# Patient Record
Sex: FEMALE | Race: WHITE | NOT HISPANIC OR LATINO | Employment: OTHER | URBAN - METROPOLITAN AREA
[De-identification: names, ages, dates, MRNs, and addresses within clinical notes are randomized per-mention and may not be internally consistent; named-entity substitution may affect disease eponyms.]

---

## 2020-02-22 ENCOUNTER — HOSPITAL ENCOUNTER (INPATIENT)
Facility: HOSPITAL | Age: 85
LOS: 2 days | Discharge: ANOTHER HOSPITAL | DRG: 813 | End: 2020-02-24
Attending: HOSPITALIST | Admitting: HOSPITALIST
Payer: MEDICARE

## 2020-02-22 ENCOUNTER — HOSPITAL ENCOUNTER (EMERGENCY)
Facility: HOSPITAL | Age: 85
Discharge: ANOTHER HOSPITAL | End: 2020-02-22
Attending: INTERNAL MEDICINE
Payer: MEDICARE

## 2020-02-22 VITALS
SYSTOLIC BLOOD PRESSURE: 138 MMHG | BODY MASS INDEX: 19.99 KG/M2 | WEIGHT: 120 LBS | TEMPERATURE: 98 F | HEIGHT: 65 IN | OXYGEN SATURATION: 95 % | HEART RATE: 93 BPM | DIASTOLIC BLOOD PRESSURE: 53 MMHG | RESPIRATION RATE: 22 BRPM

## 2020-02-22 DIAGNOSIS — I48.91 ATRIAL FIBRILLATION: ICD-10-CM

## 2020-02-22 DIAGNOSIS — R06.02 SOB (SHORTNESS OF BREATH): ICD-10-CM

## 2020-02-22 DIAGNOSIS — I50.811 ACUTE RIGHT-SIDED CONGESTIVE HEART FAILURE: ICD-10-CM

## 2020-02-22 DIAGNOSIS — D69.6 THROMBOCYTOPENIA: ICD-10-CM

## 2020-02-22 DIAGNOSIS — I34.0 MITRAL INSUFFICIENCY, ACUTE: ICD-10-CM

## 2020-02-22 DIAGNOSIS — I34.0 NONRHEUMATIC MITRAL VALVE REGURGITATION: ICD-10-CM

## 2020-02-22 DIAGNOSIS — I50.31 ACUTE DIASTOLIC CONGESTIVE HEART FAILURE: ICD-10-CM

## 2020-02-22 DIAGNOSIS — I48.91 ATRIAL FIBRILLATION, NEW ONSET: Primary | ICD-10-CM

## 2020-02-22 DIAGNOSIS — I49.9 ARRHYTHMIA: ICD-10-CM

## 2020-02-22 DIAGNOSIS — R07.89 OTHER CHEST PAIN: ICD-10-CM

## 2020-02-22 DIAGNOSIS — R07.9 CHEST PAIN: ICD-10-CM

## 2020-02-22 DIAGNOSIS — I48.91 ATRIAL FIBRILLATION WITH RAPID VENTRICULAR RESPONSE: ICD-10-CM

## 2020-02-22 DIAGNOSIS — D52.0 ANEMIA, MACROCYTIC, NUTRITIONAL: Primary | ICD-10-CM

## 2020-02-22 PROBLEM — I50.9 ACUTE CONGESTIVE HEART FAILURE: Status: ACTIVE | Noted: 2020-02-22

## 2020-02-22 PROBLEM — J96.01 ACUTE RESPIRATORY FAILURE WITH HYPOXIA: Status: ACTIVE | Noted: 2020-02-22

## 2020-02-22 PROBLEM — I10 BENIGN ESSENTIAL HYPERTENSION: Status: ACTIVE | Noted: 2020-02-22

## 2020-02-22 LAB
ABO + RH BLD: NORMAL
ALBUMIN SERPL BCP-MCNC: 3.7 G/DL (ref 3.5–5.2)
ALBUMIN SERPL BCP-MCNC: 3.9 G/DL (ref 3.5–5.2)
ALP SERPL-CCNC: 47 U/L (ref 55–135)
ALP SERPL-CCNC: 50 U/L (ref 55–135)
ALT SERPL W/O P-5'-P-CCNC: 34 U/L (ref 10–44)
ALT SERPL W/O P-5'-P-CCNC: 38 U/L (ref 10–44)
ANION GAP SERPL CALC-SCNC: 10 MMOL/L (ref 8–16)
ANION GAP SERPL CALC-SCNC: 11 MMOL/L (ref 8–16)
APTT BLDCRRT: <21 SEC (ref 21–32)
AST SERPL-CCNC: 26 U/L (ref 10–40)
AST SERPL-CCNC: 33 U/L (ref 10–40)
BASOPHILS # BLD AUTO: 0.01 K/UL (ref 0–0.2)
BASOPHILS NFR BLD: 0 % (ref 0–1.9)
BASOPHILS NFR BLD: 0.1 % (ref 0–1.9)
BILIRUB SERPL-MCNC: 0.9 MG/DL (ref 0.1–1)
BILIRUB SERPL-MCNC: 1 MG/DL (ref 0.1–1)
BLD GP AB SCN CELLS X3 SERPL QL: NORMAL
BLD PROD TYP BPU: NORMAL
BLOOD UNIT EXPIRATION DATE: NORMAL
BLOOD UNIT TYPE CODE: 5100
BLOOD UNIT TYPE: NORMAL
BNP SERPL-MCNC: 736 PG/ML (ref 0–99)
BUN SERPL-MCNC: 14 MG/DL (ref 8–23)
BUN SERPL-MCNC: 16 MG/DL (ref 8–23)
CALCIUM SERPL-MCNC: 9.1 MG/DL (ref 8.7–10.5)
CALCIUM SERPL-MCNC: 9.1 MG/DL (ref 8.7–10.5)
CHLORIDE SERPL-SCNC: 94 MMOL/L (ref 95–110)
CHLORIDE SERPL-SCNC: 99 MMOL/L (ref 95–110)
CO2 SERPL-SCNC: 31 MMOL/L (ref 23–29)
CO2 SERPL-SCNC: 37 MMOL/L (ref 23–29)
CODING SYSTEM: NORMAL
CREAT SERPL-MCNC: 0.6 MG/DL (ref 0.5–1.4)
CREAT SERPL-MCNC: 0.8 MG/DL (ref 0.5–1.4)
DIFFERENTIAL METHOD: ABNORMAL
DIFFERENTIAL METHOD: ABNORMAL
DISPENSE STATUS: NORMAL
EOSINOPHIL # BLD AUTO: 0 K/UL (ref 0–0.5)
EOSINOPHIL NFR BLD: 0.2 % (ref 0–8)
EOSINOPHIL NFR BLD: 2 % (ref 0–8)
ERYTHROCYTE [DISTWIDTH] IN BLOOD BY AUTOMATED COUNT: 12.8 % (ref 11.5–14.5)
ERYTHROCYTE [DISTWIDTH] IN BLOOD BY AUTOMATED COUNT: 13 % (ref 11.5–14.5)
EST. GFR  (AFRICAN AMERICAN): >60 ML/MIN/1.73 M^2
EST. GFR  (AFRICAN AMERICAN): >60 ML/MIN/1.73 M^2
EST. GFR  (NON AFRICAN AMERICAN): >60 ML/MIN/1.73 M^2
EST. GFR  (NON AFRICAN AMERICAN): >60 ML/MIN/1.73 M^2
GLUCOSE SERPL-MCNC: 111 MG/DL (ref 70–110)
GLUCOSE SERPL-MCNC: 123 MG/DL (ref 70–110)
HCT VFR BLD AUTO: 42.7 % (ref 37–48.5)
HCT VFR BLD AUTO: 44.6 % (ref 37–48.5)
HGB BLD-MCNC: 13.6 G/DL (ref 12–16)
HGB BLD-MCNC: 14.3 G/DL (ref 12–16)
IMM GRANULOCYTES # BLD AUTO: 0.03 K/UL (ref 0–0.04)
IMM GRANULOCYTES # BLD AUTO: ABNORMAL K/UL (ref 0–0.04)
IMM GRANULOCYTES NFR BLD AUTO: 0.4 % (ref 0–0.5)
IMM GRANULOCYTES NFR BLD AUTO: ABNORMAL % (ref 0–0.5)
INFLUENZA A, MOLECULAR: NEGATIVE
INFLUENZA B, MOLECULAR: NEGATIVE
INR PPP: 1.1 (ref 0.8–1.2)
LYMPHOCYTES # BLD AUTO: 0.7 K/UL (ref 1–4.8)
LYMPHOCYTES NFR BLD: 12 % (ref 18–48)
LYMPHOCYTES NFR BLD: 9.2 % (ref 18–48)
MAGNESIUM SERPL-MCNC: 1.5 MG/DL (ref 1.6–2.6)
MCH RBC QN AUTO: 32.1 PG (ref 27–31)
MCH RBC QN AUTO: 32.1 PG (ref 27–31)
MCHC RBC AUTO-ENTMCNC: 31.9 G/DL (ref 32–36)
MCHC RBC AUTO-ENTMCNC: 32.1 G/DL (ref 32–36)
MCV RBC AUTO: 100 FL (ref 82–98)
MCV RBC AUTO: 101 FL (ref 82–98)
MONOCYTES # BLD AUTO: 0.7 K/UL (ref 0.3–1)
MONOCYTES NFR BLD: 8.8 % (ref 4–15)
MONOCYTES NFR BLD: 9 % (ref 4–15)
NEUTROPHILS # BLD AUTO: 6.5 K/UL (ref 1.8–7.7)
NEUTROPHILS NFR BLD: 77 % (ref 38–73)
NEUTROPHILS NFR BLD: 81.3 % (ref 38–73)
NRBC BLD-RTO: 0 /100 WBC
NRBC BLD-RTO: 0 /100 WBC
NUM UNITS TRANS WBC-POOR PLATPHERESIS: NORMAL
PLATELET # BLD AUTO: 16 K/UL (ref 150–350)
PLATELET # BLD AUTO: 9 K/UL (ref 150–350)
PMV BLD AUTO: 11.6 FL (ref 9.2–12.9)
PMV BLD AUTO: 13.7 FL (ref 9.2–12.9)
POTASSIUM SERPL-SCNC: 4 MMOL/L (ref 3.5–5.1)
POTASSIUM SERPL-SCNC: 4.1 MMOL/L (ref 3.5–5.1)
PROT SERPL-MCNC: 6.5 G/DL (ref 6–8.4)
PROT SERPL-MCNC: 7 G/DL (ref 6–8.4)
PROTHROMBIN TIME: 11.5 SEC (ref 9–12.5)
RBC # BLD AUTO: 4.24 M/UL (ref 4–5.4)
RBC # BLD AUTO: 4.46 M/UL (ref 4–5.4)
SODIUM SERPL-SCNC: 140 MMOL/L (ref 136–145)
SODIUM SERPL-SCNC: 142 MMOL/L (ref 136–145)
SPECIMEN SOURCE: NORMAL
TROPONIN I SERPL DL<=0.01 NG/ML-MCNC: 0.03 NG/ML (ref 0.02–0.5)
TROPONIN I SERPL DL<=0.01 NG/ML-MCNC: 0.05 NG/ML
WBC # BLD AUTO: 8.03 K/UL (ref 3.9–12.7)
WBC # BLD AUTO: 8.5 K/UL (ref 3.9–12.7)

## 2020-02-22 PROCEDURE — 25000003 PHARM REV CODE 250: Performed by: INTERNAL MEDICINE

## 2020-02-22 PROCEDURE — 85025 COMPLETE CBC W/AUTO DIFF WBC: CPT

## 2020-02-22 PROCEDURE — 36430 TRANSFUSION BLD/BLD COMPNT: CPT

## 2020-02-22 PROCEDURE — 85730 THROMBOPLASTIN TIME PARTIAL: CPT

## 2020-02-22 PROCEDURE — 83735 ASSAY OF MAGNESIUM: CPT

## 2020-02-22 PROCEDURE — 93005 ELECTROCARDIOGRAM TRACING: CPT

## 2020-02-22 PROCEDURE — 71045 X-RAY EXAM CHEST 1 VIEW: CPT | Mod: TC,FY

## 2020-02-22 PROCEDURE — 71045 XR CHEST AP PORTABLE: ICD-10-PCS | Mod: 26,,, | Performed by: RADIOLOGY

## 2020-02-22 PROCEDURE — 80053 COMPREHEN METABOLIC PANEL: CPT | Mod: 91

## 2020-02-22 PROCEDURE — 80053 COMPREHEN METABOLIC PANEL: CPT

## 2020-02-22 PROCEDURE — 36415 COLL VENOUS BLD VENIPUNCTURE: CPT

## 2020-02-22 PROCEDURE — 85610 PROTHROMBIN TIME: CPT

## 2020-02-22 PROCEDURE — 96374 THER/PROPH/DIAG INJ IV PUSH: CPT

## 2020-02-22 PROCEDURE — 84484 ASSAY OF TROPONIN QUANT: CPT | Mod: 91

## 2020-02-22 PROCEDURE — 96375 TX/PRO/DX INJ NEW DRUG ADDON: CPT

## 2020-02-22 PROCEDURE — 99285 EMERGENCY DEPT VISIT HI MDM: CPT | Mod: 25

## 2020-02-22 PROCEDURE — 85025 COMPLETE CBC W/AUTO DIFF WBC: CPT | Mod: 91

## 2020-02-22 PROCEDURE — 63600175 PHARM REV CODE 636 W HCPCS: Performed by: INTERNAL MEDICINE

## 2020-02-22 PROCEDURE — 96376 TX/PRO/DX INJ SAME DRUG ADON: CPT

## 2020-02-22 PROCEDURE — 86901 BLOOD TYPING SEROLOGIC RH(D): CPT

## 2020-02-22 PROCEDURE — 83880 ASSAY OF NATRIURETIC PEPTIDE: CPT

## 2020-02-22 PROCEDURE — P9037 PLATE PHERES LEUKOREDU IRRAD: HCPCS

## 2020-02-22 PROCEDURE — 71045 X-RAY EXAM CHEST 1 VIEW: CPT | Mod: 26,,, | Performed by: RADIOLOGY

## 2020-02-22 PROCEDURE — 27000221 HC OXYGEN, UP TO 24 HOURS

## 2020-02-22 PROCEDURE — 84484 ASSAY OF TROPONIN QUANT: CPT

## 2020-02-22 PROCEDURE — 20000000 HC ICU ROOM

## 2020-02-22 PROCEDURE — 87502 INFLUENZA DNA AMP PROBE: CPT

## 2020-02-22 RX ORDER — MAGNESIUM SULFATE HEPTAHYDRATE 40 MG/ML
4 INJECTION, SOLUTION INTRAVENOUS
Status: DISCONTINUED | OUTPATIENT
Start: 2020-02-22 | End: 2020-02-24 | Stop reason: HOSPADM

## 2020-02-22 RX ORDER — LABETALOL HYDROCHLORIDE 5 MG/ML
10 INJECTION, SOLUTION INTRAVENOUS
Status: COMPLETED | OUTPATIENT
Start: 2020-02-22 | End: 2020-02-22

## 2020-02-22 RX ORDER — ACETAMINOPHEN 325 MG/1
650 TABLET ORAL EVERY 4 HOURS PRN
Status: DISCONTINUED | OUTPATIENT
Start: 2020-02-22 | End: 2020-02-24 | Stop reason: HOSPADM

## 2020-02-22 RX ORDER — LISINOPRIL 40 MG/1
40 TABLET ORAL DAILY
COMMUNITY

## 2020-02-22 RX ORDER — POTASSIUM CHLORIDE 20 MEQ/1
40 TABLET, EXTENDED RELEASE ORAL
Status: DISCONTINUED | OUTPATIENT
Start: 2020-02-22 | End: 2020-02-24 | Stop reason: HOSPADM

## 2020-02-22 RX ORDER — POTASSIUM CHLORIDE 7.45 MG/ML
40 INJECTION INTRAVENOUS
Status: DISCONTINUED | OUTPATIENT
Start: 2020-02-22 | End: 2020-02-24 | Stop reason: HOSPADM

## 2020-02-22 RX ORDER — FUROSEMIDE 10 MG/ML
40 INJECTION INTRAMUSCULAR; INTRAVENOUS
Status: COMPLETED | OUTPATIENT
Start: 2020-02-22 | End: 2020-02-22

## 2020-02-22 RX ORDER — HYDRALAZINE HYDROCHLORIDE 25 MG/1
50 TABLET, FILM COATED ORAL
Status: COMPLETED | OUTPATIENT
Start: 2020-02-22 | End: 2020-02-22

## 2020-02-22 RX ORDER — CALCIUM CHLORIDE IN 0.9 % NACL 1 G/100 ML
1 INTRAVENOUS SOLUTION, PIGGYBACK (ML) INTRAVENOUS
Status: DISCONTINUED | OUTPATIENT
Start: 2020-02-22 | End: 2020-02-24 | Stop reason: HOSPADM

## 2020-02-22 RX ORDER — MAGNESIUM SULFATE HEPTAHYDRATE 40 MG/ML
2 INJECTION, SOLUTION INTRAVENOUS
Status: DISCONTINUED | OUTPATIENT
Start: 2020-02-22 | End: 2020-02-24 | Stop reason: HOSPADM

## 2020-02-22 RX ORDER — ONDANSETRON 2 MG/ML
4 INJECTION INTRAMUSCULAR; INTRAVENOUS EVERY 8 HOURS PRN
Status: DISCONTINUED | OUTPATIENT
Start: 2020-02-22 | End: 2020-02-24 | Stop reason: HOSPADM

## 2020-02-22 RX ORDER — POTASSIUM CHLORIDE 7.45 MG/ML
20 INJECTION INTRAVENOUS
Status: DISCONTINUED | OUTPATIENT
Start: 2020-02-22 | End: 2020-02-24 | Stop reason: HOSPADM

## 2020-02-22 RX ORDER — METOPROLOL TARTRATE 1 MG/ML
5 INJECTION, SOLUTION INTRAVENOUS ONCE
Status: COMPLETED | OUTPATIENT
Start: 2020-02-22 | End: 2020-02-22

## 2020-02-22 RX ORDER — POTASSIUM CHLORIDE 20 MEQ/1
20 TABLET, EXTENDED RELEASE ORAL
Status: DISCONTINUED | OUTPATIENT
Start: 2020-02-22 | End: 2020-02-24 | Stop reason: HOSPADM

## 2020-02-22 RX ORDER — DIGOXIN 0.25 MG/ML
500 INJECTION INTRAMUSCULAR; INTRAVENOUS ONCE
Status: COMPLETED | OUTPATIENT
Start: 2020-02-22 | End: 2020-02-22

## 2020-02-22 RX ORDER — LANOLIN ALCOHOL/MO/W.PET/CERES
800 CREAM (GRAM) TOPICAL
Status: DISCONTINUED | OUTPATIENT
Start: 2020-02-22 | End: 2020-02-24 | Stop reason: HOSPADM

## 2020-02-22 RX ORDER — ACETAMINOPHEN 325 MG/1
650 TABLET ORAL EVERY 8 HOURS PRN
Status: DISCONTINUED | OUTPATIENT
Start: 2020-02-22 | End: 2020-02-24 | Stop reason: HOSPADM

## 2020-02-22 RX ORDER — DIGOXIN 0.25 MG/ML
500 INJECTION INTRAMUSCULAR; INTRAVENOUS
Status: COMPLETED | OUTPATIENT
Start: 2020-02-22 | End: 2020-02-22

## 2020-02-22 RX ORDER — LABETALOL 100 MG/1
100 TABLET, FILM COATED ORAL
Status: COMPLETED | OUTPATIENT
Start: 2020-02-22 | End: 2020-02-22

## 2020-02-22 RX ORDER — MAGNESIUM SULFATE 1 G/100ML
1 INJECTION INTRAVENOUS
Status: DISCONTINUED | OUTPATIENT
Start: 2020-02-22 | End: 2020-02-24 | Stop reason: HOSPADM

## 2020-02-22 RX ORDER — METOPROLOL TARTRATE 25 MG/1
25 TABLET, FILM COATED ORAL 2 TIMES DAILY
Status: DISCONTINUED | OUTPATIENT
Start: 2020-02-22 | End: 2020-02-23

## 2020-02-22 RX ORDER — HYDROCODONE BITARTRATE AND ACETAMINOPHEN 500; 5 MG/1; MG/1
TABLET ORAL
Status: DISCONTINUED | OUTPATIENT
Start: 2020-02-22 | End: 2020-02-24 | Stop reason: HOSPADM

## 2020-02-22 RX ORDER — AMLODIPINE BESYLATE 2.5 MG/1
2.5 TABLET ORAL DAILY
Status: ON HOLD | COMMUNITY
End: 2020-09-23 | Stop reason: SDUPTHER

## 2020-02-22 RX ADMIN — MAGNESIUM SULFATE 2 G: 2 INJECTION INTRAVENOUS at 09:02

## 2020-02-22 RX ADMIN — LABETALOL HYDROCHLORIDE 10 MG: 5 INJECTION, SOLUTION INTRAVENOUS at 02:02

## 2020-02-22 RX ADMIN — DIGOXIN 500 MCG: 0.25 INJECTION INTRAMUSCULAR; INTRAVENOUS at 02:02

## 2020-02-22 RX ADMIN — NITROGLYCERIN 2 INCH: 20 OINTMENT TOPICAL at 01:02

## 2020-02-22 RX ADMIN — ACETAMINOPHEN 650 MG: 325 TABLET ORAL at 11:02

## 2020-02-22 RX ADMIN — METOPROLOL TARTRATE 25 MG: 25 TABLET ORAL at 09:02

## 2020-02-22 RX ADMIN — LABETALOL HYDROCHLORIDE 100 MG: 100 TABLET, FILM COATED ORAL at 03:02

## 2020-02-22 RX ADMIN — FUROSEMIDE 40 MG: 10 INJECTION, SOLUTION INTRAMUSCULAR; INTRAVENOUS at 01:02

## 2020-02-22 RX ADMIN — DIGOXIN 500 MCG: 0.25 INJECTION INTRAMUSCULAR; INTRAVENOUS at 01:02

## 2020-02-22 RX ADMIN — HYDRALAZINE HYDROCHLORIDE 50 MG: 25 TABLET, FILM COATED ORAL at 01:02

## 2020-02-22 RX ADMIN — METOPROLOL TARTRATE 5 MG: 1 INJECTION, SOLUTION INTRAVENOUS at 08:02

## 2020-02-22 NOTE — ED PROVIDER NOTES
Encounter Date: 2/22/2020       History     Chief Complaint   Patient presents with    Headache    Back Pain    Shortness of Breath    Weakness    Cough     Patient comes in with shortness of breath, weakness, inability to lay flat, and cough.  She also states that she has been having headache and back pain. She has never had shortness of breath weakness like this in the past.  She has no history of heart disease.  She has never been told that she had a murmur.  She has had swelling of her feet but this is not brand new.  She is not on any diuretics for her swelling. She is mildly tachypneic on examination        Review of patient's allergies indicates:  No Known Allergies  Past Medical History:   Diagnosis Date    Hypertension      History reviewed. No pertinent surgical history.  History reviewed. No pertinent family history.  Social History     Tobacco Use    Smoking status: Never Smoker   Substance Use Topics    Alcohol use: Never     Frequency: Never    Drug use: Never     Review of Systems   Constitutional: Positive for fatigue. Negative for fever.   HENT: Negative for sore throat.    Respiratory: Positive for cough, chest tightness and shortness of breath.    Cardiovascular: Positive for leg swelling. Negative for chest pain.   Gastrointestinal: Negative for nausea.   Genitourinary: Negative for dysuria.   Musculoskeletal: Negative for back pain.   Skin: Negative for rash.   Neurological: Negative for weakness.   Hematological: Does not bruise/bleed easily.   All other systems reviewed and are negative.      Physical Exam     Initial Vitals [02/22/20 1234]   BP Pulse Resp Temp SpO2   (!) 155/85 (!) 124 16 97.8 °F (36.6 °C) (!) 90 %      MAP       --         Physical Exam    Nursing note and vitals reviewed.  Constitutional: Vital signs are normal. She appears well-developed and well-nourished. She is active and cooperative.   HENT:   Head: Normocephalic and atraumatic.   Eyes: Conjunctivae and lids  are normal. Lids are everted and swept, no foreign bodies found.   Neck: Trachea normal, normal range of motion and full passive range of motion without pain. Neck supple.   Cardiovascular: S1 normal, S2 normal, intact distal pulses and normal pulses.  No extrasystoles are present.    Murmur heard.  Patient has a holosystolic murmur heard best in the left axillary line radiating to the back.  Is not heard anterior chest wall very much.  It starts at the S1 ins at the S2.      Patient is in atrial fibrillation with a rapid response.  She has PVCs   Pulmonary/Chest: She has rales.   Abdominal: Soft. Normal appearance and bowel sounds are normal.   Musculoskeletal: Normal range of motion.   Neurological: She is alert. She has normal reflexes. GCS eye subscore is 4. GCS verbal subscore is 5. GCS motor subscore is 6.   Skin: Skin is warm, dry and intact. Capillary refill takes less than 2 seconds.   Psychiatric: She has a normal mood and affect. Her speech is normal and behavior is normal. Cognition and memory are normal.         ED Course   Procedures  Labs Reviewed   CBC W/ AUTO DIFFERENTIAL - Abnormal; Notable for the following components:       Result Value    Mean Corpuscular Volume 100 (*)     Mean Corpuscular Hemoglobin 32.1 (*)     Platelets 16 (*)     Gran% 77.0 (*)     Lymph% 12.0 (*)     All other components within normal limits    Narrative:     plt of 16   critical result(s) called and verbal readback obtained   from   Dr. Cox@[id number] by guillaume on 02.22.20 by ALLISON 02/22/2020 14:42   COMPREHENSIVE METABOLIC PANEL - Abnormal; Notable for the following components:    CO2 31 (*)     Glucose 111 (*)     Alkaline Phosphatase 50 (*)     All other components within normal limits   B-TYPE NATRIURETIC PEPTIDE - Abnormal; Notable for the following components:     (*)     All other components within normal limits   INFLUENZA A & B BY MOLECULAR   PROTIME-INR   APTT     EKG Readings: (Independently Interpreted)    Initial Reading: No STEMI. Rhythm: Atrial Fibrillation. Ectopy: Greater than 6/min. ST Segments: Normal ST Segments. T Waves: Normal. Axis: Normal. Clinical Impression: Atrial Fibrillation with RVR with PVCs       Imaging Results          X-Ray Chest AP Portable (Final result)  Result time 02/22/20 13:55:05    Final result by Andrew Carter MD (02/22/20 13:55:05)                 Impression:      Cardiomediastinal silhouette enlargement with findings suggestive of underlying COPD.  Superimposed interstitial edema not excluded.      Electronically signed by: Andrew Carter  Date:    02/22/2020  Time:    13:55             Narrative:    EXAMINATION:  XR CHEST AP PORTABLE    CLINICAL HISTORY:  CHF;.    TECHNIQUE:  Single frontal portable view of the chest was performed.    COMPARISON:  None.    FINDINGS:  Cardiac monitor wires overlie the chest.  Cardiomediastinal silhouette is enlarged.  Calcific plaque projects over the aortic arch.Lungs are hyperexpanded with increased interstitial markings bilaterally and mild flattening of the hemidiaphragms suggestive of underlying COPD.  Superimposed interstitial edema not excluded.  No focal consolidation, pleural effusion, or pneumothorax.  Bones are intact.                              X-Rays:   Independently Interpreted Readings:   Chest X-Ray: Cardiomegaly present.  Increased vascular markings consistent with CHF are present.     Medical Decision Making:   Clinical Tests:   Lab Tests: Ordered and Reviewed  The following lab test(s) were unremarkable: CBC, CMP and BNP       <> Summary of Lab: Patient has elevated BNP.  She also has marked thrombocytopenia.  I exam of the slide myself and there or very few platelets noted. The test was run 3 times with ranging between 8016 1000 platelets.  Radiological Study: Ordered and Reviewed  Medical Tests: Ordered and Reviewed  ED Management:  Chest x-ray showed cardiomegaly with mild pulmonary edema.  EKG showed atrial fib with rapid  response and PVCs.  Patient was hypertensive in mildly tachypneic on admission.  She was found to have a very loud mitral insufficiency murmur.  This was confirmed with bedside echocardiogram.  She has normal left ventricular function. She has a wide open mitral insufficiency.  Rupture of the cardia E was not visualized but is possible.  She has some calcification on the mitral valve but vegetations are not seen.  Her left atrium is not tremendously dilated suggesting this is possibly fairly recent.  She has been seen by her doctor in the recent past and she has no one is made mention of any murmur.  Her murmur in her left axilla is very loud.    She was given nitroglycerin and hydralazine for her hypertension.  This was to vaso dilate due to the normal left ventricle and mitral insufficiency.  She was then given digoxin initially 500 mcg which she had minimal benefit then another 500 mcg which slowed her down slightly and converted her to sinus rhythm. Due to her rate still being significantly elevated she had a 5 mg dose of labetalol which was repeated and slowed her enough to evaluate effect on her ventricular function and tolerance tip.  She tolerated quite well. She was given 100 mg of labetalol p.o..  At the present time her rate is approximately 90 and her blood pressure is 110 over 60.  She is comfortable.  She is was also given Lasix 40 IV push.  She states she is breathing much better and feels much better.    Patient should be transferred for several reasons.  1.  Evaluation of the mitral insufficiency to of see if it is acute and if TAVR is a feasible treatment or if medical treatment alone can manage this problem.  2.  Severe thrombocytopenia unknown etiology.  The platelets are low enough level whether need hematologic evaluation.  3.  Atrial fibrillation which is now controlled.    Total time on this patient with critical care is 3 hr                                 Clinical Impression:        ICD-10-CM ICD-9-CM   1. Atrial fibrillation, new onset I48.91 427.31   2. Atrial fibrillation I48.91 427.31   3. Mitral insufficiency, acute I34.0 424.0   4. Thrombocytopenia D69.6 287.5   5. Acute diastolic congestive heart failure I50.31 428.31     428.0                             Jermain Brock MD  02/22/20 1622       Jermain Brock MD  04/01/20 [id number]

## 2020-02-23 ENCOUNTER — CLINICAL SUPPORT (OUTPATIENT)
Dept: CARDIOLOGY | Facility: HOSPITAL | Age: 85
DRG: 813 | End: 2020-02-23
Attending: INTERNAL MEDICINE
Payer: MEDICARE

## 2020-02-23 PROBLEM — I34.0 NONRHEUMATIC MITRAL VALVE REGURGITATION: Status: ACTIVE | Noted: 2020-02-23

## 2020-02-23 LAB
ANION GAP SERPL CALC-SCNC: 13 MMOL/L (ref 8–16)
ANION GAP SERPL CALC-SCNC: 7 MMOL/L (ref 8–16)
AORTIC ROOT ANNULUS: 2.83 CM
AORTIC VALVE CUSP SEPERATION: 1.67 CM
APTT PPP: 25.3 SEC (ref 23.6–33.3)
AV INDEX (PROSTH): 0.66
AV MEAN GRADIENT: 6 MMHG
AV PEAK GRADIENT: 11 MMHG
AV VALVE AREA: 2.05 CM2
AV VELOCITY RATIO: 53.75
BASOPHILS # BLD AUTO: 0 K/UL (ref 0–0.2)
BASOPHILS # BLD AUTO: 0.01 K/UL (ref 0–0.2)
BASOPHILS NFR BLD: 0 % (ref 0–1.9)
BASOPHILS NFR BLD: 0.2 % (ref 0–1.9)
BNP SERPL-MCNC: 891 PG/ML (ref 0–99)
BSA FOR ECHO PROCEDURE: 1.58 M2
BUN SERPL-MCNC: 20 MG/DL (ref 8–23)
BUN SERPL-MCNC: 21 MG/DL (ref 8–23)
CALCIUM SERPL-MCNC: 8.8 MG/DL (ref 8.7–10.5)
CALCIUM SERPL-MCNC: 8.9 MG/DL (ref 8.7–10.5)
CHLORIDE SERPL-SCNC: 96 MMOL/L (ref 95–110)
CHLORIDE SERPL-SCNC: 96 MMOL/L (ref 95–110)
CO2 SERPL-SCNC: 34 MMOL/L (ref 23–29)
CO2 SERPL-SCNC: 37 MMOL/L (ref 23–29)
CREAT SERPL-MCNC: 0.9 MG/DL (ref 0.5–1.4)
CREAT SERPL-MCNC: 0.9 MG/DL (ref 0.5–1.4)
CV ECHO LV RWT: 0.52 CM
DIFFERENTIAL METHOD: ABNORMAL
DIFFERENTIAL METHOD: ABNORMAL
DOP CALC AO PEAK VEL: 1.66 M/S
DOP CALC AO VTI: 28.61 CM
DOP CALC LVOT AREA: 3.1 CM2
DOP CALC LVOT DIAMETER: 1.99 CM
DOP CALC LVOT PEAK VEL: 89.22 M/S
DOP CALC LVOT STROKE VOLUME: 58.69 CM3
DOP CALCLVOT PEAK VEL VTI: 18.88 CM
E WAVE DECELERATION TIME: 166.07 MSEC
E/A RATIO: 1.25
E/E' RATIO: 18.63 M/S
ECHO LV POSTERIOR WALL: 0.93 CM (ref 0.6–1.1)
EOSINOPHIL # BLD AUTO: 0 K/UL (ref 0–0.5)
EOSINOPHIL # BLD AUTO: 0.1 K/UL (ref 0–0.5)
EOSINOPHIL NFR BLD: 0 % (ref 0–8)
EOSINOPHIL NFR BLD: 1.5 % (ref 0–8)
ERYTHROCYTE [DISTWIDTH] IN BLOOD BY AUTOMATED COUNT: 12.7 % (ref 11.5–14.5)
ERYTHROCYTE [DISTWIDTH] IN BLOOD BY AUTOMATED COUNT: 12.9 % (ref 11.5–14.5)
EST. GFR  (AFRICAN AMERICAN): >60 ML/MIN/1.73 M^2
EST. GFR  (AFRICAN AMERICAN): >60 ML/MIN/1.73 M^2
EST. GFR  (NON AFRICAN AMERICAN): 57.7 ML/MIN/1.73 M^2
EST. GFR  (NON AFRICAN AMERICAN): 57.7 ML/MIN/1.73 M^2
FRACTIONAL SHORTENING: 24 % (ref 28–44)
GLUCOSE SERPL-MCNC: 102 MG/DL (ref 70–110)
GLUCOSE SERPL-MCNC: 157 MG/DL (ref 70–110)
HCT VFR BLD AUTO: 38.4 % (ref 37–48.5)
HCT VFR BLD AUTO: 40.3 % (ref 37–48.5)
HGB BLD-MCNC: 11.8 G/DL (ref 12–16)
HGB BLD-MCNC: 12.3 G/DL (ref 12–16)
IMM GRANULOCYTES # BLD AUTO: 0.02 K/UL (ref 0–0.04)
IMM GRANULOCYTES # BLD AUTO: 0.04 K/UL (ref 0–0.04)
IMM GRANULOCYTES NFR BLD AUTO: 0.3 % (ref 0–0.5)
IMM GRANULOCYTES NFR BLD AUTO: 0.4 % (ref 0–0.5)
INR PPP: 1.1
INTERVENTRICULAR SEPTUM: 1.11 CM (ref 0.6–1.1)
LDH SERPL L TO P-CCNC: 162 U/L (ref 110–260)
LEFT ATRIUM SIZE: 3.83 CM
LEFT INTERNAL DIMENSION IN SYSTOLE: 2.7 CM (ref 2.1–4)
LEFT VENTRICLE MASS INDEX: 68 G/M2
LEFT VENTRICULAR INTERNAL DIMENSION IN DIASTOLE: 3.57 CM (ref 3.5–6)
LEFT VENTRICULAR MASS: 109.64 G
LV LATERAL E/E' RATIO: 14.9 M/S
LV SEPTAL E/E' RATIO: 24.83 M/S
LYMPHOCYTES # BLD AUTO: 0.3 K/UL (ref 1–4.8)
LYMPHOCYTES # BLD AUTO: 1 K/UL (ref 1–4.8)
LYMPHOCYTES NFR BLD: 14.9 % (ref 18–48)
LYMPHOCYTES NFR BLD: 3.4 % (ref 18–48)
MAGNESIUM SERPL-MCNC: 1.9 MG/DL (ref 1.6–2.6)
MAGNESIUM SERPL-MCNC: 2.1 MG/DL (ref 1.6–2.6)
MCH RBC QN AUTO: 32 PG (ref 27–31)
MCH RBC QN AUTO: 32 PG (ref 27–31)
MCHC RBC AUTO-ENTMCNC: 30.5 G/DL (ref 32–36)
MCHC RBC AUTO-ENTMCNC: 30.7 G/DL (ref 32–36)
MCV RBC AUTO: 104 FL (ref 82–98)
MCV RBC AUTO: 105 FL (ref 82–98)
MONOCYTES # BLD AUTO: 0.4 K/UL (ref 0.3–1)
MONOCYTES # BLD AUTO: 1 K/UL (ref 0.3–1)
MONOCYTES NFR BLD: 15 % (ref 4–15)
MONOCYTES NFR BLD: 4.6 % (ref 4–15)
MV PEAK A VEL: 1.19 M/S
MV PEAK E VEL: 1.49 M/S
NEUTROPHILS # BLD AUTO: 4.5 K/UL (ref 1.8–7.7)
NEUTROPHILS # BLD AUTO: 8.3 K/UL (ref 1.8–7.7)
NEUTROPHILS NFR BLD: 68.1 % (ref 38–73)
NEUTROPHILS NFR BLD: 91.6 % (ref 38–73)
NRBC BLD-RTO: 0 /100 WBC
NRBC BLD-RTO: 0 /100 WBC
PATH REV BLD -IMP: NORMAL
PISA TR MAX VEL: 2.3 M/S
PLATELET # BLD AUTO: 15 K/UL (ref 150–350)
PLATELET # BLD AUTO: 51 K/UL (ref 150–350)
PLATELET BLD QL SMEAR: ABNORMAL
PMV BLD AUTO: 10.5 FL (ref 9.2–12.9)
PMV BLD AUTO: 10.5 FL (ref 9.2–12.9)
POTASSIUM SERPL-SCNC: 4.3 MMOL/L (ref 3.5–5.1)
POTASSIUM SERPL-SCNC: 4.9 MMOL/L (ref 3.5–5.1)
PROTHROMBIN TIME: 13.4 SEC (ref 10.6–14.8)
PV PEAK VELOCITY: 125.89 CM/S
RA PRESSURE: 8 MMHG
RBC # BLD AUTO: 3.69 M/UL (ref 4–5.4)
RBC # BLD AUTO: 3.84 M/UL (ref 4–5.4)
RIGHT VENTRICULAR END-DIASTOLIC DIMENSION: 180 CM
SODIUM SERPL-SCNC: 140 MMOL/L (ref 136–145)
SODIUM SERPL-SCNC: 143 MMOL/L (ref 136–145)
TDI LATERAL: 0.1 M/S
TDI SEPTAL: 0.06 M/S
TDI: 0.08 M/S
TR MAX PG: 21 MMHG
TROPONIN I SERPL DL<=0.01 NG/ML-MCNC: 0.06 NG/ML
TROPONIN I SERPL DL<=0.01 NG/ML-MCNC: 0.06 NG/ML
TV REST PULMONARY ARTERY PRESSURE: 29 MMHG
WBC # BLD AUTO: 6.58 K/UL (ref 3.9–12.7)
WBC # BLD AUTO: 9.01 K/UL (ref 3.9–12.7)

## 2020-02-23 PROCEDURE — 25500020 PHARM REV CODE 255: Performed by: INTERNAL MEDICINE

## 2020-02-23 PROCEDURE — 94761 N-INVAS EAR/PLS OXIMETRY MLT: CPT

## 2020-02-23 PROCEDURE — 25000003 PHARM REV CODE 250: Performed by: INTERNAL MEDICINE

## 2020-02-23 PROCEDURE — 25000003 PHARM REV CODE 250

## 2020-02-23 PROCEDURE — 83615 LACTATE (LD) (LDH) ENZYME: CPT

## 2020-02-23 PROCEDURE — 80048 BASIC METABOLIC PNL TOTAL CA: CPT

## 2020-02-23 PROCEDURE — 93306 TTE W/DOPPLER COMPLETE: CPT

## 2020-02-23 PROCEDURE — 84484 ASSAY OF TROPONIN QUANT: CPT | Mod: 91

## 2020-02-23 PROCEDURE — 85730 THROMBOPLASTIN TIME PARTIAL: CPT

## 2020-02-23 PROCEDURE — 83735 ASSAY OF MAGNESIUM: CPT

## 2020-02-23 PROCEDURE — 99291 PR CRITICAL CARE, E/M 30-74 MINUTES: ICD-10-PCS | Mod: ,,, | Performed by: INTERNAL MEDICINE

## 2020-02-23 PROCEDURE — 20000000 HC ICU ROOM

## 2020-02-23 PROCEDURE — 63700000 PHARM REV CODE 250 ALT 637 W/O HCPCS

## 2020-02-23 PROCEDURE — 93005 ELECTROCARDIOGRAM TRACING: CPT

## 2020-02-23 PROCEDURE — 63600175 PHARM REV CODE 636 W HCPCS: Performed by: INTERNAL MEDICINE

## 2020-02-23 PROCEDURE — 36415 COLL VENOUS BLD VENIPUNCTURE: CPT

## 2020-02-23 PROCEDURE — 51702 INSERT TEMP BLADDER CATH: CPT

## 2020-02-23 PROCEDURE — 85025 COMPLETE CBC W/AUTO DIFF WBC: CPT | Mod: 91

## 2020-02-23 PROCEDURE — 83880 ASSAY OF NATRIURETIC PEPTIDE: CPT

## 2020-02-23 PROCEDURE — 63600175 PHARM REV CODE 636 W HCPCS

## 2020-02-23 PROCEDURE — 25000003 PHARM REV CODE 250: Performed by: NURSE PRACTITIONER

## 2020-02-23 PROCEDURE — 99900035 HC TECH TIME PER 15 MIN (STAT)

## 2020-02-23 PROCEDURE — 94660 CPAP INITIATION&MGMT: CPT

## 2020-02-23 PROCEDURE — 85610 PROTHROMBIN TIME: CPT

## 2020-02-23 PROCEDURE — 80048 BASIC METABOLIC PNL TOTAL CA: CPT | Mod: 91

## 2020-02-23 PROCEDURE — 27000221 HC OXYGEN, UP TO 24 HOURS

## 2020-02-23 PROCEDURE — 99291 CRITICAL CARE FIRST HOUR: CPT | Mod: ,,, | Performed by: INTERNAL MEDICINE

## 2020-02-23 PROCEDURE — 83735 ASSAY OF MAGNESIUM: CPT | Mod: 91

## 2020-02-23 PROCEDURE — 84484 ASSAY OF TROPONIN QUANT: CPT

## 2020-02-23 RX ORDER — SODIUM CHLORIDE 9 MG/ML
INJECTION, SOLUTION INTRAVENOUS ONCE
Status: DISCONTINUED | OUTPATIENT
Start: 2020-02-24 | End: 2020-02-24

## 2020-02-23 RX ORDER — MORPHINE SULFATE 2 MG/ML
INJECTION, SOLUTION INTRAMUSCULAR; INTRAVENOUS
Status: COMPLETED
Start: 2020-02-23 | End: 2020-02-23

## 2020-02-23 RX ORDER — ATROPINE SULFATE 0.1 MG/ML
INJECTION INTRAVENOUS
Status: DISPENSED
Start: 2020-02-23 | End: 2020-02-24

## 2020-02-23 RX ORDER — DILTIAZEM HCL/D5W 125 MG/125
5 PLASTIC BAG, INJECTION (ML) INTRAVENOUS ONCE
Status: DISCONTINUED | OUTPATIENT
Start: 2020-02-23 | End: 2020-02-24 | Stop reason: HOSPADM

## 2020-02-23 RX ORDER — NITROGLYCERIN 20 MG/100ML
5 INJECTION INTRAVENOUS CONTINUOUS
Status: DISCONTINUED | OUTPATIENT
Start: 2020-02-23 | End: 2020-02-24 | Stop reason: HOSPADM

## 2020-02-23 RX ORDER — DIPHENHYDRAMINE HCL 25 MG
50 CAPSULE ORAL
Status: DISCONTINUED | OUTPATIENT
Start: 2020-02-23 | End: 2020-02-24 | Stop reason: HOSPADM

## 2020-02-23 RX ORDER — MORPHINE SULFATE 2 MG/ML
2 INJECTION, SOLUTION INTRAMUSCULAR; INTRAVENOUS
Status: DISCONTINUED | OUTPATIENT
Start: 2020-02-23 | End: 2020-02-24 | Stop reason: HOSPADM

## 2020-02-23 RX ORDER — FAMOTIDINE 20 MG/1
20 TABLET, FILM COATED ORAL DAILY
Status: DISCONTINUED | OUTPATIENT
Start: 2020-02-23 | End: 2020-02-24

## 2020-02-23 RX ORDER — FUROSEMIDE 10 MG/ML
20 INJECTION INTRAMUSCULAR; INTRAVENOUS ONCE
Status: COMPLETED | OUTPATIENT
Start: 2020-02-23 | End: 2020-02-23

## 2020-02-23 RX ORDER — CHLORHEXIDINE GLUCONATE ORAL RINSE 1.2 MG/ML
15 SOLUTION DENTAL 2 TIMES DAILY
Status: DISCONTINUED | OUTPATIENT
Start: 2020-02-23 | End: 2020-02-24 | Stop reason: HOSPADM

## 2020-02-23 RX ORDER — DILTIAZEM HYDROCHLORIDE 5 MG/ML
INJECTION INTRAVENOUS
Status: COMPLETED
Start: 2020-02-23 | End: 2020-02-23

## 2020-02-23 RX ORDER — PREDNISONE 20 MG/1
20 TABLET ORAL
Status: DISCONTINUED | OUTPATIENT
Start: 2020-02-23 | End: 2020-02-23

## 2020-02-23 RX ORDER — LISINOPRIL 10 MG/1
10 TABLET ORAL DAILY
Status: DISCONTINUED | OUTPATIENT
Start: 2020-02-23 | End: 2020-02-24 | Stop reason: HOSPADM

## 2020-02-23 RX ORDER — MUPIROCIN 20 MG/G
OINTMENT TOPICAL 2 TIMES DAILY
Status: DISCONTINUED | OUTPATIENT
Start: 2020-02-23 | End: 2020-02-24 | Stop reason: HOSPADM

## 2020-02-23 RX ORDER — METHYLPREDNISOLONE SOD SUCC 125 MG
125 VIAL (EA) INJECTION EVERY 6 HOURS
Status: DISCONTINUED | OUTPATIENT
Start: 2020-02-23 | End: 2020-02-23

## 2020-02-23 RX ORDER — FUROSEMIDE 10 MG/ML
INJECTION INTRAMUSCULAR; INTRAVENOUS
Status: COMPLETED
Start: 2020-02-23 | End: 2020-02-23

## 2020-02-23 RX ORDER — NITROGLYCERIN 0.4 MG/1
TABLET SUBLINGUAL
Status: COMPLETED
Start: 2020-02-23 | End: 2020-02-23

## 2020-02-23 RX ORDER — PREDNISONE 20 MG/1
20 TABLET ORAL EVERY 6 HOURS
Status: DISCONTINUED | OUTPATIENT
Start: 2020-02-23 | End: 2020-02-24 | Stop reason: HOSPADM

## 2020-02-23 RX ADMIN — LISINOPRIL 10 MG: 10 TABLET ORAL at 12:02

## 2020-02-23 RX ADMIN — MORPHINE SULFATE 2 MG: 2 INJECTION, SOLUTION INTRAMUSCULAR; INTRAVENOUS at 03:02

## 2020-02-23 RX ADMIN — IOHEXOL 100 ML: 350 INJECTION, SOLUTION INTRAVENOUS at 08:02

## 2020-02-23 RX ADMIN — PREDNISONE 20 MG: 20 TABLET ORAL at 11:02

## 2020-02-23 RX ADMIN — METOPROLOL TARTRATE 25 MG: 25 TABLET ORAL at 08:02

## 2020-02-23 RX ADMIN — ACETAMINOPHEN 650 MG: 325 TABLET ORAL at 05:02

## 2020-02-23 RX ADMIN — FUROSEMIDE 20 MG: 10 INJECTION, SOLUTION INTRAMUSCULAR; INTRAVENOUS at 05:02

## 2020-02-23 RX ADMIN — MORPHINE SULFATE 2 MG: 2 INJECTION, SOLUTION INTRAMUSCULAR; INTRAVENOUS at 11:02

## 2020-02-23 RX ADMIN — ACETAMINOPHEN 650 MG: 325 TABLET ORAL at 08:02

## 2020-02-23 RX ADMIN — PREDNISONE 20 MG: 20 TABLET ORAL at 05:02

## 2020-02-23 RX ADMIN — NITROGLYCERIN 5 MCG/MIN: 20 INJECTION INTRAVENOUS at 05:02

## 2020-02-23 RX ADMIN — FAMOTIDINE 20 MG: 20 TABLET ORAL at 05:02

## 2020-02-23 RX ADMIN — ACETAMINOPHEN 650 MG: 325 TABLET ORAL at 09:02

## 2020-02-23 RX ADMIN — NITROGLYCERIN 0.4 MG: 0.4 TABLET, ORALLY DISINTEGRATING SUBLINGUAL at 03:02

## 2020-02-23 RX ADMIN — DILTIAZEM HYDROCHLORIDE 10 MG: 5 INJECTION INTRAVENOUS at 03:02

## 2020-02-23 RX ADMIN — MUPIROCIN: 20 OINTMENT TOPICAL at 09:02

## 2020-02-23 RX ADMIN — CHLORHEXIDINE GLUCONATE 15 ML: 1.2 RINSE ORAL at 09:02

## 2020-02-23 RX ADMIN — FUROSEMIDE 20 MG: 10 INJECTION, SOLUTION INTRAMUSCULAR; INTRAVENOUS at 03:02

## 2020-02-23 RX ADMIN — FUROSEMIDE 20 MG: 10 INJECTION INTRAMUSCULAR; INTRAVENOUS at 05:02

## 2020-02-23 NOTE — SUBJECTIVE & OBJECTIVE
Objective:     Vital Signs (Most Recent):  Temp: (P) 98 °F (36.7 °C) (02/22/20 1953)  Pulse: 90 (02/22/20 1953)  Resp: (!) 22 (02/22/20 1953)  BP: (!) 171/79 (02/22/20 1900)  SpO2: 95 % (02/22/20 1953) Vital Signs (24h Range):  Temp:  [97.8 °F (36.6 °C)-98 °F (36.7 °C)] (P) 98 °F (36.7 °C)  Pulse:  [] 90  Resp:  [14-33] 22  SpO2:  [84 %-97 %] 95 %  BP: ()/() 171/79        Significant Labs:   CBC:   Recent Labs   Lab 02/22/20  1345   WBC 8.50   HGB 14.3   HCT 44.6   PLT 16*     CMP:   Recent Labs   Lab 02/22/20  1345      K 4.1   CL 99   CO2 31*   *   BUN 14   CREATININE 0.6   CALCIUM 9.1   PROT 7.0   ALBUMIN 3.9   BILITOT 1.0   ALKPHOS 50*   AST 33   ALT 38   ANIONGAP 10   EGFRNONAA >60.0     Cardiac Markers:   Recent Labs   Lab 02/22/20  1345   *     Coagulation:   Recent Labs   Lab 02/22/20  1345   INR 1.1   APTT <21.0     Troponin:   Recent Labs   Lab 02/22/20  1345   TROPONINI 0.03       Significant Imaging: I have reviewed all pertinent imaging results/findings within the past 24 hours.     Reading Physician Reading Date Result Priority   Andrew Carter MD 2/22/2020 STAT      Narrative     EXAMINATION:  XR CHEST AP PORTABLE    CLINICAL HISTORY:  CHF;.    TECHNIQUE:  Single frontal portable view of the chest was performed.    COMPARISON:  None.    FINDINGS:  Cardiac monitor wires overlie the chest.  Cardiomediastinal silhouette is enlarged.  Calcific plaque projects over the aortic arch.Lungs are hyperexpanded with increased interstitial markings bilaterally and mild flattening of the hemidiaphragms suggestive of underlying COPD.  Superimposed interstitial edema not excluded.  No focal consolidation, pleural effusion, or pneumothorax.  Bones are intact.      Impression       Cardiomediastinal silhouette enlargement with findings suggestive of underlying COPD.  Superimposed interstitial edema not excluded.      Electronically signed by: Andrew Carter  Date: 02/22/2020  Time:  13:55

## 2020-02-23 NOTE — SUBJECTIVE & OBJECTIVE
Past Medical History:   Diagnosis Date    Cancer     skin cancer    Hypertension        History reviewed. No pertinent surgical history.    Review of patient's allergies indicates:  No Known Allergies    Family History     None        Tobacco Use    Smoking status: Never Smoker   Substance and Sexual Activity    Alcohol use: Never     Frequency: Never    Drug use: Never    Sexual activity: Not Currently         Review of Systems   Constitutional: Positive for activity change and fatigue. Negative for appetite change, chills, diaphoresis and fever.   HENT: Negative for congestion, hearing loss, nosebleeds, postnasal drip, sneezing and sore throat.    Respiratory: Positive for shortness of breath. Negative for apnea, cough, choking, chest tightness, wheezing and stridor.    Cardiovascular: Positive for leg swelling. Negative for chest pain and palpitations.        Atrial fibrillation   Gastrointestinal: Negative for abdominal distention, abdominal pain, blood in stool, constipation, diarrhea and nausea.   Genitourinary: Negative for dysuria, frequency, hematuria and urgency.   Musculoskeletal: Negative for arthralgias and myalgias.   Neurological: Positive for weakness. Negative for dizziness, tremors, seizures, syncope, facial asymmetry, speech difficulty, light-headedness, numbness and headaches.   Hematological: Negative for adenopathy.   Psychiatric/Behavioral: Negative for dysphoric mood and suicidal ideas. The patient is not nervous/anxious.      Objective:     Vital Signs (Most Recent):  Temp: 98.5 °F (36.9 °C) (02/23/20 1501)  Pulse: 88 (02/23/20 1501)  Resp: (!) 50 (02/23/20 1501)  BP: (!) 170/74 (02/23/20 1530)  SpO2: (!) 85 % (02/23/20 1501) Vital Signs (24h Range):  Temp:  [97.9 °F (36.6 °C)-98.6 °F (37 °C)] 98.5 °F (36.9 °C)  Pulse:  [] 88  Resp:  [17-56] 50  SpO2:  [82 %-100 %] 85 %  BP: (117-193)/() 170/74     Weight: 56.7 kg (125 lb)  Body mass index is 20.8 kg/m².      Intake/Output  Summary (Last 24 hours) at 2/23/2020 1615  Last data filed at 2/23/2020 0901  Gross per 24 hour   Intake 275 ml   Output 775 ml   Net -500 ml       Physical Exam   Constitutional: She is oriented to person, place, and time. She appears well-developed and well-nourished. No distress.   Thin elderly female, nad, wearing BiPAP  No distress   HENT:   Head: Normocephalic and atraumatic.   Right Ear: External ear normal.   Left Ear: External ear normal.   Nose: Nose normal.   Mouth/Throat: Oropharynx is clear and moist.   Eyes: Pupils are equal, round, and reactive to light. Conjunctivae and EOM are normal.   Neck: Normal range of motion. Neck supple. No JVD present. No tracheal deviation present. No thyromegaly present.   JVD up   Cardiovascular: Normal rate and intact distal pulses. Exam reveals no gallop and no friction rub.   Murmur heard.  Irregular, + PVC   Pulmonary/Chest: Effort normal. No stridor. No respiratory distress. She has no wheezes. She has rales (few posterior). She exhibits no tenderness.   Wearing BiPAP  No acc m use   Abdominal: Soft. Bowel sounds are normal. She exhibits no distension. There is no tenderness. There is no rebound and no guarding.   Musculoskeletal: Normal range of motion. She exhibits edema. She exhibits no tenderness.   Lymphadenopathy:     She has no cervical adenopathy.   Neurological: She is alert and oriented to person, place, and time. No cranial nerve deficit.   Skin: Skin is warm and dry. She is not diaphoretic.   Psychiatric: She has a normal mood and affect. Her behavior is normal.   Nursing note and vitals reviewed.      Vents:  Oxygen Concentration (%): 100 (02/23/20 1540)    Lines/Drains/Airways     Peripheral Intravenous Line                 Peripheral IV - Single Lumen 02/22/20 20 G Posterior;Right Hand 1 day         Peripheral IV - Single Lumen 02/22/20 1910 Left;Posterior Hand less than 1 day                Significant Labs:    CBC/Anemia Profile:  Recent Labs   Lab  02/22/20  1345 02/22/20 1949 02/23/20 0419   WBC 8.50 8.03 6.58   HGB 14.3 13.6 11.8*   HCT 44.6 42.7 38.4   PLT 16* 9* 51*   * 101* 104*   RDW 12.8 13.0 12.9        Chemistries:  Recent Labs   Lab 02/22/20  1345 02/22/20 1949 02/23/20 0419    142 143   K 4.1 4.0 4.3   CL 99 94* 96   CO2 31* 37* 34*   BUN 14 16 20   CREATININE 0.6 0.8 0.9   CALCIUM 9.1 9.1 8.9   ALBUMIN 3.9 3.7  --    PROT 7.0 6.5  --    BILITOT 1.0 0.9  --    ALKPHOS 50* 47*  --    ALT 38 34  --    AST 33 26  --    MG  --  1.5* 2.1       Coagulation:   Recent Labs   Lab 02/22/20  1345   INR 1.1   APTT <21.0     Troponin:   Recent Labs   Lab 02/22/20 1949 02/23/20 0419 02/23/20  0941   TROPONINI 0.053* 0.060* 0.058*     All pertinent labs within the past 24 hours have been reviewed.    Flu screen - negative    Significant Imaging:   I have reviewed and interpreted all pertinent imaging results/findings within the past 24 hours.     CXR (2/23) - some hyperinflation, edema, enlarged heart with straight left heart border  Dopplers - pending    EKG  Vent. Rate : 083 BPM     Atrial Rate : 083 BPM     P-R Int : 272 ms          QRS Dur : 090 ms      QT Int : 330 ms       P-R-T Axes : 070 003 086 degrees     QTc Int : 387 ms    Sinus rhythm with 1st degree A-V block with occasional Premature  ventricular complexes  Anterior infarct ,age undetermined  Abnormal ECG  When compared with ECG of 23-FEB-2020 02:01,  Nonspecific T wave abnormality, improved in Lateral leads  Confirmed by Ton Feliciano MD ([id number]) on 2/23/2020 3:55:29 PM  (no S1Q3T3)    ECHO  · Concentric left ventricular remodeling.  · Normal left ventricular systolic function. The estimated ejection fraction is 65%.  · Indeterminate left ventricular diastolic function.  · No wall motion abnormalities.  · Normal right ventricular systolic function.  · Mild left atrial enlargement.  · There is mild leaflet calcification of the Mitral Valve.  · Mild mitral  sclerosis.  · Moderate-to-severe mitral regurgitation.  · Mild tricuspid regurgitation.  · Intermediate central venous pressure (8 mmHg).  · The estimated PA systolic pressure is 29 mmHg.

## 2020-02-23 NOTE — PROGRESS NOTES
Apparently patient got out of bed became acutely short of breath and hypoxemic this is associated with significant anxiety..  Patient had developed atrial fibrillation with rapid ventricular rates she was placed back in the bed.  With increased oxygen she developed significant hypertension of 220.  She was promptly treated with sublingual nitro placed on BiPAP machine for respiratory support and gradual improvement.  She converted back to normal sinus rhythm.  Blood pressure is improved now to 157/74 mm of mercury heart rate is 76 beats per minute with frequent PVCs.  No acute ST changes noted on telemetry.  She was also given 2 mg of morphine to which she responded very well.  With BiPAP machine and about treatment a saturations are normalized completely.    She is resting comfortably at this time.  Lungs have coarse crackles at the bases.  Cardiac exam remains to have holosystolic murmur at the apex extremities have no significant tenderness.  Ultrasound the lower extremities have been requested per hospitalist  At this point patient may need angiographic assessment more definite diagnosis.  I will tentatively schedule her for tomorrow morning as she is resting comfortably I will discuss this more with her in the morning.  Will recheck her labs in the morning as well as chest x-ray  In the meanwhile initiate IV Tridil drip.

## 2020-02-23 NOTE — H&P
[facility] Medicine  History & Physical    Patient Name: Fiona Arteaga  MRN: [id number]  Admission Date: 2/22/2020  Attending Physician: Yoel Mcfadden MD  Primary Care Provider: Primary Doctor No         Patient information was obtained from patient, past medical records and ER records.     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    She was in her usual state of health until about 2 weeks ago when she noticed gradually worsening shortness of breath.  States that she has chronic mild shortness of breath but has been worse over the last 2 weeks and very severe over the last 2 days prompting her to go to the ED.  Shortness of breath is worse with exertion and better with rest.  She has associated nonpitting bilateral lower extremity edema and orthopnea but denies paroxysmal nocturnal dyspnea. States that she had sore throat and some congestion about 2 weeks ago after which all these symptoms started.  Admits to pleuritic chest pain over anterior chest wall which has since resolved.  Also has associated nausea without any vomiting.  No prior history of congestive heart failure or coronary artery disease. She denies recent fever, chills, nausea/vomiting, epistaxis, gingival bleeding, diarrhea/constipation, dysuria/hematuria, hematemesis, melena or hematochezia. She is on amlodipine and lisinopril at home which she is compliant with.     Workup in the outside ED revealed acute severe thrombocytopenia.  She was also found to be in atrial fibrillation with RVR and possible new onset congestive heart failure with a heart murmur.  She  received 500 mcg of digoxin x2, IV furosemide 40 mg, hydralazine 50 mg p.o., IV labetalol and nitroglycerin patch.  After initial stabilization she was transferred to our facility for higher level of care.    Patient appears to be hemodynamically stable during my encounter.  Telemetry revealing AFib with RVR with heart rates anywhere between  beats per minute with frequent PVCs.  Blood pressure is elevated at 170/80 mmHg.  She is tachypneic on my exam and has conversational dyspnea.  Denies any chest pain or pressure.  Shortness of breath has improved.      Rest of the 10 point review of systems is negative except as mentioned above.  Past medical history:  Benign essential hypertension  Past surgical history:  Reviewed, nonpertinent  Social history: Denies history of tobacco, alcohol or drug abuse  Family history: Reviewed and noncontributory    EXAM:  General: Patient resting comfortably in no acute distress.  Eyes: PERRLA. No conjunctivae pallor. No scleral icterus.  ENT: OMM. No pharyngeal erythema.  Neck: Supple. No adenopathy.  Lungs:  Tachypnea noted; no accessory muscle use.  Faint bibasilar crackles audible.  No wheezing  Cor:  Irregularly irregular rhythm with tachycardia.  Holosystolic murmur heard best at the left lower sternal border.  Bilateral nonpitting edema extending all the way to the knees  Abd: Soft. Nontender. No HSM. BS (+)  Musculoskeletal: No arthropathy, deformity.  Skin: No rashes, swelling, or erythema.  Neuro: A&O x3.  Speech is clear and coherent.  Motor strength is 5/5 in all extremities.  No sensory deficit.  Ext: No clubbing. No cyanosis. Peripheral pulses +      Objective:     Vital Signs (Most Recent):  Temp: (P) 98 °F (36.7 °C) (02/22/20 1953)  Pulse: 90 (02/22/20 1953)  Resp: (!) 22 (02/22/20 1953)  BP: (!) 171/79 (02/22/20 1900)  SpO2: 95 % (02/22/20 1953) Vital Signs (24h Range):  Temp:  [97.8 °F (36.6 °C)-98 °F (36.7 °C)] (P) 98 °F (36.7 °C)  Pulse:  [] 90  Resp:   [14-33] 22  SpO2:  [84 %-97 %] 95 %  BP: ()/() 171/79        Significant Labs:   CBC:   Recent Labs   Lab 02/22/20  1345   WBC 8.50   HGB 14.3   HCT 44.6   PLT 16*     CMP:   Recent Labs   Lab 02/22/20  1345      K 4.1   CL 99   CO2 31*   *   BUN 14   CREATININE 0.6   CALCIUM 9.1   PROT 7.0   ALBUMIN 3.9   BILITOT 1.0   ALKPHOS 50*   AST 33   ALT 38   ANIONGAP 10   EGFRNONAA >60.0     Cardiac Markers:   Recent Labs   Lab 02/22/20  1345   *     Coagulation:   Recent Labs   Lab 02/22/20  1345   INR 1.1   APTT <21.0     Troponin:   Recent Labs   Lab 02/22/20  1345   TROPONINI 0.03       Significant Imaging: I have reviewed all pertinent imaging results/findings within the past 24 hours.     Reading Physician Reading Date Result Priority   Andrew Carter MD 2/22/2020 STAT      Narrative     EXAMINATION:  XR CHEST AP PORTABLE    CLINICAL HISTORY:  CHF;.    TECHNIQUE:  Single frontal portable view of the chest was performed.    COMPARISON:  None.    FINDINGS:  Cardiac monitor wires overlie the chest.  Cardiomediastinal silhouette is enlarged.  Calcific plaque projects over the aortic arch.Lungs are hyperexpanded with increased interstitial markings bilaterally and mild flattening of the hemidiaphragms suggestive of underlying COPD.  Superimposed interstitial edema not excluded.  No focal consolidation, pleural effusion, or pneumothorax.  Bones are intact.      Impression       Cardiomediastinal silhouette enlargement with findings suggestive of underlying COPD.  Superimposed interstitial edema not excluded.      Electronically signed by: Andrew Carter  Date: 02/22/2020  Time: 13:55              Assessment/Plan:     * Atrial fibrillation with rapid ventricular response  New onset   S/p digoxin 500 mcg x 2 at outside facility   Also received IV and PO labetalol   Cardiac monitoring in the ICU   Converted to normal sinus rhythm after receiving IV metoprolol 5 mg push  Consult cardiology   Obtain  echocardiogram   No anticoagulation due to severe thrombocytopenia       Thrombocytopenia  Acute isolated thrombocytopenia  No evidence of active bleeding  Unlikely HIT, TTP or drug-induced etiology based on presentation   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)  Platelet count dropped to 9000 per microL; will order 1 unit of platelets  Consult Hematology further evaluation  Will obtain peripheral blood smear and LDH       Acute congestive heart failure  Possible related to valvular heart disease vs tachycardia induced cardiomyopathy due to AF with RVR   Treat AF as above   Supplemental oxygen   S/p IV furosemide 40 mg   Obtain echocardiogram   Repeat BNP next AM   Cardiology consult       Acute respiratory failure with hypoxia  Likely due to new onset CHF   Plan as above       Benign essential hypertension  Aware  Chronic medical issue   Monitor   Hold home amlodipine and lisinopril   Will start metoprolol PO to assist with rate control         Critical care time of 45 min spent    VTE Risk Mitigation (From admission, onward)         Ordered     IP VTE HIGH RISK PATIENT  Once      02/22/20 1914     Reason for No Pharmacological VTE Prophylaxis  Once     Question:  Reasons:  Answer:  Thrombocytopenia    02/22/20 1914                   Yoel Mcfadden MD  Department of Hospital Medicine   [facility]

## 2020-02-23 NOTE — ASSESSMENT & PLAN NOTE
· Due to acute pulmonary edema due to MR, Afib and accelerated HTN  · Better  · Wean O2 down and then wean off BiPAP  · She has NO h/o COPD or cigarette use

## 2020-02-23 NOTE — PROGRESS NOTES
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  She was in her usual state of health until about 2 weeks ago when she noticed gradually worsening shortness of breath.  States that she has chronic mild shortness of breath but has been worse over the last 2 weeks and very severe over the last 2 days prompting her to go to the ED.  Shortness of breath is worse with exertion and better with rest.  She has associated nonpitting bilateral lower extremity edema and orthopnea but denies paroxysmal nocturnal dyspnea. States that she had sore throat and some congestion about 2 weeks ago after which all these symptoms started.  Admits to pleuritic chest pain over anterior chest wall which has since resolved.  Also has associated nausea without any vomiting.  No prior history of congestive heart failure or coronary artery disease. She denies recent fever, chills, nausea/vomiting, epistaxis, gingival bleeding, diarrhea/constipation, dysuria/hematuria, hematemesis, melena or hematochezia. She is on amlodipine and lisinopril at home which she is compliant with.   Workup in the outside ED revealed acute severe thrombocytopenia.  She was also found to be in atrial fibrillation with RVR and possible new onset congestive heart failure with a heart murmur.  She received 500 mcg of digoxin x2, IV furosemide 40 mg, hydralazine 50 mg  p.o., IV labetalol and nitroglycerin patch.  After initial stabilization she was transferred to our facility for higher level of care.  Patient appears to be hemodynamically stable during my encounter.  Telemetry revealing AFib with RVR with heart rates anywhere between  beats per minute with frequent PVCs.  Blood pressure is elevated at 170/80 mmHg.  She is tachypneic on my exam and has conversational dyspnea.  Denies any chest pain or pressure.  Shortness of breath has improved.  Rest of the 10 point review of systems is negative except as mentioned above.  Past medical history:  Benign essential hypertension  Past surgical history:  Reviewed, nonpertinent  Social history: Denies history of tobacco, alcohol or drug abuse  Family history: Reviewed and noncontributory    Interval history:  Today the patient reports persistent shortness of breath.  She felt improved with medical treatment overnight, however this morning her shortness of breath worsened to severe intensity with constant timing.  At that time she was tachycardic.  She received treatment and subsequently her shortness of breath has improved some at this point.  She denies chest pain. No fever.  No headache or bleeding.    Physical exam:  Vital signs reviewed  General:  Nontoxic, frail, Venti mask in place  Eyes: PERRLA.  No conjunctival discharge. No scleral icterus.  ENT:  Moist mucous membranes  Lungs:  Increased work of breathing with some accessory muscle use.  No wheezing, diminished breath sounds.  Cor:  Irregularly irregular rhythm with tachycardia.  2+ radial pulses bilaterally. Holosystolic murmur.  1+ BLE edema  Abd: Soft. Nontender. Nondistended  Skin:  Dry and warm with no jaundice  Neuro:  Alert, follows commands  Psych:  Mood is calm, insight fair    Laboratory data:  Platelet 51  Hemoglobin 11  Hematocrit 38    Troponin 0.058    Chest x-ray personally reviewed:  Basilar atelectasis, minimal proximal vascular congestion  overall improved interstitial edema    Echocardiogram Impression:  · Concentric left ventricular remodeling.  · Normal left ventricular systolic function. The estimated ejection fraction is 65%.  · Indeterminate left ventricular diastolic function.  · No wall motion abnormalities.  · Normal right ventricular systolic function.  · Mild left atrial enlargement.  · There is mild leaflet calcification of the Mitral Valve.  · Mild mitral sclerosis.  · Moderate-to-severe mitral regurgitation.  · Mild tricuspid regurgitation.  · Intermediate central venous pressure (8 mmHg).  · The estimated PA systolic pressure is 29 mmHg.    Assessment/Plan:     Assessment:  Rapid atrial fibrillation  Acute hypoxemic respiratory failure secondary to pulmonary edema  Acute pulmonary edema due to acute diastolic CHF exacerbation, rapid atrial fibrillation, mitral regurgitation  Severe mitral regurgitation  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, improved  Hypertension  Anemia likely inflammatory    Plan:  Continue ICU care for close cardiopulmonary monitoring  Appreciate consultants  Additional diuresis IV Lasix 20 mg.  Monitor fluid status.  Monitor urine output.  Continue medical management including ACE-inhibitor and beta-blocker  Consider CTA chest rule out PE  Platelets stabilized.  No evidence of hemolysis.  Will stop start prednisone 50 mg p.o. daily and monitor platelets closely.    Hematology workup pending including platelet antibodies.  I discussed the case with Dr. Holland.  Mobilize as able  Mechanical VTE prophylaxis; cannot use heparin due to thrombocytopenia    * Atrial fibrillation with rapid ventricular response  New onset   S/p digoxin 500 mcg x 2 at outside facility   Also received IV and PO labetalol   Cardiac monitoring in the ICU   Converted to normal sinus rhythm after receiving IV metoprolol 5 mg push  Consult cardiology   Obtain echocardiogram   No anticoagulation due to severe thrombocytopenia       Acute respiratory  failure with hypoxia  Likely due to new onset CHF   Plan as above       Benign essential hypertension  Aware  Chronic medical issue   Monitor   Hold home amlodipine and lisinopril   Will start metoprolol PO to assist with rate control         Acute congestive heart failure  Possible related to valvular heart disease vs tachycardia induced cardiomyopathy due to AF with RVR   Treat AF as above   Supplemental oxygen   S/p IV furosemide 40 mg   Obtain echocardiogram   Repeat BNP next AM   Cardiology consult       Thrombocytopenia  Acute isolated thrombocytopenia  No evidence of active bleeding  Unlikely HIT, TTP or drug-induced etiology based on presentation   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

## 2020-02-23 NOTE — ASSESSMENT & PLAN NOTE
· Related to valvular heart disease  · Diurese, BP control, afterload reduction, heart rate control  · She did (and could again) decompensate very quickly

## 2020-02-23 NOTE — ASSESSMENT & PLAN NOTE
New onset   S/p digoxin 500 mcg x 2 at outside facility   Also received IV and PO labetalol   Cardiac monitoring in the ICU   Converted to normal sinus rhythm after receiving IV metoprolol 5 mg push  Consult cardiology   Obtain echocardiogram   No anticoagulation due to severe thrombocytopenia

## 2020-02-23 NOTE — ASSESSMENT & PLAN NOTE
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

## 2020-02-23 NOTE — HPI
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    She was in her usual state of health until about 2 weeks ago when she noticed gradually worsening shortness of breath.  States that she has chronic mild shortness of breath but has been worse over the last 2 weeks and very severe over the last 2 days prompting her to go to the ED.  Shortness of breath is worse with exertion and better with rest.  She has associated nonpitting bilateral lower extremity edema and orthopnea but denies paroxysmal nocturnal dyspnea. States that she had sore throat and some congestion about 2 weeks ago after which all these symptoms started.  Admits to pleuritic chest pain over anterior chest wall which has since resolved.  Also has associated nausea without any vomiting.  No prior history of congestive heart failure or coronary artery disease. She denies recent fever, chills, nausea/vomiting, epistaxis, gingival bleeding, diarrhea/constipation, dysuria/hematuria, hematemesis, melena or hematochezia. She is on amlodipine and lisinopril at home which she is compliant with.     Workup in the outside ED revealed acute severe thrombocytopenia.  She was also found to be in atrial fibrillation with RVR and possible new onset congestive heart failure with a heart murmur.  She received 500 mcg of digoxin x2, IV furosemide 40 mg, hydralazine 50 mg p.o., IV labetalol and nitroglycerin patch.  After initial stabilization she was transferred to our facility for higher level of care.    Patient appears to be hemodynamically stable during my encounter.  Telemetry revealing AFib with RVR with heart rates anywhere between  beats per minute with frequent PVCs.  Blood pressure is elevated at 170/80 mmHg.  She is tachypneic on my exam and has conversational dyspnea.  Denies any chest  pain or pressure.  Shortness of breath has improved.      Rest of the 10 point review of systems is negative except as mentioned above.  Past medical history:  Benign essential hypertension  Past surgical history:  Reviewed, nonpertinent  Social history: Denies history of tobacco, alcohol or drug abuse  Family history: Reviewed and noncontributory    EXAM:  General: Patient resting comfortably in no acute distress.  Eyes: PERRLA. No conjunctivae pallor. No scleral icterus.  ENT: OMM. No pharyngeal erythema.  Neck: Supple. No adenopathy.  Lungs:  Tachypnea noted; no accessory muscle use.  Faint bibasilar crackles audible.  No wheezing  Cor:  Irregularly irregular rhythm with tachycardia.  Holosystolic murmur heard best at the left lower sternal border.  Bilateral nonpitting edema extending all the way to the knees  Abd: Soft. Nontender. No HSM. BS (+)  Musculoskeletal: No arthropathy, deformity.  Skin: No rashes, swelling, or erythema.  Neuro: A&O x3.  Speech is clear and coherent.  Motor strength is 5/5 in all extremities.  No sensory deficit.  Ext: No clubbing. No cyanosis. Peripheral pulses +

## 2020-02-23 NOTE — HPI
86 yo female with little/no PMH except for HTN presented to [facility] and had afib with RVR, transferred here for further evaluation.  During the day she has been OK (but has needed 5 LPM).  This PM she had a rapid decompensation with afib/RVR, accelerated HTN, decreased sats - she was placed on BiPAp and given lasi, nitrates and has settled down.  I was called to see here acutely.  When I arrived she was wearing BiPAP and in no distress.  Still on 80% O2 but sats were 99%, tele shows NSR with PVC and BP was better controlled.  Getting history was a little hard because on BiPAP.  She denies any h/o lung disease, she has a minimal smoking history.  She admits that oer the last 3-6 months she has had decreased exertional ability, she denies any type of chest pain or palpitations.  She has had some increase in LE edema.  ROS as below.

## 2020-02-23 NOTE — CONSULTS
[facility]  Pulmonology  Consult Note    Patient Name: Fiona Arteaga  MRN: [id number]  Admission Date: 2/22/2020  Hospital Length of Stay: 1 days  Code Status: Full Code  Attending Physician: Alex Bonds MD  Primary Care Provider: Primary Doctor No   Principal Problem: Atrial fibrillation with rapid ventricular response    Inpatient consult to Pulmonology  Consult performed by: Jaun Thurston MD  Consult ordered by: Alex Bonds MD        Subjective:     HPI:  88 yo female with little/no PMH except for HTN presented to [facility] and had afib with RVR, transferred here for further evaluation.  During the day she has been OK (but has needed 5 LPM).  This PM she had a rapid decompensation with afib/RVR, accelerated HTN, decreased sats - she was placed on BiPAp and given lasi, nitrates and has settled down.  I was called to see here acutely.  When I arrived she was wearing BiPAP and in no distress.  Still on 80% O2 but sats were 99%, tele shows NSR with PVC and BP was better controlled.  Getting history was a little hard because on BiPAP.  She denies any h/o lung disease, she has a minimal smoking history.  She admits that oer the last 3-6 months she has had decreased exertional ability, she denies any type of chest pain or palpitations.  She has had some increase in LE edema.  ROS as below.    Past Medical History:   Diagnosis Date    Cancer     skin cancer    Hypertension        History reviewed. No pertinent surgical history.    Review of patient's allergies indicates:  No Known Allergies    Family History     None        Tobacco Use    Smoking status: Never Smoker   Substance and Sexual Activity    Alcohol use: Never     Frequency: Never    Drug use: Never    Sexual activity: Not Currently         Review of Systems   Constitutional: Positive for activity change and fatigue. Negative for appetite change, chills, diaphoresis and fever.   HENT: Negative for  congestion, hearing loss, nosebleeds, postnasal drip, sneezing and sore throat.    Respiratory: Positive for shortness of breath. Negative for apnea, cough, choking, chest tightness, wheezing and stridor.    Cardiovascular: Positive for leg swelling. Negative for chest pain and palpitations.        Atrial fibrillation   Gastrointestinal: Negative for abdominal distention, abdominal pain, blood in stool, constipation, diarrhea and nausea.   Genitourinary: Negative for dysuria, frequency, hematuria and urgency.   Musculoskeletal: Negative for arthralgias and myalgias.   Neurological: Positive for weakness. Negative for dizziness, tremors, seizures, syncope, facial asymmetry, speech difficulty, light-headedness, numbness and headaches.   Hematological: Negative for adenopathy.   Psychiatric/Behavioral: Negative for dysphoric mood and suicidal ideas. The patient is not nervous/anxious.      Objective:     Vital Signs (Most Recent):  Temp: 98.5 °F (36.9 °C) (02/23/20 1501)  Pulse: 88 (02/23/20 1501)  Resp: (!) 50 (02/23/20 1501)  BP: (!) 170/74 (02/23/20 1530)  SpO2: (!) 85 % (02/23/20 1501) Vital Signs (24h Range):  Temp:  [97.9 °F (36.6 °C)-98.6 °F (37 °C)] 98.5 °F (36.9 °C)  Pulse:  [] 88  Resp:  [17-56] 50  SpO2:  [82 %-100 %] 85 %  BP: (117-193)/() 170/74     Weight: 56.7 kg (125 lb)  Body mass index is 20.8 kg/m².      Intake/Output Summary (Last 24 hours) at 2/23/2020 1615  Last data filed at 2/23/2020 0901  Gross per 24 hour   Intake 275 ml   Output 775 ml   Net -500 ml       Physical Exam   Constitutional: She is oriented to person, place, and time. She appears well-developed and well-nourished. No distress.   Thin elderly female, nad, wearing BiPAP  No distress   HENT:   Head: Normocephalic and atraumatic.   Right Ear: External ear normal.   Left Ear: External ear normal.   Nose: Nose normal.   Mouth/Throat: Oropharynx is clear and moist.   Eyes: Pupils are equal, round, and reactive to light.  Conjunctivae and EOM are normal.   Neck: Normal range of motion. Neck supple. No JVD present. No tracheal deviation present. No thyromegaly present.   JVD up   Cardiovascular: Normal rate and intact distal pulses. Exam reveals no gallop and no friction rub.   Murmur heard.  Irregular, + PVC   Pulmonary/Chest: Effort normal. No stridor. No respiratory distress. She has no wheezes. She has rales (few posterior). She exhibits no tenderness.   Wearing BiPAP  No acc m use   Abdominal: Soft. Bowel sounds are normal. She exhibits no distension. There is no tenderness. There is no rebound and no guarding.   Musculoskeletal: Normal range of motion. She exhibits edema. She exhibits no tenderness.   Lymphadenopathy:     She has no cervical adenopathy.   Neurological: She is alert and oriented to person, place, and time. No cranial nerve deficit.   Skin: Skin is warm and dry. She is not diaphoretic.   Psychiatric: She has a normal mood and affect. Her behavior is normal.   Nursing note and vitals reviewed.      Vents:  Oxygen Concentration (%): 100 (02/23/20 1540)    Lines/Drains/Airways     Peripheral Intravenous Line                 Peripheral IV - Single Lumen 02/22/20 20 G Posterior;Right Hand 1 day         Peripheral IV - Single Lumen 02/22/20 1910 Left;Posterior Hand less than 1 day                Significant Labs:    CBC/Anemia Profile:  Recent Labs   Lab 02/22/20  1345 02/22/20 1949 02/23/20 0419   WBC 8.50 8.03 6.58   HGB 14.3 13.6 11.8*   HCT 44.6 42.7 38.4   PLT 16* 9* 51*   * 101* 104*   RDW 12.8 13.0 12.9        Chemistries:  Recent Labs   Lab 02/22/20  1345 02/22/20 1949 02/23/20  0419    142 143   K 4.1 4.0 4.3   CL 99 94* 96   CO2 31* 37* 34*   BUN 14 16 20   CREATININE 0.6 0.8 0.9   CALCIUM 9.1 9.1 8.9   ALBUMIN 3.9 3.7  --    PROT 7.0 6.5  --    BILITOT 1.0 0.9  --    ALKPHOS 50* 47*  --    ALT 38 34  --    AST 33 26  --    MG  --  1.5* 2.1       Coagulation:   Recent Labs   Lab 02/22/20  6746    INR 1.1   APTT <21.0     Troponin:   Recent Labs   Lab 02/22/20  1949 02/23/20  0419 02/23/20  0941   TROPONINI 0.053* 0.060* 0.058*     All pertinent labs within the past 24 hours have been reviewed.    Flu screen - negative    Significant Imaging:   I have reviewed and interpreted all pertinent imaging results/findings within the past 24 hours.     CXR (2/23) - some hyperinflation, edema, enlarged heart with straight left heart border  Dopplers - pending    EKG  Vent. Rate : 083 BPM     Atrial Rate : 083 BPM     P-R Int : 272 ms          QRS Dur : 090 ms      QT Int : 330 ms       P-R-T Axes : 070 003 086 degrees     QTc Int : 387 ms    Sinus rhythm with 1st degree A-V block with occasional Premature  ventricular complexes  Anterior infarct ,age undetermined  Abnormal ECG  When compared with ECG of 23-FEB-2020 02:01,  Nonspecific T wave abnormality, improved in Lateral leads  Confirmed by Ton Feliciano MD ([id number]) on 2/23/2020 3:55:29 PM  (no S1Q3T3)    ECHO  · Concentric left ventricular remodeling.  · Normal left ventricular systolic function. The estimated ejection fraction is 65%.  · Indeterminate left ventricular diastolic function.  · No wall motion abnormalities.  · Normal right ventricular systolic function.  · Mild left atrial enlargement.  · There is mild leaflet calcification of the Mitral Valve.  · Mild mitral sclerosis.  · Moderate-to-severe mitral regurgitation.  · Mild tricuspid regurgitation.  · Intermediate central venous pressure (8 mmHg).  · The estimated PA systolic pressure is 29 mmHg.    Assessment/Plan:     * Atrial fibrillation with rapid ventricular response  · Currently being addressed  · Better now but doesn't tolerate RVR well    Acute respiratory failure with hypoxia  · Due to acute pulmonary edema due to MR, Afib and accelerated HTN  · Better  · Wean O2 down and then wean off BiPAP  · She has NO h/o COPD or cigarette use    Acute congestive heart failure  · Related to valvular  "heart disease  · Diurese, BP control, afterload reduction, heart rate control  · She did (and could again) decompensate very quickly    Nonrheumatic mitral valve regurgitation  · Acute decompensation seems most c/w acute pulmonary edema related to this  · May need further evaluation  · May need transfer to [facility] if valve work needs to happen    Benign essential hypertension  · Not so "benign"  · Had accelerated HTN when she was in respiratory distress    Thrombocytopenia  · Seen by Dr Garces  · Steroids started    Critical Care Time    I have spent > 35 minutes providing critical care services for this pt for the above diagnoses.  These services have included pt evaluation, pt exam, BiPAP assessment, discussions with staff, chart review, data review, note preparation and .  The patient has life threatening illness with a high risk of decompensation and/or death.        Thank you for your consult. I will follow-up with patient. Please contact us if you have any additional questions.     Jaun Thurston MD  Pulmonology  [facility]    "

## 2020-02-23 NOTE — CONSULTS
[facility]  Cardiology  Consult    Patient Name: Fiona Artegaa  MRN: [id number]  Admission Date: 2/22/2020  Code Status: Full Code   Attending Provider: Yoel Mcfadden MD   Primary Care Physician: Primary Doctor No  Principal Problem:Atrial fibrillation with rapid ventricular response    Patient information was obtained from patient and ER records.     Subjective:     Chief Complaint:   AFIB     HPI:     Ms. Arteaga is an 87 year old female patient with no known medical history besides HTN. She started having edema to the lower extremities for about 3 weeks. She has had SOB every so often but it did not last. She says yesterday however when she woke up she could not breathe and asked her granddaughter to bring her to the hospital. She went to ER and She was transferred here from [facility]. She tells me she just moved here not to long ago and her PCP is Dr. Girard. She has never had anyone tell her she has had a murmur. She was AFIB with RVR and was given digoxin at the other facility x 2. Currently she is going in and out of Aflutter and SR with PACs and PVCs. She is breathing easier currently however has SOB with exertion. She denies chest pain or palpitations.    Past Medical History:   Diagnosis Date    Cancer     skin cancer    Hypertension        History reviewed. No pertinent surgical history.    Review of patient's allergies indicates:  No Known Allergies    Current Facility-Administered Medications on File Prior to Encounter   Medication    [COMPLETED] digoxin injection 500 mcg    [COMPLETED] digoxin injection 500 mcg    [COMPLETED] furosemide injection 40 mg    [COMPLETED] hydrALAZINE tablet 50 mg    [COMPLETED] labetalol injection 10 mg    [COMPLETED] labetalol tablet 100 mg    [COMPLETED] nitroGLYCERIN 2% TD oint ointment 2 inch    [DISCONTINUED] nitroGLYCERIN 2% TD oint ointment 2 inch     Current Outpatient Medications on File Prior to Encounter   Medication Sig     amLODIPine (NORVASC) 2.5 MG tablet Take 2.5 mg by mouth once daily.    lisinopril (PRINIVIL,ZESTRIL) 40 MG tablet Take 40 mg by mouth once daily.     Family History     None        Tobacco Use    Smoking status: Never Smoker   Substance and Sexual Activity    Alcohol use: Never     Frequency: Never    Drug use: Never    Sexual activity: Not Currently       REVIEW OF SYSTEMS:    Constitutional: Negative for chills, fatigue and fever.   Eyes: No double vision, No blurred vision  Neuro: No headaches, No dizziness  Respiratory: SOB with exertion.    Cardiovascular: Peripheral Edema and Palpitations  Gastrointestinal: Negative for abdominal pain, No melena, diarrhea, nausea and vomiting.   Genitourinary: Negative for dysuria and frequency, Negative for hematuria  Skin: Negative for bruising, Negative for edema or discoloration noted.   Endocrine: Negative for polyphagia, Negative for heat intolerance, Negative for cold intolerance  Psychiatric: Negative for depression, Negative for anxiety, Negative for memory loss  Musculoskeletal: +myalgias    Objective:     Vital Signs (Most Recent):  Temp: 98.3 °F (36.8 °C) (02/23/20 0700)  Pulse: 98 (02/23/20 0812)  Resp: (!) 34 (02/23/20 0800)  BP: (!) 178/77 (02/23/20 0812)  SpO2: (!) 90 % (02/23/20 0800) Vital Signs (24h Range):  Temp:  [97.8 °F (36.6 °C)-98.6 °F (37 °C)] 98.3 °F (36.8 °C)  Pulse:  [] 98  Resp:  [14-56] 34  SpO2:  [82 %-100 %] 90 %  BP: ()/() 178/77     Weight: 56.7 kg (125 lb)  Body mass index is 20.8 kg/m².    SpO2: (!) 90 %  O2 Device (Oxygen Therapy): nasal cannula      Intake/Output Summary (Last 24 hours) at 2/23/2020 0928  Last data filed at 2/23/2020 0502  Gross per 24 hour   Intake 275 ml   Output 575 ml   Net -300 ml       Lines/Drains/Airways     Peripheral Intravenous Line                 Peripheral IV - Single Lumen 02/22/20 20 G Posterior;Right Hand 1 day         Peripheral IV - Single Lumen 02/22/20 1910 Left;Posterior Hand  less than 1 day                PHYSICAL EXAM:    GENERAL: Thin female in no distress  HEENT: Normocephalic. Pupils normal and conjunctivae normal.  Mucous membranes normal, no cyanosis or icterus, trachea central,no pallor or icterus is noted..   NECK: No JVD. No bruit..   THYROID: Thyroid not enlarged. No nodules present..   CARDIAC: Harsh Holosystolic murmur noted at the LSB  CHEST ANATOMY: Mild Barrel Chest  LUNGS: Mild Crackles   ABDOMEN: Soft no masses or organomegaly.  No abdomen pulsations or bruits.  Normal bowel sounds. No pulsations and no masses felt, No guarding or rebound.   URINARY:  fletcher catheter   EXTREMITIES:Mild edema bilaterally  PERIPHERAL VASCULAR SYSTEM: Good palpable distal pulses.   CENTRAL NERVOUS SYSTEM: No focal motor or sensory deficits noted.   SKIN: Skin without lesions, moist, well perfused.   MUSCLE STRENGTH & TONE: No noteable weakness, atrophy or abnormal movement.       Significant Labs:     Results for SARATH COLON (MRN [id number]) as of 2/23/2020 09:29   Ref. Range 2/23/2020 04:19   WBC Latest Ref Range: 3.90 - 12.70 K/uL 6.58   RBC Latest Ref Range: 4.00 - 5.40 M/uL 3.69 (L)   Hemoglobin Latest Ref Range: 12.0 - 16.0 g/dL 11.8 (L)   Hematocrit Latest Ref Range: 37.0 - 48.5 % 38.4   MCV Latest Ref Range: 82 - 98 fL 104 (H)   MCH Latest Ref Range: 27.0 - 31.0 pg 32.0 (H)   MCHC Latest Ref Range: 32.0 - 36.0 g/dL 30.7 (L)   RDW Latest Ref Range: 11.5 - 14.5 % 12.9   Platelets Latest Ref Range: 150 - 350 K/uL 51 (L)   MPV Latest Ref Range: 9.2 - 12.9 fL 10.5   Gran% Latest Ref Range: 38.0 - 73.0 % 68.1   Gran # (ANC) Latest Ref Range: 1.8 - 7.7 K/uL 4.5   Lymph% Latest Ref Range: 18.0 - 48.0 % 14.9 (L)   Lymph # Latest Ref Range: 1.0 - 4.8 K/uL 1.0   Mono% Latest Ref Range: 4.0 - 15.0 % 15.0   Mono # Latest Ref Range: 0.3 - 1.0 K/uL 1.0   Eosinophil% Latest Ref Range: 0.0 - 8.0 % 1.5   Eos # Latest Ref Range: 0.0 - 0.5 K/uL 0.1   Basophil% Latest Ref Range: 0.0 - 1.9 % 0.2   Baso #  Latest Ref Range: 0.00 - 0.20 K/uL 0.01   nRBC Latest Ref Range: 0 /100 WBC 0   Differential Method Unknown Automated   Immature Grans (Abs) Latest Ref Range: 0.00 - 0.04 K/uL 0.02   Immature Granulocytes Latest Ref Range: 0.0 - 0.5 % 0.3   Sodium Latest Ref Range: 136 - 145 mmol/L 143   Potassium Latest Ref Range: 3.5 - 5.1 mmol/L 4.3   Chloride Latest Ref Range: 95 - 110 mmol/L 96   CO2 Latest Ref Range: 23 - 29 mmol/L 34 (H)   Anion Gap Latest Ref Range: 8 - 16 mmol/L 13   BUN, Bld Latest Ref Range: 8 - 23 mg/dL 20   Creatinine Latest Ref Range: 0.5 - 1.4 mg/dL 0.9   eGFR if non African American Latest Ref Range: >60 mL/min/1.73 m^2 57.7 (A)   eGFR if African American Latest Ref Range: >60 mL/min/1.73 m^2 >60.0   Glucose Latest Ref Range: 70 - 110 mg/dL 102   Calcium Latest Ref Range: 8.7 - 10.5 mg/dL 8.9   Magnesium Latest Ref Range: 1.6 - 2.6 mg/dL 2.1   BNP Latest Ref Range: 0 - 99 pg/mL 891 (H)   LD Latest Ref Range: 110 - 260 U/L 162   Troponin I Latest Ref Range: <=0.040 ng/mL 0.060 (HH)     Significant Imaging:   Narrative     EXAMINATION:  XR CHEST AP PORTABLE    CLINICAL HISTORY:  CHF;.    TECHNIQUE:  Single frontal portable view of the chest was performed.    COMPARISON:  None.    FINDINGS:  Cardiac monitor wires overlie the chest.  Cardiomediastinal silhouette is enlarged.  Calcific plaque projects over the aortic arch.Lungs are hyperexpanded with increased interstitial markings bilaterally and mild flattening of the hemidiaphragms suggestive of underlying COPD.  Superimposed interstitial edema not excluded.  No focal consolidation, pleural effusion, or pneumothorax.  Bones are intact.      Impression       Cardiomediastinal silhouette enlargement with findings suggestive of underlying COPD.  Superimposed interstitial edema not excluded.      Electronically signed by: Andrew Carter  Date: 02/22/2020  Time: 13:55            I HAVE REVIEWED :    The vital signs, nurses notes, and all the pertinent  radiology and labs.     Assessment and Plan:     1. AFIB with RVR- Currently SR with PACs and PVCs  2. Holosytolic Murmur  3. Acute CHF exacerbation-  improving  4. COPD  5. Thrombocytopenia  6. HTN    Plan:  Continue Metoprolol 25 mg PO BID, this can be increased if needed  Add Lisinopril 10 mg daily   Await Final ECHO report  No Anticoagulation currently secondary to thrombocytopenia  Further decisions based upon hospital course.  Thank you.            Active Diagnoses:    Diagnosis Date Noted POA    PRINCIPAL PROBLEM:  Atrial fibrillation with rapid ventricular response [I48.91] 02/22/2020 Yes    Thrombocytopenia [D69.6] 02/22/2020 Yes    Acute congestive heart failure [I50.9] 02/22/2020 Yes    Benign essential hypertension [I10] 02/22/2020 Yes    Acute respiratory failure with hypoxia [J96.01] 02/22/2020 Yes      Problems Resolved During this Admission:           VTE Risk Mitigation (From admission, onward)         Ordered     Place sequential compression device  Until discontinued      02/22/20 2117     IP VTE HIGH RISK PATIENT  Once      02/22/20 1914     Reason for No Pharmacological VTE Prophylaxis  Once     Question:  Reasons:  Answer:  Thrombocytopenia    02/22/20 1914                Tasha Antunez NP  Cardiology   [facility]      I have personally interviewed and examined the patient, I have reviewed the Nurse Practitioner's history and physical, assessment, and plan. I have personally evaluated the patient at bedside and agree with the findings.

## 2020-02-23 NOTE — ASSESSMENT & PLAN NOTE
Aware  Chronic medical issue   Monitor   Hold home amlodipine and lisinopril   Will start metoprolol PO to assist with rate control

## 2020-02-23 NOTE — ASSESSMENT & PLAN NOTE
Possible related to valvular heart disease vs tachycardia induced cardiomyopathy due to AF with RVR   Treat AF as above   Supplemental oxygen   S/p IV furosemide 40 mg   Obtain echocardiogram   Repeat BNP next AM   Cardiology consult

## 2020-02-23 NOTE — CONSULTS
Hematology Oncology consultation  Griffin Holland MD  February 23, 2020  Consultation was requested per     This 87-year-old female was seen at [facility] with complaints of gradually increasing shortness of breath and peripheral edema. She was found to have atrial flutter with rapid ventricular rate and assessed as having congestive heart failure.  She was transferred to [facility] for further management.    Also she was found to have a platelet count of 9000.  Platelet pheresis was given and her platelet count is now 51,000. She does admit to easy bruisability but denies history of bleeding tendencies.    She does not smoke, she does not drink alcohol with regularity.  She lives in an apartment in [facility].  She does not have history of drug allergies.  She does have history of hypertension.    She has a niece with a history of increased platelets who sees Dr. Martini of Hematology in [place].    She appears comfortable and does not appear acutely ill or chronically ill.  She does not have palpable lymphadenopathy.  Breath sounds are distant, lungs are clear  She she has irregular rhythm no murmur  Abdomen is nontender, liver spleen are not palpable  Left and right breast are nontender without palpable mass  Neurologically she is grossly intact  Her calves are nontender, she does have 1+ edema, she does not have petechiae or purpura for on exam  She does not have bleeding or oozing from IV sites    Hemoglobin is 11.8, hematocrit is 38.4, white blood cell count is 6580.  MCV is 104.  Initial platelet count was 9000 and after platelet pheresis repeat platelet count is 10243.  She has a normal differential.  Will review her peripheral smear at the lab.    BNP is 891, LDH is 162.  Pts  blood type is O-positive, antibody screen is negative.   Influenza A and B are negative    Chest x-ray shows left basilar atelectasis    Assessment:    This individual has marked thrombocytopenia,  borderline anemia with macrocytosis, atrial flutter with rapid ventricular rate, congestive heart failure, hypertension history.  Platelet count has improved to 51,000  after transfusion with 1 platelet pheresis.  CBC will be monitored.    The differential diagnosis of the thrombocytopenia includes decreased platelet production in the bone marrow secondary to vitamin deficiencies in the setting of her increased MCV of 104.  Will check her B12, folate, ferritin and B6.    Myelodysplastic syndrome is in the differential with the increased MCV and this degree of thrombocytopenia.   I would defer Bone marrow examination until we get some of this lab work returned.    Consumptive processes  that could give us this degree of thrombocytopenia would include ITP.  Will check her anti-platelet antibodies.    TTP is also in the differential, will go to the lab and review her peripheral smear to assess the platelet number, and to check for polychromasia or schistocytes as a manifestation of microangiopathic hemolytic anemia.    Hit syndrome with thrombocytopenia secondary to heparin or Lovenox administration needs to also be considered.  She may have gotten a heparin product in the ER at [facility] or here.  Will ask that heparin, heparin flushes, Lovenox be deferred until we know that her a hit antibody is negative.    She does not have petechiae or ecchymoses on exam.  Emir hose have been applied.  Will apply SCDs to reduce the risk of DVT event here.    Have discussed the case with Dr. Bonds.  We will start her on empirical prednisone 20 mg q.i.d. because of the degree of baseline thrombocytopenia while we are waiting on anti-platelet antibody serologies and vitamin levels to return.    Also IV Ig is being considered.    Hydralazine a can be associated with autoimmune hemolytic anemia.  Also H pylori infection can sometimes cause thrombocytopenia of this degree through immune mechanisms.  Will check her stools for H  pylori antigen.  I am off tomorrow and Dr. Gonzalez will see her and round for myself.  Thank you for the consultation.

## 2020-02-23 NOTE — ASSESSMENT & PLAN NOTE
· Acute decompensation seems most c/w acute pulmonary edema related to this  · May need further evaluation  · May need transfer to [facility] if valve work needs to happen

## 2020-02-23 NOTE — PLAN OF CARE
Pt. Admitted and oriented to the unit at 1900. Tele monitoring read as recorded. Dr. Mcfadden aware. Metoprolol given as ordered. Pt. Has been NSR with 1st degree AVB and frequent or bigeminy PVC's throughout remainder of shift. One unit of platelets transfused as ordered. No reaction suspected. Pt. Sat up in bed to eat dinner. No complaints.

## 2020-02-24 ENCOUNTER — HOSPITAL ENCOUNTER (INPATIENT)
Facility: HOSPITAL | Age: 85
LOS: 8 days | Discharge: HOME | DRG: 292 | End: 2020-03-03
Attending: INTERNAL MEDICINE | Admitting: INTERNAL MEDICINE
Payer: MEDICARE

## 2020-02-24 VITALS
RESPIRATION RATE: 28 BRPM | SYSTOLIC BLOOD PRESSURE: 133 MMHG | TEMPERATURE: 98 F | WEIGHT: 120.13 LBS | OXYGEN SATURATION: 97 % | HEIGHT: 65 IN | DIASTOLIC BLOOD PRESSURE: 59 MMHG | HEART RATE: 96 BPM | BODY MASS INDEX: 20.01 KG/M2

## 2020-02-24 DIAGNOSIS — D69.3 IDIOPATHIC THROMBOCYTOPENIC PURPURA (ITP): Primary | ICD-10-CM

## 2020-02-24 DIAGNOSIS — I48.91 ATRIAL FIBRILLATION WITH RAPID VENTRICULAR RESPONSE: ICD-10-CM

## 2020-02-24 DIAGNOSIS — I49.3 PVC (PREMATURE VENTRICULAR CONTRACTION): ICD-10-CM

## 2020-02-24 DIAGNOSIS — I34.0 NONRHEUMATIC MITRAL VALVE REGURGITATION: ICD-10-CM

## 2020-02-24 DIAGNOSIS — I50.9 HEART FAILURE, CHRONIC, WITH ACUTE DECOMPENSATION: ICD-10-CM

## 2020-02-24 DIAGNOSIS — R53.81 PHYSICAL DECONDITIONING: ICD-10-CM

## 2020-02-24 PROBLEM — I50.30 (HFPEF) HEART FAILURE WITH PRESERVED EJECTION FRACTION: Status: ACTIVE | Noted: 2020-02-24

## 2020-02-24 LAB
ANION GAP SERPL CALC-SCNC: 8 MMOL/L (ref 8–16)
BASOPHILS # BLD AUTO: 0.01 K/UL (ref 0–0.2)
BASOPHILS NFR BLD: 0.1 % (ref 0–1.9)
BNP SERPL-MCNC: 873 PG/ML (ref 0–99)
BUN SERPL-MCNC: 24 MG/DL (ref 8–23)
CALCIUM SERPL-MCNC: 8.4 MG/DL (ref 8.7–10.5)
CHLORIDE SERPL-SCNC: 93 MMOL/L (ref 95–110)
CO2 SERPL-SCNC: 36 MMOL/L (ref 23–29)
CREAT SERPL-MCNC: 0.7 MG/DL (ref 0.5–1.4)
DIFFERENTIAL METHOD: ABNORMAL
EOSINOPHIL # BLD AUTO: 0 K/UL (ref 0–0.5)
EOSINOPHIL NFR BLD: 0 % (ref 0–8)
ERYTHROCYTE [DISTWIDTH] IN BLOOD BY AUTOMATED COUNT: 12.6 % (ref 11.5–14.5)
EST. GFR  (AFRICAN AMERICAN): >60 ML/MIN/1.73 M^2
EST. GFR  (NON AFRICAN AMERICAN): >60 ML/MIN/1.73 M^2
FERRITIN SERPL-MCNC: 53 NG/ML (ref 20–300)
FOLATE SERPL-MCNC: >24.8 NG/ML (ref 4–24)
GLUCOSE SERPL-MCNC: 130 MG/DL (ref 70–110)
HCT VFR BLD AUTO: 38 % (ref 37–48.5)
HGB BLD-MCNC: 11.6 G/DL (ref 12–16)
IMM GRANULOCYTES # BLD AUTO: 0.02 K/UL (ref 0–0.04)
IMM GRANULOCYTES NFR BLD AUTO: 0.3 % (ref 0–0.5)
LYMPHOCYTES # BLD AUTO: 0.3 K/UL (ref 1–4.8)
LYMPHOCYTES NFR BLD: 4.3 % (ref 18–48)
MAGNESIUM SERPL-MCNC: 1.7 MG/DL (ref 1.6–2.6)
MAGNESIUM SERPL-MCNC: 1.8 MG/DL (ref 1.6–2.6)
MCH RBC QN AUTO: 32 PG (ref 27–31)
MCHC RBC AUTO-ENTMCNC: 30.5 G/DL (ref 32–36)
MCV RBC AUTO: 105 FL (ref 82–98)
MONOCYTES # BLD AUTO: 0.1 K/UL (ref 0.3–1)
MONOCYTES NFR BLD: 1.5 % (ref 4–15)
MPV, BLUE TOP: 13.6 FL (ref 9.2–12.9)
NEUTROPHILS # BLD AUTO: 6.3 K/UL (ref 1.8–7.7)
NEUTROPHILS NFR BLD: 93.8 % (ref 38–73)
NRBC BLD-RTO: 0 /100 WBC
PLATELET # BLD AUTO: 13 K/UL (ref 150–350)
PLATELET, BLUE TOP: 15 K/UL (ref 150–350)
PMV BLD AUTO: 12.2 FL (ref 9.2–12.9)
POTASSIUM SERPL-SCNC: 4.7 MMOL/L (ref 3.5–5.1)
POTASSIUM SERPL-SCNC: 4.8 MMOL/L (ref 3.5–5.1)
RBC # BLD AUTO: 3.63 M/UL (ref 4–5.4)
SODIUM SERPL-SCNC: 137 MMOL/L (ref 136–145)
TSH SERPL DL<=0.005 MIU/L-ACNC: 0.53 UIU/ML (ref 0.4–4)
VIT B12 SERPL-MCNC: 984 PG/ML (ref 210–950)
WBC # BLD AUTO: 6.7 K/UL (ref 3.9–12.7)

## 2020-02-24 PROCEDURE — 83735 ASSAY OF MAGNESIUM: CPT | Mod: 91

## 2020-02-24 PROCEDURE — 86022 PLATELET ANTIBODIES: CPT | Mod: 59

## 2020-02-24 PROCEDURE — 63600175 PHARM REV CODE 636 W HCPCS: Performed by: STUDENT IN AN ORGANIZED HEALTH CARE EDUCATION/TRAINING PROGRAM

## 2020-02-24 PROCEDURE — 25000003 PHARM REV CODE 250

## 2020-02-24 PROCEDURE — 84207 ASSAY OF VITAMIN B-6: CPT

## 2020-02-24 PROCEDURE — 84132 ASSAY OF SERUM POTASSIUM: CPT

## 2020-02-24 PROCEDURE — 99223 PR INITIAL HOSPITAL CARE,LEVL III: ICD-10-PCS | Mod: AI,GC,, | Performed by: INTERNAL MEDICINE

## 2020-02-24 PROCEDURE — 82746 ASSAY OF FOLIC ACID SERUM: CPT

## 2020-02-24 PROCEDURE — 99900035 HC TECH TIME PER 15 MIN (STAT)

## 2020-02-24 PROCEDURE — 94660 CPAP INITIATION&MGMT: CPT

## 2020-02-24 PROCEDURE — 85025 COMPLETE CBC W/AUTO DIFF WBC: CPT

## 2020-02-24 PROCEDURE — 99223 1ST HOSP IP/OBS HIGH 75: CPT | Mod: AI,GC,, | Performed by: INTERNAL MEDICINE

## 2020-02-24 PROCEDURE — 83735 ASSAY OF MAGNESIUM: CPT

## 2020-02-24 PROCEDURE — 25000003 PHARM REV CODE 250: Performed by: STUDENT IN AN ORGANIZED HEALTH CARE EDUCATION/TRAINING PROGRAM

## 2020-02-24 PROCEDURE — 25000003 PHARM REV CODE 250: Performed by: NURSE PRACTITIONER

## 2020-02-24 PROCEDURE — 82728 ASSAY OF FERRITIN: CPT

## 2020-02-24 PROCEDURE — 94761 N-INVAS EAR/PLS OXIMETRY MLT: CPT

## 2020-02-24 PROCEDURE — 20000000 HC ICU ROOM

## 2020-02-24 PROCEDURE — 27000221 HC OXYGEN, UP TO 24 HOURS

## 2020-02-24 PROCEDURE — 80048 BASIC METABOLIC PNL TOTAL CA: CPT

## 2020-02-24 PROCEDURE — 63600175 PHARM REV CODE 636 W HCPCS: Performed by: INTERNAL MEDICINE

## 2020-02-24 PROCEDURE — 85397 CLOTTING FUNCT ACTIVITY: CPT

## 2020-02-24 PROCEDURE — 85049 AUTOMATED PLATELET COUNT: CPT

## 2020-02-24 PROCEDURE — 25000003 PHARM REV CODE 250: Performed by: INTERNAL MEDICINE

## 2020-02-24 PROCEDURE — 82607 VITAMIN B-12: CPT

## 2020-02-24 PROCEDURE — 84443 ASSAY THYROID STIM HORMONE: CPT

## 2020-02-24 PROCEDURE — 86022 PLATELET ANTIBODIES: CPT

## 2020-02-24 PROCEDURE — 83880 ASSAY OF NATRIURETIC PEPTIDE: CPT

## 2020-02-24 PROCEDURE — 36415 COLL VENOUS BLD VENIPUNCTURE: CPT

## 2020-02-24 RX ORDER — FUROSEMIDE 10 MG/ML
40 INJECTION INTRAMUSCULAR; INTRAVENOUS 2 TIMES DAILY
Status: DISCONTINUED | OUTPATIENT
Start: 2020-02-24 | End: 2020-02-26

## 2020-02-24 RX ORDER — MORPHINE SULFATE 2 MG/ML
2 INJECTION, SOLUTION INTRAMUSCULAR; INTRAVENOUS
Status: CANCELLED | OUTPATIENT
Start: 2020-02-24

## 2020-02-24 RX ORDER — LANOLIN ALCOHOL/MO/W.PET/CERES
400 CREAM (GRAM) TOPICAL DAILY
Status: DISCONTINUED | OUTPATIENT
Start: 2020-02-24 | End: 2020-02-24 | Stop reason: HOSPADM

## 2020-02-24 RX ORDER — LISINOPRIL 10 MG/1
10 TABLET ORAL DAILY
Status: CANCELLED | OUTPATIENT
Start: 2020-02-25

## 2020-02-24 RX ORDER — ACETAMINOPHEN 325 MG/1
650 TABLET ORAL ONCE
Status: COMPLETED | OUTPATIENT
Start: 2020-02-24 | End: 2020-02-24

## 2020-02-24 RX ORDER — METOPROLOL TARTRATE 25 MG/1
12.5 TABLET ORAL 4 TIMES DAILY
Status: DISCONTINUED | OUTPATIENT
Start: 2020-02-24 | End: 2020-02-25

## 2020-02-24 RX ORDER — ACETAMINOPHEN 325 MG/1
650 TABLET ORAL EVERY 4 HOURS PRN
Status: CANCELLED | OUTPATIENT
Start: 2020-02-24

## 2020-02-24 RX ORDER — PANTOPRAZOLE SODIUM 40 MG/1
40 TABLET, DELAYED RELEASE ORAL DAILY
Status: DISCONTINUED | OUTPATIENT
Start: 2020-02-25 | End: 2020-03-03 | Stop reason: HOSPADM

## 2020-02-24 RX ORDER — ONDANSETRON 2 MG/ML
4 INJECTION INTRAMUSCULAR; INTRAVENOUS EVERY 8 HOURS PRN
Status: CANCELLED | OUTPATIENT
Start: 2020-02-24

## 2020-02-24 RX ORDER — NITROGLYCERIN 20 MG/100ML
5 INJECTION INTRAVENOUS CONTINUOUS
Status: CANCELLED | OUTPATIENT
Start: 2020-02-24

## 2020-02-24 RX ORDER — MUPIROCIN 20 MG/G
OINTMENT TOPICAL 2 TIMES DAILY
Status: CANCELLED | OUTPATIENT
Start: 2020-02-24 | End: 2020-02-28

## 2020-02-24 RX ORDER — CHLORHEXIDINE GLUCONATE ORAL RINSE 1.2 MG/ML
15 SOLUTION DENTAL 2 TIMES DAILY
Status: CANCELLED | OUTPATIENT
Start: 2020-02-24 | End: 2020-02-28

## 2020-02-24 RX ORDER — LANOLIN ALCOHOL/MO/W.PET/CERES
400 CREAM (GRAM) TOPICAL DAILY
Status: CANCELLED | OUTPATIENT
Start: 2020-02-25

## 2020-02-24 RX ORDER — PANTOPRAZOLE SODIUM 40 MG/1
40 TABLET, DELAYED RELEASE ORAL DAILY
Status: DISCONTINUED | OUTPATIENT
Start: 2020-02-24 | End: 2020-02-24 | Stop reason: HOSPADM

## 2020-02-24 RX ORDER — PREDNISONE 20 MG/1
20 TABLET ORAL EVERY 6 HOURS
Status: CANCELLED | OUTPATIENT
Start: 2020-02-24

## 2020-02-24 RX ORDER — HYDROCODONE BITARTRATE AND ACETAMINOPHEN 500; 5 MG/1; MG/1
TABLET ORAL
Status: CANCELLED | OUTPATIENT
Start: 2020-02-24

## 2020-02-24 RX ORDER — MAGNESIUM SULFATE HEPTAHYDRATE 40 MG/ML
2 INJECTION, SOLUTION INTRAVENOUS ONCE
Status: COMPLETED | OUTPATIENT
Start: 2020-02-24 | End: 2020-02-24

## 2020-02-24 RX ORDER — DIPHENHYDRAMINE HCL 25 MG
50 CAPSULE ORAL
Status: CANCELLED | OUTPATIENT
Start: 2020-02-24

## 2020-02-24 RX ORDER — AMLODIPINE BESYLATE 2.5 MG/1
2.5 TABLET ORAL DAILY
Status: DISCONTINUED | OUTPATIENT
Start: 2020-02-24 | End: 2020-03-02

## 2020-02-24 RX ORDER — ACETAMINOPHEN 325 MG/1
650 TABLET ORAL EVERY 8 HOURS PRN
Status: CANCELLED | OUTPATIENT
Start: 2020-02-24

## 2020-02-24 RX ORDER — PANTOPRAZOLE SODIUM 40 MG/1
40 TABLET, DELAYED RELEASE ORAL DAILY
Status: CANCELLED | OUTPATIENT
Start: 2020-02-25

## 2020-02-24 RX ORDER — LISINOPRIL 20 MG/1
40 TABLET ORAL DAILY
Status: DISCONTINUED | OUTPATIENT
Start: 2020-02-25 | End: 2020-02-24

## 2020-02-24 RX ORDER — PREDNISONE 10 MG/1
20 TABLET ORAL 4 TIMES DAILY
Status: DISCONTINUED | OUTPATIENT
Start: 2020-02-24 | End: 2020-02-25

## 2020-02-24 RX ADMIN — METOPROLOL TARTRATE 12.5 MG: 25 TABLET, FILM COATED ORAL at 09:02

## 2020-02-24 RX ADMIN — PANTOPRAZOLE SODIUM 40 MG: 40 TABLET, DELAYED RELEASE ORAL at 09:02

## 2020-02-24 RX ADMIN — FUROSEMIDE 40 MG: 10 INJECTION, SOLUTION INTRAMUSCULAR; INTRAVENOUS at 05:02

## 2020-02-24 RX ADMIN — LISINOPRIL 10 MG: 10 TABLET ORAL at 09:02

## 2020-02-24 RX ADMIN — PREDNISONE 20 MG: 20 TABLET ORAL at 09:02

## 2020-02-24 RX ADMIN — AMLODIPINE BESYLATE 2.5 MG: 2.5 TABLET ORAL at 05:02

## 2020-02-24 RX ADMIN — ACETAMINOPHEN 650 MG: 325 TABLET ORAL at 09:02

## 2020-02-24 RX ADMIN — MAGNESIUM SULFATE HEPTAHYDRATE 2 G: 40 INJECTION, SOLUTION INTRAVENOUS at 09:02

## 2020-02-24 RX ADMIN — PREDNISONE 20 MG: 20 TABLET ORAL at 05:02

## 2020-02-24 RX ADMIN — METOPROLOL TARTRATE 12.5 MG: 25 TABLET, FILM COATED ORAL at 05:02

## 2020-02-24 RX ADMIN — CHLORHEXIDINE GLUCONATE 15 ML: 1.2 RINSE ORAL at 09:02

## 2020-02-24 RX ADMIN — MAGNESIUM OXIDE 400 MG: 400 TABLET ORAL at 09:02

## 2020-02-24 RX ADMIN — MUPIROCIN: 20 OINTMENT TOPICAL at 09:02

## 2020-02-24 NOTE — PROGRESS NOTES
Interval History:     Patient had extended pauses overnight.  Denies having any chest discomfort some shortness of breath intermittently is noted.  No tendencies for any bleeding noted.      Review of Systems     Denies Chest pain, sob, or palpitations  No recent fever, cough chills or congestion  No blood in the urine or stool  No myalgias  No recent arm, neck, or jaw pain  No lightheadedness or dizziness  No Double vision, blurry, vision or headache     Objective:     Vital Signs (Most Recent):  Temp: 98.3 °F (36.8 °C) (02/24/20 1205)  Pulse: 96 (02/24/20 1230)  Resp: (!) 28 (02/24/20 1230)  BP: (!) 133/59 (02/24/20 1230)  SpO2: 97 % (02/24/20 1230) Vital Signs (24h Range):  Temp:  [97.5 °F (36.4 °C)-98.5 °F (36.9 °C)] 98.2 °F (36.8 °C)  Pulse:  [] 96  Resp:  [18-41] 24  SpO2:  [87 %-100 %] 97 %  BP: ()/(49-80) 151/67     Weight: 54.5 kg (120 lb 2.4 oz)  Body mass index is 19.99 kg/m².     SpO2: 97 %  O2 Device (Oxygen Therapy): High Flow nasal Cannula      Intake/Output Summary (Last 24 hours) at 2/24/2020 1508  Last data filed at 2/24/2020 0500  Gross per 24 hour   Intake 70.41 ml   Output 1026 ml   Net -955.59 ml       Lines/Drains/Airways     Drain                 Urethral Catheter 02/23/20 1744 Straight-tip 16 Fr. less than 1 day          Peripheral Intravenous Line                 Peripheral IV - Double Lumen 02/23/20 1600 20 G Left Antecubital less than 1 day         Peripheral IV - Single Lumen 02/24/20 0218 20 G Anterior;Right Forearm less than 1 day                   chlorhexidine  15 mL Mouth/Throat BID    dilTIAZem  5 mg/hr Intravenous Once    lisinopril  10 mg Oral Daily    magnesium oxide  400 mg Oral Daily    mupirocin   Nasal BID    pantoprazole  40 mg Oral Daily    predniSONE  20 mg Oral Q6H         PHYSICAL EXAM:  Constitutional: Well built, well nourished in no apparent distress  Neck:  Elevated JVD no thyromegaly noted  Lungs appear diminished breath sounds coarse crackles  bilaterally   Chest Wall: no tenderness  Heart: regular rate and rhythm, S1, S2 normal, holosystolic murmurs present over the apex.  Abdomen: soft, non-tender; bowel sounds normal; no masses,  no organomegaly  Extremities: Extremities normal, no edema  Neuro: AAO X 3    I HAVE REVIEWED :    The vital signs, nurses notes, and all the pertinent radiology and labs.       Significant Labs:   Severe thrombocytopenia is noted    Significant Imaging:       I HAVE REVIEWED :    The vital signs, nurses notes, and all the pertinent radiology and labs.       ASSESSMENT & PLAN:    Paroxysmal atrial fibrillation now in sinus rhythm  2.  Extended pauses  3.  Severe mitral insufficiency  4.  Severe thrombocytopenia  5.  Acute congestive heart failure combination systolic and diastolic dysfunction  6.  Valvular heart disease    RECOMMENDATIONS:  Discussed with the staff and patient.  Recommend to transfer the patient to tertiary care facility for appropriate intervention as necessary insert of stage procedure.  She can have platelet transfusion and appropriate coronary intervention can be performed at 1 stage.  Patient understands agrees for transfer.      As per hospital less assessment and plan      Atrial fibrillation with rapid ventricular response  New onset   S/p digoxin 500 mcg x 2 at outside facility   Also received IV and PO labetalol   Cardiac monitoring in the ICU   Converted to normal sinus rhythm after receiving IV metoprolol 5 mg push  Consult cardiology   Obtain echocardiogram   No anticoagulation due to severe thrombocytopenia       Benign essential hypertension  Aware  Chronic medical issue   Monitor   Hold home amlodipine and lisinopril   Will start metoprolol PO to assist with rate control         Thrombocytopenia  Acute isolated thrombocytopenia  No evidence of active bleeding  Unlikely HIT, TTP or drug-induced etiology based on presentation   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)  Platelet count  "dropped to 9000 per microL; will order 1 unit of platelets  Consult Hematology further evaluation  Will obtain peripheral blood smear and LDH       Acute congestive heart failure  Possible related to valvular heart disease vs tachycardia induced cardiomyopathy due to AF with RVR   Treat AF as above   Supplemental oxygen   S/p IV furosemide 40 mg   Obtain echocardiogram   Repeat BNP next AM   Cardiology consult       Acute respiratory failure with hypoxia  Likely due to new onset CHF   Plan as above       Atrial fibrillation with rapid ventricular response  · Currently being addressed  · Better now but doesn't tolerate RVR well    Acute respiratory failure with hypoxia  · Due to acute pulmonary edema due to MR, Afib and accelerated HTN  · Better  · Wean O2 down and then wean off BiPAP  · She has NO h/o COPD or cigarette use    Acute congestive heart failure  · Related to valvular heart disease  · Diurese, BP control, afterload reduction, heart rate control  · She did (and could again) decompensate very quickly    Thrombocytopenia  · Seen by Dr Garces  · Steroids started    Benign essential hypertension  · Not so "benign"  · Had accelerated HTN when she was in respiratory distress    Nonrheumatic mitral valve regurgitation  · Acute decompensation seems most c/w acute pulmonary edema related to this  · May need further evaluation  · May need transfer to [facility] if valve work needs to happen    Atrial fibrillation with rapid ventricular response  · Had pause as noted  · Current plan is to transfer to [facility] (arrangements being made)    Acute respiratory failure with hypoxia  · Better and on NC O2  · She has NO h/o COPD or cigarette use    Acute congestive heart failure  · Related to valvular heart disease  · Diurese, BP control, afterload reduction, heart rate control  · She did (and could again) decompensate very quickly  · Better this AM    Thrombocytopenia  · Seen by Dr Garces  · Steroids " started  · ? ITP    Benign essential hypertension  · Better control    Nonrheumatic mitral valve regurgitation  · Acute decompensation seems most c/w acute pulmonary edema related to this  · More stable with regards to this this AM         I have personally interviewed and examined the patient, I have reviewed the Nurse Practitioner's history and physical, assessment, and plan. I have personally evaluated the patient at bedside and agree with the findings.

## 2020-02-24 NOTE — HPI
Ms. Arteaga is an 87yF who was sent from [facility] for evaluation of mitral valve regurgitation. She has a medical history of HTN. She is independent with ADLs at baseline. Yesterday she states she was drinking coffee in the AM and developed acute SOB. Also reports increased LE swelling and mild exertional dyspnea past 1 week. Denies chest pain. Went to ED and found to be in Afib with RVR. Also noted to have thrombocytopenia (was being worked up by Hematology there, s/p platelet transfusion and daily prednisone). She received 500 mcg of digoxin x2, IV furosemide 40 mg, hydralazine 50 mg p.o., IV labetalol and nitroglycerin patch. Sent to the ICU there with Bipap for intermittent respiratory failure. Bilateral lower extremity ultrasound was negative for DVT.  CTA chest was negative for pulmonary embolus but was consistent with pulmonary edema and pleural effusion.  Echocardiogram revealed severe mitral regurgitation with normal LVEF.Transferred for higher level of care.

## 2020-02-24 NOTE — PROGRESS NOTES
[facility]  Pulmonology  Progress Note    Patient Name: Fiona Arteaga  MRN: [id number]  Admission Date: 2/22/2020  Hospital Length of Stay: 2 days  Code Status: Full Code  Attending Provider: Alex Bonds MD  Primary Care Provider: Primary Doctor No   Principal Problem: Atrial fibrillation with rapid ventricular response    Subjective:     Interval History:     2/24/2020 - Respiratory status stable overnight, had about 18 second pause on tele (spontaneusly resolved).  No new complaints this AM.    Review of Systems   Constitutional: Positive for malaise/fatigue. Negative for chills, diaphoresis, fever and weight loss.   HENT: Negative for congestion.    Eyes: Negative for pain.   Respiratory: Positive for shortness of breath (better this AM). Negative for cough, hemoptysis, sputum production, wheezing and stridor.    Cardiovascular: Positive for leg swelling. Negative for chest pain, palpitations, orthopnea, claudication and PND.        + pause   Gastrointestinal: Negative for abdominal pain, blood in stool, constipation, diarrhea, heartburn, nausea and vomiting.   Genitourinary: Negative for dysuria, frequency, hematuria and urgency.   Musculoskeletal: Negative for falls and myalgias.   Neurological: Positive for weakness. Negative for sensory change and focal weakness.   Psychiatric/Behavioral: Negative for depression and suicidal ideas. The patient is not nervous/anxious.          Objective:     Vital Signs (Most Recent):  Temp: 98.3 °F (36.8 °C) (02/24/20 1205)  Pulse: 96 (02/24/20 1230)  Resp: (!) 28 (02/24/20 1230)  BP: (!) 133/59 (02/24/20 1230)  SpO2: 97 % (02/24/20 1230) Vital Signs (24h Range):  Temp:  [97.5 °F (36.4 °C)-98.5 °F (36.9 °C)] 98.2 °F (36.8 °C)  Pulse:  [] 96  Resp:  [18-50] 24  SpO2:  [85 %-100 %] 97 %  BP: ()/(49-84) 151/67     Weight: 54.5 kg (120 lb 2.4 oz)  Body mass index is 19.99 kg/m².      Intake/Output Summary (Last 24 hours) at 2/24/2020  1321  Last data filed at 2/24/2020 0500  Gross per 24 hour   Intake 70.41 ml   Output 1026 ml   Net -955.59 ml       Physical Exam   Constitutional: She appears well-developed and well-nourished. No distress.   Thin female, nad, aao x 2   HENT:   Head: Normocephalic and atraumatic.   Right Ear: External ear normal.   Left Ear: External ear normal.   Nose: Nose normal.   Mouth/Throat: Oropharynx is clear and moist.   Eyes: Pupils are equal, round, and reactive to light. Conjunctivae and EOM are normal.   Neck: Normal range of motion. Neck supple. No JVD present. No tracheal deviation present. No thyromegaly present.   Cardiovascular: Normal rate, regular rhythm and intact distal pulses. Exam reveals no gallop and no friction rub.   Murmur heard.  Pulmonary/Chest: Effort normal and breath sounds normal. No stridor. No respiratory distress. She has no wheezes. She has no rales. She exhibits no tenderness.   Decreased at bases, o/w CTA   Abdominal: Soft. Bowel sounds are normal. She exhibits no distension. There is no tenderness. There is no rebound and no guarding.   Musculoskeletal: Normal range of motion. She exhibits edema. She exhibits no tenderness.   Lymphadenopathy:     She has no cervical adenopathy.   Neurological: She is alert. No cranial nerve deficit.   Skin: Skin is warm and dry. She is not diaphoretic.   Psychiatric: She has a normal mood and affect. Her behavior is normal.   Nursing note and vitals reviewed.      Vents:  Oxygen Concentration (%): 70 (02/23/20 1926)    Lines/Drains/Airways     Drain                 Urethral Catheter 02/23/20 1744 Straight-tip 16 Fr. less than 1 day          Peripheral Intravenous Line                 Peripheral IV - Double Lumen 02/23/20 1600 20 G Left Antecubital less than 1 day         Peripheral IV - Single Lumen 02/24/20 0218 20 G Anterior;Right Forearm less than 1 day                Significant Labs:    CBC/Anemia Profile:  Recent Labs   Lab 02/23/20  0493  02/23/20 2129 02/24/20 0437   WBC 6.58 9.01 6.70   HGB 11.8* 12.3 11.6*   HCT 38.4 40.3 38.0   PLT 51* 15* 13*   * 105* 105*   RDW 12.9 12.7 12.6   FERRITIN  --   --  53   FOLATE  --   --  >24.8*   [id number]  --   --  984*        Chemistries:  Recent Labs   Lab 02/22/20  1345  02/22/20  1949 02/23/20 0419 02/23/20 1704 02/23/20 1706 02/24/20 0437     --  142 143 140  --  137   K 4.1  --  4.0 4.3 4.9  --  4.8   CL 99  --  94* 96 96  --  93*   CO2 31*  --  37* 34* 37*  --  36*   BUN 14  --  16 20 21  --  24*   CREATININE 0.6  --  0.8 0.9 0.9  --  0.7   CALCIUM 9.1  --  9.1 8.9 8.8  --  8.4*   ALBUMIN 3.9  --  3.7  --   --   --   --    PROT 7.0  --  6.5  --   --   --   --    BILITOT 1.0  --  0.9  --   --   --   --    ALKPHOS 50*  --  47*  --   --   --   --    ALT 38  --  34  --   --   --   --    AST 33  --  26  --   --   --   --    MG  --    < > 1.5* 2.1  --  1.9 1.8    < > = values in this interval not displayed.       All pertinent labs within the past 24 hours have been reviewed.    Significant Imaging:  I have reviewed and interpreted all pertinent imaging results/findings within the past 24 hours.     CXR (2/24) - NAD    Assessment/Plan:     * Atrial fibrillation with rapid ventricular response  · Had pause as noted  · Current plan is to transfer to [facility] (arrangements being made)    Acute respiratory failure with hypoxia  · Better and on NC O2  · She has NO h/o COPD or cigarette use    Acute congestive heart failure  · Related to valvular heart disease  · Diurese, BP control, afterload reduction, heart rate control  · She did (and could again) decompensate very quickly  · Better this AM    Nonrheumatic mitral valve regurgitation  · Acute decompensation seems most c/w acute pulmonary edema related to this  · More stable with regards to this this AM    Benign essential hypertension  · Better control    Thrombocytopenia  · Seen by Dr Garces  · Steroids started  · ? ITP            Jaun Thurston MD  Pulmonology  [facility]

## 2020-02-24 NOTE — ASSESSMENT & PLAN NOTE
· Related to valvular heart disease  · Diurese, BP control, afterload reduction, heart rate control  · She did (and could again) decompensate very quickly  · Better this AM

## 2020-02-24 NOTE — SUBJECTIVE & OBJECTIVE
Past Medical History:   Diagnosis Date    Cancer     skin cancer    Hypertension        No past surgical history on file.    Review of patient's allergies indicates:  No Known Allergies    Current Facility-Administered Medications on File Prior to Encounter   Medication    [] atropine 0.1 mg/mL injection    [COMPLETED] furosemide injection 20 mg    [COMPLETED] iohexol (OMNIPAQUE 350) injection 100 mL    [DISCONTINUED] 0.9%  NaCl infusion (for blood administration)    [DISCONTINUED] 0.9%  NaCl infusion    [DISCONTINUED] acetaminophen tablet 650 mg    [DISCONTINUED] acetaminophen tablet 650 mg    [DISCONTINUED] calcium chloride 1g in sodium chloride 0.9% 100mL (ready to mix system)    [DISCONTINUED] calcium chloride 1g in sodium chloride 0.9% 100mL (ready to mix system)    [DISCONTINUED] calcium chloride 1g in sodium chloride 0.9% 100mL (ready to mix system)    [DISCONTINUED] chlorhexidine 0.12 % solution 15 mL    [DISCONTINUED] diltiaZEM 125 mg in dextrose 5% 125 mL infusion (non-titrating)    [DISCONTINUED] diphenhydrAMINE capsule 50 mg    [DISCONTINUED] famotidine tablet 20 mg    [DISCONTINUED] lisinopril tablet 10 mg    [DISCONTINUED] magnesium oxide tablet 400 mg    [DISCONTINUED] magnesium oxide tablet 800 mg    [DISCONTINUED] magnesium sulfate 2g in water 50mL IVPB (premix)    [DISCONTINUED] magnesium sulfate 2g in water 50mL IVPB (premix)    [DISCONTINUED] magnesium sulfate 2g in water 50mL IVPB (premix)    [DISCONTINUED] magnesium sulfate in dextrose IVPB (premix) 1 g    [DISCONTINUED] metoprolol tartrate (LOPRESSOR) tablet 25 mg    [DISCONTINUED] morphine injection 2 mg    [DISCONTINUED] mupirocin 2 % ointment    [DISCONTINUED] nitroGLYCERIN 50 mg in dextrose 5 % 250 mL infusion (TITRATING)    [DISCONTINUED] ondansetron injection 4 mg    [DISCONTINUED] pantoprazole EC tablet 40 mg    [DISCONTINUED] potassium chloride 10 mEq in 100 mL IVPB    [DISCONTINUED] potassium  chloride 10 mEq in 100 mL IVPB    [DISCONTINUED] potassium chloride 10 mEq in 100 mL IVPB    [DISCONTINUED] potassium chloride 10 mEq in 100 mL IVPB    [DISCONTINUED] potassium chloride SA CR tablet 20 mEq    [DISCONTINUED] potassium chloride SA CR tablet 20 mEq    [DISCONTINUED] potassium chloride SA CR tablet 40 mEq    [DISCONTINUED] potassium chloride SA CR tablet 40 mEq    [DISCONTINUED] predniSONE tablet 20 mg    [DISCONTINUED] sodium phosphate 15 mmol in dextrose 5 % 250 mL IVPB    [DISCONTINUED] sodium phosphate 15 mmol in dextrose 5 % 250 mL IVPB    [DISCONTINUED] sodium phosphate 20.01 mmol in dextrose 5 % 250 mL IVPB    [DISCONTINUED] sodium phosphate 20.01 mmol in dextrose 5 % 250 mL IVPB    [DISCONTINUED] sodium phosphate 30 mmol in dextrose 5 % 250 mL IVPB     Current Outpatient Medications on File Prior to Encounter   Medication Sig    amLODIPine (NORVASC) 2.5 MG tablet Take 2.5 mg by mouth once daily.    lisinopril (PRINIVIL,ZESTRIL) 40 MG tablet Take 40 mg by mouth once daily.     Family History     None        Tobacco Use    Smoking status: Never Smoker   Substance and Sexual Activity    Alcohol use: Never     Frequency: Never    Drug use: Never    Sexual activity: Not Currently     Review of Systems   Constitution: Positive for weight gain. Negative for chills, decreased appetite, diaphoresis, fever and malaise/fatigue.   HENT: Negative for congestion.    Eyes: Negative for blurred vision.   Cardiovascular: Positive for dyspnea on exertion and leg swelling. Negative for chest pain, orthopnea, palpitations, paroxysmal nocturnal dyspnea and syncope.   Hematologic/Lymphatic: Bruises/bleeds easily.   Skin: Positive for color change.   Musculoskeletal: Negative for falls and myalgias.   Gastrointestinal: Negative for abdominal pain, nausea and vomiting.   Genitourinary: Negative for dysuria.   Neurological: Positive for tremors. Negative for dizziness and weakness.    Psychiatric/Behavioral: Negative for altered mental status.     Objective:     Vital Signs (Most Recent):  Temp: 98.2 °F (36.8 °C) (02/24/20 1500)  Pulse: (!) 111 (02/24/20 1700)  Resp: (!) 26 (02/24/20 1700)  BP: (!) 157/74 (02/24/20 1700)  SpO2: (!) 93 % (02/24/20 1700) Vital Signs (24h Range):  Temp:  [97.5 °F (36.4 °C)-98.5 °F (36.9 °C)] 98.2 °F (36.8 °C)  Pulse:  [] 111  Resp:  [18-41] 26  SpO2:  [87 %-100 %] 93 %  BP: ()/(49-80) 157/74     Weight: 54.4 kg (119 lb 14.9 oz)  Body mass index is 19.96 kg/m².    SpO2: (!) 93 %  O2 Device (Oxygen Therapy): nasal cannula      Intake/Output Summary (Last 24 hours) at 2/24/2020 1716  Last data filed at 2/24/2020 1600  Gross per 24 hour   Intake --   Output 95 ml   Net -95 ml       Lines/Drains/Airways     Drain                 Urethral Catheter 02/23/20 1744 Straight-tip 16 Fr. less than 1 day          Peripheral Intravenous Line                 Peripheral IV - Double Lumen 02/23/20 1600 20 G Left Antecubital 1 day         Peripheral IV - Single Lumen 02/24/20 0218 20 G Anterior;Right Forearm less than 1 day                Physical Exam   Constitutional: She is oriented to person, place, and time. She appears well-developed and well-nourished. No distress.   HENT:   Head: Normocephalic and atraumatic.   On 2L NC   Eyes: Conjunctivae and EOM are normal. No scleral icterus.   Neck: Normal range of motion. Neck supple. No JVD present. No thyromegaly present.   Cardiovascular: Normal rate and regular rhythm.   Murmur heard.   Holosystolic murmur is present at the apex.  Pulmonary/Chest: Effort normal. No respiratory distress. She has wheezes (Mild).   Abdominal: Soft. She exhibits no distension. There is no tenderness.   Musculoskeletal: Normal range of motion. She exhibits edema (Trace LE edema ).   Lymphadenopathy:     She has no cervical adenopathy.   Neurological: She is alert and oriented to person, place, and time. No cranial nerve deficit. Coordination  normal.   Skin: Skin is warm and dry. Bruising and petechiae noted. No rash noted. She is not diaphoretic.   Psychiatric: She has a normal mood and affect. Her behavior is normal.   Nursing note and vitals reviewed.      Significant Labs:   ABG: No results for input(s): PH, PCO2, HCO3, POCSATURATED, BE in the last 48 hours., CMP   Recent Labs   Lab 02/22/20 1949 02/23/20 0419 02/23/20 1704 02/24/20  0437    143 140 137   K 4.0 4.3 4.9 4.8   CL 94* 96 96 93*   CO2 37* 34* 37* 36*   * 102 157* 130*   BUN 16 20 21 24*   CREATININE 0.8 0.9 0.9 0.7   CALCIUM 9.1 8.9 8.8 8.4*   PROT 6.5  --   --   --    ALBUMIN 3.7  --   --   --    BILITOT 0.9  --   --   --    ALKPHOS 47*  --   --   --    AST 26  --   --   --    ALT 34  --   --   --    ANIONGAP 11 13 7* 8   ESTGFRAFRICA >60.0 >60.0 >60.0 >60.0   EGFRNONAA >60.0 57.7* 57.7* >60.0   , CBC   Recent Labs   Lab 02/23/20 0419 02/23/20 2129 02/24/20  0437   WBC 6.58 9.01 6.70   HGB 11.8* 12.3 11.6*   HCT 38.4 40.3 38.0   PLT 51* 15* 13*   , INR   Recent Labs   Lab 02/23/20 1706   INR 1.1    and Troponin   Recent Labs   Lab 02/22/20 1949 02/23/20 0419 02/23/20  0941   TROPONINI 0.053* 0.060* 0.058*       Significant Imaging: Echocardiogram:   Transthoracic echo (TTE) complete (Cupid Only):   Results for orders placed or performed during the hospital encounter of 02/22/20   Echo Color Flow Doppler? Yes   Result Value Ref Range    BSA 1.58 m2    TDI SEPTAL 0.06 m/s    LV LATERAL E/E' RATIO 14.90 m/s    LV SEPTAL E/E' RATIO 24.83 m/s    AORTIC VALVE CUSP SEPERATION 1.67 cm    TDI LATERAL 0.10 m/s    PV PEAK VELOCITY 125.89 cm/s    LVIDD 3.57 3.5 - 6.0 cm    IVS 1.11 (A) 0.6 - 1.1 cm    PW 0.93 0.6 - 1.1 cm    Ao root annulus 2.83 cm    LVIDS 2.70 2.1 - 4.0 cm    FS 24 28 - 44 %    LV mass 109.64 g    LA size 3.83 cm    RVDD 180.00 cm    Left Ventricle Relative Wall Thickness 0.52 cm    AV mean gradient 6 mmHg    AV valve area 2.05 cm2    AV Velocity Ratio 53.75      AV index (prosthetic) 0.66     E/A ratio 1.25     Mean e' 0.08 m/s    E wave decelartion time 166.07 msec    LVOT diameter 1.99 cm    LVOT area 3.1 cm2    LVOT peak artem 89.22 m/s    LVOT peak VTI 18.88 cm    Ao peak artem 1.66 m/s    Ao VTI 28.61 cm    LVOT stroke volume 58.69 cm3    AV peak gradient 11 mmHg    E/E' ratio 18.63 m/s    MV Peak E Artem 1.49 m/s    TR Max Artem 2.30 m/s    MV Peak A Artem 1.19 m/s    LV Mass Index 68 g/m2    Triscuspid Valve Regurgitation Peak Gradient 21 mmHg    Right Atrial Pressure (from IVC) 8 mmHg    TV rest pulmonary artery pressure 29 mmHg    Narrative    · Concentric left ventricular remodeling.  · Normal left ventricular systolic function. The estimated ejection   fraction is 65%.  · Indeterminate left ventricular diastolic function.  · No wall motion abnormalities.  · Normal right ventricular systolic function.  · Mild left atrial enlargement.  · There is mild leaflet calcification of the Mitral Valve.  · Mild mitral sclerosis.  · Moderate-to-severe mitral regurgitation.  · Mild tricuspid regurgitation.  · Intermediate central venous pressure (8 mmHg).  · The estimated PA systolic pressure is 29 mmHg.

## 2020-02-24 NOTE — PLAN OF CARE
Arrhythmias as charted. Pt. Asymptomatic. VSS at this time. Pt. Tolerating HFNC well. Nitro gtt paused at this time. Code cart in room with external pacer pads on pt and connected to the [facility]. Pt. Bathed and clipped for cath lab. Pt. Has been NPO since midnight except po prednisone this morning.

## 2020-02-24 NOTE — ASSESSMENT & PLAN NOTE
Pt with moderate-severe MR. Likely functional MR. Also has HFpEF and Afib with RVR.     -Will review echo   - According to Dr. Fernandez, pt has no indications for any valvular intervention   - Will continue diuresis, BP control, Afib control

## 2020-02-24 NOTE — PLAN OF CARE
02/24/20 1511   Discharge Reassessment   Assessment Type Discharge Planning Reassessment   Anticipated Discharge Disposition Short Term   Pt to discharged to [facility].

## 2020-02-24 NOTE — ASSESSMENT & PLAN NOTE
[facility] and found to have thrombocytopenia. Per pt, states she was never told she has this before.   Hematology followed at outside hospital, work-up pending. Started on prednisone 20 mg QID and s/p platelet transfusion prior to transfer. Per their notes, consider IVIG if needed. B12 elevated on outside labs. Denies alcohol use. LFTs wnl.     - Hematology consulted here  - Bruising noted on arms, no signs active bleeding currently   - Will monitor with daily CBCs  - Platelets at 13 now  - Re-started prednisone

## 2020-02-24 NOTE — DISCHARGE SUMMARY
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  She was in her usual state of health until about 2 weeks ago when she noticed gradually worsening shortness of breath.  States that she has chronic mild shortness of breath but has been worse over the last 2 weeks and very severe over the last 2 days prompting her to go to the ED.  Shortness of breath is worse with exertion and better with rest.  She has associated nonpitting bilateral lower extremity edema and orthopnea but denies paroxysmal nocturnal dyspnea. States that she had sore throat and some congestion about 2 weeks ago after which all these symptoms started.  Admits to pleuritic chest pain over anterior chest wall which has since resolved.  Also has associated nausea without any vomiting.  No prior history of congestive heart failure or coronary artery disease. She denies recent fever, chills, nausea/vomiting, epistaxis, gingival bleeding, diarrhea/constipation, dysuria/hematuria, hematemesis, melena or hematochezia. She is on amlodipine and lisinopril at home which she is compliant with.   Workup in the outside ED revealed acute severe thrombocytopenia.  She was also found to be in atrial fibrillation with RVR and possible new onset congestive heart failure with a heart murmur.  She received 500 mcg of digoxin x2, IV furosemide 40 mg, hydralazine 50 mg p.o., IV labetalol and nitroglycerin patch.  After initial stabilization she was transferred to our facility for higher level  of care.  Patient appears to be hemodynamically stable during my encounter.  Telemetry revealing AFib with RVR with heart rates anywhere between  beats per minute with frequent PVCs.  Blood pressure is elevated at 170/80 mmHg.  She is tachypneic on my exam and has conversational dyspnea.  Denies any chest pain or pressure.  Shortness of breath has improved.  Rest of the 10 point review of systems is negative except as mentioned above.  Past medical history:  Benign essential hypertension  Past surgical history:  Reviewed, nonpertinent  Social history: Denies history of tobacco, alcohol or drug abuse  Family history: Reviewed and noncontributory     Hospital course:  The patient was admitted to the ICU for workup and treatment.  Cardiology, Pulmonary, and Hematology consultations were obtained.  On admission the patient had rapid atrial fibrillation as well as pulmonary edema. She was started on IV Lasix in addition to IV Lopressor.  She converted to sinus rhythm. Her symptoms fluctuated with episodes of rapid atrial fibrillation causing flash pulmonary edema and worsening respiratory distress. She was started on IV Tridil infusion.  Bilateral lower extremity ultrasound was negative for DVT.  CTA chest was negative for pulmonary embolus but was consistent with pulmonary edema and pleural effusion.  Echocardiogram revealed severe mitral regurgitation with normal LVEF, as below.  At this time cardiology is recommending transfer for evaluation of severe mitral regurgitation with atrial fibrillation which is driving her symptoms.  On admission the patient was found to have severe thrombocytopenia requiring platelet transfusion.  Subsequently platelets again decreased.  Hematology consultation was obtained and the patient was started on oral prednisone for possible ITP.  Workup including serologies initiated.  Platelet count will need to be monitored.     Consults:   Consults (From admission, onward)        Status  Ordering Provider     Inpatient consult to Cardiology  Once     Provider:  Ton Feliciano MD    Completed JUVENCIO SIMON     Inpatient consult to Hematology Oncology  Once     Provider:  EDUARD Garces MD    Completed JUVENCIO SIMON     Inpatient consult to Pulmonology  Once     Provider:  Jaun Thurston MD    Completed MARITZA ALAS        Discharge diagnoses:  Rapid atrial fibrillation  Acute hypoxemic respiratory failure secondary to pulmonary edema  Acute pulmonary edema due to acute diastolic CHF exacerbation, rapid atrial fibrillation, mitral regurgitation  Severe mitral regurgitation  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  Hypertension  Anemia likely inflammatory  Hypomagnesemia, improved  Additional discharge Diagnoses:    Diagnosis Date Noted POA    PRINCIPAL PROBLEM:  Atrial fibrillation with rapid ventricular response [I48.91] 02/22/2020 Yes    Nonrheumatic mitral valve regurgitation [I34.0] 02/23/2020 Yes    Thrombocytopenia [D69.6] 02/22/2020 Yes    Acute congestive heart failure [I50.9] 02/22/2020 Yes    Benign essential hypertension [I10] 02/22/2020 Yes    Acute respiratory failure with hypoxia [J96.01] 02/22/2020 Yes      Problems Resolved During this Admission:       Disposition: Another [facility] Care [facility]* TRANSFER TO [facility]    Significant Diagnostic Studies:   CTA CHEST IMPRESSION:  Mild bilateral pleural effusions and lower lobe/basal subsegmental atelectasis.  Diffuse patchy ground glass haziness in all lobes of bilateral lungs - suspect  congestive changes.  Indeterminate 5.5 mm nodule in middle lobe of right lung. LUNG RADS:4A  Suspicious (see below).  Mild cardiomegaly.  No filling defect within the pulmonary arteries to the segmental branch level.    BLE DOPPLER US IMPRESSION: No ultrasound evidence of right or left lower extremity DVT.    Echocardiogram Impression:  · Concentric left ventricular remodeling.  · Normal left ventricular systolic function. The  estimated ejection fraction is 65%.  · Indeterminate left ventricular diastolic function.  · No wall motion abnormalities.  · Normal right ventricular systolic function.  · Mild left atrial enlargement.  · There is mild leaflet calcification of the Mitral Valve.  · Mild mitral sclerosis.  · Moderate-to-severe mitral regurgitation.  · Mild tricuspid regurgitation.  · Intermediate central venous pressure (8 mmHg).  · The estimated PA systolic pressure is 29 mmHg.      Pending Diagnostic Studies:     Procedure Component Value Units Date/Time    [id number] Evaluation [[id number]] Collected:  02/24/20 0437    Order Status:  Sent Lab Status:  In process Updated:  02/24/20 0450    Specimen:  Blood     JUNIOR Screen w/Reflex [[id number]]     Order Status:  Sent Lab Status:  No result     Specimen:  Blood     EKG 12-lead [[id number]]     Order Status:  Sent Lab Status:  No result     Dacia-Barr Virus (EBV) antibody panel [[id number]]     Order Status:  Sent Lab Status:  No result     Specimen:  Blood     HIV 1/2 Ag/Ab (4th Gen) [[id number]]     Order Status:  Sent Lab Status:  No result     Specimen:  Blood     Heparin-induced platelet antibody [[id number]] Collected:  02/24/20 0437    Order Status:  Sent Lab Status:  In process Updated:  02/24/20 0450    Specimen:  Blood     Hepatitis Panel, Acute [[id number]]     Order Status:  Sent Lab Status:  No result     Specimen:  Blood     Platelet AutoAntibody Panel [[id number]] Collected:  02/24/20 0437    Order Status:  Sent Lab Status:  In process Updated:  02/24/20 0450    Specimen:  Blood     Platelet antibodies, indirect [[id number]] Collected:  02/24/20 0437    Order Status:  Sent Lab Status:  In process Updated:  02/24/20 0450    Specimen:  Blood     Vitamin B6 [[id number]] Collected:  02/24/20 0437    Order Status:  Sent Lab Status:  In process Updated:  02/24/20 0450    Specimen:  Blood          Medications:  Transfer Medications (for Discharge Readmit only):   Current  Facility-Administered Medications   Medication Dose Route Frequency Provider Last Rate Last Dose    0.9%  NaCl infusion (for blood administration)   Intravenous Q24H PRN Yoel Mcfadden MD        acetaminophen tablet 650 mg  650 mg Oral Q4H PRN Yoel Mcfadden MD   650 mg at 02/24/20 0936    acetaminophen tablet 650 mg  650 mg Oral Q8H PRN Yoel Mcfadden MD        calcium chloride 1g in sodium chloride 0.9% 100mL (ready to mix system)  1 g Intravenous PRN Yoel Mcfadden MD        calcium chloride 1g in sodium chloride 0.9% 100mL (ready to mix system)  1 g Intravenous PRN Yoel Mcfadden MD        calcium chloride 1g in sodium chloride 0.9% 100mL (ready to mix system)  1 g Intravenous PRN Yoel Mcfadden MD        chlorhexidine 0.12 % solution 15 mL  15 mL Mouth/Throat BID Marnie Cullen PharmD   15 mL at 02/24/20 0937    diltiaZEM 125 mg in dextrose 5% 125 mL infusion (non-titrating)  5 mg/hr Intravenous Once Alex Bonds MD        diphenhydrAMINE capsule 50 mg  50 mg Oral On Call Procedure Ton Feliciano MD        lisinopril tablet 10 mg  10 mg Oral Daily Tasha Antunez, NP   10 mg at 02/24/20 0937    magnesium oxide tablet 400 mg  400 mg Oral Daily Tasha Antunez NP   400 mg at 02/24/20 0937    magnesium oxide tablet 800 mg  800 mg Oral PRN Yoel Mcfadden MD        magnesium sulfate 2g in water 50mL IVPB (premix)  2 g Intravenous PRN Yoel Mcfadden MD   Stopped at 02/22/20 2226    magnesium sulfate 2g in water 50mL IVPB (premix)  4 g Intravenous PRN Yoel Mcfadden MD        magnesium sulfate 2g in water 50mL IVPB (premix)  2 g Intravenous PRN Yoel Mcfadden MD        magnesium sulfate in dextrose IVPB (premix) 1 g  1 g Intravenous PRN Yoel Mcfadden MD        morphine injection 2 mg  2 mg Intravenous Q3H PRN Ton Feliciano MD   2 mg at 02/23/20 2309    mupirocin 2 % ointment   Nasal BID Marnie Cullen PharmD        nitroGLYCERIN 50 mg in dextrose 5 % 250  mL infusion (TITRATING)  5 mcg/min Intravenous Continuous Ton Feliciano MD   Stopped at 02/24/20 0249    ondansetron injection 4 mg  4 mg Intravenous Q8H PRN Yoel Mcfadden MD        pantoprazole EC tablet 40 mg  40 mg Oral Daily Tashaarminda Antunez, DARRICK   40 mg at 02/24/20 0937    potassium chloride 10 mEq in 100 mL IVPB  20 mEq Intravenous PRN Yoel Mcfadden MD        potassium chloride 10 mEq in 100 mL IVPB  40 mEq Intravenous PRN Yoel Mcfadden MD        potassium chloride 10 mEq in 100 mL IVPB  20 mEq Intravenous PRN Yoel Mcfadden MD        potassium chloride 10 mEq in 100 mL IVPB  40 mEq Intravenous PRN Yoel Mcfadden MD        potassium chloride SA CR tablet 20 mEq  20 mEq Oral PRDEEPIKA Mcfadden MD        potassium chloride SA CR tablet 20 mEq  20 mEq Oral PRN Yoel Mcfadden MD        potassium chloride SA CR tablet 40 mEq  40 mEq Oral PRN Yoel Mcfadden MD        potassium chloride SA CR tablet 40 mEq  40 mEq Oral PRN Yoel Mcfadden MD        predniSONE tablet 20 mg  20 mg Oral Q6H Ton Feliciano MD   20 mg at 02/24/20 0522    sodium phosphate 15 mmol in dextrose 5 % 250 mL IVPB  15 mmol Intravenous PRN Yoel Mcfadden MD        sodium phosphate 15 mmol in dextrose 5 % 250 mL IVPB  15 mmol Intravenous PRN Yoel Mcfadden MD        sodium phosphate 20.01 mmol in dextrose 5 % 250 mL IVPB  20.01 mmol Intravenous PRN Yoel Mcfadden MD        sodium phosphate 20.01 mmol in dextrose 5 % 250 mL IVPB  20.01 mmol Intravenous PRN Yoel Mcfadden MD        sodium phosphate 30 mmol in dextrose 5 % 250 mL IVPB  30 mmol Intravenous PRN Yoel Mcfadden MD           Indwelling Lines/Drains at time of discharge:   Lines/Drains/Airways     Drain                 Urethral Catheter 02/23/20 9424 Straight-tip 16 Fr. less than 1 day                Time spent on the discharge of patient: 35 minutes  Patient was seen and examined on the date of discharge and determined to be suitable for  discharge.         Alex Bonds MD  Department of Hospital Medicine  [facility]

## 2020-02-24 NOTE — SUBJECTIVE & OBJECTIVE
Interval History:     2/24/2020 - Respiratory status stable overnight, had about 18 second pause on tele (spontaneusly resolved).  No new complaints this AM.    Review of Systems   Constitutional: Positive for malaise/fatigue. Negative for chills, diaphoresis, fever and weight loss.   HENT: Negative for congestion.    Eyes: Negative for pain.   Respiratory: Positive for shortness of breath (better this AM). Negative for cough, hemoptysis, sputum production, wheezing and stridor.    Cardiovascular: Positive for leg swelling. Negative for chest pain, palpitations, orthopnea, claudication and PND.        + pause   Gastrointestinal: Negative for abdominal pain, blood in stool, constipation, diarrhea, heartburn, nausea and vomiting.   Genitourinary: Negative for dysuria, frequency, hematuria and urgency.   Musculoskeletal: Negative for falls and myalgias.   Neurological: Positive for weakness. Negative for sensory change and focal weakness.   Psychiatric/Behavioral: Negative for depression and suicidal ideas. The patient is not nervous/anxious.          Objective:     Vital Signs (Most Recent):  Temp: 98.3 °F (36.8 °C) (02/24/20 1205)  Pulse: 96 (02/24/20 1230)  Resp: (!) 28 (02/24/20 1230)  BP: (!) 133/59 (02/24/20 1230)  SpO2: 97 % (02/24/20 1230) Vital Signs (24h Range):  Temp:  [97.5 °F (36.4 °C)-98.5 °F (36.9 °C)] 98.2 °F (36.8 °C)  Pulse:  [] 96  Resp:  [18-50] 24  SpO2:  [85 %-100 %] 97 %  BP: ()/(49-84) 151/67     Weight: 54.5 kg (120 lb 2.4 oz)  Body mass index is 19.99 kg/m².      Intake/Output Summary (Last 24 hours) at 2/24/2020 1321  Last data filed at 2/24/2020 0500  Gross per 24 hour   Intake 70.41 ml   Output 1026 ml   Net -955.59 ml       Physical Exam   Constitutional: She appears well-developed and well-nourished. No distress.   Thin female, nad, aao x 2   HENT:   Head: Normocephalic and atraumatic.   Right Ear: External ear normal.   Left Ear: External ear normal.   Nose: Nose normal.    Mouth/Throat: Oropharynx is clear and moist.   Eyes: Pupils are equal, round, and reactive to light. Conjunctivae and EOM are normal.   Neck: Normal range of motion. Neck supple. No JVD present. No tracheal deviation present. No thyromegaly present.   Cardiovascular: Normal rate, regular rhythm and intact distal pulses. Exam reveals no gallop and no friction rub.   Murmur heard.  Pulmonary/Chest: Effort normal and breath sounds normal. No stridor. No respiratory distress. She has no wheezes. She has no rales. She exhibits no tenderness.   Decreased at bases, o/w CTA   Abdominal: Soft. Bowel sounds are normal. She exhibits no distension. There is no tenderness. There is no rebound and no guarding.   Musculoskeletal: Normal range of motion. She exhibits edema. She exhibits no tenderness.   Lymphadenopathy:     She has no cervical adenopathy.   Neurological: She is alert. No cranial nerve deficit.   Skin: Skin is warm and dry. She is not diaphoretic.   Psychiatric: She has a normal mood and affect. Her behavior is normal.   Nursing note and vitals reviewed.      Vents:  Oxygen Concentration (%): 70 (02/23/20 1926)    Lines/Drains/Airways     Drain                 Urethral Catheter 02/23/20 1744 Straight-tip 16 Fr. less than 1 day          Peripheral Intravenous Line                 Peripheral IV - Double Lumen 02/23/20 1600 20 G Left Antecubital less than 1 day         Peripheral IV - Single Lumen 02/24/20 0218 20 G Anterior;Right Forearm less than 1 day                Significant Labs:    CBC/Anemia Profile:  Recent Labs   Lab 02/23/20  0419 02/23/20  2129 02/24/20  0437   WBC 6.58 9.01 6.70   HGB 11.8* 12.3 11.6*   HCT 38.4 40.3 38.0   PLT 51* 15* 13*   * 105* 105*   RDW 12.9 12.7 12.6   FERRITIN  --   --  53   FOLATE  --   --  >24.8*   [id number]  --   --  984*        Chemistries:  Recent Labs   Lab 02/22/20  1345  02/22/20  1949 02/23/20  0419 02/23/20  1704 02/23/20  1706 02/24/20  0437     --  142  143 140  --  137   K 4.1  --  4.0 4.3 4.9  --  4.8   CL 99  --  94* 96 96  --  93*   CO2 31*  --  37* 34* 37*  --  36*   BUN 14  --  16 20 21  --  24*   CREATININE 0.6  --  0.8 0.9 0.9  --  0.7   CALCIUM 9.1  --  9.1 8.9 8.8  --  8.4*   ALBUMIN 3.9  --  3.7  --   --   --   --    PROT 7.0  --  6.5  --   --   --   --    BILITOT 1.0  --  0.9  --   --   --   --    ALKPHOS 50*  --  47*  --   --   --   --    ALT 38  --  34  --   --   --   --    AST 33  --  26  --   --   --   --    MG  --    < > 1.5* 2.1  --  1.9 1.8    < > = values in this interval not displayed.       All pertinent labs within the past 24 hours have been reviewed.    Significant Imaging:  I have reviewed and interpreted all pertinent imaging results/findings within the past 24 hours.     CXR (2/24) - NAD

## 2020-02-24 NOTE — ASSESSMENT & PLAN NOTE
Ms. Arteaga is an 87yF who was sent from [facility] for further level of care. Yesterday AM developed acute SOB. Also reports increased LE swelling and mild exertional dyspnea past 1 week. Denies chest pain. Went to ED and found to be in Afib with RVR. She received 500 mcg of digoxin x2, IV furosemide 40 mg, hydralazine 50 mg, IV labetalol and nitroglycerin patch. Sent to the ICU there with Bipap for intermittent respiratory failure. Bilateral lower extremity ultrasound was negative for DVT. CTA chest was negative for pulmonary embolus but was consistent with pulmonary edema and pleural effusion. BNP elevated. Echocardiogram revealed severe mitral regurgitation with normal LVEF.    · Concentric left ventricular remodeling.  · Normal left ventricular systolic function. The estimated ejection fraction is 65%.  · Indeterminate left ventricular diastolic function.  · No wall motion abnormalities.  · Normal right ventricular systolic function.  · Mild left atrial enlargement.  · There is mild leaflet calcification of the Mitral Valve.  · Mild mitral sclerosis.  · Moderate-to-severe mitral regurgitation.  · Mild tricuspid regurgitation.  · Intermediate central venous pressure (8 mmHg).  · The estimated PA systolic pressure is 29 mmHg.    -Will diurese with Lasix 40 mg IV BID, fletcher in place  -Metoprolol 12.5 QID, rate control for Afib  -BP control with Amlodipine 2.5 mg daily  -Ins/Outs, daily weights

## 2020-02-24 NOTE — H&P
[facility]  Cardiology  History and Physical     Patient Name: Fiona Arteaga  MRN: [id number]  Admission Date: 2020  Code Status: Prior   Attending Provider: Esther Fernandez MD   Primary Care Physician: Primary Doctor No  Principal Problem:(HFpEF) heart failure with preserved ejection fraction    Patient information was obtained from patient and ER records.     Subjective:     Chief Complaint:  HFpEF     HPI:  Ms. Arteaga is an 87yF who was sent from [facility] for evaluation of mitral valve regurgitation. She has a medical history of HTN. She is independent with ADLs at baseline. Yesterday she states she was drinking coffee in the AM and developed acute SOB. Also reports increased LE swelling and mild exertional dyspnea past 1 week. Denies chest pain. Went to ED and found to be in Afib with RVR. Also noted to have thrombocytopenia (was being worked up by Hematology there, s/p platelet transfusion and daily prednisone). She received 500 mcg of digoxin x2, IV furosemide 40 mg, hydralazine 50 mg p.o., IV labetalol and nitroglycerin patch. Sent to the ICU there with Bipap for intermittent respiratory failure. Bilateral lower extremity ultrasound was negative for DVT.  CTA chest was negative for pulmonary embolus but was consistent with pulmonary edema and pleural effusion.  Echocardiogram revealed severe mitral regurgitation with normal LVEF.Transferred for higher level of care.     Past Medical History:   Diagnosis Date    Cancer     skin cancer    Hypertension        No past surgical history on file.    Review of patient's allergies indicates:  No Known Allergies    Current Facility-Administered Medications on File Prior to Encounter   Medication    [] atropine 0.1 mg/mL injection    [COMPLETED] furosemide injection 20 mg    [COMPLETED] iohexol (OMNIPAQUE 350) injection 100 mL    [DISCONTINUED] 0.9%  NaCl infusion (for blood administration)     [DISCONTINUED] 0.9%  NaCl infusion    [DISCONTINUED] acetaminophen tablet 650 mg    [DISCONTINUED] acetaminophen tablet 650 mg    [DISCONTINUED] calcium chloride 1g in sodium chloride 0.9% 100mL (ready to mix system)    [DISCONTINUED] calcium chloride 1g in sodium chloride 0.9% 100mL (ready to mix system)    [DISCONTINUED] calcium chloride 1g in sodium chloride 0.9% 100mL (ready to mix system)    [DISCONTINUED] chlorhexidine 0.12 % solution 15 mL    [DISCONTINUED] diltiaZEM 125 mg in dextrose 5% 125 mL infusion (non-titrating)    [DISCONTINUED] diphenhydrAMINE capsule 50 mg    [DISCONTINUED] famotidine tablet 20 mg    [DISCONTINUED] lisinopril tablet 10 mg    [DISCONTINUED] magnesium oxide tablet 400 mg    [DISCONTINUED] magnesium oxide tablet 800 mg    [DISCONTINUED] magnesium sulfate 2g in water 50mL IVPB (premix)    [DISCONTINUED] magnesium sulfate 2g in water 50mL IVPB (premix)    [DISCONTINUED] magnesium sulfate 2g in water 50mL IVPB (premix)    [DISCONTINUED] magnesium sulfate in dextrose IVPB (premix) 1 g    [DISCONTINUED] metoprolol tartrate (LOPRESSOR) tablet 25 mg    [DISCONTINUED] morphine injection 2 mg    [DISCONTINUED] mupirocin 2 % ointment    [DISCONTINUED] nitroGLYCERIN 50 mg in dextrose 5 % 250 mL infusion (TITRATING)    [DISCONTINUED] ondansetron injection 4 mg    [DISCONTINUED] pantoprazole EC tablet 40 mg    [DISCONTINUED] potassium chloride 10 mEq in 100 mL IVPB    [DISCONTINUED] potassium chloride 10 mEq in 100 mL IVPB    [DISCONTINUED] potassium chloride 10 mEq in 100 mL IVPB    [DISCONTINUED] potassium chloride 10 mEq in 100 mL IVPB    [DISCONTINUED] potassium chloride SA CR tablet 20 mEq    [DISCONTINUED] potassium chloride SA CR tablet 20 mEq    [DISCONTINUED] potassium chloride SA CR tablet 40 mEq    [DISCONTINUED] potassium chloride SA CR tablet 40 mEq    [DISCONTINUED] predniSONE tablet 20 mg    [DISCONTINUED] sodium phosphate 15 mmol in dextrose 5 % 250  mL IVPB    [DISCONTINUED] sodium phosphate 15 mmol in dextrose 5 % 250 mL IVPB    [DISCONTINUED] sodium phosphate 20.01 mmol in dextrose 5 % 250 mL IVPB    [DISCONTINUED] sodium phosphate 20.01 mmol in dextrose 5 % 250 mL IVPB    [DISCONTINUED] sodium phosphate 30 mmol in dextrose 5 % 250 mL IVPB     Current Outpatient Medications on File Prior to Encounter   Medication Sig    amLODIPine (NORVASC) 2.5 MG tablet Take 2.5 mg by mouth once daily.    lisinopril (PRINIVIL,ZESTRIL) 40 MG tablet Take 40 mg by mouth once daily.     Family History     None        Tobacco Use    Smoking status: Never Smoker   Substance and Sexual Activity    Alcohol use: Never     Frequency: Never    Drug use: Never    Sexual activity: Not Currently     Review of Systems   Constitution: Positive for weight gain. Negative for chills, decreased appetite, diaphoresis, fever and malaise/fatigue.   HENT: Negative for congestion.    Eyes: Negative for blurred vision.   Cardiovascular: Positive for dyspnea on exertion and leg swelling. Negative for chest pain, orthopnea, palpitations, paroxysmal nocturnal dyspnea and syncope.   Hematologic/Lymphatic: Bruises/bleeds easily.   Skin: Positive for color change.   Musculoskeletal: Negative for falls and myalgias.   Gastrointestinal: Negative for abdominal pain, nausea and vomiting.   Genitourinary: Negative for dysuria.   Neurological: Positive for tremors. Negative for dizziness and weakness.   Psychiatric/Behavioral: Negative for altered mental status.     Objective:     Vital Signs (Most Recent):  Temp: 98.2 °F (36.8 °C) (02/24/20 1500)  Pulse: (!) 111 (02/24/20 1700)  Resp: (!) 26 (02/24/20 1700)  BP: (!) 157/74 (02/24/20 1700)  SpO2: (!) 93 % (02/24/20 1700) Vital Signs (24h Range):  Temp:  [97.5 °F (36.4 °C)-98.5 °F (36.9 °C)] 98.2 °F (36.8 °C)  Pulse:  [] 111  Resp:  [18-41] 26  SpO2:  [87 %-100 %] 93 %  BP: ()/(49-80) 157/74     Weight: 54.4 kg (119 lb 14.9 oz)  Body mass  index is 19.96 kg/m².    SpO2: (!) 93 %  O2 Device (Oxygen Therapy): nasal cannula      Intake/Output Summary (Last 24 hours) at 2/24/2020 1716  Last data filed at 2/24/2020 1600  Gross per 24 hour   Intake --   Output 95 ml   Net -95 ml       Lines/Drains/Airways     Drain                 Urethral Catheter 02/23/20 1744 Straight-tip 16 Fr. less than 1 day          Peripheral Intravenous Line                 Peripheral IV - Double Lumen 02/23/20 1600 20 G Left Antecubital 1 day         Peripheral IV - Single Lumen 02/24/20 0218 20 G Anterior;Right Forearm less than 1 day                Physical Exam   Constitutional: She is oriented to person, place, and time. She appears well-developed and well-nourished. No distress.   HENT:   Head: Normocephalic and atraumatic.   On 2L NC   Eyes: Conjunctivae and EOM are normal. No scleral icterus.   Neck: Normal range of motion. Neck supple. No JVD present. No thyromegaly present.   Cardiovascular: Normal rate and regular rhythm.   Murmur heard.   Holosystolic murmur is present at the apex.  Pulmonary/Chest: Effort normal. No respiratory distress. She has wheezes (Mild).   Abdominal: Soft. She exhibits no distension. There is no tenderness.   Musculoskeletal: Normal range of motion. She exhibits edema (Trace LE edema ).   Lymphadenopathy:     She has no cervical adenopathy.   Neurological: She is alert and oriented to person, place, and time. No cranial nerve deficit. Coordination normal.   Skin: Skin is warm and dry. Bruising and petechiae noted. No rash noted. She is not diaphoretic.   Psychiatric: She has a normal mood and affect. Her behavior is normal.   Nursing note and vitals reviewed.      Significant Labs:   ABG: No results for input(s): PH, PCO2, HCO3, POCSATURATED, BE in the last 48 hours., CMP   Recent Labs   Lab 02/22/20  1949 02/23/20  0419 02/23/20  1704 02/24/20  0437    143 140 137   K 4.0 4.3 4.9 4.8   CL 94* 96 96 93*   CO2 37* 34* 37* 36*   * 102  157* 130*   BUN 16 20 21 24*   CREATININE 0.8 0.9 0.9 0.7   CALCIUM 9.1 8.9 8.8 8.4*   PROT 6.5  --   --   --    ALBUMIN 3.7  --   --   --    BILITOT 0.9  --   --   --    ALKPHOS 47*  --   --   --    AST 26  --   --   --    ALT 34  --   --   --    ANIONGAP 11 13 7* 8   ESTGFRAFRICA >60.0 >60.0 >60.0 >60.0   EGFRNONAA >60.0 57.7* 57.7* >60.0   , CBC   Recent Labs   Lab 02/23/20  0419 02/23/20 2129 02/24/20  0437   WBC 6.58 9.01 6.70   HGB 11.8* 12.3 11.6*   HCT 38.4 40.3 38.0   PLT 51* 15* 13*   , INR   Recent Labs   Lab 02/23/20  1706   INR 1.1    and Troponin   Recent Labs   Lab 02/22/20  1949 02/23/20  0419 02/23/20  0941   TROPONINI 0.053* 0.060* 0.058*       Significant Imaging: Echocardiogram:   Transthoracic echo (TTE) complete (Cupid Only):   Results for orders placed or performed during the hospital encounter of 02/22/20   Echo Color Flow Doppler? Yes   Result Value Ref Range    BSA 1.58 m2    TDI SEPTAL 0.06 m/s    LV LATERAL E/E' RATIO 14.90 m/s    LV SEPTAL E/E' RATIO 24.83 m/s    AORTIC VALVE CUSP SEPERATION 1.67 cm    TDI LATERAL 0.10 m/s    PV PEAK VELOCITY 125.89 cm/s    LVIDD 3.57 3.5 - 6.0 cm    IVS 1.11 (A) 0.6 - 1.1 cm    PW 0.93 0.6 - 1.1 cm    Ao root annulus 2.83 cm    LVIDS 2.70 2.1 - 4.0 cm    FS 24 28 - 44 %    LV mass 109.64 g    LA size 3.83 cm    RVDD 180.00 cm    Left Ventricle Relative Wall Thickness 0.52 cm    AV mean gradient 6 mmHg    AV valve area 2.05 cm2    AV Velocity Ratio 53.75     AV index (prosthetic) 0.66     E/A ratio 1.25     Mean e' 0.08 m/s    E wave decelartion time 166.07 msec    LVOT diameter 1.99 cm    LVOT area 3.1 cm2    LVOT peak artem 89.22 m/s    LVOT peak VTI 18.88 cm    Ao peak artem 1.66 m/s    Ao VTI 28.61 cm    LVOT stroke volume 58.69 cm3    AV peak gradient 11 mmHg    E/E' ratio 18.63 m/s    MV Peak E Artem 1.49 m/s    TR Max Artem 2.30 m/s    MV Peak A Artem 1.19 m/s    LV Mass Index 68 g/m2    Triscuspid Valve Regurgitation Peak Gradient 21 mmHg    Right Atrial  Pressure (from IVC) 8 mmHg    TV rest pulmonary artery pressure 29 mmHg    Narrative    · Concentric left ventricular remodeling.  · Normal left ventricular systolic function. The estimated ejection   fraction is 65%.  · Indeterminate left ventricular diastolic function.  · No wall motion abnormalities.  · Normal right ventricular systolic function.  · Mild left atrial enlargement.  · There is mild leaflet calcification of the Mitral Valve.  · Mild mitral sclerosis.  · Moderate-to-severe mitral regurgitation.  · Mild tricuspid regurgitation.  · Intermediate central venous pressure (8 mmHg).  · The estimated PA systolic pressure is 29 mmHg.        Assessment and Plan:     * (HFpEF) heart failure with preserved ejection fraction  Ms. Arteaga is an 87yF who was sent from [facility] for further level of care. Yesterday AM developed acute SOB. Also reports increased LE swelling and mild exertional dyspnea past 1 week. Denies chest pain. Went to ED and found to be in Afib with RVR. She received 500 mcg of digoxin x2, IV furosemide 40 mg, hydralazine 50 mg, IV labetalol and nitroglycerin patch. Sent to the ICU there with Bipap for intermittent respiratory failure. Bilateral lower extremity ultrasound was negative for DVT. CTA chest was negative for pulmonary embolus but was consistent with pulmonary edema and pleural effusion. BNP elevated. Echocardiogram revealed severe mitral regurgitation with normal LVEF.    · Concentric left ventricular remodeling.  · Normal left ventricular systolic function. The estimated ejection fraction is 65%.  · Indeterminate left ventricular diastolic function.  · No wall motion abnormalities.  · Normal right ventricular systolic function.  · Mild left atrial enlargement.  · There is mild leaflet calcification of the Mitral Valve.  · Mild mitral sclerosis.  · Moderate-to-severe mitral regurgitation.  · Mild tricuspid regurgitation.  · Intermediate central venous pressure (8  mmHg).  · The estimated PA systolic pressure is 29 mmHg.    -Will diurese with Lasix 40 mg IV BID, fletcher in place  -Metoprolol 12.5 QID, rate control for Afib  -BP control with Amlodipine 2.5 mg daily  -Ins/Outs, daily weights      MR (mitral regurgitation)  Pt with moderate-severe MR. Likely functional MR. Also has HFpEF and Afib with RVR.     -Will review echo   - According to Dr. Fernandez, pt has no indications for any valvular intervention   - Will continue diuresis, BP control, Afib control       Benign essential hypertension  Will re-start home Amlodipine 2.5 mg daily, can up titrate as needed for BP control        Atrial fibrillation with rapid ventricular response  Found to be in paroxysmal Afib with RVR at outside Hospital.   CHADS2-VASC of 5 (7.2% risk CVA). HAS-BLED 3 (high risk of bleeding).     - Will hold on AG currently in setting of thrombocytopenia with platelets at 13   - Rate control with Metoprolol 12.5 QID  - Now in sinus rhythm  - Can consider sotolol if needed for further control   - TSH ordered     Thrombocytopenia  Pt presented to [facility] and found to have thrombocytopenia. Per pt, states she was never told she has this before.   Hematology followed at outside hospital, work-up pending. Started on prednisone 20 mg QID and s/p platelet transfusion prior to transfer. Per their notes, consider IVIG if needed. B12 elevated on outside labs. Denies alcohol use. LFTs wnl.     - Hematology consulted here  - Bruising noted on arms, no signs active bleeding currently   - Will monitor with daily CBCs  - Platelets at 13 now  - Re-started prednisone          VTE Risk Mitigation (From admission, onward)         Ordered     Place sequential compression device  Until discontinued      02/24/20 0386                Penny Pike MD  Cardiology   [facility]

## 2020-02-24 NOTE — ASSESSMENT & PLAN NOTE
· Had pause as noted  · Current plan is to transfer to [facility] (arrangements being made)

## 2020-02-24 NOTE — PROGRESS NOTES
"[facility]   Hematology/Oncology  Inpatient Progress Note          Patient Name: Fiona Arteaga  MRN: [id number]  Admission Date: 2/22/2020  Hospital Length of Stay: 2 days  Code Status: Full Code   Attending Provider: Alex Bonds MD  Consulting Provider: Benjie Orellana MD  Primary Care Physician: Primary Doctor No  Principal Problem:Atrial fibrillation with rapid ventricular response    Coverage for Dr EDUARD Garces      Subjective:       Patient ID: Fiona Arteaga is a 87 y.o. female.    Chief Complaint: Shortness of Breath        History Present Illness:    Patient was transferred to [facility] today prior to being seen by myself      Review of Systems:      deferred    Objective:     Vitals:  Blood pressure (!) 133/59, pulse 96, temperature 98.3 °F (36.8 °C), temperature source Oral, resp. rate (!) 28, height 5' 5" (1.651 m), weight 54.5 kg (120 lb 2.4 oz), SpO2 97 %, not currently breastfeeding.    Physical Exam:      deferred          Lab Review:        Lab Results   Component Value Date    WBC 6.70 02/24/2020    HGB 11.6 (L) 02/24/2020    HCT 38.0 02/24/2020     (H) 02/24/2020    PLT 13 (LL) 02/24/2020     CMP  Sodium   Date Value Ref Range Status   02/24/2020 137 136 - 145 mmol/L Final     Potassium   Date Value Ref Range Status   02/24/2020 4.8 3.5 - 5.1 mmol/L Final     Chloride   Date Value Ref Range Status   02/24/2020 93 (L) 95 - 110 mmol/L Final     CO2   Date Value Ref Range Status   02/24/2020 36 (H) 23 - 29 mmol/L Final     Glucose   Date Value Ref Range Status   02/24/2020 130 (H) 70 - 110 mg/dL Final     BUN, Bld   Date Value Ref Range Status   02/24/2020 24 (H) 8 - 23 mg/dL Final     Creatinine   Date Value Ref Range Status   02/24/2020 0.7 0.5 - 1.4 mg/dL Final     Calcium   Date Value Ref Range Status   02/24/2020 8.4 (L) 8.7 - 10.5 mg/dL Final     Total Protein   Date Value Ref Range Status   02/22/2020 6.5 6.0 - 8.4 g/dL Final     Albumin   Date " Value Ref Range Status   02/22/2020 3.7 3.5 - 5.2 g/dL Final     Total Bilirubin   Date Value Ref Range Status   02/22/2020 0.9 0.1 - 1.0 mg/dL Final     Comment:     For infants and newborns, interpretation of results should be based  on gestational age, weight and in agreement with clinical  observations.  Premature Infant recommended reference ranges:  Up to 24 hours.............<8.0 mg/dL  Up to 48 hours............<12.0 mg/dL  3-5 days..................<15.0 mg/dL  6-29 days.................<15.0 mg/dL       Alkaline Phosphatase   Date Value Ref Range Status   02/22/2020 47 (L) 55 - 135 U/L Final     AST   Date Value Ref Range Status   02/22/2020 26 10 - 40 U/L Final     ALT   Date Value Ref Range Status   02/22/2020 34 10 - 44 U/L Final     Anion Gap   Date Value Ref Range Status   02/24/2020 8 8 - 16 mmol/L Final     eGFR if    Date Value Ref Range Status   02/24/2020 >60.0 >60 mL/min/1.73 m^2 Final     eGFR if non    Date Value Ref Range Status   02/24/2020 >60.0 >60 mL/min/1.73 m^2 Final     Comment:     Calculation used to obtain the estimated glomerular filtration  rate (eGFR) is the CKD-EPI equation.                Radiology Diagnostic Studies:       X-Ray Chest AP Portable [[id number]] Collected: 02/24/20 0508   Order Status: Completed Updated: 02/24/20 0606   Narrative:         Exam: X-RAY OF THE CHEST    Clinical data: Respiratory failure.    Technique: Single view of the chest.    Prior studies: CTA of the chest dated 02/23/20.    Findings: Increased lung markings are seen bilaterally with no evidence for  geographic infiltrates, effusions or pneumothoraces.  The heart is somewhat  enlarged.  No acute osseous abnormality is detected.    IMPRESSION: No acute cardiopulmonary process    Recommendation:  Follow up as clinically indicated.         Assessment:     IMPRESSION:    (1) 87 y.o. female known to the hematology service of my associate Dr EDUARD Garces with  diagnosis of thrombocytopenia  - latest platelet count is 13,000 and down from 15,000 yesterday but up from the 9,000 count it was before  - he is currently on steroids  - will monitor for now, if the counts continued to decline and/or drops below 10,000 then will initiate IV IgG    (2) Atrial flutter/fibrillation with RVR and CHF; MVR - seen by Dr Feliciano with cardiology this admit    (3) Mild borderline anemia with hgb at 11.6 with normal total bilirubin and LDH; macrocytic parameters; I do not suspect any hemolysis at this time and as such I do not suspect TTP or HUS either  -  ferritin and B12 were WNL  - antiplat ab panels pending, HIT ab pending, [id number] pending    (4) HTN    (5) Respiratory insufficiency due to acute pulmonary edema - seen by Dr Thurston with pulmonary this admit          1. Anemia, macrocytic, nutritional    2. Thrombocytopenia    3. Chest pain    4. Acute diastolic congestive heart failure    5. Atrial fibrillation    6. Arrhythmia    7. SOB (shortness of breath)    8. Atrial fibrillation with rapid ventricular response    9. Acute right-sided congestive heart failure    10. Nonrheumatic mitral valve regurgitation    11. Other chest pain           Plan:     PLAN:          1. patient was transferred to [facility] today prior to being seen; I notified Dr Garces's nurse Kathy Orellana MD  Hematology/Oncology  [facility]

## 2020-02-24 NOTE — ASSESSMENT & PLAN NOTE
Found to be in paroxysmal Afib with RVR at outside Hospital.   CHADS2-VASC of 5 (7.2% risk CVA). HAS-BLED 3 (high risk of bleeding).     - Will hold on AG currently in setting of thrombocytopenia with platelets at 13   - Rate control with Metoprolol 12.5 QID  - Now in sinus rhythm  - Can consider sotolol if needed for further control   - TSH ordered

## 2020-02-25 LAB
ALBUMIN SERPL BCP-MCNC: 2.7 G/DL (ref 3.5–5.2)
ALP SERPL-CCNC: 49 U/L (ref 55–135)
ALT SERPL W/O P-5'-P-CCNC: 33 U/L (ref 10–44)
ANION GAP SERPL CALC-SCNC: 5 MMOL/L (ref 8–16)
ANISOCYTOSIS BLD QL SMEAR: SLIGHT
AST SERPL-CCNC: 17 U/L (ref 10–40)
BASO STIPL BLD QL SMEAR: ABNORMAL
BASOPHILS # BLD AUTO: 0 K/UL (ref 0–0.2)
BASOPHILS NFR BLD: 0 % (ref 0–1.9)
BILIRUB SERPL-MCNC: 0.5 MG/DL (ref 0.1–1)
BUN SERPL-MCNC: 23 MG/DL (ref 8–23)
CALCIUM SERPL-MCNC: 8.4 MG/DL (ref 8.7–10.5)
CHLORIDE SERPL-SCNC: 95 MMOL/L (ref 95–110)
CO2 SERPL-SCNC: 36 MMOL/L (ref 23–29)
CREAT SERPL-MCNC: 0.8 MG/DL (ref 0.5–1.4)
DIFFERENTIAL METHOD: ABNORMAL
EOSINOPHIL # BLD AUTO: 0 K/UL (ref 0–0.5)
EOSINOPHIL NFR BLD: 0.4 % (ref 0–8)
ERYTHROCYTE [DISTWIDTH] IN BLOOD BY AUTOMATED COUNT: 12.6 % (ref 11.5–14.5)
EST. GFR  (AFRICAN AMERICAN): >60 ML/MIN/1.73 M^2
EST. GFR  (NON AFRICAN AMERICAN): >60 ML/MIN/1.73 M^2
GLUCOSE SERPL-MCNC: 121 MG/DL (ref 70–110)
HCT VFR BLD AUTO: 36.4 % (ref 37–48.5)
HGB BLD-MCNC: 11.4 G/DL (ref 12–16)
HYPOCHROMIA BLD QL SMEAR: ABNORMAL
IMM GRANULOCYTES # BLD AUTO: 0.03 K/UL (ref 0–0.04)
IMM GRANULOCYTES NFR BLD AUTO: 0.3 % (ref 0–0.5)
LYMPHOCYTES # BLD AUTO: 0.4 K/UL (ref 1–4.8)
LYMPHOCYTES NFR BLD: 3.6 % (ref 18–48)
MAGNESIUM SERPL-MCNC: 2.5 MG/DL (ref 1.6–2.6)
MCH RBC QN AUTO: 32.6 PG (ref 27–31)
MCHC RBC AUTO-ENTMCNC: 31.3 G/DL (ref 32–36)
MCV RBC AUTO: 104 FL (ref 82–98)
MONOCYTES # BLD AUTO: 0.5 K/UL (ref 0.3–1)
MONOCYTES NFR BLD: 4.2 % (ref 4–15)
NEUTROPHILS # BLD AUTO: 10.2 K/UL (ref 1.8–7.7)
NEUTROPHILS NFR BLD: 91.5 % (ref 38–73)
NRBC BLD-RTO: 0 /100 WBC
PF4 HEPARIN CMPLX IGG SERPL IA: 0.15 OD (ref 0–0.4)
PHOSPHATE SERPL-MCNC: 2.5 MG/DL (ref 2.7–4.5)
PLATELET # BLD AUTO: 17 K/UL (ref 150–350)
PLATELET BLD QL SMEAR: ABNORMAL
PMV BLD AUTO: 11.8 FL (ref 9.2–12.9)
POTASSIUM SERPL-SCNC: 5.1 MMOL/L (ref 3.5–5.1)
PROT SERPL-MCNC: 5.4 G/DL (ref 6–8.4)
RBC # BLD AUTO: 3.5 M/UL (ref 4–5.4)
SODIUM SERPL-SCNC: 136 MMOL/L (ref 136–145)
WBC # BLD AUTO: 11.17 K/UL (ref 3.9–12.7)

## 2020-02-25 PROCEDURE — 85025 COMPLETE CBC W/AUTO DIFF WBC: CPT

## 2020-02-25 PROCEDURE — 63600175 PHARM REV CODE 636 W HCPCS: Performed by: STUDENT IN AN ORGANIZED HEALTH CARE EDUCATION/TRAINING PROGRAM

## 2020-02-25 PROCEDURE — 20600001 HC STEP DOWN PRIVATE ROOM

## 2020-02-25 PROCEDURE — 94761 N-INVAS EAR/PLS OXIMETRY MLT: CPT

## 2020-02-25 PROCEDURE — 99223 1ST HOSP IP/OBS HIGH 75: CPT | Mod: GC,,, | Performed by: INTERNAL MEDICINE

## 2020-02-25 PROCEDURE — 25000003 PHARM REV CODE 250: Performed by: STUDENT IN AN ORGANIZED HEALTH CARE EDUCATION/TRAINING PROGRAM

## 2020-02-25 PROCEDURE — 83735 ASSAY OF MAGNESIUM: CPT

## 2020-02-25 PROCEDURE — 27000221 HC OXYGEN, UP TO 24 HOURS

## 2020-02-25 PROCEDURE — 80053 COMPREHEN METABOLIC PANEL: CPT

## 2020-02-25 PROCEDURE — 99231 SBSQ HOSP IP/OBS SF/LOW 25: CPT | Mod: GC,,, | Performed by: INTERNAL MEDICINE

## 2020-02-25 PROCEDURE — 84100 ASSAY OF PHOSPHORUS: CPT

## 2020-02-25 PROCEDURE — 99231 PR SUBSEQUENT HOSPITAL CARE,LEVL I: ICD-10-PCS | Mod: GC,,, | Performed by: INTERNAL MEDICINE

## 2020-02-25 PROCEDURE — 99223 PR INITIAL HOSPITAL CARE,LEVL III: ICD-10-PCS | Mod: GC,,, | Performed by: INTERNAL MEDICINE

## 2020-02-25 RX ORDER — PREDNISONE 10 MG/1
60 TABLET ORAL DAILY
Status: DISCONTINUED | OUTPATIENT
Start: 2020-02-26 | End: 2020-03-02

## 2020-02-25 RX ORDER — METOPROLOL SUCCINATE 25 MG/1
50 TABLET, EXTENDED RELEASE ORAL 2 TIMES DAILY
Status: DISCONTINUED | OUTPATIENT
Start: 2020-02-25 | End: 2020-03-03 | Stop reason: HOSPADM

## 2020-02-25 RX ADMIN — PANTOPRAZOLE SODIUM 40 MG: 40 TABLET, DELAYED RELEASE ORAL at 08:02

## 2020-02-25 RX ADMIN — METOPROLOL SUCCINATE 50 MG: 25 TABLET, EXTENDED RELEASE ORAL at 08:02

## 2020-02-25 RX ADMIN — PREDNISONE 20 MG: 20 TABLET ORAL at 01:02

## 2020-02-25 RX ADMIN — METOPROLOL TARTRATE 12.5 MG: 25 TABLET, FILM COATED ORAL at 08:02

## 2020-02-25 RX ADMIN — FUROSEMIDE 40 MG: 10 INJECTION, SOLUTION INTRAMUSCULAR; INTRAVENOUS at 08:02

## 2020-02-25 RX ADMIN — PREDNISONE 20 MG: 20 TABLET ORAL at 08:02

## 2020-02-25 RX ADMIN — AMLODIPINE BESYLATE 2.5 MG: 2.5 TABLET ORAL at 08:02

## 2020-02-25 RX ADMIN — PREDNISONE 20 MG: 20 TABLET ORAL at 06:02

## 2020-02-25 NOTE — PLAN OF CARE
CMICU DAILY GOALS       A: Awake    RASS: Goal - RASS Goal: 0-->alert and calm  Actual - RASS (Pascal Agitation-Sedation Scale): 0-->alert and calm   Restraint necessity:  N/A  B: Breath   SBT: Not intubated   C: Coordinate A & B, analgesics/sedatives   Pain: managed    SAT: NA  D: Delirium   CAM-ICU:    E: Early Mobility   MOVE Screen: Pass   Activity: Activity Management: activity adjusted per tolerance  FAS: Feeding/Nutrition   Diet order: Diet/Nutrition Received: NPO,   Fluid restriction:    T: Thrombus   DVT prophylaxis: VTE Required Core Measure: (TEDs) Compression stocking therapy initiated/maintained  H: HOB Elevation   Head of Bed (HOB): HOB at 30 degrees  U: Ulcer Prophylaxis   GI: yes  G: Glucose control   managed    S: Skin   Bundle compliance: yes     Date: PM or AM shift (new admit after 1500)  B: Bowel Function   no issues   I: Indwelling Catheters   Schaeffer necessity: [REMOVED]      Urethral Catheter 02/23/20 1744 Straight-tip 16 Fr.-Reason for Continuing Urinary Catheterization: Critically ill in ICU requiring intensive monitoring   CVC necessity: No   IPAD offered: No  D: De-escalation Antibx   No  Plan for the day   Manage blood pressure. Diurese per ordered meds.     Family/Goals of care/Code Status   Code Status: Full     No acute events throughout day, VS and assessment per flow sheet, patient progressing towards goals as tolerated, plan of care reviewed with Fiona Arteaga and family, all concerns addressed, will continue to monitor.

## 2020-02-25 NOTE — HOSPITAL COURSE
Overnight 2/24 with PVCs, electrolytes wnl. BP stable in the 150s/80s. Good urine output with lasix. Will step down to floor today.

## 2020-02-25 NOTE — ASSESSMENT & PLAN NOTE
Ms. Arteaga is an 87yF who was sent from [facility] for further level of care. Yesterday AM developed acute SOB. Also reports increased LE swelling and mild exertional dyspnea past 1 week. Denies chest pain. Went to ED and found to be in Afib with RVR. She received 500 mcg of digoxin x2, IV furosemide 40 mg, hydralazine 50 mg, IV labetalol and nitroglycerin patch. Sent to the ICU there with Bipap for intermittent respiratory failure. Bilateral lower extremity ultrasound was negative for DVT. CTA chest was negative for pulmonary embolus but was consistent with pulmonary edema and pleural effusion. BNP elevated. Echocardiogram revealed severe mitral regurgitation with normal LVEF.    · Concentric left ventricular remodeling.  · Normal left ventricular systolic function. The estimated ejection fraction is 65%.  · Indeterminate left ventricular diastolic function.  · No wall motion abnormalities.  · Normal right ventricular systolic function.  · Mild left atrial enlargement.  · There is mild leaflet calcification of the Mitral Valve.  · Mild mitral sclerosis.  · Moderate-to-severe mitral regurgitation.  · Mild tricuspid regurgitation.  · Intermediate central venous pressure (8 mmHg).  · The estimated PA systolic pressure is 29 mmHg.    -Diurese with Lasix 40 mg IV BID, so far negative 1.3L  -Metoprolol 12.5 QID changed to Toprol XL 50 mg BID for rate control for Afib  -BP control with Amlodipine 2.5 mg daily  -Ins/Outs, daily weights  -Will step down to hospital medicine today  -If Heme needs to give IVIG, can give extra dose of lasix for volume control

## 2020-02-25 NOTE — PROGRESS NOTES
[facility]  Cardiology  Progress Note    Patient Name: Fiona Arteaga  MRN: [id number]  Admission Date: 2/24/2020  Hospital Length of Stay: 1 days  Code Status: Prior   Attending Physician: Esther Fernandez MD   Primary Care Physician: Primary Doctor No  Expected Discharge Date:   Principal Problem:(HFpEF) heart failure with preserved ejection fraction    Subjective:     Hospital Course:   Overnight 2/24 with PVCs, electrolytes wnl. BP stable in the 150s/80s. Good urine output with lasix. Will step down to floor today.    Interval History: Did well overnight, no complaints. PVCs noted on telemetry. K and Mg wnl. Urine output 1.3L with lasix 40 mg IV BID. Platelets up to 17 today. Heme/onc consulted. Will step down to floor today.    Review of Systems   Constitution: Negative for chills, decreased appetite, diaphoresis, fever and malaise/fatigue.   HENT: Negative for congestion.    Eyes: Negative for blurred vision.   Cardiovascular: Negative for chest pain, dyspnea on exertion, leg swelling, orthopnea, palpitations, paroxysmal nocturnal dyspnea and syncope.   Hematologic/Lymphatic: Bruises/bleeds easily.   Skin: Positive for color change.   Musculoskeletal: Negative for falls and myalgias.   Gastrointestinal: Negative for abdominal pain, nausea and vomiting.   Genitourinary: Negative for dysuria.   Neurological: Positive for tremors. Negative for dizziness and weakness.   Psychiatric/Behavioral: Negative for altered mental status.     Objective:     Vital Signs (Most Recent):  Temp: 98.1 °F (36.7 °C) (02/24/20 2300)  Pulse: 95 (02/25/20 0839)  Resp: (!) 21 (02/25/20 0600)  BP: (!) 155/68 (02/25/20 0839)  SpO2: 96 % (02/25/20 0600) Vital Signs (24h Range):  Temp:  [98 °F (36.7 °C)-98.5 °F (36.9 °C)] 98.1 °F (36.7 °C)  Pulse:  [] 95  Resp:  [19-33] 21  SpO2:  [93 %-99 %] 96 %  BP: (105-160)/(46-82) 155/68     Weight: 54.5 kg (120 lb 2.4 oz)  Body mass index is 19.99 kg/m².     SpO2: 96  %  O2 Device (Oxygen Therapy): nasal cannula      Intake/Output Summary (Last 24 hours) at 2/25/2020 0907  Last data filed at 2/25/2020 0600  Gross per 24 hour   Intake 50 ml   Output 1310 ml   Net -1260 ml       Lines/Drains/Airways     Drain                 Urethral Catheter 02/24/20 1749 less than 1 day          Peripheral Intravenous Line                 Peripheral IV - Double Lumen 02/23/20 1600 20 G Left Antecubital 1 day         Peripheral IV - Single Lumen 02/24/20 0218 20 G Anterior;Right Forearm 1 day                Physical Exam   Constitutional: She is oriented to person, place, and time. She appears well-developed and well-nourished. No distress.   HENT:   Head: Normocephalic and atraumatic.   On 3L NC   Eyes: Conjunctivae and EOM are normal. No scleral icterus.   Neck: Normal range of motion. Neck supple. No JVD present. No thyromegaly present.   Cardiovascular: Normal rate. An irregular rhythm present. Frequent extrasystoles are present.   No murmur heard.  Pulmonary/Chest: Effort normal. No respiratory distress. She has no wheezes.   Abdominal: Soft. She exhibits no distension. There is no tenderness.   Musculoskeletal: Normal range of motion. She exhibits edema (Trace LE edema ).   Compression stockings in place   Lymphadenopathy:     She has no cervical adenopathy.   Neurological: She is alert and oriented to person, place, and time. No cranial nerve deficit. Coordination normal.   Skin: Skin is warm and dry. Bruising and petechiae noted. No rash noted. She is not diaphoretic.   Psychiatric: She has a normal mood and affect. Her behavior is normal.   Nursing note and vitals reviewed.      Significant Labs:   CMP   Recent Labs   Lab 02/23/20  1704 02/24/20  0437 02/24/20  1714 02/25/20  0210    137  --  136   K 4.9 4.8 4.7 5.1   CL 96 93*  --  95   CO2 37* 36*  --  36*   * 130*  --  121*   BUN 21 24*  --  23   CREATININE 0.9 0.7  --  0.8   CALCIUM 8.8 8.4*  --  8.4*   PROT  --   --   --   5.4*   ALBUMIN  --   --   --  2.7*   BILITOT  --   --   --  0.5   ALKPHOS  --   --   --  49*   AST  --   --   --  17   ALT  --   --   --  33   ANIONGAP 7* 8  --  5*   ESTGFRAFRICA >60.0 >60.0  --  >60.0   EGFRNONAA 57.7* >60.0  --  >60.0   , CBC   Recent Labs   Lab 02/23/20  2129 02/24/20  0437 02/25/20  0210   WBC 9.01 6.70 11.17   HGB 12.3 11.6* 11.4*   HCT 40.3 38.0 36.4*   PLT 15* 13* 17*    and INR   Recent Labs   Lab 02/23/20  1706   INR 1.1       Significant Imaging: X-Ray: CXR: X-Ray Chest 1 View (CXR): Small bilateral pleural effusions left greater than right, as well as atelectatic change at the lung bases, stable radiographic appearance.    Assessment and Plan:       * (HFpEF) heart failure with preserved ejection fraction  Ms. Arteaga is an 87yF who was sent from [facility] for further level of care. Yesterday AM developed acute SOB. Also reports increased LE swelling and mild exertional dyspnea past 1 week. Denies chest pain. Went to ED and found to be in Afib with RVR. She received 500 mcg of digoxin x2, IV furosemide 40 mg, hydralazine 50 mg, IV labetalol and nitroglycerin patch. Sent to the ICU there with Bipap for intermittent respiratory failure. Bilateral lower extremity ultrasound was negative for DVT. CTA chest was negative for pulmonary embolus but was consistent with pulmonary edema and pleural effusion. BNP elevated. Echocardiogram revealed severe mitral regurgitation with normal LVEF.    · Concentric left ventricular remodeling.  · Normal left ventricular systolic function. The estimated ejection fraction is 65%.  · Indeterminate left ventricular diastolic function.  · No wall motion abnormalities.  · Normal right ventricular systolic function.  · Mild left atrial enlargement.  · There is mild leaflet calcification of the Mitral Valve.  · Mild mitral sclerosis.  · Moderate-to-severe mitral regurgitation.  · Mild tricuspid regurgitation.  · Intermediate central venous pressure  (8 mmHg).  · The estimated PA systolic pressure is 29 mmHg.    -Diurese with Lasix 40 mg IV BID, so far negative 1.3L  -Metoprolol 12.5 QID changed to Toprol XL 50 mg BID for rate control for Afib  -BP control with Amlodipine 2.5 mg daily  -Ins/Outs, daily weights  -Will step down to hospital medicine today  -If Heme needs to give IVIG, can give extra dose of lasix for volume control     MR (mitral regurgitation)  Pt with moderate-severe MR. Likely functional MR. Also has HFpEF and Afib with RVR.     - According to Dr. Fernandez, pt has no indications for any valvular intervention (Papa lr)  - Continue diuresis, BP control, Afib control   - Will step down to Medicine today for further work-up of thrombocytopenia       Benign essential hypertension  Started on home Amlodipine 2.5 mg daily, can up titrate as needed for BP control        Atrial fibrillation with rapid ventricular response  Found to be in paroxysmal Afib with RVR at outside Hospital.   CHADS2-VASC of 5 (7.2% risk CVA). HAS-BLED 3 (high risk of bleeding).     - Will hold on AG currently in setting of thrombocytopenia with platelets at 13-->17 on admission  - Rate control with Metoprolol 12.5 QID-->Metoprolol XL 50 mg BID  - Now in sinus rhythm with PVCs  - TSH wnl     Thrombocytopenia  Pt presented to [facility] and found to have thrombocytopenia. Per pt, states she was never told she has this before.   Hematology followed at outside hospital, work-up pending. Started on prednisone 20 mg QID and s/p platelet transfusion prior to transfer. Per their notes, consider IVIG if needed. B12 elevated on outside labs. Denies alcohol use. LFTs wnl.     - Hematology consulted here on admit, considering IVIG, can give extra dose lasix if large volume needed to be given with IVIG  - Bruising noted on arms, no signs active bleeding currently   - Will monitor with daily CBCs  - Platelets at 13-->17 now  - Re-started prednisone 20 mg QID per  Hematology at [facility]          VTE Risk Mitigation (From admission, onward)         Ordered     Place sequential compression device  Until discontinued      02/24/20 6078                Penny Pike MD  Cardiology  [facility]

## 2020-02-25 NOTE — NURSING
Contacted Cards., notified Dr. Gallo of labs-K 5.1, Mg 2.5. Pt still experiencing PVCs. Per Dr. Gallo to go up on pt O2 from 2L to 3L. Will continue to monitor.

## 2020-02-25 NOTE — SUBJECTIVE & OBJECTIVE
Interval History: Did well overnight, no complaints. PVCs noted on telemetry. K and Mg wnl. Urine output 1.3L with lasix 40 mg IV BID. Platelets up to 17 today. Heme/onc consulted. Will step down to floor today.    Review of Systems   Constitution: Negative for chills, decreased appetite, diaphoresis, fever and malaise/fatigue.   HENT: Negative for congestion.    Eyes: Negative for blurred vision.   Cardiovascular: Negative for chest pain, dyspnea on exertion, leg swelling, orthopnea, palpitations, paroxysmal nocturnal dyspnea and syncope.   Hematologic/Lymphatic: Bruises/bleeds easily.   Skin: Positive for color change.   Musculoskeletal: Negative for falls and myalgias.   Gastrointestinal: Negative for abdominal pain, nausea and vomiting.   Genitourinary: Negative for dysuria.   Neurological: Positive for tremors. Negative for dizziness and weakness.   Psychiatric/Behavioral: Negative for altered mental status.     Objective:     Vital Signs (Most Recent):  Temp: 98.1 °F (36.7 °C) (02/24/20 2300)  Pulse: 95 (02/25/20 0839)  Resp: (!) 21 (02/25/20 0600)  BP: (!) 155/68 (02/25/20 0839)  SpO2: 96 % (02/25/20 0600) Vital Signs (24h Range):  Temp:  [98 °F (36.7 °C)-98.5 °F (36.9 °C)] 98.1 °F (36.7 °C)  Pulse:  [] 95  Resp:  [19-33] 21  SpO2:  [93 %-99 %] 96 %  BP: (105-160)/(46-82) 155/68     Weight: 54.5 kg (120 lb 2.4 oz)  Body mass index is 19.99 kg/m².     SpO2: 96 %  O2 Device (Oxygen Therapy): nasal cannula      Intake/Output Summary (Last 24 hours) at 2/25/2020 0907  Last data filed at 2/25/2020 0600  Gross per 24 hour   Intake 50 ml   Output 1310 ml   Net -1260 ml       Lines/Drains/Airways     Drain                 Urethral Catheter 02/24/20 1749 less than 1 day          Peripheral Intravenous Line                 Peripheral IV - Double Lumen 02/23/20 1600 20 G Left Antecubital 1 day         Peripheral IV - Single Lumen 02/24/20 0218 20 G Anterior;Right Forearm 1 day                Physical Exam    Constitutional: She is oriented to person, place, and time. She appears well-developed and well-nourished. No distress.   HENT:   Head: Normocephalic and atraumatic.   On 3L NC   Eyes: Conjunctivae and EOM are normal. No scleral icterus.   Neck: Normal range of motion. Neck supple. No JVD present. No thyromegaly present.   Cardiovascular: Normal rate. An irregular rhythm present. Frequent extrasystoles are present.   No murmur heard.  Pulmonary/Chest: Effort normal. No respiratory distress. She has no wheezes.   Abdominal: Soft. She exhibits no distension. There is no tenderness.   Musculoskeletal: Normal range of motion. She exhibits edema (Trace LE edema ).   Compression stockings in place   Lymphadenopathy:     She has no cervical adenopathy.   Neurological: She is alert and oriented to person, place, and time. No cranial nerve deficit. Coordination normal.   Skin: Skin is warm and dry. Bruising and petechiae noted. No rash noted. She is not diaphoretic.   Psychiatric: She has a normal mood and affect. Her behavior is normal.   Nursing note and vitals reviewed.      Significant Labs:   CMP   Recent Labs   Lab 02/23/20  1704 02/24/20 0437 02/24/20  1714 02/25/20  0210    137  --  136   K 4.9 4.8 4.7 5.1   CL 96 93*  --  95   CO2 37* 36*  --  36*   * 130*  --  121*   BUN 21 24*  --  23   CREATININE 0.9 0.7  --  0.8   CALCIUM 8.8 8.4*  --  8.4*   PROT  --   --   --  5.4*   ALBUMIN  --   --   --  2.7*   BILITOT  --   --   --  0.5   ALKPHOS  --   --   --  49*   AST  --   --   --  17   ALT  --   --   --  33   ANIONGAP 7* 8  --  5*   ESTGFRAFRICA >60.0 >60.0  --  >60.0   EGFRNONAA 57.7* >60.0  --  >60.0   , CBC   Recent Labs   Lab 02/23/20  2129 02/24/20  0437 02/25/20  0210   WBC 9.01 6.70 11.17   HGB 12.3 11.6* 11.4*   HCT 40.3 38.0 36.4*   PLT 15* 13* 17*    and INR   Recent Labs   Lab 02/23/20  1706   INR 1.1       Significant Imaging: X-Ray: CXR: X-Ray Chest 1 View (CXR): Small bilateral pleural  effusions left greater than right, as well as atelectatic change at the lung bases, stable radiographic appearance.

## 2020-02-25 NOTE — NURSING
Contacted Cards., notified Dr. Gallo of pt having frequent PVCs. Ordered to draw AM labs early at 0200 and contact her with the results. Will contine to monitor.

## 2020-02-25 NOTE — PLAN OF CARE
CMICU DAILY GOALS       A: Awake    RASS: Goal - RASS Goal: 0-->alert and calm  Actual - RASS (Pascal Agitation-Sedation Scale): 0-->alert and calm   Restraint necessity:    B: Breath   SBT: Not intubated   C: Coordinate A & B, analgesics/sedatives   Pain: managed    SAT: Not intubated  D: Delirium   CAM-ICU: Overall CAM-ICU: Negative  E: Early Mobility   MOVE Screen: Pass   Activity: Activity Management: activity clustered for rest period  FAS: Feeding/Nutrition   Diet order: Diet/Nutrition Received: low saturated fat/low cholesterol,   Fluid restriction:    T: Thrombus   DVT prophylaxis: VTE Required Core Measure: (TEDs) Compression stocking therapy initiated/maintained  H: HOB Elevation   Head of Bed (HOB): HOB at 45 degrees  U: Ulcer Prophylaxis   GI: yes  G: Glucose control   managed    S: Skin   Bundle compliance: yes   Bathing/Skin Care: bath, chlorhexidine, bath, complete, linen changed Date: [unfilled]  B: Bowel Function   no issues   I: Indwelling Catheters   Schaeffer necessity: Yes. Requiring close in/out monitoring at this time   CVC necessity: No   IPAD offered: No  D: De-escalation Antibx   Yes  Plan for the day   Continue to diurese. Monitor platelets. Step down out of ICU this evening.   Family/Goals of care/Code Status   Code Status: Prior     No acute events throughout day, VS and assessment per flow sheet, patient progressing towards goals as tolerated, plan of care reviewed with Fiona Arteaga and family, all concerns addressed, will continue to monitor.

## 2020-02-25 NOTE — CONSULTS
Consult Note    Inpatient consult to Hematology  Consult performed by: Cate Powell MD  Consult ordered by: Sarai Arreola MD        SUBJECTIVE:     History of Present Illness:  Patient is a 87 y.o. female with history of mitral regurgitation currently admitted under cardiology service with volume overload. She was transferred from [facility] where she was found to have profound thrombocytopenia with platelet count of 5k. She did not report any bleading outside of brusing associated with blood draws. Interestingly she received platelet transfusion at [facility] with appropriate response. She tells me her niece has a CLAUDINE-2 mutation associated MPN.         Review of patient's allergies indicates:  No Known Allergies  Past Medical History:   Diagnosis Date    Cancer     skin cancer    Hypertension      History reviewed. No pertinent surgical history.  History reviewed. No pertinent family history.  Social History     Tobacco Use    Smoking status: Never Smoker   Substance Use Topics    Alcohol use: Never     Frequency: Never    Drug use: Never     Review of Systems   Constitutional: Negative for chills, fever and weight loss.   HENT: Negative for congestion and nosebleeds.    Eyes: Negative for blurred vision.   Respiratory: Positive for shortness of breath. Negative for hemoptysis and sputum production.    Cardiovascular: Positive for leg swelling. Negative for chest pain.   Gastrointestinal: Negative for abdominal pain, nausea and vomiting.   Genitourinary: Negative for dysuria and hematuria.   Skin: Negative for rash.   Neurological: Negative for dizziness and headaches.   Endo/Heme/Allergies: Bruises/bleeds easily.     OBJECTIVE:     Vital Signs:  Temp:  [98 °F (36.7 °C)-98.1 °F (36.7 °C)]   Pulse:  []   Resp:  [14-35]   BP: (108-155)/(54-92)   SpO2:  [95 %-99 %]     Physical Exam   Constitutional: No distress.   Eyes: Conjunctivae are normal. Right eye exhibits no discharge. Left eye  exhibits no discharge. No scleral icterus.   Cardiovascular: Normal rate, regular rhythm and intact distal pulses.   Murmur heard.  Pulmonary/Chest: Effort normal and breath sounds normal. No respiratory distress.   Abdominal: Soft. She exhibits no distension. There is no tenderness.   Musculoskeletal: She exhibits edema.   Skin: She is not diaphoretic.   Nursing note and vitals reviewed.    Laboratory:  CBC:   Recent Labs   Lab 02/25/20 0210   WBC 11.17   RBC 3.50*   HGB 11.4*   HCT 36.4*   PLT 17*   *   MCH 32.6*   MCHC 31.3*     CMP:   Recent Labs   Lab 02/25/20 0210   *   CALCIUM 8.4*   ALBUMIN 2.7*   PROT 5.4*      K 5.1   CO2 36*   CL 95   BUN 23   CREATININE 0.8   ALKPHOS 49*   ALT 33   AST 17   BILITOT 0.5         ASSESSMENT/PLAN:     # Thrombocytopenia   # Macrocytic anemia     DDx includes ITP vs primary bone marrow process like MDS. Please check HIV Ag/Ab, HCV Ab with reflex to HCV RNA, H.pylori IgG and stool Ag, peripheral smear. Please order an abdominal US to screen for splenomegaly.     It is reasonable to treat for ITP for now, please change Prednisone dosing to 60 mg once daily (1 mg/kg). We would recommend IVIG x2 doses, we would like cardiology to weigh in on whether this would feasible since it might provide a considerable amount of volume to the patient.     We will follow her labs through the week and will consider performing a bone marrow biopsy later this week if counts fail to recover. Concern for MDS is more prominent given macrocytosis and normal vitamin B12 and Folate levels. Please check Copper level.     Patient was seen and discussed with attending Dr. Powell.

## 2020-02-25 NOTE — ASSESSMENT & PLAN NOTE
Pt with moderate-severe MR. Likely functional MR. Also has HFpEF and Afib with RVR.     - According to Dr. Fernandez, pt has no indications for any valvular intervention (Papa lr)  - Continue diuresis, BP control, Afib control   - Will step down to Medicine today for further work-up of thrombocytopenia

## 2020-02-25 NOTE — RESIDENT HANDOFF
Handoff     Primary Team: Networked reference to record PCT  Room Number: 6095/6095 A     Patient Name: Fiona Arteaga MRN: [id number]     Date of Birth: [id number] Allergies: Patient has no known allergies.     Age: 87 y.o. Admit Date: 2/24/2020     Sex: female  BMI: Body mass index is 19.99 kg/m².     Code Status: Prior        Illness Level (current clinical status): Watcher - Yes - Afib with RVR, Functional MS, HFpEF    Reason for Admission: (HFpEF) heart failure with preserved ejection fraction    Brief HPI:  Ms. Arteaga is an 87yF who was sent from [facility] for evaluation of mitral valve regurgitation. She has a medical history of HTN. She is independent with ADLs at baseline. Yesterday she states she was drinking coffee in the AM and developed acute SOB. Also reports increased LE swelling and mild exertional dyspnea past 1 week. Denies chest pain. Went to ED and found to be in Afib with RVR. Also noted to have thrombocytopenia (was being worked up by Hematology there, s/p platelet transfusion and daily prednisone). She received 500 mcg of digoxin x2, IV furosemide 40 mg, hydralazine 50 mg p.o., IV labetalol and nitroglycerin patch. Sent to the ICU there with Bipap for intermittent respiratory failure. Bilateral lower extremity ultrasound was negative for DVT.  CTA chest was negative for pulmonary embolus but was consistent with pulmonary edema and pleural effusion.  Echocardiogram revealed severe mitral regurgitation with normal LVEF.Transferred for higher level of care.     Procedure Date: NA    Hospital Course: Did well overnight, no complaints. PVCs noted on telemetry. K and Mg wnl. Urine output 1.3L with lasix 40 mg IV BID. Platelets up to 17 today. Toprol XL 50 mg BID started for rate control. Heme/onc consulted. Will step down to floor today.    Tasks:   -Continue with diuresis and BP control for HFpEF  -Not a candidate for any valvular procedure  -Extra dose of lasix if needed for IVIG    -PT/OT  -F/u Heme recommendations for thrombocytopenia     Estimated Discharge Date: TBD    Discharge Disposition: Home or Self Care

## 2020-02-25 NOTE — ASSESSMENT & PLAN NOTE
Pt presented to [facility] and found to have thrombocytopenia. Per pt, states she was never told she has this before.   Hematology followed at outside hospital, work-up pending. Started on prednisone 20 mg QID and s/p platelet transfusion prior to transfer. Per their notes, consider IVIG if needed. B12 elevated on outside labs. Denies alcohol use. LFTs wnl.     - Hematology consulted here on admit, considering IVIG, can give extra dose lasix if large volume needed to be given with IVIG  - Bruising noted on arms, no signs active bleeding currently   - Will monitor with daily CBCs  - Platelets at 13-->17 now  - Re-started prednisone 20 mg QID per Hematology at [facility]

## 2020-02-25 NOTE — CARE UPDATE
Notified cards about patient's mag being 1.7. MD Gallo to look at patient's chart. No new orders received.

## 2020-02-25 NOTE — ASSESSMENT & PLAN NOTE
Found to be in paroxysmal Afib with RVR at outside Hospital.   CHADS2-VASC of 5 (7.2% risk CVA). HAS-BLED 3 (high risk of bleeding).     - Will hold on AG currently in setting of thrombocytopenia with platelets at 13-->17 on admission  - Rate control with Metoprolol 12.5 QID-->Metoprolol XL 50 mg BID  - Now in sinus rhythm with PVCs  - TSH wnl

## 2020-02-25 NOTE — PLAN OF CARE
CMICU DAILY GOALS       A: Awake    RASS: Goal - RASS Goal: 0-->alert and calm  Actual - RASS (Pascal Agitation-Sedation Scale): 0-->alert and calm   Restraint necessity:    B: Breath   SBT: NA   C: Coordinate A & B, analgesics/sedatives   Pain: managed    SAT: NA  D: Delirium   CAM-ICU: Overall CAM-ICU: Negative  E: Early Mobility   MOVE Screen: Pass   Activity: Activity Management: activity adjusted per tolerance  FAS: Feeding/Nutrition   Diet order: Diet/Nutrition Received: low saturated fat/low cholesterol, 2 gram sodium,   Fluid restriction:    T: Thrombus   DVT prophylaxis: VTE Required Core Measure: (TEDs) Compression stocking therapy initiated/maintained  H: HOB Elevation   Head of Bed (HOB): HOB at 60 degrees  U: Ulcer Prophylaxis   GI: yes  G: Glucose control   managed    S: Skin   Bundle compliance: yes     Date: 2/24/20 AM shift    B: Bowel Function   LBM 2/22/20    I: Indwelling Catheters   Schaeffer necessity: [REMOVED]      Urethral Catheter 02/23/20 1744 Straight-tip 16 Fr.-Reason for Continuing Urinary Catheterization: Critically ill in ICU requiring intensive monitoring       Urethral Catheter 02/24/20 1749-Reason for Continuing Urinary Catheterization: Critically ill in ICU requiring intensive monitoring   CVC necessity: No   IPAD offered: No  D: De-escalation Antibx   Meds per MAR.    Plan for the day          Monitor heart rhythm.   Family/Goals of care/Code Status   Code Status: Prior/Full Code      No acute events throughout day, VS and assessment per flow sheet, patient progressing towards goals as tolerated, plan of care reviewed with Fiona Arteaga, all concerns addressed, will continue to monitor.

## 2020-02-26 PROBLEM — D69.3 IDIOPATHIC THROMBOCYTOPENIC PURPURA (ITP): Status: ACTIVE | Noted: 2020-02-26

## 2020-02-26 LAB
ALBUMIN SERPL BCP-MCNC: 3 G/DL (ref 3.5–5.2)
ALP SERPL-CCNC: 48 U/L (ref 55–135)
ALT SERPL W/O P-5'-P-CCNC: 26 U/L (ref 10–44)
ANION GAP SERPL CALC-SCNC: 8 MMOL/L (ref 8–16)
ANISOCYTOSIS BLD QL SMEAR: SLIGHT
AST SERPL-CCNC: 13 U/L (ref 10–40)
BASOPHILS # BLD AUTO: 0 K/UL (ref 0–0.2)
BASOPHILS # BLD AUTO: 0 K/UL (ref 0–0.2)
BASOPHILS NFR BLD: 0 % (ref 0–1.9)
BASOPHILS NFR BLD: 0 % (ref 0–1.9)
BILIRUB SERPL-MCNC: 0.8 MG/DL (ref 0.1–1)
BUN SERPL-MCNC: 25 MG/DL (ref 8–23)
CALCIUM SERPL-MCNC: 9 MG/DL (ref 8.7–10.5)
CHLORIDE SERPL-SCNC: 92 MMOL/L (ref 95–110)
CO2 SERPL-SCNC: 40 MMOL/L (ref 23–29)
CREAT SERPL-MCNC: 0.8 MG/DL (ref 0.5–1.4)
DIFFERENTIAL METHOD: ABNORMAL
DIFFERENTIAL METHOD: ABNORMAL
EOSINOPHIL # BLD AUTO: 0 K/UL (ref 0–0.5)
EOSINOPHIL # BLD AUTO: 0 K/UL (ref 0–0.5)
EOSINOPHIL NFR BLD: 0 % (ref 0–8)
EOSINOPHIL NFR BLD: 0.1 % (ref 0–8)
ERYTHROCYTE [DISTWIDTH] IN BLOOD BY AUTOMATED COUNT: 12.2 % (ref 11.5–14.5)
ERYTHROCYTE [DISTWIDTH] IN BLOOD BY AUTOMATED COUNT: 12.3 % (ref 11.5–14.5)
EST. GFR  (AFRICAN AMERICAN): >60 ML/MIN/1.73 M^2
EST. GFR  (NON AFRICAN AMERICAN): >60 ML/MIN/1.73 M^2
GLUCOSE SERPL-MCNC: 113 MG/DL (ref 70–110)
HCT VFR BLD AUTO: 40.2 % (ref 37–48.5)
HCT VFR BLD AUTO: 41.1 % (ref 37–48.5)
HCV AB SERPL QL IA: NEGATIVE
HGB BLD-MCNC: 12.7 G/DL (ref 12–16)
HGB BLD-MCNC: 12.8 G/DL (ref 12–16)
HIV 1+2 AB+HIV1 P24 AG SERPL QL IA: NEGATIVE
HYPOCHROMIA BLD QL SMEAR: ABNORMAL
IMM GRANULOCYTES # BLD AUTO: 0.04 K/UL (ref 0–0.04)
IMM GRANULOCYTES # BLD AUTO: 0.04 K/UL (ref 0–0.04)
IMM GRANULOCYTES NFR BLD AUTO: 0.4 % (ref 0–0.5)
IMM GRANULOCYTES NFR BLD AUTO: 0.4 % (ref 0–0.5)
LYMPHOCYTES # BLD AUTO: 0.7 K/UL (ref 1–4.8)
LYMPHOCYTES # BLD AUTO: 0.8 K/UL (ref 1–4.8)
LYMPHOCYTES NFR BLD: 8 % (ref 18–48)
LYMPHOCYTES NFR BLD: 8.5 % (ref 18–48)
MAGNESIUM SERPL-MCNC: 1.8 MG/DL (ref 1.6–2.6)
MCH RBC QN AUTO: 31.8 PG (ref 27–31)
MCH RBC QN AUTO: 32.2 PG (ref 27–31)
MCHC RBC AUTO-ENTMCNC: 31.1 G/DL (ref 32–36)
MCHC RBC AUTO-ENTMCNC: 31.6 G/DL (ref 32–36)
MCV RBC AUTO: 102 FL (ref 82–98)
MCV RBC AUTO: 102 FL (ref 82–98)
MONOCYTES # BLD AUTO: 0.7 K/UL (ref 0.3–1)
MONOCYTES # BLD AUTO: 0.8 K/UL (ref 0.3–1)
MONOCYTES NFR BLD: 8.3 % (ref 4–15)
MONOCYTES NFR BLD: 9 % (ref 4–15)
NEUTROPHILS # BLD AUTO: 7.4 K/UL (ref 1.8–7.7)
NEUTROPHILS # BLD AUTO: 7.4 K/UL (ref 1.8–7.7)
NEUTROPHILS NFR BLD: 82.6 % (ref 38–73)
NEUTROPHILS NFR BLD: 82.7 % (ref 38–73)
NRBC BLD-RTO: 0 /100 WBC
NRBC BLD-RTO: 0 /100 WBC
OVALOCYTES BLD QL SMEAR: ABNORMAL
PATH REV BLD -IMP: NORMAL
PATH REV BLD -IMP: NORMAL
PHOSPHATE SERPL-MCNC: 3.1 MG/DL (ref 2.7–4.5)
PLATELET # BLD AUTO: 20 K/UL (ref 150–350)
PLATELET # BLD AUTO: 20 K/UL (ref 150–350)
PLATELET BLD QL SMEAR: ABNORMAL
PLT AB: GP IA/IIA: NEGATIVE
PLT AB: GP IB/IX: NEGATIVE
PLT AB: GP IIB/IIIA: NEGATIVE
PLT AB: GP IV: NEGATIVE
PLT AB: HLA CLASS 1: NEGATIVE
PMV BLD AUTO: 11.6 FL (ref 9.2–12.9)
PMV BLD AUTO: 13.4 FL (ref 9.2–12.9)
POIKILOCYTOSIS BLD QL SMEAR: SLIGHT
POLYCHROMASIA BLD QL SMEAR: ABNORMAL
POTASSIUM SERPL-SCNC: 4.3 MMOL/L (ref 3.5–5.1)
PROT SERPL-MCNC: 6 G/DL (ref 6–8.4)
RBC # BLD AUTO: 3.94 M/UL (ref 4–5.4)
RBC # BLD AUTO: 4.03 M/UL (ref 4–5.4)
SODIUM SERPL-SCNC: 140 MMOL/L (ref 136–145)
VWF CP ACT/NOR PPP CHRO: 57.4 %
WBC # BLD AUTO: 8.92 K/UL (ref 3.9–12.7)
WBC # BLD AUTO: 8.94 K/UL (ref 3.9–12.7)

## 2020-02-26 PROCEDURE — 85025 COMPLETE CBC W/AUTO DIFF WBC: CPT | Mod: 91

## 2020-02-26 PROCEDURE — 99231 SBSQ HOSP IP/OBS SF/LOW 25: CPT | Mod: GC,,, | Performed by: INTERNAL MEDICINE

## 2020-02-26 PROCEDURE — 99231 PR SUBSEQUENT HOSPITAL CARE,LEVL I: ICD-10-PCS | Mod: GC,,, | Performed by: INTERNAL MEDICINE

## 2020-02-26 PROCEDURE — 83735 ASSAY OF MAGNESIUM: CPT

## 2020-02-26 PROCEDURE — 20600001 HC STEP DOWN PRIVATE ROOM

## 2020-02-26 PROCEDURE — 99232 PR SUBSEQUENT HOSPITAL CARE,LEVL II: ICD-10-PCS | Mod: ,,, | Performed by: STUDENT IN AN ORGANIZED HEALTH CARE EDUCATION/TRAINING PROGRAM

## 2020-02-26 PROCEDURE — 25000003 PHARM REV CODE 250: Performed by: STUDENT IN AN ORGANIZED HEALTH CARE EDUCATION/TRAINING PROGRAM

## 2020-02-26 PROCEDURE — 86677 HELICOBACTER PYLORI ANTIBODY: CPT

## 2020-02-26 PROCEDURE — 97166 OT EVAL MOD COMPLEX 45 MIN: CPT

## 2020-02-26 PROCEDURE — 86803 HEPATITIS C AB TEST: CPT

## 2020-02-26 PROCEDURE — 63600175 PHARM REV CODE 636 W HCPCS: Performed by: STUDENT IN AN ORGANIZED HEALTH CARE EDUCATION/TRAINING PROGRAM

## 2020-02-26 PROCEDURE — 97535 SELF CARE MNGMENT TRAINING: CPT

## 2020-02-26 PROCEDURE — 97161 PT EVAL LOW COMPLEX 20 MIN: CPT

## 2020-02-26 PROCEDURE — 97530 THERAPEUTIC ACTIVITIES: CPT

## 2020-02-26 PROCEDURE — 82525 ASSAY OF COPPER: CPT

## 2020-02-26 PROCEDURE — 84100 ASSAY OF PHOSPHORUS: CPT

## 2020-02-26 PROCEDURE — 80053 COMPREHEN METABOLIC PANEL: CPT

## 2020-02-26 PROCEDURE — 85060 BLOOD SMEAR INTERPRETATION: CPT | Mod: ,,, | Performed by: PATHOLOGY

## 2020-02-26 PROCEDURE — 99232 SBSQ HOSP IP/OBS MODERATE 35: CPT | Mod: ,,, | Performed by: STUDENT IN AN ORGANIZED HEALTH CARE EDUCATION/TRAINING PROGRAM

## 2020-02-26 PROCEDURE — 85060 PATHOLOGIST REVIEW: ICD-10-PCS | Mod: ,,, | Performed by: PATHOLOGY

## 2020-02-26 PROCEDURE — 36415 COLL VENOUS BLD VENIPUNCTURE: CPT

## 2020-02-26 PROCEDURE — 86703 HIV-1/HIV-2 1 RESULT ANTBDY: CPT

## 2020-02-26 RX ORDER — ONDANSETRON 4 MG/1
4 TABLET, ORALLY DISINTEGRATING ORAL EVERY 6 HOURS PRN
Status: DISCONTINUED | OUTPATIENT
Start: 2020-02-26 | End: 2020-03-03 | Stop reason: HOSPADM

## 2020-02-26 RX ORDER — SIMETHICONE 80 MG
1 TABLET,CHEWABLE ORAL 3 TIMES DAILY PRN
Status: DISCONTINUED | OUTPATIENT
Start: 2020-02-26 | End: 2020-03-03 | Stop reason: HOSPADM

## 2020-02-26 RX ORDER — FUROSEMIDE 10 MG/ML
40 INJECTION INTRAMUSCULAR; INTRAVENOUS DAILY
Status: DISCONTINUED | OUTPATIENT
Start: 2020-02-26 | End: 2020-02-27

## 2020-02-26 RX ORDER — ACETAMINOPHEN 325 MG/1
650 TABLET ORAL EVERY 6 HOURS PRN
Status: DISCONTINUED | OUTPATIENT
Start: 2020-02-26 | End: 2020-02-27

## 2020-02-26 RX ORDER — POLYETHYLENE GLYCOL 3350 17 G/17G
17 POWDER, FOR SOLUTION ORAL 2 TIMES DAILY PRN
Status: DISCONTINUED | OUTPATIENT
Start: 2020-02-26 | End: 2020-02-28

## 2020-02-26 RX ADMIN — METOPROLOL SUCCINATE 50 MG: 25 TABLET, EXTENDED RELEASE ORAL at 08:02

## 2020-02-26 RX ADMIN — PANTOPRAZOLE SODIUM 40 MG: 40 TABLET, DELAYED RELEASE ORAL at 09:02

## 2020-02-26 RX ADMIN — FUROSEMIDE 40 MG: 10 INJECTION, SOLUTION INTRAMUSCULAR; INTRAVENOUS at 09:02

## 2020-02-26 RX ADMIN — METOPROLOL SUCCINATE 50 MG: 25 TABLET, EXTENDED RELEASE ORAL at 09:02

## 2020-02-26 RX ADMIN — AMLODIPINE BESYLATE 2.5 MG: 2.5 TABLET ORAL at 09:02

## 2020-02-26 RX ADMIN — PREDNISONE 60 MG: 10 TABLET ORAL at 09:02

## 2020-02-26 RX ADMIN — SIMETHICONE CHEW TAB 80 MG 80 MG: 80 TABLET ORAL at 10:02

## 2020-02-26 NOTE — PT/OT/SLP EVAL
"Occupational Therapy   Evaluation and Discharge Note    Name: Fiona Arteaga  MRN: [id number]  Admitting Diagnosis:  (HFpEF) heart failure with preserved ejection fraction      Recommendations:     Discharge Recommendations: home with home health  Discharge Equipment Recommendations:  none  Barriers to discharge:  None    Assessment:     Fiona Arteaga is a 87 y.o. female with a medical diagnosis of (HFpEF) heart failure with preserved ejection fraction. At this time, patient is functioning at their prior level of function and does not require further acute OT services. Patient completed bed mobility and functional transfers with supervision to stand by assist, demonstrated good use of rolling walker, initiated self-pacing with standing rest break, and good insight into capabilities with endurance. Patient would benefit from HH when deemed medically appropriate for discharge for safety evaluation with rolling walker in her home environment and energy conservation.    Plan:     During this hospitalization, patient does not require further acute OT services.  Please re-consult if situation changes.    · Plan of Care Reviewed with: patient    Subjective     Chief Complaint: Ready to go home  Patient/Family Comments/goals: Return home and to OF    Occupational Profile:  Living Environment: Pt lives alone, her niece lives next door and "is constantly checking on her and telling her what not to do," in SSH, 0STE, t/s comb.  Previous level of function: Independent  Equipment Used at home:  none  Assistance upon Discharge: Patient's family lives next door and is available to provide assistance    Pain/Comfort:  · Pain Rating 1: 0/10  · Pain Rating Post-Intervention 1: 0/10    Patients cultural, spiritual, Restoration conflicts given the current situation: no    Objective:     Communicated with: Nskendrick prior to session.  Patient found supine with telemetry, pulse ox (continuous), oxygen upon OT entry to " room.    General Precautions: Standard, fall   Orthopedic Precautions:N/A   Braces: N/A     Occupational Performance:    Bed Mobility:    · Patient completed Rolling/Turning to Left with  supervision  · Patient completed Scooting/Bridging with stand by assistance  · Patient completed Supine to Sit with stand by assistance    Functional Mobility/Transfers:  · Patient completed Sit <> Stand Transfer with stand by assistance  with  rolling walker   · Functional Mobility: Stand by assistance with rolling walker for >HH distance x 2, patient's O2 sat to 87% during completion of functional mobility, x1 standing rest break    Activities of Daily Living:  · Grooming: supervision seated edge of bed  · Upper Body Dressing: supervision seated edge of bed  · Lower Body Dressing: supervision seated edge of bed  · Toileting: patient with Schaeffer in place    Cognitive/Visual Perceptual:  Cognitive/Psychosocial Skills:     -       Oriented to: Person, Place, Time and Situation   -       Follows Commands/attention:Follows multistep  commands  -       Safety awareness/insight to disability: intact   -       Mood/Affect/Coping skills/emotional control: Appropriate to situation and Cooperative    Physical Exam:  Postural examination/scapula alignment:    -       No postural abnormalities identified  Upper Extremity Range of Motion:     -       Right Upper Extremity: WFL  -       Left Upper Extremity: WFL  Upper Extremity Strength:    -       Right Upper Extremity: WFL  -       Left Upper Extremity: WFL   Strength:    -       Right Upper Extremity: WFL  -       Left Upper Extremity: WFL    AMPAC 6 Click ADL:  AMPAC Total Score: 23    Treatment & Education:  -Evaluation completed, plan of care established.  -Patient and family educated on roles/goals of OT and POC.  -White board updated.  -Therapist provided time for questions and answered within scope of practice.  -Patient educated on importance of EOB/OOB activity to maximize  recovery.  Education:    Patient left supine with all lines intact and call button in reach    GOALS:   Multidisciplinary Problems     Occupational Therapy Goals     Not on file          Multidisciplinary Problems (Resolved)        Problem: Occupational Therapy Goal    Goal Priority Disciplines Outcome Interventions   Occupational Therapy Goal   (Resolved)     OT, PT/OT Met    Description:  Evaluation completed, patient determined to be at baseline for completion of ADL's and functional transfers. Patient D/C acute OT. Patient could benefit from HH for use of DME in home environment as well as safety evaluation and education on most efficient set-up for energy conservation.     Patient does not require continued skilled OT services at this time.  Patient educated on importance of continued edge of bed and out of bed activity and safety.   Patient educated to request re-ordering of therapy from MD if functional status/mobility should change during this admission.                    History:     Past Medical History:   Diagnosis Date    Cancer     skin cancer    Hypertension        History reviewed. No pertinent surgical history.    Time Tracking:     OT Date of Treatment: 02/26/20  OT Start Time: 1312  OT Stop Time: 1330  OT Total Time (min): 18 min    Billable Minutes:Evaluation 8  Therapeutic Activity 10    Lisbeth Pennington OT  2/26/2020

## 2020-02-26 NOTE — PROGRESS NOTES
Hospital Medicine  Progress Note      Patient Name: Fiona Arteaga  MRN:  [id number]  Hospital Medicine Team: [facility] [place] Jeremias Kenny MD  Date of Admission:  2/24/2020     Length of Stay:  LOS: 2 days   Expected Discharge Date: 2/28/2020  Principal Problem:  (HFpEF) heart failure with preserved ejection fraction      Subjective:    Interval History/Overnight Events:  Stepped down from MICU, diuresing well, oxygen requirements down to 1L NC, improvement in leg swelling. Heme-Onc following for thrombocytopenia, recommend continuing prednisone for now and holding off on IVIG. Platelet counts improving. Patient without any complaints as per ROS below this AM.       Review of Systems:  Constitutional: Negative for chills, fatigue, fever.   Respiratory: Negative for cough, shortness of breath.    Cardiovascular: Negative for chest pain, palpitations, leg swelling.   Gastrointestinal: Negative for abdominal pain, nausea, vomiting, diarrhea, constipation  Genitourinary: Negative for dysuria, frequency.   All other systems reviewed and are negative.      Objective:    Physical Exam:  Temp:  [97.8 °F (36.6 °C)-98.3 °F (36.8 °C)]   Pulse:  []   Resp:  [11-26]   BP: (120-172)/(59-88)   SpO2:  [78 %-97 %]     Constitutional: Appears comfortable, in no acute distress   Eyes: No scleral icterus or eye discharge  Cardiovascular: Normal heart rate.  Regular heart rhythm.  No murmur heard.  Pulmonary/Chest: Effort normal and breath sounds normal. No respiratory distress. No wheezes, rales, or rhonchi  Abdominal: Soft. Bowel sounds are normal.  No distension.  No tenderness  Musculoskeletal: No deformities.  No edema.   Neurological: Alert.  Oriented to person, place, and time.   Skin: Skin is warm and dry.       Assessment and Plan:    * (HFpEF) heart failure with preserved ejection fraction  Ms. Arteaga is an 87yF who was sent from [facility] for further level of care. Yesterday AM developed acute  SOB. Also reports increased LE swelling and mild exertional dyspnea past 1 week. Denies chest pain. Went to ED and found to be in Afib with RVR. She received 500 mcg of digoxin x2, IV furosemide 40 mg, hydralazine 50 mg, IV labetalol and nitroglycerin patch. Sent to the ICU there with Bipap for intermittent respiratory failure. Bilateral lower extremity ultrasound was negative for DVT. CTA chest was negative for pulmonary embolus but was consistent with pulmonary edema and pleural effusion. BNP elevated. Echocardiogram revealed severe mitral regurgitation with normal LVEF.     · Concentric left ventricular remodeling.  · Normal left ventricular systolic function. The estimated ejection fraction is 65%.  · Indeterminate left ventricular diastolic function.  · No wall motion abnormalities.  · Normal right ventricular systolic function.  · Mild left atrial enlargement.  · There is mild leaflet calcification of the Mitral Valve.  · Mild mitral sclerosis.  · Moderate-to-severe mitral regurgitation.  · Mild tricuspid regurgitation.  · Intermediate central venous pressure (8 mmHg).  · The estimated PA systolic pressure is 29 mmHg.     -Diurese with Lasix 40 mg IV BID, so far negative 1.3L  -Metoprolol 12.5 QID changed to Toprol XL 50 mg BID for rate control for Afib  -BP control with Amlodipine 2.5 mg daily  -Ins/Outs, daily weights  -Will step down to hospital medicine today  -If Heme needs to give IVIG, can give extra dose of lasix for volume control      MR (mitral regurgitation)  Pt with moderate-severe MR. Likely functional MR. Also has HFpEF and Afib with RVR.      - According to Dr. Fernandez, pt has no indications for any valvular intervention (Papa lr)  - Continue diuresis, BP control, Afib control         Benign essential hypertension  Started on home Amlodipine 2.5 mg daily, can up titrate as needed for BP control           Atrial fibrillation with rapid ventricular response  Found to be in paroxysmal Afib  with RVR at outside Hospital.   CHADS2-VASC of 5 (7.2% risk CVA). HAS-BLED 3 (high risk of bleeding).      - Will hold on AG currently in setting of thrombocytopenia with platelets at 13-->17 on admission  - Rate control with Metoprolol 12.5 QID-->Metoprolol XL 50 mg BID  - Now in sinus rhythm with PVCs  - TSH wnl      Thrombocytopenia  Pt presented to [facility] and found to have thrombocytopenia. Per pt, states she was never told she has this before.   Hematology followed at outside hospital, work-up pending. Started on prednisone 20 mg QID and s/p platelet transfusion prior to transfer. Per their notes, consider IVIG if needed. B12 elevated on outside labs. Denies alcohol use. LFTs wnl.      - Hematology consulted here on admit, considering IVIG, can give extra dose lasix if large volume needed to be given with IVIG  - Bruising noted on arms, no signs active bleeding currently   - Will monitor with daily CBCs  - Platelets improving  - continue prednisone 1 mg/kg  - workup in process    Jeremias Kenny MD  Hospital Medicine  Spectra:  [phone]  Pager:  [phone]

## 2020-02-26 NOTE — ASSESSMENT & PLAN NOTE
88yo woman with PMH of HTN, HFpEF presents with SOB at [facility] found to have Afib with RVR & severe MR and transferred for Cardiology services. Initial platelets of 16 and given PLT transfusion.   Prednisone 60mg a day for about 2 days. Hand bruising improving and patient is without signs of overt mucosal bleeding. PLT  13>17> 20     Assessment: Acute thrombocytopenia due to ITP in setting of this 86 yo patient with no prior thrombocytopenia and RBC & WBC WNL.     Plan:   -Continue prednisone at 60mg QD (1mg/kg)   -PLT count improving, would monitor another 24hr   -Monitor for any signs of mucosal bleeding   -No IVIG at this time   -Hold off bone marrow biopsy at this time  -Disposition pending platelet improvement to an acceptable level such as 30K without any signs of bleeding

## 2020-02-26 NOTE — PLAN OF CARE
Evaluation completed, patient determined to be at baseline for completion of ADL's and functional transfers. Patient D/C acute OT. Patient could benefit from HH for use of DME in home environment as well as safety evaluation and education on most efficient set-up for energy conservation.     Patient does not require continued skilled OT services at this time.  Patient educated on importance of continued edge of bed and out of bed activity and safety.   Patient educated to request re-ordering of therapy from MD if functional status/mobility should change during this admission.     Discharge Recommendations: Home with HH for Safety Evalution; DME: Rolling walker  Outcome: Met

## 2020-02-26 NOTE — PT/OT/SLP EVAL
Physical Therapy Evaluation    Patient Name:  Fiona Arteaga   MRN:  [id number]    Recommendations:     Discharge Recommendations:  home with home health   Discharge Equipment Recommendations: walker, rolling(possibly oxygen)   Barriers to discharge: None    Assessment:     Fiona Arteaga is a 87 y.o. female admitted with a medical diagnosis of (HFpEF) heart failure with preserved ejection fraction.  She presents with the following impairments/functional limitations:  weakness, impaired endurance, impaired functional mobilty, gait instability, impaired balance, impaired cardiopulmonary response to activity Pt. cooperative and tolerated treatment fairly well except for desatting into mid-80s on room air.    Rehab Prognosis: Good; patient would benefit from acute skilled PT services to address these deficits and reach maximum level of function.    Recent Surgery: * No surgery found *      Plan:     During this hospitalization, patient to be seen 3 x/week to address the identified rehab impairments via gait training, therapeutic activities, therapeutic exercises and progress toward the following goals:    · Plan of Care Expires:  03/27/20    Subjective     Chief Complaint: weakness  Patient/Family Comments/goals: to get stronger  Pain/Comfort:  · Pain Rating 1: 0/10  · Pain Rating Post-Intervention 1: 0/10    Patients cultural, spiritual, Mandaeism conflicts given the current situation: no    Living Environment:  Pt. Lives alone in [facility] with no SONAL  Prior to admission, patients level of function was indep.  Equipment used at home: none.  Upon discharge, patient will have assistance from niece.    Objective:     Communicated with nursing prior to session.  Patient found supine with peripheral IV, pulse ox (continuous), telemetry, oxygen  upon PT entry to room.    General Precautions: Standard, fall   Orthopedic Precautions:N/A   Braces:       Exams:  · RLE ROM: WFL  · RLE Strength: WFL  · LLE ROM: WFL  · LLE  Strength: WFL    Functional Mobility:  · Bed Mobility:     · Rolling Left:  stand by assistance  · Scooting: stand by assistance  · Supine to Sit: stand by assistance  · Sit to Supine: stand by assistance  · Transfers:     · Sit to Stand:  stand by assistance with rolling walker  · Gait: 80' with RW and portable oxygen @ 2L. Pt. amb. with decreased step length/frankie.  · Balance: fair+      Therapeutic Activities and Exercises:   Discussed breathing technique, therapy/DME needs, goals, and POC.    AM-PAC 6 CLICK MOBILITY  Total Score:18     Patient left supine with all lines intact and call button in reach.    GOALS:   Multidisciplinary Problems     Physical Therapy Goals        Problem: Physical Therapy Goal    Goal Priority Disciplines Outcome Goal Variances Interventions   Physical Therapy Goal     PT, PT/OT Ongoing, Progressing     Description:  Goals to be met by: 3/11/2020     Patient will increase functional independence with mobility by performin. Supine to sit with Modified [place]  2. Sit to supine with Modified [place]  3. Sit to stand transfer with Supervision  4. Bed to chair transfer with Supervision using LRAD  5. Gait  x 150 feet with Supervision using LRAD.   6. Lower extremity exercise program x15 reps per handout, with supervision                      History:     Past Medical History:   Diagnosis Date    Cancer     skin cancer    Hypertension        History reviewed. No pertinent surgical history.    Time Tracking:     PT Received On: 20  PT Start Time: 1314     PT Stop Time: 1330  PT Total Time (min): 16 min     Billable Minutes: Evaluation 8 and Therapeutic Activity 8      Yaya May, PT  2020

## 2020-02-26 NOTE — PLAN OF CARE
Problem: Physical Therapy Goal  Goal: Physical Therapy Goal  Description  Goals to be met by: 3/11/2020     Patient will increase functional independence with mobility by performin. Supine to sit with Modified [place]  2. Sit to supine with Modified [place]  3. Sit to stand transfer with Supervision  4. Bed to chair transfer with Supervision using LRAD  5. Gait  x 150 feet with Supervision using LRAD.   6. Lower extremity exercise program x15 reps per handout, with supervision     Outcome: Ongoing, Progressing     Goals set

## 2020-02-26 NOTE — PROGRESS NOTES
Pt is AAOx4; afebrile; vital signs stable. She is on 1L O2 per nasal cannula. It was attempted to wean her off O2 twice, but sats dropped into the 70s'-80s. Plt count 20 this morning. She is up with one assist. Schaeffer pulled this afternoon. 40mg lasix given this morning; she is urinating clear yellow. Abdominal u/s done this morning.

## 2020-02-26 NOTE — PLAN OF CARE
02/26/20 1021   Post-Acute Status   Post-Acute Authorization Placement     SW is following this Pt for DC planning needs. There are no identified needs at this time.    Leti De Anda LMSW  Case Management   [facility]

## 2020-02-26 NOTE — PROGRESS NOTES
[facility]  Hematology/Oncology  Progress Note    Patient Name: Fiona Arteaga  Admission Date: 2/24/2020  Hospital Length of Stay: 2 days  Code Status: Prior     Subjective:     HPI:  Patient is a 87 y.o. female with history of mitral regurgitation currently admitted under cardiology service with volume overload. She was transferred from [facility] where she was found to have profound thrombocytopenia with platelet count of 5k. She did not report any bleeding outside of brusing associated with blood draws. Interestingly she received platelet transfusion at [facility] with appropriate response. She tells me her niece has a CLAUDINE-2 mutation associated MPN.      Oncology Treatment Plan:   [No treatment plan]    Medications:  Continuous Infusions:  Scheduled Meds:   amLODIPine  2.5 mg Oral Daily    furosemide (LASIX) IV  40 mg Intravenous Daily    metoprolol succinate  50 mg Oral BID    pantoprazole  40 mg Oral Daily    predniSONE  60 mg Oral Daily     PRN Meds:simethicone     Review of patient's allergies indicates:  No Known Allergies     Past Medical History:   Diagnosis Date    Cancer     skin cancer    Hypertension      History reviewed. No pertinent surgical history.  Family History     None        Tobacco Use    Smoking status: Never Smoker   Substance and Sexual Activity    Alcohol use: Never     Frequency: Never    Drug use: Never    Sexual activity: Not Currently       Review of Systems   Constitutional: Negative for activity change, chills, fatigue, fever and unexpected weight change.   HENT: Negative for congestion.    Eyes: Negative for visual disturbance.   Respiratory: Negative for cough, choking, chest tightness, shortness of breath, wheezing and stridor.    Cardiovascular: Negative for chest pain, palpitations and leg swelling.   Gastrointestinal: Negative for abdominal distention, abdominal pain, blood in stool, constipation, diarrhea, nausea and vomiting.    Endocrine: Negative for polyuria.   Genitourinary: Negative for difficulty urinating, dysuria, flank pain, frequency, urgency and vaginal discharge.   Musculoskeletal: Negative for arthralgias and gait problem.   Neurological: Negative for dizziness, tremors, seizures, facial asymmetry, weakness, numbness and headaches.   Hematological: Does not bruise/bleed easily.   Psychiatric/Behavioral: Negative for agitation, behavioral problems, confusion and decreased concentration.     Objective:     Vital Signs (Most Recent):  Temp: 97.8 °F (36.6 °C) (02/26/20 1126)  Pulse: 87 (02/26/20 1355)  Resp: (!) 22 (02/26/20 1355)  BP: (!) 120/59 (02/26/20 1355)  SpO2: (!) 93 % (02/26/20 1355) Vital Signs (24h Range):  Temp:  [97.8 °F (36.6 °C)-98.3 °F (36.8 °C)] 97.8 °F (36.6 °C)  Pulse:  [73-93] 87  Resp:  [11-26] 22  SpO2:  [78 %-97 %] 93 %  BP: (120-172)/(59-88) 120/59     Weight: 54.5 kg (120 lb 2.4 oz)  Body mass index is 19.99 kg/m².  Body surface area is 1.58 meters squared.      Intake/Output Summary (Last 24 hours) at 2/26/2020 1610  Last data filed at 2/26/2020 1600  Gross per 24 hour   Intake 1020 ml   Output 2150 ml   Net -1130 ml       Physical Exam   Constitutional: She is oriented to person, place, and time. She appears well-developed and well-nourished. No distress.   HENT:   Head: Normocephalic and atraumatic.   Eyes: Pupils are equal, round, and reactive to light. EOM are normal.   Neck: Normal range of motion. Neck supple. No JVD present. No thyromegaly present.   Cardiovascular: Normal rate, regular rhythm and intact distal pulses. Exam reveals no gallop and no friction rub.   Murmur heard.  Pulmonary/Chest: Effort normal and breath sounds normal. No stridor. No respiratory distress. She has no wheezes. She has no rales. She exhibits no tenderness.   Abdominal: Soft. Bowel sounds are normal. She exhibits no distension. There is no tenderness. There is no guarding.   Musculoskeletal: Normal range of motion.    Neurological: She is alert and oriented to person, place, and time. She displays normal reflexes. No cranial nerve deficit or sensory deficit. She exhibits normal muscle tone. Coordination normal.   Skin: Skin is warm and dry. Capillary refill takes less than 2 seconds. She is not diaphoretic.   Bruising on dorsal side of right hand, patient noted improvement   Psychiatric: She has a normal mood and affect. Her behavior is normal. Judgment and thought content normal.       Significant Labs:   CBC:   Recent Labs   Lab 02/25/20  0210 02/26/20  0651   WBC 11.17 8.94  8.92   HGB 11.4* 12.7  12.8   HCT 36.4* 40.2  41.1   PLT 17* 20*  20*   , CMP:   Recent Labs   Lab 02/24/20  1714 02/25/20 0210 02/26/20  0651   NA  --  136 140   K 4.7 5.1 4.3   CL  --  95 92*   CO2  --  36* 40*   GLU  --  121* 113*   BUN  --  23 25*   CREATININE  --  0.8 0.8   CALCIUM  --  8.4* 9.0   PROT  --  5.4* 6.0   ALBUMIN  --  2.7* 3.0*   BILITOT  --  0.5 0.8   ALKPHOS  --  49* 48*   AST  --  17 13   ALT  --  33 26   ANIONGAP  --  5* 8   EGFRNONAA  --  >60.0 >60.0   , Coagulation: No results for input(s): PT, INR, APTT in the last 48 hours. and All pertinent labs from the last 24 hours have been reviewed.    Diagnostic Results:  I have reviewed and interpreted all pertinent imaging results/findings within the past 24 hours.    Assessment/Plan:     * (HFpEF) heart failure with preserved ejection fraction  With severe MR, on IV diuresis     Management per primary team    Idiopathic thrombocytopenic purpura (ITP)  86yo woman with PMH of HTN, HFpEF presents with SOB at [facility] found to have Afib with RVR & severe MR and transferred for Cardiology services. Initial platelets of 16 and given PLT transfusion.   Prednisone 60mg a day for about 2 days. Hand bruising improving and patient is without signs of overt mucosal bleeding. PLT  13>17> 20     Assessment: Acute thrombocytopenia due to ITP in setting of this 86 yo patient with no prior  thrombocytopenia and RBC & WBC WNL.     Plan:   -Continue prednisone at 60mg QD (1mg/kg)   -PLT count improving, would monitor another 24hr   -Monitor for any signs of mucosal bleeding   -No IVIG at this time   -Hold off bone marrow biopsy at this time  -Disposition pending platelet improvement to an acceptable level such as 30K without any signs of bleeding  -She states her last CBC would have been 1 year ago. Does not think it was abnormal. Would obtain the last CBC records.     Atrial fibrillation with rapid ventricular response  Found to be in paroxysmal Afib with RVR at outside Hospital.   CHADS2-VASC of 5 (7.2% risk CVA). HAS-BLED 3 (high risk of bleeding).      AC held in setting of acute thrombocytopenia.    Management per primary team      Discussed with Dr. Roberts.      Justina Kumar MD  Hematology/Oncology Consults  [facility]

## 2020-02-26 NOTE — NURSING
[facility] transferred [facility] from [facility] via wheelchair. Pt AAO x 4; VSS. Pt on 3 L high flow NC; sats >94%. Schaeffer remains secure and intact. X 2 PIV secure and intact. Pt ambulating with stand by assist. Upon initial assessment, no evidence of skin breakdown. Old skin cancer site covered with bandage to left cheek. Fall precautions maintained. Call bell within reach; notified to call staff for mobility. WCTM.

## 2020-02-26 NOTE — ASSESSMENT & PLAN NOTE
# Thrombocytopenia   # Macrocytic anemia      DDx includes ITP vs primary bone marrow process like MDS. Please check HIV Ag/Ab, HCV Ab with reflex to HCV RNA, H.pylori IgG and stool Ag, peripheral smear. Please order an abdominal US to screen for splenomegaly.      It is reasonable to treat for ITP for now, please change Prednisone dosing to 60 mg once daily (1 mg/kg). We would recommend IVIG x2 doses, we would like cardiology to weigh in on whether this would feasible since it might provide a considerable amount of volume to the patient.      We will follow her labs through the week and will consider performing a bone marrow biopsy later this week if counts fail to recover. Concern for MDS is more prominent given macrocytosis and normal vitamin B12 and Folate levels. Please check Copper level.      Patient was seen and discussed with attending Dr. Powell.

## 2020-02-26 NOTE — ASSESSMENT & PLAN NOTE
Found to be in paroxysmal Afib with RVR at outside Hospital.   CHADS2-VASC of 5 (7.2% risk CVA). HAS-BLED 3 (high risk of bleeding).      AC held in setting of acute thrombocytopenia.    Management per primary team

## 2020-02-26 NOTE — PLAN OF CARE
Pt remains AAO x 4 with VSS throughout shift  No chief complaint or acute changes at this time  Lasix 40 mg continued for diuresis  Pt reports improved breathing; currently on 1 L high flow NC; sats > 94%  Schaeffer remains secure and intact; I/O documented in flow sheets  NPO @ midnight for abdominal US today 2/26  Pt x 1 assist; up to toilet and ambulating within room  Activity encouraged as tolerable  Tele and visi monitor remain in place; NSR on tele  Fall precautions maintained; call be within reach  Pt remains free from falls and injuries  Will continue to monitor

## 2020-02-26 NOTE — HPI
Patient is a 87 y.o. female with history of mitral regurgitation currently admitted under cardiology service with volume overload. She was transferred from [facility] where she was found to have profound thrombocytopenia with platelet count of 5k. She did not report any bleeding outside of brusing associated with blood draws. Interestingly she received platelet transfusion at [facility] with appropriate response. She tells me her niece has a CLAUDINE-2 mutation associated MPN.

## 2020-02-26 NOTE — SUBJECTIVE & OBJECTIVE
Oncology Treatment Plan:   [No treatment plan]    Medications:  Continuous Infusions:  Scheduled Meds:   amLODIPine  2.5 mg Oral Daily    furosemide (LASIX) IV  40 mg Intravenous Daily    metoprolol succinate  50 mg Oral BID    pantoprazole  40 mg Oral Daily    predniSONE  60 mg Oral Daily     PRN Meds:simethicone     Review of patient's allergies indicates:  No Known Allergies     Past Medical History:   Diagnosis Date    Cancer     skin cancer    Hypertension      History reviewed. No pertinent surgical history.  Family History     None        Tobacco Use    Smoking status: Never Smoker   Substance and Sexual Activity    Alcohol use: Never     Frequency: Never    Drug use: Never    Sexual activity: Not Currently       Review of Systems   Constitutional: Negative for activity change, chills, fatigue, fever and unexpected weight change.   HENT: Negative for congestion.    Eyes: Negative for visual disturbance.   Respiratory: Negative for cough, choking, chest tightness, shortness of breath, wheezing and stridor.    Cardiovascular: Negative for chest pain, palpitations and leg swelling.   Gastrointestinal: Negative for abdominal distention, abdominal pain, blood in stool, constipation, diarrhea, nausea and vomiting.   Endocrine: Negative for polyuria.   Genitourinary: Negative for difficulty urinating, dysuria, flank pain, frequency, urgency and vaginal discharge.   Musculoskeletal: Negative for arthralgias and gait problem.   Neurological: Negative for dizziness, tremors, seizures, facial asymmetry, weakness, numbness and headaches.   Hematological: Does not bruise/bleed easily.   Psychiatric/Behavioral: Negative for agitation, behavioral problems, confusion and decreased concentration.     Objective:     Vital Signs (Most Recent):  Temp: 97.8 °F (36.6 °C) (02/26/20 1126)  Pulse: 87 (02/26/20 1355)  Resp: (!) 22 (02/26/20 1355)  BP: (!) 120/59 (02/26/20 1355)  SpO2: (!) 93 % (02/26/20 1355) Vital Signs  (24h Range):  Temp:  [97.8 °F (36.6 °C)-98.3 °F (36.8 °C)] 97.8 °F (36.6 °C)  Pulse:  [73-93] 87  Resp:  [11-26] 22  SpO2:  [78 %-97 %] 93 %  BP: (120-172)/(59-88) 120/59     Weight: 54.5 kg (120 lb 2.4 oz)  Body mass index is 19.99 kg/m².  Body surface area is 1.58 meters squared.      Intake/Output Summary (Last 24 hours) at 2/26/2020 1610  Last data filed at 2/26/2020 1600  Gross per 24 hour   Intake 1020 ml   Output 2150 ml   Net -1130 ml       Physical Exam   Constitutional: She is oriented to person, place, and time. She appears well-developed and well-nourished. No distress.   HENT:   Head: Normocephalic and atraumatic.   Eyes: Pupils are equal, round, and reactive to light. EOM are normal.   Neck: Normal range of motion. Neck supple. No JVD present. No thyromegaly present.   Cardiovascular: Normal rate, regular rhythm and intact distal pulses. Exam reveals no gallop and no friction rub.   Murmur heard.  Pulmonary/Chest: Effort normal and breath sounds normal. No stridor. No respiratory distress. She has no wheezes. She has no rales. She exhibits no tenderness.   Abdominal: Soft. Bowel sounds are normal. She exhibits no distension. There is no tenderness. There is no guarding.   Musculoskeletal: Normal range of motion.   Neurological: She is alert and oriented to person, place, and time. She displays normal reflexes. No cranial nerve deficit or sensory deficit. She exhibits normal muscle tone. Coordination normal.   Skin: Skin is warm and dry. Capillary refill takes less than 2 seconds. She is not diaphoretic.   Bruising on dorsal side of right hand, patient noted improvement   Psychiatric: She has a normal mood and affect. Her behavior is normal. Judgment and thought content normal.       Significant Labs:   CBC:   Recent Labs   Lab 02/25/20  0210 02/26/20  0651   WBC 11.17 8.94  8.92   HGB 11.4* 12.7  12.8   HCT 36.4* 40.2  41.1   PLT 17* 20*  20*   , CMP:   Recent Labs   Lab 02/24/20  3770  02/25/20  0210 02/26/20  0651   NA  --  136 140   K 4.7 5.1 4.3   CL  --  95 92*   CO2  --  36* 40*   GLU  --  121* 113*   BUN  --  23 25*   CREATININE  --  0.8 0.8   CALCIUM  --  8.4* 9.0   PROT  --  5.4* 6.0   ALBUMIN  --  2.7* 3.0*   BILITOT  --  0.5 0.8   ALKPHOS  --  49* 48*   AST  --  17 13   ALT  --  33 26   ANIONGAP  --  5* 8   EGFRNONAA  --  >60.0 >60.0   , Coagulation: No results for input(s): PT, INR, APTT in the last 48 hours. and All pertinent labs from the last 24 hours have been reviewed.    Diagnostic Results:  I have reviewed and interpreted all pertinent imaging results/findings within the past 24 hours.

## 2020-02-27 PROBLEM — N30.00 ACUTE CYSTITIS: Status: ACTIVE | Noted: 2020-02-27

## 2020-02-27 PROBLEM — E87.3 METABOLIC ALKALOSIS: Status: ACTIVE | Noted: 2020-02-27

## 2020-02-27 LAB
ALBUMIN SERPL BCP-MCNC: 2.8 G/DL (ref 3.5–5.2)
ALLENS TEST: ABNORMAL
ALP SERPL-CCNC: 47 U/L (ref 55–135)
ALT SERPL W/O P-5'-P-CCNC: 24 U/L (ref 10–44)
ANION GAP SERPL CALC-SCNC: 6 MMOL/L (ref 8–16)
ANISOCYTOSIS BLD QL SMEAR: SLIGHT
AST SERPL-CCNC: 13 U/L (ref 10–40)
BACTERIA #/AREA URNS AUTO: ABNORMAL /HPF
BASOPHILS # BLD AUTO: 0.01 K/UL (ref 0–0.2)
BASOPHILS NFR BLD: 0.1 % (ref 0–1.9)
BILIRUB SERPL-MCNC: 0.8 MG/DL (ref 0.1–1)
BILIRUB UR QL STRIP: NEGATIVE
BUN SERPL-MCNC: 33 MG/DL (ref 8–23)
CALCIUM SERPL-MCNC: 8.9 MG/DL (ref 8.7–10.5)
CHLORIDE SERPL-SCNC: 91 MMOL/L (ref 95–110)
CLARITY UR REFRACT.AUTO: ABNORMAL
CO2 SERPL-SCNC: 44 MMOL/L (ref 23–29)
COLOR UR AUTO: YELLOW
COPPER SERPL-MCNC: 1231 UG/L (ref 810–1990)
CREAT SERPL-MCNC: 0.9 MG/DL (ref 0.5–1.4)
DELSYS: ABNORMAL
DIFFERENTIAL METHOD: ABNORMAL
EOSINOPHIL # BLD AUTO: 0 K/UL (ref 0–0.5)
EOSINOPHIL NFR BLD: 0.5 % (ref 0–8)
ERYTHROCYTE [DISTWIDTH] IN BLOOD BY AUTOMATED COUNT: 12.2 % (ref 11.5–14.5)
ERYTHROCYTE [SEDIMENTATION RATE] IN BLOOD BY WESTERGREN METHOD: 16 MM/H
EST. GFR  (AFRICAN AMERICAN): >60 ML/MIN/1.73 M^2
EST. GFR  (NON AFRICAN AMERICAN): 57.7 ML/MIN/1.73 M^2
FIO2: 24
FLOW: 1
GLUCOSE SERPL-MCNC: 93 MG/DL (ref 70–110)
GLUCOSE UR QL STRIP: NEGATIVE
HCO3 UR-SCNC: 47.3 MMOL/L (ref 24–28)
HCT VFR BLD AUTO: 40 % (ref 37–48.5)
HGB BLD-MCNC: 12.4 G/DL (ref 12–16)
HGB UR QL STRIP: ABNORMAL
HYALINE CASTS UR QL AUTO: 2 /LPF
HYPOCHROMIA BLD QL SMEAR: ABNORMAL
IMM GRANULOCYTES # BLD AUTO: 0.04 K/UL (ref 0–0.04)
IMM GRANULOCYTES NFR BLD AUTO: 0.5 % (ref 0–0.5)
KETONES UR QL STRIP: NEGATIVE
LEUKOCYTE ESTERASE UR QL STRIP: ABNORMAL
LYMPHOCYTES # BLD AUTO: 1.3 K/UL (ref 1–4.8)
LYMPHOCYTES NFR BLD: 14.9 % (ref 18–48)
MAGNESIUM SERPL-MCNC: 1.8 MG/DL (ref 1.6–2.6)
MCH RBC QN AUTO: 32.2 PG (ref 27–31)
MCHC RBC AUTO-ENTMCNC: 31 G/DL (ref 32–36)
MCV RBC AUTO: 104 FL (ref 82–98)
MICROSCOPIC COMMENT: ABNORMAL
MODE: ABNORMAL
MONOCYTES # BLD AUTO: 1 K/UL (ref 0.3–1)
MONOCYTES NFR BLD: 12.1 % (ref 4–15)
NEUTROPHILS # BLD AUTO: 6.1 K/UL (ref 1.8–7.7)
NEUTROPHILS NFR BLD: 71.9 % (ref 38–73)
NITRITE UR QL STRIP: POSITIVE
NRBC BLD-RTO: 0 /100 WBC
OVALOCYTES BLD QL SMEAR: ABNORMAL
PCO2 BLDA: 60.1 MMHG (ref 35–45)
PH SMN: 7.5 [PH] (ref 7.35–7.45)
PH UR STRIP: >8 [PH] (ref 5–8)
PHOSPHATE SERPL-MCNC: 3.3 MG/DL (ref 2.7–4.5)
PLATELET # BLD AUTO: 19 K/UL (ref 150–350)
PMV BLD AUTO: 11.9 FL (ref 9.2–12.9)
PO2 BLDA: 72 MMHG (ref 80–100)
POC BE: 24 MMOL/L
POC SATURATED O2: 95 % (ref 95–100)
POC TCO2: 49 MMOL/L (ref 23–27)
POIKILOCYTOSIS BLD QL SMEAR: SLIGHT
POLYCHROMASIA BLD QL SMEAR: ABNORMAL
POTASSIUM SERPL-SCNC: 3.9 MMOL/L (ref 3.5–5.1)
PROT SERPL-MCNC: 5.8 G/DL (ref 6–8.4)
PROT UR QL STRIP: ABNORMAL
RBC # BLD AUTO: 3.85 M/UL (ref 4–5.4)
RBC #/AREA URNS AUTO: 1 /HPF (ref 0–4)
SAMPLE: ABNORMAL
SITE: ABNORMAL
SODIUM SERPL-SCNC: 141 MMOL/L (ref 136–145)
SP GR UR STRIP: 1.02 (ref 1–1.03)
SP02: 98
SQUAMOUS #/AREA URNS AUTO: 1 /HPF
URN SPEC COLLECT METH UR: ABNORMAL
WBC # BLD AUTO: 8.48 K/UL (ref 3.9–12.7)
WBC #/AREA URNS AUTO: 15 /HPF (ref 0–5)

## 2020-02-27 PROCEDURE — 87186 SC STD MICRODIL/AGAR DIL: CPT

## 2020-02-27 PROCEDURE — 99233 PR SUBSEQUENT HOSPITAL CARE,LEVL III: ICD-10-PCS | Mod: ,,, | Performed by: STUDENT IN AN ORGANIZED HEALTH CARE EDUCATION/TRAINING PROGRAM

## 2020-02-27 PROCEDURE — 20600001 HC STEP DOWN PRIVATE ROOM

## 2020-02-27 PROCEDURE — 63600175 PHARM REV CODE 636 W HCPCS: Performed by: STUDENT IN AN ORGANIZED HEALTH CARE EDUCATION/TRAINING PROGRAM

## 2020-02-27 PROCEDURE — 87086 URINE CULTURE/COLONY COUNT: CPT

## 2020-02-27 PROCEDURE — 81001 URINALYSIS AUTO W/SCOPE: CPT

## 2020-02-27 PROCEDURE — 99231 SBSQ HOSP IP/OBS SF/LOW 25: CPT | Mod: GC,,, | Performed by: INTERNAL MEDICINE

## 2020-02-27 PROCEDURE — 82803 BLOOD GASES ANY COMBINATION: CPT

## 2020-02-27 PROCEDURE — 36415 COLL VENOUS BLD VENIPUNCTURE: CPT

## 2020-02-27 PROCEDURE — 94761 N-INVAS EAR/PLS OXIMETRY MLT: CPT

## 2020-02-27 PROCEDURE — 87077 CULTURE AEROBIC IDENTIFY: CPT

## 2020-02-27 PROCEDURE — 87088 URINE BACTERIA CULTURE: CPT

## 2020-02-27 PROCEDURE — 84100 ASSAY OF PHOSPHORUS: CPT

## 2020-02-27 PROCEDURE — 80053 COMPREHEN METABOLIC PANEL: CPT

## 2020-02-27 PROCEDURE — 85025 COMPLETE CBC W/AUTO DIFF WBC: CPT

## 2020-02-27 PROCEDURE — 99900035 HC TECH TIME PER 15 MIN (STAT)

## 2020-02-27 PROCEDURE — 63600175 PHARM REV CODE 636 W HCPCS

## 2020-02-27 PROCEDURE — 36600 WITHDRAWAL OF ARTERIAL BLOOD: CPT

## 2020-02-27 PROCEDURE — 83735 ASSAY OF MAGNESIUM: CPT

## 2020-02-27 PROCEDURE — 25000003 PHARM REV CODE 250: Performed by: STUDENT IN AN ORGANIZED HEALTH CARE EDUCATION/TRAINING PROGRAM

## 2020-02-27 PROCEDURE — 99233 SBSQ HOSP IP/OBS HIGH 50: CPT | Mod: ,,, | Performed by: STUDENT IN AN ORGANIZED HEALTH CARE EDUCATION/TRAINING PROGRAM

## 2020-02-27 PROCEDURE — 99231 PR SUBSEQUENT HOSPITAL CARE,LEVL I: ICD-10-PCS | Mod: GC,,, | Performed by: INTERNAL MEDICINE

## 2020-02-27 RX ORDER — ACETAZOLAMIDE 500 MG/5ML
500 INJECTION, POWDER, LYOPHILIZED, FOR SOLUTION INTRAVENOUS ONCE
Status: COMPLETED | OUTPATIENT
Start: 2020-02-27 | End: 2020-02-27

## 2020-02-27 RX ORDER — DIPHENHYDRAMINE HYDROCHLORIDE 50 MG/ML
25 INJECTION INTRAMUSCULAR; INTRAVENOUS
Status: DISPENSED | OUTPATIENT
Start: 2020-02-27 | End: 2020-03-03

## 2020-02-27 RX ORDER — CEFTRIAXONE 1 G/1
1 INJECTION, POWDER, FOR SOLUTION INTRAMUSCULAR; INTRAVENOUS
Status: DISCONTINUED | OUTPATIENT
Start: 2020-02-27 | End: 2020-03-02

## 2020-02-27 RX ADMIN — ACETAMINOPHEN 650 MG: 325 TABLET ORAL at 08:02

## 2020-02-27 RX ADMIN — PREDNISONE 60 MG: 10 TABLET ORAL at 08:02

## 2020-02-27 RX ADMIN — ACETAZOLAMIDE 500 MG: 500 INJECTION, POWDER, LYOPHILIZED, FOR SOLUTION INTRAVENOUS at 03:02

## 2020-02-27 RX ADMIN — METOPROLOL SUCCINATE 50 MG: 25 TABLET, EXTENDED RELEASE ORAL at 08:02

## 2020-02-27 RX ADMIN — ACETAMINOPHEN 650 MG: 325 TABLET ORAL at 03:02

## 2020-02-27 RX ADMIN — HUMAN IMMUNOGLOBULIN G 20 G: 20 LIQUID INTRAVENOUS at 07:02

## 2020-02-27 RX ADMIN — DIPHENHYDRAMINE HYDROCHLORIDE 12.5 MG: 50 INJECTION, SOLUTION INTRAMUSCULAR; INTRAVENOUS at 07:02

## 2020-02-27 RX ADMIN — PANTOPRAZOLE SODIUM 40 MG: 40 TABLET, DELAYED RELEASE ORAL at 08:02

## 2020-02-27 RX ADMIN — CEFTRIAXONE SODIUM 1 G: 1 INJECTION, POWDER, FOR SOLUTION INTRAMUSCULAR; INTRAVENOUS at 02:02

## 2020-02-27 RX ADMIN — AMLODIPINE BESYLATE 2.5 MG: 2.5 TABLET ORAL at 08:02

## 2020-02-27 NOTE — PLAN OF CARE
Pt. AAO x 4, WBC 8.48 afebrile, Tele NSR Hr 70's-80's, family at the bedside for part of the day- interacting with the patient and participating in care.  Critical Plt 19K - known abnormal-- started IGG x 5 today and plans for possible Bone biopsy on Monday  Critical CO2 44-- Lasix dc'd, ABG collected, and Diamox given  Pain when urinating, foul odor to urine, and bilateral flank pain-- urinalysis and urine culture sent-- started Rocephin Daily  Patient ambulated to [facility] with assistance  Encouraged patient to get up out of bed and in the chair and she stated she would later  Safety precautions maintained throughout the shift, call light within reach, and nonslip socks when out of bed.

## 2020-02-27 NOTE — ASSESSMENT & PLAN NOTE
With severe MR, on IV diuresis   Will need to keep in mind with fluid while administering IVIG over the next couple of days    Management per primary team

## 2020-02-27 NOTE — PROGRESS NOTES
[facility]  Hematology/Oncology  Progress Note    Patient Name: Fiona Arteaga  Admission Date: 2/24/2020  Hospital Length of Stay: 3 days  Code Status: Prior     Subjective:     HPI:  Patient is a 87 y.o. female with history of mitral regurgitation currently admitted under cardiology service with volume overload. She was transferred from [facility] where she was found to have profound thrombocytopenia with platelet count of 5k. She did not report any bleeding outside of brusing associated with blood draws. Interestingly she received platelet transfusion at [facility] with appropriate response. She tells me her niece has a CLAUDINE-2 mutation associated MPN.      Interval History: No improvement noted in platelets overnight, now on day 3. CBC otherwise stable as well as clinical course.     Oncology Treatment Plan:   [No treatment plan]    Medications:  Continuous Infusions:  Scheduled Meds:   amLODIPine  2.5 mg Oral Daily    cefTRIAXone (ROCEPHIN) IVPB  1 g Intravenous Q24H    diphenhydrAMINE  25 mg Intravenous Q24H    Immune Globulin G (IGG)-PRO-IGA 10 % injection (Privigen)  400 mg/kg/day (Dosing Weight) Intravenous Q24H    metoprolol succinate  50 mg Oral BID    pantoprazole  40 mg Oral Daily    predniSONE  60 mg Oral Daily     PRN Meds:ondansetron, polyethylene glycol, simethicone     Review of Systems   Constitutional: Negative for activity change and unexpected weight change.   Eyes: Negative for photophobia and redness.   Respiratory: Positive for shortness of breath. Negative for cough and wheezing.    Gastrointestinal: Negative for abdominal distention and abdominal pain.   Musculoskeletal: Positive for arthralgias and back pain.   Allergic/Immunologic: Negative for immunocompromised state.   Neurological: Negative for dizziness, weakness and light-headedness.   Hematological: Negative for adenopathy. Bruises/bleeds easily.   Psychiatric/Behavioral: Negative for  agitation, behavioral problems and confusion.     Objective:     Vital Signs (Most Recent):  Temp: 98.5 °F (36.9 °C) (02/27/20 1118)  Pulse: 78 (02/27/20 1416)  Resp: (!) 21 (02/27/20 1416)  BP: (!) 146/76 (02/27/20 1416)  SpO2: (!) 89 % (02/27/20 1416) Vital Signs (24h Range):  Temp:  [97.5 °F (36.4 °C)-98.9 °F (37.2 °C)] 98.5 °F (36.9 °C)  Pulse:  [67-88] 78  Resp:  [16-23] 21  SpO2:  [89 %-98 %] 89 %  BP: (106-154)/(59-80) 146/76     Weight: 57.1 kg (125 lb 14.1 oz)  Body mass index is 20.95 kg/m².  Body surface area is 1.62 meters squared.      Intake/Output Summary (Last 24 hours) at 2/27/2020 1528  Last data filed at 2/27/2020 1400  Gross per 24 hour   Intake 2040 ml   Output 1450 ml   Net 590 ml       Physical Exam   Constitutional: She is oriented to person, place, and time.   Elderly female, laying in bed, NAD   HENT:   Head: Normocephalic and atraumatic.   Eyes: Right eye exhibits no discharge. Left eye exhibits no discharge. No scleral icterus.   Neck:   Thin anatomy   Cardiovascular: Normal rate.   Murmur heard.  Pulmonary/Chest: Effort normal and breath sounds normal.   Abdominal: Soft. Bowel sounds are normal.   Musculoskeletal: Normal range of motion. She exhibits no edema.   Lymphadenopathy:     She has no cervical adenopathy.   Neurological: She is oriented to person, place, and time.   Skin: No rash noted. No pallor.   Psychiatric: She has a normal mood and affect. Her behavior is normal.   Vitals reviewed.      Significant Labs:   CBC:   Recent Labs   Lab 02/26/20  0651 02/27/20  0701   WBC 8.94  8.92 8.48   HGB 12.7  12.8 12.4   HCT 40.2  41.1 40.0   PLT 20*  20* 19*       Diagnostic Results:  I have reviewed all pertinent imaging results/findings within the past 24 hours.    Assessment/Plan:     * Idiopathic thrombocytopenic purpura (ITP)  New onset ITP in 87 female that appears to be pure thrombocytopenia and moderate. Plt stable today, on day 3 of full therapy. Discussion with patient at  bedside today regarding need for increase in therapy and prolongation of her hospital stay. She understands.     Plan  -continue with prednisone 1mg/kg  -will add IVIG 400 mg daily x5 days   -will need to be mindful of tenuous cardiac standpoint and may require further diuresis  -daily CBC   -if thrombocytopenia does not improve over weekend, will recommend bone marrow biopsy Monday  -will continue to follow    (HFpEF) heart failure with preserved ejection fraction  With severe MR, on IV diuresis   Will need to keep in mind with fluid while administering IVIG over the next couple of days    Management per primary team    Atrial fibrillation with rapid ventricular response  Found to be in paroxysmal Afib with RVR at outside Hospital.   CHADS2-VASC of 5 (7.2% risk CVA). HAS-BLED 3 (high risk of bleeding).      AC held in setting of acute thrombocytopenia.    Management per primary team    Discussed with Dr. Roberts.     Chuck Carroll MD  Hematology/Oncology  [facility]

## 2020-02-27 NOTE — SUBJECTIVE & OBJECTIVE
Interval History: No improvement noted in platelets overnight, now on day 3. CBC otherwise stable as well as clinical course.     Oncology Treatment Plan:   [No treatment plan]    Medications:  Continuous Infusions:  Scheduled Meds:   amLODIPine  2.5 mg Oral Daily    cefTRIAXone (ROCEPHIN) IVPB  1 g Intravenous Q24H    diphenhydrAMINE  25 mg Intravenous Q24H    Immune Globulin G (IGG)-PRO-IGA 10 % injection (Privigen)  400 mg/kg/day (Dosing Weight) Intravenous Q24H    metoprolol succinate  50 mg Oral BID    pantoprazole  40 mg Oral Daily    predniSONE  60 mg Oral Daily     PRN Meds:ondansetron, polyethylene glycol, simethicone     Review of Systems   Constitutional: Negative for activity change and unexpected weight change.   Eyes: Negative for photophobia and redness.   Respiratory: Positive for shortness of breath. Negative for cough and wheezing.    Gastrointestinal: Negative for abdominal distention and abdominal pain.   Musculoskeletal: Positive for arthralgias and back pain.   Allergic/Immunologic: Negative for immunocompromised state.   Neurological: Negative for dizziness, weakness and light-headedness.   Hematological: Negative for adenopathy. Bruises/bleeds easily.   Psychiatric/Behavioral: Negative for agitation, behavioral problems and confusion.     Objective:     Vital Signs (Most Recent):  Temp: 98.5 °F (36.9 °C) (02/27/20 1118)  Pulse: 78 (02/27/20 1416)  Resp: (!) 21 (02/27/20 1416)  BP: (!) 146/76 (02/27/20 1416)  SpO2: (!) 89 % (02/27/20 1416) Vital Signs (24h Range):  Temp:  [97.5 °F (36.4 °C)-98.9 °F (37.2 °C)] 98.5 °F (36.9 °C)  Pulse:  [67-88] 78  Resp:  [16-23] 21  SpO2:  [89 %-98 %] 89 %  BP: (106-154)/(59-80) 146/76     Weight: 57.1 kg (125 lb 14.1 oz)  Body mass index is 20.95 kg/m².  Body surface area is 1.62 meters squared.      Intake/Output Summary (Last 24 hours) at 2/27/2020 1528  Last data filed at 2/27/2020 1400  Gross per 24 hour   Intake 2040 ml   Output 1450 ml   Net 590  ml       Physical Exam   Constitutional: She is oriented to person, place, and time.   Elderly female, laying in bed, NAD   HENT:   Head: Normocephalic and atraumatic.   Eyes: Right eye exhibits no discharge. Left eye exhibits no discharge. No scleral icterus.   Neck:   Thin anatomy   Cardiovascular: Normal rate.   Murmur heard.  Pulmonary/Chest: Effort normal and breath sounds normal.   Abdominal: Soft. Bowel sounds are normal.   Musculoskeletal: Normal range of motion. She exhibits no edema.   Lymphadenopathy:     She has no cervical adenopathy.   Neurological: She is oriented to person, place, and time.   Skin: No rash noted. No pallor.   Psychiatric: She has a normal mood and affect. Her behavior is normal.   Vitals reviewed.      Significant Labs:   CBC:   Recent Labs   Lab 02/26/20  0651 02/27/20  0701   WBC 8.94  8.92 8.48   HGB 12.7  12.8 12.4   HCT 40.2  41.1 40.0   PLT 20*  20* 19*       Diagnostic Results:  I have reviewed all pertinent imaging results/findings within the past 24 hours.

## 2020-02-27 NOTE — ASSESSMENT & PLAN NOTE
New onset ITP in 87 female that appears to be pure thrombocytopenia and moderate. Plt stable today, on day 3 of full therapy. Discussion with patient at bedside today regarding need for increase in therapy and prolongation of her hospital stay. She understands.     Plan  -continue with prednisone 1mg/kg  -will add IVIG 400 mg daily x5 days   -will need to be mindful of tenuous cardiac standpoint and may require further diuresis  -daily CBC   -if thrombocytopenia does not improve over weekend, will recommend bone marrow biopsy Monday  -will continue to follow

## 2020-02-27 NOTE — PROGRESS NOTES
Hospital Medicine  Progress Note      Patient Name: Fiona Arteaga  MRN:  [id number]  Hospital Medicine Team: [facility] HOSP MED R Jeremias Kenny MD  Date of Admission:  2/24/2020     Length of Stay:  LOS: 3 days   Expected Discharge Date: 2/28/2020  Principal Problem:  (HFpEF) heart failure with preserved ejection fraction      Subjective:    Interval History/Overnight Events:  Patient with worsening metabolic alkalosis 2/2 diuresis today, lasix held and acetazolamide given. Patient also complaint of flank pain and odor to urine, CT renal stone study with mild hydronephrosis, U/A consistent with acute cystitis and ceftriaxone started.       Review of Systems:  Constitutional: Negative for chills, fatigue, fever.   Respiratory: Negative for cough, shortness of breath.    Cardiovascular: Negative for chest pain, palpitations, leg swelling.   Gastrointestinal: Negative for abdominal pain, nausea, vomiting, diarrhea, constipation.   Genitourinary: +dysuria, +flank pain  All other systems reviewed and are negative.      Objective:    Physical Exam:  Temp:  [97.5 °F (36.4 °C)-98.9 °F (37.2 °C)]   Pulse:  [67-88]   Resp:  [16-23]   BP: (106-154)/(59-80)   SpO2:  [91 %-98 %]     Constitutional: Appears comfortable, in no acute distress   Eyes: No scleral icterus or eye discharge  Cardiovascular: Normal heart rate.  Regular heart rhythm.  +murmur.  Pulmonary/Chest: Effort normal and breath sounds normal. No respiratory distress. No wheezes, rales, or rhonchi  Abdominal: Soft. Bowel sounds are normal.  No distension.  No tenderness  Musculoskeletal: No deformities.  No edema.   Neurological: Alert.  Oriented to person, place, and time.   Skin: Skin is warm and dry.       Assessment and Plan:  Patient transferred for cardiology evaluation for surgical intervention given severe mitral regurgitation, diuresed well and deemed stable for stepdown to hospital medicine. On 2/27/20 patient with worsening metabolic alkalosis 2/2  diuresis, lasix held and acetazolamide given. Patient also complaint of flank pain and odor to urine, CT renal stone study with mild hydronephrosis, U/A consistent with acute cystitis and ceftriaxone started. Hematology Oncology following for thrombocytopenia, counts slowly improving with prednisone.     * (HFpEF) heart failure with preserved ejection fraction  Ms. Arteaga is an 87yF who was sent from [facility] for further level of care. Yesterday AM developed acute SOB. Also reports increased LE swelling and mild exertional dyspnea past 1 week. Denies chest pain. Went to ED and found to be in Afib with RVR. She received 500 mcg of digoxin x2, IV furosemide 40 mg, hydralazine 50 mg, IV labetalol and nitroglycerin patch. Sent to the ICU there with Bipap for intermittent respiratory failure. Bilateral lower extremity ultrasound was negative for DVT. CTA chest was negative for pulmonary embolus but was consistent with pulmonary edema and pleural effusion. BNP elevated. Echocardiogram revealed severe mitral regurgitation with normal LVEF.     · Concentric left ventricular remodeling.  · Normal left ventricular systolic function. The estimated ejection fraction is 65%.  · Indeterminate left ventricular diastolic function.  · No wall motion abnormalities.  · Normal right ventricular systolic function.  · Mild left atrial enlargement.  · There is mild leaflet calcification of the Mitral Valve.  · Mild mitral sclerosis.  · Moderate-to-severe mitral regurgitation.  · Mild tricuspid regurgitation.  · Intermediate central venous pressure (8 mmHg).  · The estimated PA systolic pressure is 29 mmHg.     -Metoprolol 12.5 QID changed to Toprol XL 50 mg BID for rate control for Afib  -BP control with Amlodipine 2.5 mg daily  -Ins/Outs, daily weights  -Will hold lasix 2/27/20 as patient with worsening metabolic contraction alkalosis     Metabolic Alkalosis  - Patient with worsening metabolic alkalosis 2/2 diuresis  today, lasix held and acetazolamide given.   - will monitor on daily labs and resume lasix as appropriate    Acute cystitis  - complaint of flank pain and odor to urine, CT renal stone study with mild hydronephrosis  - U/A consistent with acute cystitis and ceftriaxone started.  - will follow urine culture     MR (mitral regurgitation)  Pt with moderate-severe MR. Likely functional MR. Also has HFpEF and Afib with RVR.      - According to Dr. Fernandez, pt has no indications for any valvular intervention (Papa lr)  - Continue diuresis, BP control, Afib control         Benign essential hypertension  Started on home Amlodipine 2.5 mg daily, can up titrate as needed for BP control           Atrial fibrillation with rapid ventricular response  Found to be in paroxysmal Afib with RVR at outside Hospital.   CHADS2-VASC of 5 (7.2% risk CVA). HAS-BLED 3 (high risk of bleeding).      - Will hold on AG currently in setting of thrombocytopenia with platelets at 13-->17 on admission  - Rate control with Metoprolol 12.5 QID-->Metoprolol XL 50 mg BID  - Now in sinus rhythm with PVCs  - TSH wnl      Thrombocytopenia  [facility] and found to have thrombocytopenia. Per pt, states she was never told she has this before.   Hematology followed at outside hospital, work-up pending. Started on prednisone 20 mg QID and s/p platelet transfusion prior to transfer. Per their notes, consider IVIG if needed. B12 elevated on outside labs. Denies alcohol use. LFTs wnl.      - Hematology consulted here on admit, considering IVIG, can give extra dose lasix if large volume needed to be given with IVIG  - Bruising noted on arms, no signs active bleeding currently   - Will monitor with daily CBCs  - Platelets improving  - continue prednisone 1 mg/kg  - workup in process      Jeremias Kenny MD  [facility] Medicine  Spectra:  [id number]  Pager:  [phone]

## 2020-02-28 PROBLEM — K59.00 CONSTIPATION: Status: ACTIVE | Noted: 2020-02-28

## 2020-02-28 LAB
ALBUMIN SERPL BCP-MCNC: 2.8 G/DL (ref 3.5–5.2)
ALP SERPL-CCNC: 47 U/L (ref 55–135)
ALT SERPL W/O P-5'-P-CCNC: 27 U/L (ref 10–44)
ANION GAP SERPL CALC-SCNC: 6 MMOL/L (ref 8–16)
ANISOCYTOSIS BLD QL SMEAR: SLIGHT
AST SERPL-CCNC: 12 U/L (ref 10–40)
BASOPHILS # BLD AUTO: 0.01 K/UL (ref 0–0.2)
BASOPHILS NFR BLD: 0.1 % (ref 0–1.9)
BILIRUB SERPL-MCNC: 0.7 MG/DL (ref 0.1–1)
BUN SERPL-MCNC: 31 MG/DL (ref 8–23)
CALCIUM SERPL-MCNC: 8.9 MG/DL (ref 8.7–10.5)
CHLORIDE SERPL-SCNC: 97 MMOL/L (ref 95–110)
CO2 SERPL-SCNC: 36 MMOL/L (ref 23–29)
CREAT SERPL-MCNC: 1 MG/DL (ref 0.5–1.4)
DIFFERENTIAL METHOD: ABNORMAL
EOSINOPHIL # BLD AUTO: 0.1 K/UL (ref 0–0.5)
EOSINOPHIL NFR BLD: 1.1 % (ref 0–8)
ERYTHROCYTE [DISTWIDTH] IN BLOOD BY AUTOMATED COUNT: 12.3 % (ref 11.5–14.5)
EST. GFR  (AFRICAN AMERICAN): 58.5 ML/MIN/1.73 M^2
EST. GFR  (NON AFRICAN AMERICAN): 50.8 ML/MIN/1.73 M^2
GLUCOSE SERPL-MCNC: 88 MG/DL (ref 70–110)
HCT VFR BLD AUTO: 41.2 % (ref 37–48.5)
HGB BLD-MCNC: 12.7 G/DL (ref 12–16)
HYPOCHROMIA BLD QL SMEAR: ABNORMAL
IMM GRANULOCYTES # BLD AUTO: 0.05 K/UL (ref 0–0.04)
IMM GRANULOCYTES NFR BLD AUTO: 0.6 % (ref 0–0.5)
LYMPHOCYTES # BLD AUTO: 1.3 K/UL (ref 1–4.8)
LYMPHOCYTES NFR BLD: 15.8 % (ref 18–48)
MAGNESIUM SERPL-MCNC: 1.9 MG/DL (ref 1.6–2.6)
MCH RBC QN AUTO: 31.8 PG (ref 27–31)
MCHC RBC AUTO-ENTMCNC: 30.8 G/DL (ref 32–36)
MCV RBC AUTO: 103 FL (ref 82–98)
MONOCYTES # BLD AUTO: 1 K/UL (ref 0.3–1)
MONOCYTES NFR BLD: 12.1 % (ref 4–15)
NEUTROPHILS # BLD AUTO: 5.9 K/UL (ref 1.8–7.7)
NEUTROPHILS NFR BLD: 70.3 % (ref 38–73)
NRBC BLD-RTO: 0 /100 WBC
OVALOCYTES BLD QL SMEAR: ABNORMAL
PHOSPHATE SERPL-MCNC: 4 MG/DL (ref 2.7–4.5)
PLATELET # BLD AUTO: 25 K/UL (ref 150–350)
PMV BLD AUTO: ABNORMAL FL (ref 9.2–12.9)
POIKILOCYTOSIS BLD QL SMEAR: SLIGHT
POLYCHROMASIA BLD QL SMEAR: ABNORMAL
POTASSIUM SERPL-SCNC: 3.9 MMOL/L (ref 3.5–5.1)
PROT SERPL-MCNC: 6.6 G/DL (ref 6–8.4)
RBC # BLD AUTO: 4 M/UL (ref 4–5.4)
SODIUM SERPL-SCNC: 139 MMOL/L (ref 136–145)
WBC # BLD AUTO: 8.44 K/UL (ref 3.9–12.7)

## 2020-02-28 PROCEDURE — 83735 ASSAY OF MAGNESIUM: CPT

## 2020-02-28 PROCEDURE — 99232 SBSQ HOSP IP/OBS MODERATE 35: CPT | Mod: ,,, | Performed by: STUDENT IN AN ORGANIZED HEALTH CARE EDUCATION/TRAINING PROGRAM

## 2020-02-28 PROCEDURE — 20600001 HC STEP DOWN PRIVATE ROOM

## 2020-02-28 PROCEDURE — 99232 PR SUBSEQUENT HOSPITAL CARE,LEVL II: ICD-10-PCS | Mod: ,,, | Performed by: STUDENT IN AN ORGANIZED HEALTH CARE EDUCATION/TRAINING PROGRAM

## 2020-02-28 PROCEDURE — 85025 COMPLETE CBC W/AUTO DIFF WBC: CPT

## 2020-02-28 PROCEDURE — 93010 EKG 12-LEAD: ICD-10-PCS | Mod: ,,, | Performed by: INTERNAL MEDICINE

## 2020-02-28 PROCEDURE — 80053 COMPREHEN METABOLIC PANEL: CPT

## 2020-02-28 PROCEDURE — 84100 ASSAY OF PHOSPHORUS: CPT

## 2020-02-28 PROCEDURE — 25000003 PHARM REV CODE 250: Performed by: STUDENT IN AN ORGANIZED HEALTH CARE EDUCATION/TRAINING PROGRAM

## 2020-02-28 PROCEDURE — 36415 COLL VENOUS BLD VENIPUNCTURE: CPT

## 2020-02-28 PROCEDURE — 87338 HPYLORI STOOL AG IA: CPT

## 2020-02-28 PROCEDURE — 87340 HEPATITIS B SURFACE AG IA: CPT

## 2020-02-28 PROCEDURE — 86706 HEP B SURFACE ANTIBODY: CPT

## 2020-02-28 PROCEDURE — 99233 SBSQ HOSP IP/OBS HIGH 50: CPT | Mod: GC,,, | Performed by: INTERNAL MEDICINE

## 2020-02-28 PROCEDURE — 63600175 PHARM REV CODE 636 W HCPCS: Performed by: STUDENT IN AN ORGANIZED HEALTH CARE EDUCATION/TRAINING PROGRAM

## 2020-02-28 PROCEDURE — 93005 ELECTROCARDIOGRAM TRACING: CPT

## 2020-02-28 PROCEDURE — 86704 HEP B CORE ANTIBODY TOTAL: CPT

## 2020-02-28 PROCEDURE — 25000003 PHARM REV CODE 250: Performed by: PHYSICIAN ASSISTANT

## 2020-02-28 PROCEDURE — 93010 ELECTROCARDIOGRAM REPORT: CPT | Mod: ,,, | Performed by: INTERNAL MEDICINE

## 2020-02-28 PROCEDURE — 99233 PR SUBSEQUENT HOSPITAL CARE,LEVL III: ICD-10-PCS | Mod: GC,,, | Performed by: INTERNAL MEDICINE

## 2020-02-28 PROCEDURE — 63600175 PHARM REV CODE 636 W HCPCS: Mod: JG

## 2020-02-28 RX ORDER — ACETAMINOPHEN 325 MG/1
650 TABLET ORAL EVERY 6 HOURS PRN
Status: DISCONTINUED | OUTPATIENT
Start: 2020-02-28 | End: 2020-03-03 | Stop reason: HOSPADM

## 2020-02-28 RX ORDER — FUROSEMIDE 40 MG/1
40 TABLET ORAL DAILY
Status: DISCONTINUED | OUTPATIENT
Start: 2020-02-28 | End: 2020-02-28

## 2020-02-28 RX ORDER — POLYETHYLENE GLYCOL 3350 17 G/17G
17 POWDER, FOR SOLUTION ORAL DAILY
Status: DISCONTINUED | OUTPATIENT
Start: 2020-02-28 | End: 2020-03-03 | Stop reason: HOSPADM

## 2020-02-28 RX ORDER — AMOXICILLIN 250 MG
1 CAPSULE ORAL DAILY PRN
Status: DISCONTINUED | OUTPATIENT
Start: 2020-02-28 | End: 2020-03-03 | Stop reason: HOSPADM

## 2020-02-28 RX ORDER — FUROSEMIDE 40 MG/1
80 TABLET ORAL DAILY
Status: DISCONTINUED | OUTPATIENT
Start: 2020-02-29 | End: 2020-03-03 | Stop reason: HOSPADM

## 2020-02-28 RX ADMIN — FUROSEMIDE 40 MG: 40 TABLET ORAL at 08:02

## 2020-02-28 RX ADMIN — ACETAMINOPHEN 650 MG: 325 TABLET ORAL at 10:02

## 2020-02-28 RX ADMIN — METOPROLOL SUCCINATE 50 MG: 25 TABLET, EXTENDED RELEASE ORAL at 09:02

## 2020-02-28 RX ADMIN — METOPROLOL SUCCINATE 50 MG: 25 TABLET, EXTENDED RELEASE ORAL at 08:02

## 2020-02-28 RX ADMIN — PREDNISONE 60 MG: 10 TABLET ORAL at 08:02

## 2020-02-28 RX ADMIN — POLYETHYLENE GLYCOL 3350 17 G: 17 POWDER, FOR SOLUTION ORAL at 10:02

## 2020-02-28 RX ADMIN — AMLODIPINE BESYLATE 2.5 MG: 2.5 TABLET ORAL at 08:02

## 2020-02-28 RX ADMIN — CEFTRIAXONE SODIUM 1 G: 1 INJECTION, POWDER, FOR SOLUTION INTRAMUSCULAR; INTRAVENOUS at 02:02

## 2020-02-28 RX ADMIN — HUMAN IMMUNOGLOBULIN G 20 G: 20 LIQUID INTRAVENOUS at 06:02

## 2020-02-28 RX ADMIN — PANTOPRAZOLE SODIUM 40 MG: 40 TABLET, DELAYED RELEASE ORAL at 08:02

## 2020-02-28 RX ADMIN — SIMETHICONE CHEW TAB 80 MG 80 MG: 80 TABLET ORAL at 08:02

## 2020-02-28 NOTE — SUBJECTIVE & OBJECTIVE
Interval History: No complaints, doing well. Tolerated day 1 of IVIG 400mg/kg well. Platelet with mild improvement this AM. Will plan to complete 5 day course.    Oncology Treatment Plan:   [No treatment plan]    Medications:  Continuous Infusions:  Scheduled Meds:   amLODIPine  2.5 mg Oral Daily    cefTRIAXone (ROCEPHIN) IVPB  1 g Intravenous Q24H    diphenhydrAMINE  25 mg Intravenous Q24H    furosemide  40 mg Oral Daily    Immune Globulin G (IGG)-PRO-IGA 10 % injection (Privigen)  400 mg/kg/day (Dosing Weight) Intravenous Q24H    metoprolol succinate  50 mg Oral BID    pantoprazole  40 mg Oral Daily    polyethylene glycol  17 g Oral Daily    predniSONE  60 mg Oral Daily     PRN Meds:ondansetron, senna-docusate 8.6-50 mg, simethicone     Review of Systems   Constitutional: Negative for activity change and unexpected weight change.   Eyes: Negative for photophobia and redness.   Respiratory: Positive for shortness of breath. Negative for cough and wheezing.    Gastrointestinal: Negative for abdominal distention and abdominal pain.   Musculoskeletal: Positive for arthralgias and back pain.   Allergic/Immunologic: Negative for immunocompromised state.   Neurological: Negative for dizziness, weakness and light-headedness.   Hematological: Negative for adenopathy. Bruises/bleeds easily.   Psychiatric/Behavioral: Negative for agitation, behavioral problems and confusion.     Objective:     Vital Signs (Most Recent):  Temp: 97.7 °F (36.5 °C) (02/28/20 0818)  Pulse: 77 (02/28/20 0839)  Resp: 14 (02/28/20 0818)  BP: (!) 149/87 (02/28/20 0839)  SpO2: 96 % (02/28/20 0818) Vital Signs (24h Range):  Temp:  [97.7 °F (36.5 °C)-98.5 °F (36.9 °C)] 97.7 °F (36.5 °C)  Pulse:  [57-88] 77  Resp:  [14-21] 14  SpO2:  [89 %-98 %] 96 %  BP: (107-154)/(59-87) 149/87     Weight: 59 kg (130 lb 1.1 oz)  Body mass index is 21.64 kg/m².  Body surface area is 1.64 meters squared.      Intake/Output Summary (Last 24 hours) at 2/28/2020  1022  Last data filed at 2/28/2020 1000  Gross per 24 hour   Intake 2720 ml   Output 1450 ml   Net 1270 ml       Physical Exam  Constitutional: She is oriented to person, place, and time.   Elderly female, laying in bed, NAD   HENT:   Head: Normocephalic and atraumatic.   Eyes: Right eye exhibits no discharge. Left eye exhibits no discharge. No scleral icterus.   Neck:   Thin anatomy   Cardiovascular: Normal rate.   Murmur heard.  Pulmonary/Chest: Effort normal and breath sounds normal.   Abdominal: Soft. Bowel sounds are normal.   Musculoskeletal: Normal range of motion. She exhibits no edema.   Lymphadenopathy:     She has no cervical adenopathy.   Neurological: She is oriented to person, place, and time.   Skin: No rash noted. No pallor. Senescent changes and right hand ecchymosis.   Psychiatric: She has a normal mood and affect. Her behavior is normal.   Vitals reviewed.     Significant Labs:   CBC:   Recent Labs   Lab 02/27/20  0701 02/28/20  0642   WBC 8.48 8.44   HGB 12.4 12.7   HCT 40.0 41.2   PLT 19* 25*       Diagnostic Results:  None

## 2020-02-28 NOTE — PROGRESS NOTES
[facility]  Hematology/Oncology  Progress Note    Patient Name: Fiona Artaega  Admission Date: 2/24/2020  Hospital Length of Stay: 4 days  Code Status: Prior     Subjective:     HPI:  Patient is a 87 y.o. female with history of mitral regurgitation currently admitted under cardiology service with volume overload. She was transferred from [facility] where she was found to have profound thrombocytopenia with platelet count of 5k. She did not report any bleeding outside of brusing associated with blood draws. Interestingly she received platelet transfusion at [facility] with appropriate response. She tells me her niece has a CLAUDINE-2 mutation associated MPN.      Interval History: No complaints, doing well. Tolerated day 1 of IVIG 400mg/kg well. Platelet with mild improvement this AM. Will plan to complete 5 day course.    Oncology Treatment Plan:   [No treatment plan]    Medications:  Continuous Infusions:  Scheduled Meds:   amLODIPine  2.5 mg Oral Daily    cefTRIAXone (ROCEPHIN) IVPB  1 g Intravenous Q24H    diphenhydrAMINE  25 mg Intravenous Q24H    furosemide  40 mg Oral Daily    Immune Globulin G (IGG)-PRO-IGA 10 % injection (Privigen)  400 mg/kg/day (Dosing Weight) Intravenous Q24H    metoprolol succinate  50 mg Oral BID    pantoprazole  40 mg Oral Daily    polyethylene glycol  17 g Oral Daily    predniSONE  60 mg Oral Daily     PRN Meds:ondansetron, senna-docusate 8.6-50 mg, simethicone     Review of Systems   Constitutional: Negative for activity change and unexpected weight change.   Eyes: Negative for photophobia and redness.   Respiratory: Positive for shortness of breath. Negative for cough and wheezing.    Gastrointestinal: Negative for abdominal distention and abdominal pain.   Musculoskeletal: Positive for arthralgias and back pain.   Allergic/Immunologic: Negative for immunocompromised state.   Neurological: Negative for dizziness, weakness and  light-headedness.   Hematological: Negative for adenopathy. Bruises/bleeds easily.   Psychiatric/Behavioral: Negative for agitation, behavioral problems and confusion.     Objective:     Vital Signs (Most Recent):  Temp: 97.7 °F (36.5 °C) (02/28/20 0818)  Pulse: 77 (02/28/20 0839)  Resp: 14 (02/28/20 0818)  BP: (!) 149/87 (02/28/20 0839)  SpO2: 96 % (02/28/20 0818) Vital Signs (24h Range):  Temp:  [97.7 °F (36.5 °C)-98.5 °F (36.9 °C)] 97.7 °F (36.5 °C)  Pulse:  [57-88] 77  Resp:  [14-21] 14  SpO2:  [89 %-98 %] 96 %  BP: (107-154)/(59-87) 149/87     Weight: 59 kg (130 lb 1.1 oz)  Body mass index is 21.64 kg/m².  Body surface area is 1.64 meters squared.      Intake/Output Summary (Last 24 hours) at 2/28/2020 1022  Last data filed at 2/28/2020 1000  Gross per 24 hour   Intake 2720 ml   Output 1450 ml   Net 1270 ml       Physical Exam  Constitutional: She is oriented to person, place, and time.   Elderly female, laying in bed, NAD   HENT:   Head: Normocephalic and atraumatic.   Eyes: Right eye exhibits no discharge. Left eye exhibits no discharge. No scleral icterus.   Neck:   Thin anatomy   Cardiovascular: Normal rate.   Murmur heard.  Pulmonary/Chest: Effort normal and breath sounds normal.   Abdominal: Soft. Bowel sounds are normal.   Musculoskeletal: Normal range of motion. She exhibits no edema.   Lymphadenopathy:     She has no cervical adenopathy.   Neurological: She is oriented to person, place, and time.   Skin: No rash noted. No pallor. Senescent changes and right hand ecchymosis.   Psychiatric: She has a normal mood and affect. Her behavior is normal.   Vitals reviewed.     Significant Labs:   CBC:   Recent Labs   Lab 02/27/20  0701 02/28/20  0642   WBC 8.48 8.44   HGB 12.4 12.7   HCT 40.0 41.2   PLT 19* 25*       Diagnostic Results:  None    Assessment/Plan:     * Idiopathic thrombocytopenic purpura (ITP)  New onset ITP in 87 female that appears to be pure thrombocytopenia and moderate. Patient failed 3  days of 1 mg/kg prednisone and decision was made to add IVIG 400 mg/kg daily for 5 days. She received day 1 yesterday without incident. Will continue to plan to complete a 5 day course.     Plan  -continue with prednisone 1mg/kg - 60 mg daily  -IVIG 400 mg/kg (day 2)  -daily CBC   -if thrombocytopenia does not improve over weekend, will recommend bone marrow biopsy Monday  -hep b panel drawn today for possible use of rituxan at some point  -will continue to follow, please contact with questions or concerns    To be discussed with Dr. Lundy. Plan pending his attestation.     Chuck Carroll MD  Hematology/Oncology  [facility]

## 2020-02-28 NOTE — PLAN OF CARE
Patient AAOx4, Afebrile, VSS on Visi monitor. Telemetry in use- NSR. IGG orders initiated- 2mg dose tolerated well, infused without issues. Patient ambulatory to BSC. Patient c/o ongoing burning and pain with urination- urine clear, yellow, odorous- see flowsheet for output details. No BM noted through shift. Stool specimen still to be obtained- reiterated to patient, patient verbalized understanding. Patient remains on 1L NC, O2 sats. Stable. Bed locked and lowered, call bell/personal items within reach, non-skid socks in use. Patient is aware to call for assistance. Hand hygiene performed before and after patient care. NAD noted, WCTM.

## 2020-02-28 NOTE — ASSESSMENT & PLAN NOTE
New onset ITP in 87 female that appears to be pure thrombocytopenia and moderate. Patient failed 3 days of 1 mg/kg prednisone and decision was made to add IVIG 400 mg/kg daily for 5 days. She received day 1 yesterday without incident. Will continue to plan to complete a 5 day course.     Plan  -continue with prednisone 1mg/kg - 60 mg daily  -IVIG 400 mg/kg (day 2)  -daily CBC   -if thrombocytopenia does not improve over weekend, will recommend bone marrow biopsy Monday  -hep b panel drawn today for possible use of rituxan at some point  -will continue to follow, please contact with questions or concerns

## 2020-02-28 NOTE — PLAN OF CARE
02/28/20 1608   Post-Acute Status   Post-Acute Authorization Other     This SW is in communication with  and medical team. SW will continue to follow for discharge needs and offer support as needed.     Leti De Anda LMSW  Case Management   [facility]

## 2020-02-29 LAB
ALBUMIN SERPL BCP-MCNC: 2.6 G/DL (ref 3.5–5.2)
ALP SERPL-CCNC: 42 U/L (ref 55–135)
ALT SERPL W/O P-5'-P-CCNC: 21 U/L (ref 10–44)
ANION GAP SERPL CALC-SCNC: 5 MMOL/L (ref 8–16)
AST SERPL-CCNC: 10 U/L (ref 10–40)
BACTERIA UR CULT: ABNORMAL
BASOPHILS # BLD AUTO: 0.01 K/UL (ref 0–0.2)
BASOPHILS NFR BLD: 0.1 % (ref 0–1.9)
BILIRUB SERPL-MCNC: 0.5 MG/DL (ref 0.1–1)
BUN SERPL-MCNC: 36 MG/DL (ref 8–23)
CALCIUM SERPL-MCNC: 8.6 MG/DL (ref 8.7–10.5)
CHLORIDE SERPL-SCNC: 99 MMOL/L (ref 95–110)
CO2 SERPL-SCNC: 35 MMOL/L (ref 23–29)
CREAT SERPL-MCNC: 0.8 MG/DL (ref 0.5–1.4)
DIFFERENTIAL METHOD: ABNORMAL
EOSINOPHIL # BLD AUTO: 0.1 K/UL (ref 0–0.5)
EOSINOPHIL NFR BLD: 0.8 % (ref 0–8)
ERYTHROCYTE [DISTWIDTH] IN BLOOD BY AUTOMATED COUNT: 12.3 % (ref 11.5–14.5)
EST. GFR  (AFRICAN AMERICAN): >60 ML/MIN/1.73 M^2
EST. GFR  (NON AFRICAN AMERICAN): >60 ML/MIN/1.73 M^2
GLUCOSE SERPL-MCNC: 88 MG/DL (ref 70–110)
H PYLORI IGG SERPL QL IA: ABNORMAL
HCT VFR BLD AUTO: 38.5 % (ref 37–48.5)
HGB BLD-MCNC: 12 G/DL (ref 12–16)
IMM GRANULOCYTES # BLD AUTO: 0.06 K/UL (ref 0–0.04)
IMM GRANULOCYTES NFR BLD AUTO: 0.8 % (ref 0–0.5)
LYMPHOCYTES # BLD AUTO: 1.2 K/UL (ref 1–4.8)
LYMPHOCYTES NFR BLD: 16.6 % (ref 18–48)
MAGNESIUM SERPL-MCNC: 1.9 MG/DL (ref 1.6–2.6)
MCH RBC QN AUTO: 32.1 PG (ref 27–31)
MCHC RBC AUTO-ENTMCNC: 31.2 G/DL (ref 32–36)
MCV RBC AUTO: 103 FL (ref 82–98)
MONOCYTES # BLD AUTO: 0.9 K/UL (ref 0.3–1)
MONOCYTES NFR BLD: 12.7 % (ref 4–15)
NEUTROPHILS # BLD AUTO: 5 K/UL (ref 1.8–7.7)
NEUTROPHILS NFR BLD: 69 % (ref 38–73)
NRBC BLD-RTO: 0 /100 WBC
PHOSPHATE SERPL-MCNC: 3.9 MG/DL (ref 2.7–4.5)
PLATELET # BLD AUTO: 48 K/UL (ref 150–350)
PMV BLD AUTO: 12.1 FL (ref 9.2–12.9)
POTASSIUM SERPL-SCNC: 3.9 MMOL/L (ref 3.5–5.1)
PROT SERPL-MCNC: 6.6 G/DL (ref 6–8.4)
RBC # BLD AUTO: 3.74 M/UL (ref 4–5.4)
SODIUM SERPL-SCNC: 139 MMOL/L (ref 136–145)
WBC # BLD AUTO: 7.3 K/UL (ref 3.9–12.7)

## 2020-02-29 PROCEDURE — 84100 ASSAY OF PHOSPHORUS: CPT

## 2020-02-29 PROCEDURE — 99232 PR SUBSEQUENT HOSPITAL CARE,LEVL II: ICD-10-PCS | Mod: ,,, | Performed by: INTERNAL MEDICINE

## 2020-02-29 PROCEDURE — 85025 COMPLETE CBC W/AUTO DIFF WBC: CPT

## 2020-02-29 PROCEDURE — 83735 ASSAY OF MAGNESIUM: CPT

## 2020-02-29 PROCEDURE — 25000003 PHARM REV CODE 250: Performed by: STUDENT IN AN ORGANIZED HEALTH CARE EDUCATION/TRAINING PROGRAM

## 2020-02-29 PROCEDURE — 36415 COLL VENOUS BLD VENIPUNCTURE: CPT

## 2020-02-29 PROCEDURE — 63600175 PHARM REV CODE 636 W HCPCS: Performed by: STUDENT IN AN ORGANIZED HEALTH CARE EDUCATION/TRAINING PROGRAM

## 2020-02-29 PROCEDURE — 80053 COMPREHEN METABOLIC PANEL: CPT

## 2020-02-29 PROCEDURE — 20600001 HC STEP DOWN PRIVATE ROOM

## 2020-02-29 PROCEDURE — 25000003 PHARM REV CODE 250: Performed by: PHYSICIAN ASSISTANT

## 2020-02-29 PROCEDURE — 99232 SBSQ HOSP IP/OBS MODERATE 35: CPT | Mod: ,,, | Performed by: INTERNAL MEDICINE

## 2020-02-29 PROCEDURE — 63600175 PHARM REV CODE 636 W HCPCS: Mod: JG

## 2020-02-29 RX ADMIN — PREDNISONE 60 MG: 10 TABLET ORAL at 08:02

## 2020-02-29 RX ADMIN — ACETAMINOPHEN 650 MG: 325 TABLET ORAL at 10:02

## 2020-02-29 RX ADMIN — PANTOPRAZOLE SODIUM 40 MG: 40 TABLET, DELAYED RELEASE ORAL at 08:02

## 2020-02-29 RX ADMIN — ACETAMINOPHEN 650 MG: 325 TABLET ORAL at 11:02

## 2020-02-29 RX ADMIN — CEFTRIAXONE SODIUM 1 G: 1 INJECTION, POWDER, FOR SOLUTION INTRAMUSCULAR; INTRAVENOUS at 01:02

## 2020-02-29 RX ADMIN — METOPROLOL SUCCINATE 50 MG: 25 TABLET, EXTENDED RELEASE ORAL at 08:02

## 2020-02-29 RX ADMIN — HUMAN IMMUNOGLOBULIN G 20 G: 20 LIQUID INTRAVENOUS at 05:02

## 2020-02-29 RX ADMIN — FUROSEMIDE 80 MG: 40 TABLET ORAL at 08:02

## 2020-02-29 RX ADMIN — ACETAMINOPHEN 650 MG: 325 TABLET ORAL at 05:02

## 2020-02-29 RX ADMIN — AMLODIPINE BESYLATE 2.5 MG: 2.5 TABLET ORAL at 08:02

## 2020-02-29 RX ADMIN — POLYETHYLENE GLYCOL 3350 17 G: 17 POWDER, FOR SOLUTION ORAL at 08:02

## 2020-02-29 NOTE — PROGRESS NOTES
Hospital Medicine  Progress Note      Patient Name: Fiona Arteaga  MRN:  [id number]  Hospital Medicine Team: [facility] R Ely Teran MD  Date of Admission:  2/24/2020     Length of Stay:  LOS: 5 days   Expected Discharge Date: 3/2/2020  Principal Problem:  Idiopathic thrombocytopenic purpura (ITP)      Subjective:    Interval History/Overnight Events:  Tolerated day 2 IVIG yesterday. Labs remarkable for Plts 25-->48k. Complaining of dysuria, although improving. UCx growing proteus.      Review of Systems:  Constitutional: Negative for chills, fatigue, fever.   Respiratory: Negative for cough, shortness of breath.    Cardiovascular: Negative for chest pain, palpitations, leg swelling.   Gastrointestinal: Negative for abdominal pain, nausea, vomiting, diarrhea, constipation.   Genitourinary: +dysuria (improving)  All other systems reviewed and are negative.      Objective:    Physical Exam:  Temp:  [97.8 °F (36.6 °C)-98.5 °F (36.9 °C)]   Pulse:  [59-84]   Resp:  [15-23]   BP: (110-155)/(61-91)   SpO2:  [93 %-98 %]     Constitutional: Appears comfortable, in no acute distress   Eyes: No scleral icterus or eye discharge  Cardiovascular: Normal heart rate.  Regular heart rhythm.  +murmur.  Pulmonary/Chest: Effort normal and breath sounds normal. No respiratory distress. No wheezes, rales, or rhonchi  Abdominal: Soft. Bowel sounds are normal.  No distension.  No tenderness  Musculoskeletal: No deformities.  No edema.   Neurological: Alert.  Oriented to person, place, and time.   Skin: Skin is warm and dry.       Assessment and Plan:  Patient transferred for cardiology evaluation for surgical intervention given severe mitral regurgitation, diuresed well and deemed stable for stepdown to hospital medicine. On 2/27/20 patient with worsening metabolic alkalosis 2/2 diuresis, lasix held and acetazolamide given. Patient also complaint of flank pain and odor to urine, CT renal stone study with mild  hydronephrosis, U/A consistent with acute cystitis and ceftriaxone started. UCx - pan-sensitive proteus mirabilis. Hematology Oncology following for thrombocytopenia, counts slowly improving with prednisone. Patient started on IVIG on 2/27/20 due to platelets not steadily improving with plan for 5 day course and then possible bone marrow biopsy.     * (HFpEF) heart failure with preserved ejection fraction  Ms. Arteaga is an 87yF who was sent from [facility] for further level of care. Yesterday AM developed acute SOB. Also reports increased LE swelling and mild exertional dyspnea past 1 week. Denies chest pain. Went to ED and found to be in Afib with RVR. She received 500 mcg of digoxin x2, IV furosemide 40 mg, hydralazine 50 mg, IV labetalol and nitroglycerin patch. Sent to the ICU there with Bipap for intermittent respiratory failure. Bilateral lower extremity ultrasound was negative for DVT. CTA chest was negative for pulmonary embolus but was consistent with pulmonary edema and pleural effusion. BNP elevated. Echocardiogram revealed severe mitral regurgitation with normal LVEF.     · Concentric left ventricular remodeling.  · Normal left ventricular systolic function. The estimated ejection fraction is 65%.  · Indeterminate left ventricular diastolic function.  · No wall motion abnormalities.  · Normal right ventricular systolic function.  · Mild left atrial enlargement.  · There is mild leaflet calcification of the Mitral Valve.  · Mild mitral sclerosis.  · Moderate-to-severe mitral regurgitation.  · Mild tricuspid regurgitation.  · Intermediate central venous pressure (8 mmHg).  · The estimated PA systolic pressure is 29 mmHg.     -Metoprolol 12.5 QID changed to Toprol XL 50 mg BID for rate control for Afib  -BP control with Amlodipine 2.5 mg daily  -Ins/Outs, daily weights  -Will hold lasix 2/27/20 as patient with worsening metabolic contraction alkalosis  -started lasix 40 PO daily on 2/28/20  however not much urine output, increased to lasix 80 PO daily for 2/29/20     Metabolic Alkalosis  - Patient with worsening metabolic alkalosis 2/2 diuresis 2/27/20, lasix held and acetazolamide given.   - improved with acetazolamide, resumed lasix on 2/28/20    Acute cystitis  - complaint of flank pain and odor to urine, CT renal stone study with mild hydronephrosis  - U/A consistent with acute cystitis and ceftriaxone started.  -UCX- pan-sensitive proteus mirabilis  - day 3/ 5 ceftriaxone    MR (mitral regurgitation)  Pt with moderate-severe MR. Likely functional MR. Also has HFpEF and Afib with RVR.      - According to Dr. Fernandez, pt has no indications for any valvular intervention (Papa lr)  - Continue diuresis, BP control, Afib control         Benign essential hypertension  Started on home Amlodipine 2.5 mg daily, can up titrate as needed for BP control           Atrial fibrillation with rapid ventricular response  Found to be in paroxysmal Afib with RVR at outside Hospital.   CHADS2-VASC of 5 (7.2% risk CVA). HAS-BLED 3 (high risk of bleeding).      - Will hold on AG currently in setting of thrombocytopenia with platelets at 13-->17 on admission  - Rate control with Metoprolol 12.5 QID-->Metoprolol XL 50 mg BID  - Now in sinus rhythm with PVCs  - TSH wnl      Thrombocytopenia  Pt presented to [facility] and found to have thrombocytopenia. Per pt, states she was never told she has this before.   Hematology followed at outside hospital, work-up pending. Started on prednisone 20 mg QID and s/p platelet transfusion prior to transfer. Per their notes, consider IVIG if needed. B12 elevated on outside labs. Denies alcohol use. LFTs wnl.      - Hematology consulted here on admit, considering IVIG, can give extra dose lasix if large volume needed to be given with IVIG  - Bruising noted on arms, no signs active bleeding currently   - Will monitor with daily CBCs  - Platelets improving  - continue  prednisone 1 mg/kg  - IVIG x2 - 2/27/20, 2/28/20 with plan for 5 day course    Constipation  - KUB 2/28/20- no obstruction  - miralax daily with PRN senna-doc  - resolved      Ely Teran MD  [facility]  Spectra:  [phone]

## 2020-02-29 NOTE — PLAN OF CARE
Pt aao x3. Vss. No acute distress. Tele reveals NSR. Pt to have her 3rd of 5 doses of IGG tonight. Pt to possibly have a bone marrow biopsy Monday. Pt independent and steady on her feet. Pt given tylenol for a headache. Will continue to monitor, assess and adjust care as needed. See assessment for full chart details.

## 2020-02-29 NOTE — PROGRESS NOTES
Hospital Medicine  Progress Note      Patient Name: Fiona Arteaga  MRN:  [id number]  Hospital Medicine Team: [facility] MED R Jeremias Kenny MD  Date of Admission:  2/24/2020     Length of Stay:  LOS: 4 days   Expected Discharge Date: 3/2/2020  Principal Problem:  Idiopathic thrombocytopenic purpura (ITP)      Subjective:    Interval History/Overnight Events:  Day 2 of IVIG. Metabolic alkalosis improving, lasix 40 PO daily started today and fluid restriction on diet however not much urine output. Patient states dysuria has improved significantly. Still without a bowel movement, KUB done and miralax scheduled with PRN senna-doc.      Review of Systems:  Constitutional: Negative for chills, fatigue, fever.   Respiratory: Negative for cough, shortness of breath.    Cardiovascular: Negative for chest pain, palpitations, leg swelling.   Gastrointestinal: Negative for abdominal pain, nausea, vomiting, diarrhea, constipation.   Genitourinary: +dysuria (improving)  All other systems reviewed and are negative.      Objective:    Physical Exam:  Temp:  [97.7 °F (36.5 °C)-98.5 °F (36.9 °C)]   Pulse:  [57-84]   Resp:  [14-23]   BP: (107-155)/(59-91)   SpO2:  [93 %-98 %]     Constitutional: Appears comfortable, in no acute distress   Eyes: No scleral icterus or eye discharge  Cardiovascular: Normal heart rate.  Regular heart rhythm.  +murmur.  Pulmonary/Chest: Effort normal and breath sounds normal. No respiratory distress. No wheezes, rales, or rhonchi  Abdominal: Soft. Bowel sounds are normal.  No distension.  No tenderness  Musculoskeletal: No deformities.  No edema.   Neurological: Alert.  Oriented to person, place, and time.   Skin: Skin is warm and dry.       Assessment and Plan:  Patient transferred for cardiology evaluation for surgical intervention given severe mitral regurgitation, diuresed well and deemed stable for stepdown to hospital medicine. On 2/27/20 patient with worsening metabolic alkalosis 2/2  diuresis, lasix held and acetazolamide given. Patient also complaint of flank pain and odor to urine, CT renal stone study with mild hydronephrosis, U/A consistent with acute cystitis and ceftriaxone started. Hematology Oncology following for thrombocytopenia, counts slowly improving with prednisone. Patient started on IVIG on 2/27/20 due to platelets not steadily improving with plan for 5 day course and then possible bone marrow biopsy.     * (HFpEF) heart failure with preserved ejection fraction  Ms. Arteaga is an 87yF who was sent from [facility] for further level of care. Yesterday AM developed acute SOB. Also reports increased LE swelling and mild exertional dyspnea past 1 week. Denies chest pain. Went to ED and found to be in Afib with RVR. She received 500 mcg of digoxin x2, IV furosemide 40 mg, hydralazine 50 mg, IV labetalol and nitroglycerin patch. Sent to the ICU there with Bipap for intermittent respiratory failure. Bilateral lower extremity ultrasound was negative for DVT. CTA chest was negative for pulmonary embolus but was consistent with pulmonary edema and pleural effusion. BNP elevated. Echocardiogram revealed severe mitral regurgitation with normal LVEF.     · Concentric left ventricular remodeling.  · Normal left ventricular systolic function. The estimated ejection fraction is 65%.  · Indeterminate left ventricular diastolic function.  · No wall motion abnormalities.  · Normal right ventricular systolic function.  · Mild left atrial enlargement.  · There is mild leaflet calcification of the Mitral Valve.  · Mild mitral sclerosis.  · Moderate-to-severe mitral regurgitation.  · Mild tricuspid regurgitation.  · Intermediate central venous pressure (8 mmHg).  · The estimated PA systolic pressure is 29 mmHg.     -Metoprolol 12.5 QID changed to Toprol XL 50 mg BID for rate control for Afib  -BP control with Amlodipine 2.5 mg daily  -Ins/Outs, daily weights  -Will hold lasix 2/27/20 as  patient with worsening metabolic contraction alkalosis  -started lasix 40 PO daily on 2/28/20 however not much urine output, increased to lasix 80 PO daily for 2/29/20     Metabolic Alkalosis  - Patient with worsening metabolic alkalosis 2/2 diuresis 2/27/20, lasix held and acetazolamide given.   - improved with acetazolamide, resumed lasix on 2/28/20    Acute cystitis  - complaint of flank pain and odor to urine, CT renal stone study with mild hydronephrosis  - U/A consistent with acute cystitis and ceftriaxone started.  - will follow urine culture     MR (mitral regurgitation)  Pt with moderate-severe MR. Likely functional MR. Also has HFpEF and Afib with RVR.      - According to Dr. Fernandez, pt has no indications for any valvular intervention (Papa lr)  - Continue diuresis, BP control, Afib control         Benign essential hypertension  Started on home Amlodipine 2.5 mg daily, can up titrate as needed for BP control           Atrial fibrillation with rapid ventricular response  Found to be in paroxysmal Afib with RVR at outside Hospital.   CHADS2-VASC of 5 (7.2% risk CVA). HAS-BLED 3 (high risk of bleeding).      - Will hold on AG currently in setting of thrombocytopenia with platelets at 13-->17 on admission  - Rate control with Metoprolol 12.5 QID-->Metoprolol XL 50 mg BID  - Now in sinus rhythm with PVCs  - TSH wnl      Thrombocytopenia  Pt presented to [facility] and found to have thrombocytopenia. Per pt, states she was never told she has this before.   Hematology followed at outside hospital, work-up pending. Started on prednisone 20 mg QID and s/p platelet transfusion prior to transfer. Per their notes, consider IVIG if needed. B12 elevated on outside labs. Denies alcohol use. LFTs wnl.      - Hematology consulted here on admit, considering IVIG, can give extra dose lasix if large volume needed to be given with IVIG  - Bruising noted on arms, no signs active bleeding currently   -  Will monitor with daily CBCs  - Platelets improving  - continue prednisone 1 mg/kg  - IVIG started 2/27/20 with plan for 5 day course    Constipation  - KUB 2/28/20  - miralax daily with PRN senna-doc      Jeremias Kenny MD  [facility]  Spectra:  [phone]  Pager:  [phone]

## 2020-02-29 NOTE — PLAN OF CARE
Pt. AAO x 4, WBC 8.44 afebrile  Tele NSR Hr 70's-80's with frequent PVC's-- Team aware and EKG performed  Critical Plt 25K - known abnormal-- started IGG dose 2 of 5 and plans for possible Bone biopsy on Monday  Pain when urinating, foul odor to urine- urine culture pending-- Rocephin continued-- symptoms have improved since yesterday  1.5 L FR-- 1.2 L consumed and patient aware 300 cc is available for the remainder of the day  Patient ambulated to [facility] with assistance  Encouraged patient to get up out of bed and in the chair and she stated she would later  Safety precautions maintained throughout the shift, call light within reach, and nonslip socks when out of bed.

## 2020-03-01 LAB
ALBUMIN SERPL BCP-MCNC: 2.6 G/DL (ref 3.5–5.2)
ALP SERPL-CCNC: 41 U/L (ref 55–135)
ALT SERPL W/O P-5'-P-CCNC: 17 U/L (ref 10–44)
ANION GAP SERPL CALC-SCNC: 5 MMOL/L (ref 8–16)
AST SERPL-CCNC: 10 U/L (ref 10–40)
BASOPHILS # BLD AUTO: 0.01 K/UL (ref 0–0.2)
BASOPHILS NFR BLD: 0.1 % (ref 0–1.9)
BILIRUB SERPL-MCNC: 0.6 MG/DL (ref 0.1–1)
BUN SERPL-MCNC: 33 MG/DL (ref 8–23)
CALCIUM SERPL-MCNC: 8.7 MG/DL (ref 8.7–10.5)
CHLORIDE SERPL-SCNC: 95 MMOL/L (ref 95–110)
CO2 SERPL-SCNC: 36 MMOL/L (ref 23–29)
CREAT SERPL-MCNC: 0.8 MG/DL (ref 0.5–1.4)
DIFFERENTIAL METHOD: ABNORMAL
EOSINOPHIL # BLD AUTO: 0.1 K/UL (ref 0–0.5)
EOSINOPHIL NFR BLD: 0.8 % (ref 0–8)
ERYTHROCYTE [DISTWIDTH] IN BLOOD BY AUTOMATED COUNT: 12.2 % (ref 11.5–14.5)
EST. GFR  (AFRICAN AMERICAN): >60 ML/MIN/1.73 M^2
EST. GFR  (NON AFRICAN AMERICAN): >60 ML/MIN/1.73 M^2
GLUCOSE SERPL-MCNC: 83 MG/DL (ref 70–110)
HCT VFR BLD AUTO: 37 % (ref 37–48.5)
HGB BLD-MCNC: 12.3 G/DL (ref 12–16)
IMM GRANULOCYTES # BLD AUTO: 0.04 K/UL (ref 0–0.04)
IMM GRANULOCYTES NFR BLD AUTO: 0.5 % (ref 0–0.5)
LYMPHOCYTES # BLD AUTO: 1.4 K/UL (ref 1–4.8)
LYMPHOCYTES NFR BLD: 18.9 % (ref 18–48)
MAGNESIUM SERPL-MCNC: 1.8 MG/DL (ref 1.6–2.6)
MCH RBC QN AUTO: 32.8 PG (ref 27–31)
MCHC RBC AUTO-ENTMCNC: 33.2 G/DL (ref 32–36)
MCV RBC AUTO: 99 FL (ref 82–98)
MONOCYTES # BLD AUTO: 0.9 K/UL (ref 0.3–1)
MONOCYTES NFR BLD: 11.9 % (ref 4–15)
NEUTROPHILS # BLD AUTO: 5.1 K/UL (ref 1.8–7.7)
NEUTROPHILS NFR BLD: 67.8 % (ref 38–73)
NRBC BLD-RTO: 0 /100 WBC
PHOSPHATE SERPL-MCNC: 3.2 MG/DL (ref 2.7–4.5)
PLATELET # BLD AUTO: 77 K/UL (ref 150–350)
PMV BLD AUTO: 11 FL (ref 9.2–12.9)
POTASSIUM SERPL-SCNC: 3.7 MMOL/L (ref 3.5–5.1)
PROT SERPL-MCNC: 7.1 G/DL (ref 6–8.4)
RBC # BLD AUTO: 3.75 M/UL (ref 4–5.4)
SODIUM SERPL-SCNC: 136 MMOL/L (ref 136–145)
WBC # BLD AUTO: 7.55 K/UL (ref 3.9–12.7)

## 2020-03-01 PROCEDURE — 63600175 PHARM REV CODE 636 W HCPCS: Performed by: STUDENT IN AN ORGANIZED HEALTH CARE EDUCATION/TRAINING PROGRAM

## 2020-03-01 PROCEDURE — 20600001 HC STEP DOWN PRIVATE ROOM

## 2020-03-01 PROCEDURE — 85025 COMPLETE CBC W/AUTO DIFF WBC: CPT

## 2020-03-01 PROCEDURE — 99232 PR SUBSEQUENT HOSPITAL CARE,LEVL II: ICD-10-PCS | Mod: ,,, | Performed by: INTERNAL MEDICINE

## 2020-03-01 PROCEDURE — 84100 ASSAY OF PHOSPHORUS: CPT

## 2020-03-01 PROCEDURE — 25000003 PHARM REV CODE 250: Performed by: PHYSICIAN ASSISTANT

## 2020-03-01 PROCEDURE — 36415 COLL VENOUS BLD VENIPUNCTURE: CPT

## 2020-03-01 PROCEDURE — 63600175 PHARM REV CODE 636 W HCPCS: Mod: JG

## 2020-03-01 PROCEDURE — 83735 ASSAY OF MAGNESIUM: CPT

## 2020-03-01 PROCEDURE — 25000003 PHARM REV CODE 250: Performed by: STUDENT IN AN ORGANIZED HEALTH CARE EDUCATION/TRAINING PROGRAM

## 2020-03-01 PROCEDURE — 80053 COMPREHEN METABOLIC PANEL: CPT

## 2020-03-01 PROCEDURE — 99232 SBSQ HOSP IP/OBS MODERATE 35: CPT | Mod: ,,, | Performed by: INTERNAL MEDICINE

## 2020-03-01 RX ADMIN — ACETAMINOPHEN 650 MG: 325 TABLET ORAL at 02:03

## 2020-03-01 RX ADMIN — METOPROLOL SUCCINATE 50 MG: 25 TABLET, EXTENDED RELEASE ORAL at 08:03

## 2020-03-01 RX ADMIN — PREDNISONE 60 MG: 10 TABLET ORAL at 08:03

## 2020-03-01 RX ADMIN — ACETAMINOPHEN 650 MG: 325 TABLET ORAL at 06:03

## 2020-03-01 RX ADMIN — CEFTRIAXONE SODIUM 1 G: 1 INJECTION, POWDER, FOR SOLUTION INTRAMUSCULAR; INTRAVENOUS at 02:03

## 2020-03-01 RX ADMIN — AMLODIPINE BESYLATE 2.5 MG: 2.5 TABLET ORAL at 08:03

## 2020-03-01 RX ADMIN — METOPROLOL SUCCINATE 50 MG: 25 TABLET, EXTENDED RELEASE ORAL at 07:03

## 2020-03-01 RX ADMIN — ACETAMINOPHEN 650 MG: 325 TABLET ORAL at 11:03

## 2020-03-01 RX ADMIN — FUROSEMIDE 80 MG: 40 TABLET ORAL at 08:03

## 2020-03-01 RX ADMIN — PANTOPRAZOLE SODIUM 40 MG: 40 TABLET, DELAYED RELEASE ORAL at 08:03

## 2020-03-01 RX ADMIN — HUMAN IMMUNOGLOBULIN G 20 G: 20 LIQUID INTRAVENOUS at 04:03

## 2020-03-01 NOTE — PROGRESS NOTES
Hospital Medicine  Progress Note      Patient Name: Fiona Arteaga  MRN:  [id number]  Hospital Medicine Team: [facility] HOSP MED R Ely Teran MD  Date of Admission:  2/24/2020     Length of Stay:  LOS: 6 days   Expected Discharge Date: 3/2/2020  Principal Problem:  Idiopathic thrombocytopenic purpura (ITP)      Subjective:    Interval History/Overnight Events:  Tolerated day 3 IVIG yesterday and scheduled for day 4 of 5 today. Thrombocytopenia continues to improve. On day 4 of 5 abx for proteus UTI, but still with mild dysuria.     Review of Systems:  Constitutional: Negative for chills, fatigue, fever.   Respiratory: Negative for cough, shortness of breath.    Cardiovascular: Negative for chest pain, palpitations, leg swelling.   Gastrointestinal: Negative for abdominal pain, nausea, vomiting, diarrhea, constipation.   Genitourinary: +dysuria (improving)  All other systems reviewed and are negative.      Objective:    Physical Exam:  Temp:  [97.3 °F (36.3 °C)-98.4 °F (36.9 °C)]   Pulse:  [62-80]   Resp:  [17-30]   BP: (101-147)/(54-71)   SpO2:  [95 %-99 %]     Constitutional: Appears comfortable, in no acute distress   Eyes: No scleral icterus or eye discharge  Cardiovascular: Normal heart rate.  Regular heart rhythm.  +murmur.  Pulmonary/Chest: Effort normal and breath sounds normal. No respiratory distress. No wheezes, rales, or rhonchi  Abdominal: Soft. Bowel sounds are normal.  No distension.  No tenderness  Musculoskeletal: No deformities.  No edema.   Neurological: Alert.  Oriented to person, place, and time.   Skin: Skin is warm and dry.       Assessment and Plan:  Patient transferred for cardiology evaluation for surgical intervention given severe mitral regurgitation, diuresed well and deemed stable for stepdown to hospital medicine. On 2/27/20 patient with worsening metabolic alkalosis 2/2 diuresis, lasix held and acetazolamide given. Patient also complaint of flank pain and odor to urine, CT  renal stone study with mild hydronephrosis, U/A consistent with acute cystitis and ceftriaxone started. UCx - pan-sensitive proteus mirabilis. Hematology Oncology following for thrombocytopenia, counts slowly improving with prednisone. Patient started on IVIG on 2/27/20 due to platelets not steadily improving with plan for 5 day course and then possible bone marrow biopsy.     * (HFpEF) heart failure with preserved ejection fraction  Ms. Arteaga is an 87yF who was [facility] for further level of care. Yesterday AM developed acute SOB. Also reports increased LE swelling and mild exertional dyspnea past 1 week. Denies chest pain. Went to ED and found to be in Afib with RVR. She received 500 mcg of digoxin x2, IV furosemide 40 mg, hydralazine 50 mg, IV labetalol and nitroglycerin patch. Sent to the ICU there with Bipap for intermittent respiratory failure. Bilateral lower extremity ultrasound was negative for DVT. CTA chest was negative for pulmonary embolus but was consistent with pulmonary edema and pleural effusion. BNP elevated. Echocardiogram revealed severe mitral regurgitation with normal LVEF.     · Concentric left ventricular remodeling.  · Normal left ventricular systolic function. The estimated ejection fraction is 65%.  · Indeterminate left ventricular diastolic function.  · No wall motion abnormalities.  · Normal right ventricular systolic function.  · Mild left atrial enlargement.  · There is mild leaflet calcification of the Mitral Valve.  · Mild mitral sclerosis.  · Moderate-to-severe mitral regurgitation.  · Mild tricuspid regurgitation.  · Intermediate central venous pressure (8 mmHg).  · The estimated PA systolic pressure is 29 mmHg.     -Metoprolol 12.5 QID changed to Toprol XL 50 mg BID for rate control for Afib  -BP control with Amlodipine 2.5 mg daily  -Ins/Outs, daily weights  -Will hold lasix 2/27/20 as patient with worsening metabolic contraction alkalosis  -started  lasix 40 PO daily on 2/28/20 however not much urine output, increased to lasix 80 PO daily for 2/29/20     Metabolic Alkalosis  - Patient with worsening metabolic alkalosis 2/2 diuresis 2/27/20, lasix held and acetazolamide given.   - improved with acetazolamide, resumed lasix on 2/28/20    Acute cystitis  - complaint of flank pain and odor to urine, CT renal stone study with mild hydronephrosis  - U/A consistent with acute cystitis and ceftriaxone started.  -UCX- pan-sensitive proteus mirabilis  - day 4/ 5 ceftriaxone    MR (mitral regurgitation)  Pt with moderate-severe MR. Likely functional MR. Also has HFpEF and Afib with RVR.      - According to Dr. Fernandez, pt has no indications for any valvular intervention (Papa lr)  - Continue diuresis, BP control, Afib control         Benign essential hypertension  Started on home Amlodipine 2.5 mg daily, can up titrate as needed for BP control           Atrial fibrillation with rapid ventricular response  Found to be in paroxysmal Afib with RVR at outside Hospital.   CHADS2-VASC of 5 (7.2% risk CVA). HAS-BLED 3 (high risk of bleeding).      - Will hold on AG currently in setting of thrombocytopenia with platelets at 13-->17 on admission  - Rate control with Metoprolol 12.5 QID-->Metoprolol XL 50 mg BID  - Now in sinus rhythm with PVCs  - TSH wnl   - will consider resuming AC tomorrow If platelets remain >50k     Thrombocytopenia  Pt presented to [facility] and found to have thrombocytopenia. Per pt, states she was never told she has this before.   Hematology followed at outside hospital, work-up pending. Started on prednisone 20 mg QID and s/p platelet transfusion prior to transfer. Per their notes, consider IVIG if needed. B12 elevated on outside labs. Denies alcohol use. LFTs wnl.      - Hematology consulted here on admit, considering IVIG, can give extra dose lasix if large volume needed to be given with IVIG  - Bruising noted on arms, no signs  active bleeding currently   - Will monitor with daily CBCs  - Platelets improving  - continue prednisone 1 mg/kg  - IVIG x3 - 2/27/20, 2/28/20, 2/29 with plan for 5 day course    Constipation  - KUB 2/28/20- no obstruction  - miralax daily with PRN senna-doc  - resolved      Ely Teran MD  [facility] Medicine  Spectra:  [phone]

## 2020-03-02 LAB
ALBUMIN SERPL BCP-MCNC: 2.5 G/DL (ref 3.5–5.2)
ALP SERPL-CCNC: 40 U/L (ref 55–135)
ALT SERPL W/O P-5'-P-CCNC: 14 U/L (ref 10–44)
ANION GAP SERPL CALC-SCNC: 6 MMOL/L (ref 8–16)
AST SERPL-CCNC: 10 U/L (ref 10–40)
BASOPHILS # BLD AUTO: 0.01 K/UL (ref 0–0.2)
BASOPHILS NFR BLD: 0.1 % (ref 0–1.9)
BILIRUB SERPL-MCNC: 0.5 MG/DL (ref 0.1–1)
BUN SERPL-MCNC: 32 MG/DL (ref 8–23)
CALCIUM SERPL-MCNC: 8.4 MG/DL (ref 8.7–10.5)
CHLORIDE SERPL-SCNC: 94 MMOL/L (ref 95–110)
CO2 SERPL-SCNC: 35 MMOL/L (ref 23–29)
CREAT SERPL-MCNC: 0.8 MG/DL (ref 0.5–1.4)
DIFFERENTIAL METHOD: ABNORMAL
EOSINOPHIL # BLD AUTO: 0.1 K/UL (ref 0–0.5)
EOSINOPHIL NFR BLD: 0.7 % (ref 0–8)
ERYTHROCYTE [DISTWIDTH] IN BLOOD BY AUTOMATED COUNT: 12.2 % (ref 11.5–14.5)
EST. GFR  (AFRICAN AMERICAN): >60 ML/MIN/1.73 M^2
EST. GFR  (NON AFRICAN AMERICAN): >60 ML/MIN/1.73 M^2
GLUCOSE SERPL-MCNC: 81 MG/DL (ref 70–110)
HBV CORE AB SERPL QL IA: POSITIVE
HBV SURFACE AB SER-ACNC: POSITIVE M[IU]/ML
HBV SURFACE AG SERPL QL IA: NEGATIVE
HCT VFR BLD AUTO: 38.7 % (ref 37–48.5)
HGB BLD-MCNC: 12.1 G/DL (ref 12–16)
IMM GRANULOCYTES # BLD AUTO: 0.07 K/UL (ref 0–0.04)
IMM GRANULOCYTES NFR BLD AUTO: 0.8 % (ref 0–0.5)
LYMPHOCYTES # BLD AUTO: 1.3 K/UL (ref 1–4.8)
LYMPHOCYTES NFR BLD: 15.7 % (ref 18–48)
MAGNESIUM SERPL-MCNC: 1.8 MG/DL (ref 1.6–2.6)
MCH RBC QN AUTO: 31.5 PG (ref 27–31)
MCHC RBC AUTO-ENTMCNC: 31.3 G/DL (ref 32–36)
MCV RBC AUTO: 101 FL (ref 82–98)
MONOCYTES # BLD AUTO: 1.1 K/UL (ref 0.3–1)
MONOCYTES NFR BLD: 12.4 % (ref 4–15)
NEUTROPHILS # BLD AUTO: 6 K/UL (ref 1.8–7.7)
NEUTROPHILS NFR BLD: 70.3 % (ref 38–73)
NRBC BLD-RTO: 0 /100 WBC
PHOSPHATE SERPL-MCNC: 3 MG/DL (ref 2.7–4.5)
PLATELET # BLD AUTO: 120 K/UL (ref 150–350)
PMV BLD AUTO: 10.5 FL (ref 9.2–12.9)
POTASSIUM SERPL-SCNC: 3.6 MMOL/L (ref 3.5–5.1)
PROT SERPL-MCNC: 7.4 G/DL (ref 6–8.4)
RBC # BLD AUTO: 3.84 M/UL (ref 4–5.4)
SODIUM SERPL-SCNC: 135 MMOL/L (ref 136–145)
WBC # BLD AUTO: 8.54 K/UL (ref 3.9–12.7)

## 2020-03-02 PROCEDURE — 63600175 PHARM REV CODE 636 W HCPCS: Mod: JG

## 2020-03-02 PROCEDURE — 94761 N-INVAS EAR/PLS OXIMETRY MLT: CPT

## 2020-03-02 PROCEDURE — 25000003 PHARM REV CODE 250: Performed by: STUDENT IN AN ORGANIZED HEALTH CARE EDUCATION/TRAINING PROGRAM

## 2020-03-02 PROCEDURE — 99232 PR SUBSEQUENT HOSPITAL CARE,LEVL II: ICD-10-PCS | Mod: ,,, | Performed by: INTERNAL MEDICINE

## 2020-03-02 PROCEDURE — 63600175 PHARM REV CODE 636 W HCPCS: Performed by: INTERNAL MEDICINE

## 2020-03-02 PROCEDURE — 85025 COMPLETE CBC W/AUTO DIFF WBC: CPT

## 2020-03-02 PROCEDURE — 84100 ASSAY OF PHOSPHORUS: CPT

## 2020-03-02 PROCEDURE — 80053 COMPREHEN METABOLIC PANEL: CPT

## 2020-03-02 PROCEDURE — 36415 COLL VENOUS BLD VENIPUNCTURE: CPT

## 2020-03-02 PROCEDURE — 25000003 PHARM REV CODE 250: Performed by: INTERNAL MEDICINE

## 2020-03-02 PROCEDURE — 99232 SBSQ HOSP IP/OBS MODERATE 35: CPT | Mod: ,,, | Performed by: INTERNAL MEDICINE

## 2020-03-02 PROCEDURE — 83735 ASSAY OF MAGNESIUM: CPT

## 2020-03-02 PROCEDURE — 63600175 PHARM REV CODE 636 W HCPCS: Performed by: STUDENT IN AN ORGANIZED HEALTH CARE EDUCATION/TRAINING PROGRAM

## 2020-03-02 PROCEDURE — 20600001 HC STEP DOWN PRIVATE ROOM

## 2020-03-02 PROCEDURE — 25000003 PHARM REV CODE 250: Performed by: PHYSICIAN ASSISTANT

## 2020-03-02 RX ORDER — PREDNISONE 10 MG/1
40 TABLET ORAL DAILY
Status: DISCONTINUED | OUTPATIENT
Start: 2020-03-03 | End: 2020-03-03 | Stop reason: HOSPADM

## 2020-03-02 RX ORDER — ENOXAPARIN SODIUM 100 MG/ML
40 INJECTION SUBCUTANEOUS EVERY 24 HOURS
Status: DISCONTINUED | OUTPATIENT
Start: 2020-03-02 | End: 2020-03-03

## 2020-03-02 RX ORDER — AMLODIPINE BESYLATE 5 MG/1
5 TABLET ORAL DAILY
Status: DISCONTINUED | OUTPATIENT
Start: 2020-03-03 | End: 2020-03-03 | Stop reason: HOSPADM

## 2020-03-02 RX ORDER — SULFAMETHOXAZOLE AND TRIMETHOPRIM 800; 160 MG/1; MG/1
1 TABLET ORAL DAILY
Status: DISCONTINUED | OUTPATIENT
Start: 2020-03-03 | End: 2020-03-03 | Stop reason: HOSPADM

## 2020-03-02 RX ORDER — AMLODIPINE BESYLATE 2.5 MG/1
2.5 TABLET ORAL ONCE
Status: COMPLETED | OUTPATIENT
Start: 2020-03-02 | End: 2020-03-02

## 2020-03-02 RX ORDER — CEFTRIAXONE 1 G/1
1 INJECTION, POWDER, FOR SOLUTION INTRAMUSCULAR; INTRAVENOUS
Status: COMPLETED | OUTPATIENT
Start: 2020-03-02 | End: 2020-03-02

## 2020-03-02 RX ADMIN — ACETAMINOPHEN 650 MG: 325 TABLET ORAL at 09:03

## 2020-03-02 RX ADMIN — ENOXAPARIN SODIUM 40 MG: 100 INJECTION SUBCUTANEOUS at 06:03

## 2020-03-02 RX ADMIN — AMLODIPINE BESYLATE 2.5 MG: 2.5 TABLET ORAL at 06:03

## 2020-03-02 RX ADMIN — AMLODIPINE BESYLATE 2.5 MG: 2.5 TABLET ORAL at 09:03

## 2020-03-02 RX ADMIN — PREDNISONE 60 MG: 10 TABLET ORAL at 09:03

## 2020-03-02 RX ADMIN — CEFTRIAXONE SODIUM 1 G: 1 INJECTION, POWDER, FOR SOLUTION INTRAMUSCULAR; INTRAVENOUS at 01:03

## 2020-03-02 RX ADMIN — PANTOPRAZOLE SODIUM 40 MG: 40 TABLET, DELAYED RELEASE ORAL at 09:03

## 2020-03-02 RX ADMIN — METOPROLOL SUCCINATE 50 MG: 25 TABLET, EXTENDED RELEASE ORAL at 09:03

## 2020-03-02 RX ADMIN — HUMAN IMMUNOGLOBULIN G 20 G: 20 LIQUID INTRAVENOUS at 05:03

## 2020-03-02 RX ADMIN — FUROSEMIDE 80 MG: 40 TABLET ORAL at 09:03

## 2020-03-02 NOTE — PLAN OF CARE
SW met with patient at bedside who informed SW that she has decided not to use HH at this time. Patient expressed that when she gets home with niece, if they determine she needs help, they will make contact with HH agencies provided and coordinate care on their own.     SW will continue to coordinate with patient, family, team and insurance to complete patient's discharge plan.       03/02/20 1432   Post-Acute Status   Post-Acute Authorization Home Health/Hospice   Home Health/Hospice Status Patient/Family Changed Mind     Jolie Swenson LMSW   - Case Management

## 2020-03-02 NOTE — PLAN OF CARE
Pt aaox4. Bed in low and locked position.  Nonskid socks in use.  Call bell, phone, food, drink within reach in bed or on bedside table.  Bedside commode in use.  Able to move to commode by self.  Aware to call if needing assistance.  Denies pain.  Tele in use.  IGG #4 of 5 to be given this afternoon. IVP rocephin last dose this afternoon.  Plan for dc tues

## 2020-03-02 NOTE — PLAN OF CARE
-Pt AAOx4  -Vital signs stable  -Telemetry monitoring NSR   -Received dose 4 of 5 if IVIG 3/1  -Rocephin continued for UTI  -Fall precautions maintained, non skid socks worn  -No acute events/falls/injuries this shift  -Pt aware to call for help with ambulating.    -Bed lowered, locked, siderails up x2, and call bell in reach.    See flowsheet for assessment findings.  Will continue to monitor pt.

## 2020-03-02 NOTE — PLAN OF CARE
Pt aaox4. Bed in low and locked position.  Nonskid socks in use.  Call bell, phone, food, drink within reach in bed or on bedside table.  Bedside commode in use.  Able to move to commode by self.  Aware to call if needing assistance.  Denies pain.  Tele in use.  IGG #5 of 5 to be given this afternoon. IVP rocephin last dose this afternoon.  Plan for dc lizeth

## 2020-03-02 NOTE — PLAN OF CARE
Patient seen and examined at bed side  She feels much better today,  and denied of any chest pain, shortness of breath fever or chills.    New onset ITP : Her platelet count markedly improved to 120 K today.  Status post IVIG day 5/5 and 60 mg of prednisone daily.     Plan  1.  Start prednisone taper to 40 mg daily for the next 2 weeks, 20 mg daily for 2 weeks, 10 mg for 2 weeks and monitoring platelet count.   2.  Start her on PJP prophylaxis as she is on prednisone for more than 4 weeks  3.  She will need outpatient hematology follow-up.  Spoke to Angelic, patient's niece who is going to arrange a follow-up visit with Dr. Martini at [facility] after discharge.     Plan of care discussed with Dr. Kamron Ruff MD   Hematology/Oncology

## 2020-03-02 NOTE — PLAN OF CARE
SW met with patient at bedside and reviewed recommendation for HH upon d/c. Patient stated that she lives with her niece and would like to review the list of in-network providers with her niece prior to picking one. Patient requested SW meet with her after niece comes at noon today, 3/2/20.    SW reviewed recommendation for rolling walker with Naya TUBBS. Naya entered the order of the DME needed.     SW reviewed d/c plan with patient's assigned nurse. Patient will be ready for discharge at the earliest on 3/3/20.     IAN will continue to coordinate with patient, family, team and insurance to complete patient's discharge plan.       03/02/20 1058   Post-Acute Status   Post-Acute Authorization Home Health/Hospice   Home Health/Hospice Status   (Patient List Provided)   Other Status   (DME (RW) Ordered)     Jolie Swenson LMSW   - Case Management

## 2020-03-03 VITALS
HEART RATE: 70 BPM | SYSTOLIC BLOOD PRESSURE: 133 MMHG | DIASTOLIC BLOOD PRESSURE: 63 MMHG | WEIGHT: 133.38 LBS | OXYGEN SATURATION: 95 % | RESPIRATION RATE: 19 BRPM | TEMPERATURE: 98 F | BODY MASS INDEX: 22.22 KG/M2 | HEIGHT: 65 IN

## 2020-03-03 LAB
ALBUMIN SERPL BCP-MCNC: 2.5 G/DL (ref 3.5–5.2)
ALP SERPL-CCNC: 41 U/L (ref 55–135)
ALT SERPL W/O P-5'-P-CCNC: 12 U/L (ref 10–44)
ANION GAP SERPL CALC-SCNC: 8 MMOL/L (ref 8–16)
AST SERPL-CCNC: 12 U/L (ref 10–40)
BASOPHILS # BLD AUTO: 0.01 K/UL (ref 0–0.2)
BASOPHILS NFR BLD: 0.1 % (ref 0–1.9)
BILIRUB SERPL-MCNC: 0.5 MG/DL (ref 0.1–1)
BUN SERPL-MCNC: 36 MG/DL (ref 8–23)
CALCIUM SERPL-MCNC: 8.6 MG/DL (ref 8.7–10.5)
CHLORIDE SERPL-SCNC: 93 MMOL/L (ref 95–110)
CO2 SERPL-SCNC: 35 MMOL/L (ref 23–29)
CREAT SERPL-MCNC: 0.8 MG/DL (ref 0.5–1.4)
DIFFERENTIAL METHOD: ABNORMAL
EOSINOPHIL # BLD AUTO: 0 K/UL (ref 0–0.5)
EOSINOPHIL NFR BLD: 0.5 % (ref 0–8)
ERYTHROCYTE [DISTWIDTH] IN BLOOD BY AUTOMATED COUNT: 12.1 % (ref 11.5–14.5)
EST. GFR  (AFRICAN AMERICAN): >60 ML/MIN/1.73 M^2
EST. GFR  (NON AFRICAN AMERICAN): >60 ML/MIN/1.73 M^2
GLUCOSE SERPL-MCNC: 86 MG/DL (ref 70–110)
H PYLORI AG STL QL IA: NOT DETECTED
HCT VFR BLD AUTO: 39 % (ref 37–48.5)
HGB BLD-MCNC: 12.3 G/DL (ref 12–16)
IMM GRANULOCYTES # BLD AUTO: 0.08 K/UL (ref 0–0.04)
IMM GRANULOCYTES NFR BLD AUTO: 1 % (ref 0–0.5)
LYMPHOCYTES # BLD AUTO: 1.2 K/UL (ref 1–4.8)
LYMPHOCYTES NFR BLD: 14.7 % (ref 18–48)
MAGNESIUM SERPL-MCNC: 1.8 MG/DL (ref 1.6–2.6)
MCH RBC QN AUTO: 31.6 PG (ref 27–31)
MCHC RBC AUTO-ENTMCNC: 31.5 G/DL (ref 32–36)
MCV RBC AUTO: 100 FL (ref 82–98)
MONOCYTES # BLD AUTO: 0.9 K/UL (ref 0.3–1)
MONOCYTES NFR BLD: 10.7 % (ref 4–15)
NEUTROPHILS # BLD AUTO: 6.2 K/UL (ref 1.8–7.7)
NEUTROPHILS NFR BLD: 73 % (ref 38–73)
NRBC BLD-RTO: 0 /100 WBC
PHOSPHATE SERPL-MCNC: 3.3 MG/DL (ref 2.7–4.5)
PLATELET # BLD AUTO: 155 K/UL (ref 150–350)
PMV BLD AUTO: 10.2 FL (ref 9.2–12.9)
POTASSIUM SERPL-SCNC: 3.6 MMOL/L (ref 3.5–5.1)
PROT SERPL-MCNC: 7.8 G/DL (ref 6–8.4)
RBC # BLD AUTO: 3.89 M/UL (ref 4–5.4)
SODIUM SERPL-SCNC: 136 MMOL/L (ref 136–145)
VIT B6 SERPL-MCNC: 8 UG/L (ref 2–32.8)
WBC # BLD AUTO: 8.42 K/UL (ref 3.9–12.7)

## 2020-03-03 PROCEDURE — 36415 COLL VENOUS BLD VENIPUNCTURE: CPT

## 2020-03-03 PROCEDURE — 25000003 PHARM REV CODE 250: Performed by: INTERNAL MEDICINE

## 2020-03-03 PROCEDURE — 84100 ASSAY OF PHOSPHORUS: CPT

## 2020-03-03 PROCEDURE — 97116 GAIT TRAINING THERAPY: CPT

## 2020-03-03 PROCEDURE — 25000003 PHARM REV CODE 250: Performed by: STUDENT IN AN ORGANIZED HEALTH CARE EDUCATION/TRAINING PROGRAM

## 2020-03-03 PROCEDURE — 85025 COMPLETE CBC W/AUTO DIFF WBC: CPT

## 2020-03-03 PROCEDURE — 99239 HOSP IP/OBS DSCHRG MGMT >30: CPT | Mod: ,,, | Performed by: INTERNAL MEDICINE

## 2020-03-03 PROCEDURE — 63600175 PHARM REV CODE 636 W HCPCS: Performed by: INTERNAL MEDICINE

## 2020-03-03 PROCEDURE — 99239 PR HOSPITAL DISCHARGE DAY,>30 MIN: ICD-10-PCS | Mod: ,,, | Performed by: INTERNAL MEDICINE

## 2020-03-03 PROCEDURE — 80053 COMPREHEN METABOLIC PANEL: CPT

## 2020-03-03 PROCEDURE — 83735 ASSAY OF MAGNESIUM: CPT

## 2020-03-03 RX ORDER — FUROSEMIDE 40 MG/1
40 TABLET ORAL DAILY
Qty: 30 TABLET | Refills: 0 | Status: ON HOLD | OUTPATIENT
Start: 2020-03-03 | End: 2020-09-23 | Stop reason: SDUPTHER

## 2020-03-03 RX ORDER — METOPROLOL SUCCINATE 50 MG/1
50 TABLET, EXTENDED RELEASE ORAL 2 TIMES DAILY
Qty: 60 TABLET | Refills: 0 | Status: ON HOLD | OUTPATIENT
Start: 2020-03-03 | End: 2020-09-15 | Stop reason: HOSPADM

## 2020-03-03 RX ORDER — PANTOPRAZOLE SODIUM 40 MG/1
40 TABLET, DELAYED RELEASE ORAL DAILY
Qty: 30 TABLET | Refills: 0 | Status: ON HOLD | OUTPATIENT
Start: 2020-03-04 | End: 2020-09-23 | Stop reason: SDUPTHER

## 2020-03-03 RX ORDER — SULFAMETHOXAZOLE AND TRIMETHOPRIM 800; 160 MG/1; MG/1
1 TABLET ORAL DAILY
Qty: 42 TABLET | Refills: 1 | Status: SHIPPED | OUTPATIENT
Start: 2020-03-04 | End: 2020-04-15

## 2020-03-03 RX ORDER — FUROSEMIDE 80 MG/1
80 TABLET ORAL DAILY
Qty: 30 TABLET | Refills: 0 | Status: CANCELLED | OUTPATIENT
Start: 2020-03-04 | End: 2021-03-04

## 2020-03-03 RX ORDER — PREDNISONE 20 MG/1
TABLET ORAL
Qty: 47 TABLET | Refills: 0 | Status: SHIPPED | OUTPATIENT
Start: 2020-03-04 | End: 2020-04-14

## 2020-03-03 RX ADMIN — SULFAMETHOXAZOLE AND TRIMETHOPRIM 1 TABLET: 800; 160 TABLET ORAL at 09:03

## 2020-03-03 RX ADMIN — PREDNISONE 40 MG: 10 TABLET ORAL at 09:03

## 2020-03-03 RX ADMIN — PANTOPRAZOLE SODIUM 40 MG: 40 TABLET, DELAYED RELEASE ORAL at 09:03

## 2020-03-03 RX ADMIN — FUROSEMIDE 80 MG: 40 TABLET ORAL at 09:03

## 2020-03-03 RX ADMIN — METOPROLOL SUCCINATE 50 MG: 25 TABLET, EXTENDED RELEASE ORAL at 09:03

## 2020-03-03 RX ADMIN — AMLODIPINE BESYLATE 5 MG: 5 TABLET ORAL at 09:03

## 2020-03-03 NOTE — PT/OT/SLP PROGRESS
Physical Therapy Treatment    Patient Name:  Fiona Arteaga   MRN:  [id number]    Recommendations:     Discharge Recommendations:  home with home health   Discharge Equipment Recommendations: walker, rolling   Barriers to discharge: None    Assessment:     Fiona Arteaga is a 87 y.o. female admitted with a medical diagnosis of Idiopathic thrombocytopenic purpura (ITP).  She presents with the following impairments/functional limitations:  weakness, impaired functional mobilty, impaired balance, impaired endurance, gait instability. Pt tolerated treatment well as she performed bed mobility with sup, sit to stand transfer with SBA, and amb in hallway with RW and CGA. PT will require continued skilled PT services to increase her strength, balance, and improve her overall gait quality. Upon D/C, pt will received recommendations for a RW and HHPT.    Rehab Prognosis: Good; patient would benefit from acute skilled PT services to address these deficits and reach maximum level of function.    Recent Surgery: * No surgery found *      Plan:     During this hospitalization, patient to be seen 3 x/week to address the identified rehab impairments via gait training, therapeutic activities, therapeutic exercises and progress toward the following goals:    · Plan of Care Expires:  03/27/20    Subjective     Chief Complaint: didn't sleep well/tired  Patient/Family Comments/goals: to go home  Pain/Comfort:  · Pain Rating 1: (not reported)      Objective:     Communicated with nurse prior to session.  Patient found supine with telemetry, pulse ox (continuous) upon PT entry to room.     General Precautions: Standard, fall   Orthopedic Precautions:N/A   Braces: N/A     Functional Mobility:  · Bed Mobility:     · Rolling Right: supervision  · Supine to Sit: supervision  · Sit to Supine: supervision  · Transfers:     · Sit to Stand:  contact guard assistance with rolling walker, but pt's leg were resting on bed while  standing.  · Gait: amb 115' with CGA and RW. Pt demonstrated decreased frankie, VC to remain close to RW, 2 standing rest breaks, step-through gait pattern, and multiple small steps to turn.  · Balance: sitting: fair+, with amb: fair-      AM-PAC 6 CLICK MOBILITY  Turning over in bed (including adjusting bedclothes, sheets and blankets)?: 4  Sitting down on and standing up from a chair with arms (e.g., wheelchair, bedside commode, etc.): 3  Moving from lying on back to sitting on the side of the bed?: 4  Moving to and from a bed to a chair (including a wheelchair)?: 3  Need to walk in hospital room?: 3  Climbing 3-5 steps with a railing?: 3  Basic Mobility Total Score: 20       Therapeutic Activities and Exercises:   Discussed discharge plans and PT role with pt. She expressed verbal understanding of such.     Patient left sitting right EOB with call button in reach..    GOALS:   Multidisciplinary Problems     Physical Therapy Goals        Problem: Physical Therapy Goal    Goal Priority Disciplines Outcome Goal Variances Interventions   Physical Therapy Goal     PT, PT/OT Ongoing, Progressing     Description:  Goals to be met by: 3/11/2020     Patient will increase functional independence with mobility by performin. Supine to sit with Modified [place]  2. Sit to supine with Modified [place]  3. Sit to stand transfer with Supervision  4. Bed to chair transfer with Supervision using LRAD  5. Gait  x 150 feet with Supervision using LRAD.   6. Lower extremity exercise program x15 reps per handout, with supervision                      Time Tracking:     PT Received On: 20  PT Start Time: 1113     PT Stop Time: 1121  PT Total Time (min): 8 min     Billable Minutes: Gait Training 8    Treatment Type: Treatment  PT/PTA: PT     PTA Visit Number: 0     Bimal Workman [facility]  2020

## 2020-03-03 NOTE — PLAN OF CARE
Problem: Physical Therapy Goal  Goal: Physical Therapy Goal  Description  Goals to be met by: 3/11/2020     Patient will increase functional independence with mobility by performin. Supine to sit with Modified [place]  2. Sit to supine with Modified [place]  3. Sit to stand transfer with Supervision  4. Bed to chair transfer with Supervision using LRAD  5. Gait  x 150 feet with Supervision using LRAD.   6. Lower extremity exercise program x15 reps per handout, with supervision     Outcome: Ongoing, Progressing    Goals reviewed and are appropriate.     Bimal Workman, [facility]  3/3/2020

## 2020-03-03 NOTE — PLAN OF CARE
-Pt AAOx4  -Vital signs stable  -Telemetry monitoring NSR   -Received dose 5 of 5 if IVIG 3/2  -Rocephin continued for UTI  -Possible DC tomorrow  -Fall precautions maintained, non skid socks worn  -No acute events/falls/injuries this shift  -Pt aware to call for help with ambulating.    -Bed lowered, locked, siderails up x2, and call bell in reach.     See flowsheet for assessment findings.  Will continue to monitor pt.

## 2020-03-03 NOTE — PLAN OF CARE
SW met with patient at bedside to ensure she had received required DME for discharge. Patient confirmed and showed SW rolling walker. Patient confirmed with SW that her niece would be picking her up around 17:00. Patient reconfirmed that she did not wish for SW to set up HH and that her and niece would arrange it in the future if they found it to be needed.        03/03/20 1609   Post-Acute Status   Post-Acute Authorization Other   Other Status No Post-Acute Service Needs   Discharge Plan   Discharge Plan A Home with family     Jolie Swenson LMSW   - Case Management

## 2020-03-03 NOTE — PLAN OF CARE
Patient to discharge to home today. Angelic, patient's niece, will arrive in about 2 hours to transport her home.     Spoke with Angelic regarding dc plan - she has spoken with hematology and has the information to set up a f/u appointment with Dr. Martini at [facility].      PCP - Dr. Kaitlynn Borrero to f/u in 2 weeks.  [id number] [place] [address]    Cards referral - discharging on Apixaban.

## 2020-03-04 LAB
PLAT GP IA/IIA IGG BLD QL IA: NORMAL
PLAT GP IB/IX IGG BLD QL IA: NORMAL
PLAT GP IIB/IIIA IGG BLD QL IA: NORMAL

## 2020-03-04 NOTE — NURSING
Patient discharged home as ordered. VSS. IV removed, catheter intact. Patient and niece educated on discharge handout including upcoming appointments and medications. Patient verbalized understanding and denied any further questions at the time. Patient left unit via wheelchair accompanied by this RN and family. Patient left with all personal belongings, home walker, and discharge handout. Medications picked up from pharmacy. Visi and tele monitor removed and placed in proper area.

## 2020-03-05 NOTE — HOSPITAL COURSE
Patient transferred for cardiology evaluation for surgical intervention given severe mitral regurgitation, diuresed well and deemed stable for stepdown to hospital medicine. On 2/27/20 patient with worsening metabolic alkalosis 2/2 diuresis, lasix held and acetazolamide given. Patient also complaint of flank pain and odor to urine, CT renal stone study with mild hydronephrosis, U/A consistent with acute cystitis and ceftriaxone started. UCx - pan-sensitive proteus mirabilis. Completed 5 days ceftrraxone. Hematology Oncology following for thrombocytopenia, counts slowly improving with prednisone. Patient started on IVIG on 2/27/20 due to platelets not steadily improving with plan for 5 day course and then possible bone marrow biopsy. On 3/2 completed 5th IVIG. The next day plt normal at 155k. Hematology recommending 6 wk pred taper as well as ppx bactrim. Discharged on ppi for gi ppx and apixaban for new afib. euvolemic on discharge to continue on lasix 40mg po daily and f/u with cards.

## 2020-03-05 NOTE — DISCHARGE SUMMARY
[facility] Hospital Medicine  Discharge Summary      Patient Name: Fiona Arteaga  MRN: [id number]  Admission Date: 2/24/2020  Hospital Length of Stay: 8 days  Discharge Date and Time:  03/04/2020 9:56 PM  Attending Physician: Opal att. providers found   Discharging Provider: Ely Teran MD  Primary Care Provider: Primary Doctor Opal  [facility] Medicine Team: [facility] [place] Ely Teran MD    HPI:   Ms. Arteaga is an 87yF who was sent from [facility] for evaluation of mitral valve regurgitation. She has a medical history of HTN. She is independent with ADLs at baseline. Yesterday she states she was drinking coffee in the AM and developed acute SOB. Also reports increased LE swelling and mild exertional dyspnea past 1 week. Denies chest pain. Went to ED and found to be in Afib with RVR. Also noted to have thrombocytopenia (was being worked up by Hematology there, s/p platelet transfusion and daily prednisone). She received 500 mcg of digoxin x2, IV furosemide 40 mg, hydralazine 50 mg p.o., IV labetalol and nitroglycerin patch. Sent to the ICU there with Bipap for intermittent respiratory failure. Bilateral lower extremity ultrasound was negative for DVT.  CTA chest was negative for pulmonary embolus but was consistent with pulmonary edema and pleural effusion.  Echocardiogram revealed severe mitral regurgitation with normal LVEF.Transferred for higher level of care.        * No surgery found *      Hospital Course:   Patient transferred for cardiology evaluation for surgical intervention given severe mitral regurgitation, diuresed well and deemed stable for stepdown to hospital medicine. On 2/27/20 patient with worsening metabolic alkalosis 2/2 diuresis, lasix held and acetazolamide given. Patient also complaint of flank pain and odor to urine, CT renal stone study with mild hydronephrosis, U/A consistent with acute cystitis and ceftriaxone started. UCx -  pan-sensitive proteus mirabilis. Completed 5 days ceftrraxone. Hematology Oncology following for thrombocytopenia, counts slowly improving with prednisone. Patient started on IVIG on 2/27/20 due to platelets not steadily improving with plan for 5 day course and then possible bone marrow biopsy. On 3/2 completed 5th IVIG. The next day plt normal at 155k. Hematology recommending 6 wk pred taper as well as ppx bactrim. Discharged on ppi for gi ppx and apixaban for new afib. euvolemic on discharge to continue on lasix 40mg po daily and f/u with cards.      Consults:   Consults (From admission, onward)        Status Ordering Provider     Inpatient consult to Hematology  Once     Provider:  (Not yet assigned)    Completed JASPER GARAY          No new Assessment & Plan notes have been filed under this hospital service since the last note was generated.  Service: Hospital Medicine    Final Active Diagnoses:    Diagnosis Date Noted POA    PRINCIPAL PROBLEM:  Idiopathic thrombocytopenic purpura (ITP) [D69.3] 02/26/2020 Yes    Constipation [K59.00] 02/28/2020 No    Metabolic alkalosis [E87.3] 02/27/2020 No    Acute cystitis [N30.00] 02/27/2020 No    (HFpEF) heart failure with preserved ejection fraction [I50.30] 02/24/2020 Yes    Heart failure, chronic, with acute decompensation [I50.9] 02/24/2020 Yes    MR (mitral regurgitation) [I34.0] 02/23/2020 Yes    Atrial fibrillation with rapid ventricular response [I48.91] 02/22/2020 Yes    Benign essential hypertension [I10] 02/22/2020 Yes    Thrombocytopenia [D69.6] 02/22/2020 Yes      Problems Resolved During this Admission:       Discharged Condition: stable    Disposition: Home or Self Care    Follow Up:  Follow-up Information     Schedule an appointment as soon as possible for a visit with Primary Doctor No.               Patient Instructions:      WALKER FOR HOME USE     Order Specific Question Answer Comments   Type of Walker: Rollator    With wheels? Yes   "  Height: 5' 5" (1.651 m)    Weight: 60 kg (132 lb 4.4 oz)    Length of need (1-99 months): 99    Does patient have medical equipment at home? none    Please check all that apply: Patient's condition impairs ambulation.    Vendor: [facility]    Expected Date of Delivery: 3/3/2020      Ambulatory referral/consult to Cardiology   Standing Status: Future   Referral Priority: Routine Referral Type: Consultation   Referral Reason: Specialty Services Required   Requested Specialty: Cardiology   Number of Visits Requested: 1     Ambulatory referral/consult to Hematology / Oncology   Standing Status: Future   Referral Priority: Routine Referral Type: Consultation   Referral Reason: Specialty Services Required   Requested Specialty: Hematology and Oncology   Number of Visits Requested: 1     Diet Cardiac     Diet Cardiac     Notify your health care provider if you experience any of the following:  temperature >100.4     Notify your health care provider if you experience any of the following:  redness, tenderness, or signs of infection (pain, swelling, redness, odor or green/yellow discharge around incision site)     Notify your health care provider if you experience any of the following:  persistent dizziness, light-headedness, or visual disturbances     Notify your health care provider if you experience any of the following:  increased confusion or weakness     Notify your health care provider if you experience any of the following:  persistent dizziness, light-headedness, or visual disturbances     Notify your health care provider if you experience any of the following:  increased confusion or weakness     Notify your health care provider if you experience any of the following:  redness, tenderness, or signs of infection (pain, swelling, redness, odor or green/yellow discharge around incision site)     Notify your health care provider if you experience any of the following:  persistent nausea and vomiting or diarrhea "     Notify your health care provider if you experience any of the following:  temperature >100.4     Activity as tolerated     Activity as tolerated       Significant Diagnostic Studies: Labs: All labs within the past 24 hours have been reviewed    Pending Diagnostic Studies:     None         Medications:  Reconciled Home Medications:      Medication List      START taking these medications    Eliquis 2.5 mg Tab  Generic drug:  apixaban  Take 1 tablet (2.5 mg total) by mouth 2 (two) times daily.     furosemide 40 MG tablet  Commonly known as:  LASIX  Take 1 tablet (40 mg total) by mouth once daily.     metoprolol succinate 50 MG 24 hr tablet  Commonly known as:  TOPROL-XL  Take 1 tablet (50 mg total) by mouth 2 (two) times daily.     pantoprazole 40 MG tablet  Commonly known as:  PROTONIX  Take 1 tablet (40 mg total) by mouth once daily.     predniSONE 20 MG tablet  Commonly known as:  DELTASONE  Take 2 tablets (40 mg total) by mouth once daily for 13 days, THEN 1 tablet (20 mg total) once daily for 14 days, THEN 0.5 tablets (10 mg total) once daily for 14 days.  Start taking on:  March 4, 2020     sulfamethoxazole-trimethoprim 800-160mg 800-160 mg Tab  Commonly known as:  BACTRIM DS  Take 1 tablet by mouth once daily.        CONTINUE taking these medications    amLODIPine 2.5 MG tablet  Commonly known as:  NORVASC  Take 2.5 mg by mouth once daily.     lisinopril 40 MG tablet  Commonly known as:  PRINIVIL,ZESTRIL  Take 40 mg by mouth once daily.            Indwelling Lines/Drains at time of discharge:   Lines/Drains/Airways     None                 Time spent on the discharge of patient: 35 minutes  Patient was seen and examined on the date of discharge and determined to be suitable for discharge.         Ely Teran MD  Department of Hospital Medicine  [facility]

## 2020-03-05 NOTE — HPI
Ms. Arteaga is an 87yF who was sent from [facility] for evaluation of mitral valve regurgitation. She has a medical history of HTN. She is independent with ADLs at baseline. Yesterday she states she was drinking coffee in the AM and developed acute SOB. Also reports increased LE swelling and mild exertional dyspnea past 1 week. Denies chest pain. Went to ED and found to be in Afib with RVR. Also noted to have thrombocytopenia (was being worked up by Hematology there, s/p platelet transfusion and daily prednisone). She received 500 mcg of digoxin x2, IV furosemide 40 mg, hydralazine 50 mg p.o., IV labetalol and nitroglycerin patch. Sent to the ICU there with Bipap for intermittent respiratory failure. Bilateral lower extremity ultrasound was negative for DVT.  CTA chest was negative for pulmonary embolus but was consistent with pulmonary edema and pleural effusion.  Echocardiogram revealed severe mitral regurgitation with normal LVEF.Transferred for higher level of care.

## 2020-03-10 LAB — ADAMTS13 EVALUATION COMMENT 2: NORMAL

## 2020-03-11 ENCOUNTER — DOCUMENTATION ONLY (OUTPATIENT)
Dept: EMERGENCY MEDICINE | Facility: HOSPITAL | Age: 85
End: 2020-03-11

## 2020-08-27 NOTE — PROGRESS NOTES
[facility] AMBULATORY ENCOUNTER  FAMILY PRACTICE OFFICE VISIT    CHIEF COMPLAINT:    Chief Complaint   Patient presents with   • Establish Care     Rash that started about four months ago located on legs and feet. has been to affiliated dermatology and they thought it was an allergy. Reduces itching.    • Office Visit       SUBJECTIVE:  Juan Jefferson is a 41 year old male who presented requesting evaluation for rash.  Has rash that continues to come and go, started in May after he was wading in his basement that flooded.  Red spots come and go, sometimes as single lesions, sometimes congregating.  Occasionally itchy.  Always below the knee.  Saw dermatologist and was given triamcinolone cream, told to take 2 Zyrtec daily.  Zyrtec did not seem to change occurrences, discontinued that.  Still using triamcinolone but is questionably helping.     Patient Care Team:  Lazaro Craig DO as PCP - General (Family Practice)     REVIEW OF SYSTEMS: negative unless mentioned above  Constitutional: Negative for fever and chills.   Skin:  As above  HEENT: Negative for eye drainage, rhinorrhea, ear pain or sore throat.  Respiratory: Negative for cough, wheezing or shortness of breath.    Cardiovascular: Negative for chest pain, chest pressure, palpitations or diaphoresis.   Gastrointestinal: Negative for nausea, vomiting, diarrhea or abdominal pain.   Genitourinary: Negative for dysuria, urgency, frequency, hematuria or flank pain.  Extremities: Negative for joint swelling or joint pain.  Neurologic: Negative for change in sensory or motor function.  Negative for headache.  Psychiatric: Negative for change in affect, change in mentation or sleep disturbance.     OBJECTIVE:  PROBLEM LIST:   Patient Active Problem List   Diagnosis   • Testicular/scrotal pain       PAST HISTORIES:   I have reviewed the past medical history, family history, social history, medications and allergies listed in the medical record as obtained by my  Hospital Medicine  Progress Note      Patient Name: Fiona Arteaga  MRN:  [id number]  [facility] Medicine Team: [facility] R Ely Teran MD  Date of Admission:  2/24/2020     Length of Stay:  LOS: 7 days   Expected Discharge Date: 3/3/2020  Principal Problem:  Idiopathic thrombocytopenic purpura (ITP)      Subjective:    Interval History/Overnight Events:  Tolerated day 4 IVIG yesterday and scheduled for day 5 of 5 today. Thrombocytopenia continues to improve. Pt stating she does not want BM bx. Hematology team still to round on pt. Will f/u on final recs. On day 5 of 5 abx for proteus UTI.     Review of Systems:  Constitutional: Negative for chills, fatigue, fever.   Respiratory: Negative for cough, shortness of breath.    Cardiovascular: Negative for chest pain, palpitations, leg swelling.   Gastrointestinal: Negative for abdominal pain, nausea, vomiting, diarrhea, constipation.   Genitourinary: +dysuria (improving)  All other systems reviewed and are negative.      Objective:    Physical Exam:  Temp:  [97.1 °F (36.2 °C)-98.3 °F (36.8 °C)]   Pulse:  [58-83]   Resp:  [17-31]   BP: (126-185)/(61-87)   SpO2:  [92 %-99 %]     Constitutional: Appears comfortable, in no acute distress   Eyes: No scleral icterus or eye discharge  Cardiovascular: Normal heart rate.  Regular heart rhythm.  +murmur.  Pulmonary/Chest: Effort normal and breath sounds normal. No respiratory distress. No wheezes, rales, or rhonchi  Abdominal: Soft. Bowel sounds are normal.  No distension.  No tenderness  Musculoskeletal: No deformities.  No edema.   Neurological: Alert.  Oriented to person, place, and time.   Skin: Skin is warm and dry.       Assessment and Plan:  Patient transferred for cardiology evaluation for surgical intervention given severe mitral regurgitation, diuresed well and deemed stable for stepdown to hospital medicine. On 2/27/20 patient with worsening metabolic alkalosis 2/2 diuresis, lasix held and  acetazolamide given. Patient also complaint of flank pain and odor to urine, CT renal stone study with mild hydronephrosis, U/A consistent with acute cystitis and ceftriaxone started. UCx - pan-sensitive proteus mirabilis. Hematology Oncology following for thrombocytopenia, counts slowly improving with prednisone. Patient started on IVIG on 2/27/20 due to platelets not steadily improving with plan for 5 day course and then possible bone marrow biopsy.     * (HFpEF) heart failure with preserved ejection fraction  Ms. Arteaga is an 87yF who [facility]. Yesterday AM developed acute SOB. Also reports increased LE swelling and mild exertional dyspnea past 1 week. Denies chest pain. Went to ED and found to be in Afib with RVR. She received 500 mcg of digoxin x2, IV furosemide 40 mg, hydralazine 50 mg, IV labetalol and nitroglycerin patch. Sent to the ICU there with Bipap for intermittent respiratory failure. Bilateral lower extremity ultrasound was negative for DVT. CTA chest was negative for pulmonary embolus but was consistent with pulmonary edema and pleural effusion. BNP elevated. Echocardiogram revealed severe mitral regurgitation with normal LVEF.     · Concentric left ventricular remodeling.  · Normal left ventricular systolic function. The estimated ejection fraction is 65%.  · Indeterminate left ventricular diastolic function.  · No wall motion abnormalities.  · Normal right ventricular systolic function.  · Mild left atrial enlargement.  · There is mild leaflet calcification of the Mitral Valve.  · Mild mitral sclerosis.  · Moderate-to-severe mitral regurgitation.  · Mild tricuspid regurgitation.  · Intermediate central venous pressure (8 mmHg).  · The estimated PA systolic pressure is 29 mmHg.     -Metoprolol 12.5 QID changed to Toprol XL 50 mg BID for rate control for Afib  -BP control with Amlodipine 2.5 mg daily  -Ins/Outs, daily weights  -Will hold  nursing staff and support staff and agree with their documentation.  Current Outpatient Medications   Medication Sig Dispense Refill   • triamcinolone (ARISTOCORT) 0.1 % cream Apply topically 2 times daily. 60 g 1   • ketoconazole (NIZORAL) 2 % cream Apply topically daily. 60 g 1   • mupirocin (BACTROBAN) 2 % cream Apply topically 3 times daily. 60 g 1   • omeprazole (PRILOSEC) 20 MG capsule Take 1 capsule by mouth daily. 30 capsule 2     No current facility-administered medications for this visit.      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 Jain by esophageal manometry    • No significant active problems    • Penile lesion    • Perineal abscess    • RB (rectal bleeding)    • Scrotal varices    • Testicular microlithiasis    • Urethritis    • Wears contact lenses      Social History     Tobacco Use   Smoking Status Former Smoker   • Packs/day: 0.00   Smokeless Tobacco Former User   • Quit date: 5/30/1999     Family History   Problem Relation Age of Onset   • Diabetes Father    • Cancer, Prostate Neg Hx      Past Surgical History:   Procedure Laterality Date   • Colonoscopy  09/24/2019    dr hernandez   • Esophagogastroduodenoscopy  09/24/2019    dr hernandez   • Incise and drain abscess      perineal abscess   • Soft tissue mass excision Right 2015    Right groin area (fibrous tissue removal)       PHYSICAL EXAM:   Vital Signs:    Visit Vitals  /80   Pulse 66   Resp 16   Ht 6' 3\" (1.905 m)   Wt 106 kg   SpO2 99%   BMI 29.21 kg/m²     General:   Alert, cooperative, conversive in no acute distress.  Skin:  Multiple varying lesions on the bilateral lower extremities.  Some small erythematous papules.  Patient showed me pictures of in occurrence on the dorsum of his left foot with many small red papules close together.  Back:   Normal alignment.  Good range  lasix 2/27/20 as patient with worsening metabolic contraction alkalosis  -started lasix 40 PO daily on 2/28/20 however not much urine output, increased to lasix 80 PO daily for 2/29/20     Metabolic Alkalosis  - Patient with worsening metabolic alkalosis 2/2 diuresis 2/27/20, lasix held and acetazolamide given.   - improved with acetazolamide, resumed lasix on 2/28/20    Acute cystitis  - complaint of flank pain and odor to urine, CT renal stone study with mild hydronephrosis  - U/A consistent with acute cystitis and ceftriaxone started.  -UCX- pan-sensitive proteus mirabilis  - day 4/ 5 ceftriaxone    MR (mitral regurgitation)  Pt with moderate-severe MR. Likely functional MR. Also has HFpEF and Afib with RVR.      - According to Dr. Fernandez, pt has no indications for any valvular intervention (Papa lr)  - Continue diuresis, BP control, Afib control         Benign essential hypertension  Started on home Amlodipine 2.5 mg daily,    --increase to 5mg today         Atrial fibrillation with rapid ventricular response  Found to be in paroxysmal Afib with RVR at outside Hospital.   CHADS2-VASC of 5 (7.2% risk CVA). HAS-BLED 3 (high risk of bleeding).      - Will hold on AG currently in setting of thrombocytopenia with platelets at 13-->17 on admission  - Rate control with Metoprolol 12.5 QID-->Metoprolol XL 50 mg BID  - Now in sinus rhythm with PVCs  - TSH wnl   - awaiting hematology recs regarding BM bx prior to restarting apixaban     Thrombocytopenia  Pt presented to [facility] and found to have thrombocytopenia. Per pt, states she was never told she has this before.   Hematology followed at outside hospital, work-up pending. Started on prednisone 20 mg QID and s/p platelet transfusion prior to transfer. Per their notes, consider IVIG if needed. B12 elevated on outside labs. Denies alcohol use. LFTs wnl.      - Hematology consulted here on admit, considering IVIG, can give extra dose lasix if  of motion without pain.   Neurologic:   Oriented x4.  Cranial nerves II-XII are intact.  No focal deficits or lateralizing signs.  Psychiatric:   Cooperative.  Appropriate mood and affect.    LAB RESULTS:   All pertinent laboratory results were reviewed.    ASSESSMENT:   1. Rash        PLAN:     Rash of unsure etiology.  Suspicious of the fact that it started after wading in flooded water.  Will add additional creams, antifungal, antibacterial.  Can continue triamcinolone.  Will consider oral therapies as well.  May be atypical nummular eczema, however patient without any other skin manifestations in the past.    No problem-specific Assessment & Plan notes found for this encounter.    Orders Placed This Encounter   • DISCONTD: triamcinolone (ARISTOCORT) 0.1 % cream   • ketoconazole (NIZORAL) 2 % cream   • triamcinolone (ARISTOCORT) 0.1 % cream   • mupirocin (BACTROBAN) 2 % cream         Return if symptoms worsen or fail to improve.    Medical compliance with plan discussed and risks of non-compliance reviewed. Patient education completed on disease process, etiology & prognosis. Patient expresses understanding of the plan. Proper usage and side effects of medications reviewed & discussed. Return to clinic as clinically indicated as discussed with patient who verbalized understanding of & agreement with the plan    Lazaro Craig DO       large volume needed to be given with IVIG  - Bruising noted on arms, no signs active bleeding currently   - Will monitor with daily CBCs  - Platelets improving  - continue prednisone 1 mg/kg  - IVIG x3 - 2/27/20, 2/28/20, 2/29 with plan for 5 day course    Constipation  - KUB 2/28/20- no obstruction  - miralax daily with PRN senna-doc  - resolved    Dispo: likely home 1-2 days   pending hematology recs    Ely Teran MD  [facility] Medicine  Spectra:  [phone]

## 2020-09-12 ENCOUNTER — HOSPITAL ENCOUNTER (INPATIENT)
Facility: HOSPITAL | Age: 85
LOS: 3 days | Discharge: NURSING, REHAB OR LONG-TERM CARE | DRG: 086 | End: 2020-09-15
Attending: EMERGENCY MEDICINE | Admitting: PSYCHIATRY & NEUROLOGY
Payer: MEDICARE

## 2020-09-12 ENCOUNTER — HOSPITAL ENCOUNTER (EMERGENCY)
Facility: HOSPITAL | Age: 85
Discharge: ANOTHER HOSPITAL | End: 2020-09-12
Attending: EMERGENCY MEDICINE
Payer: MEDICARE

## 2020-09-12 VITALS
SYSTOLIC BLOOD PRESSURE: 146 MMHG | HEIGHT: 60 IN | RESPIRATION RATE: 21 BRPM | DIASTOLIC BLOOD PRESSURE: 67 MMHG | WEIGHT: 127 LBS | TEMPERATURE: 99 F | OXYGEN SATURATION: 93 % | HEART RATE: 121 BPM | BODY MASS INDEX: 24.94 KG/M2

## 2020-09-12 DIAGNOSIS — S32.512A CLOSED FRACTURE OF SUPERIOR RAMUS OF LEFT PUBIS, INITIAL ENCOUNTER: ICD-10-CM

## 2020-09-12 DIAGNOSIS — I62.00 SUBDURAL HEMORRHAGE: Primary | ICD-10-CM

## 2020-09-12 DIAGNOSIS — N17.9 AKI (ACUTE KIDNEY INJURY): ICD-10-CM

## 2020-09-12 DIAGNOSIS — N28.1 BILATERAL RENAL CYSTS: ICD-10-CM
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
## 2020-09-12 DIAGNOSIS — E87.5 HYPERKALEMIA: ICD-10-CM

## 2020-09-12 DIAGNOSIS — S06.5XAA SDH (SUBDURAL HEMATOMA): Primary | ICD-10-CM

## 2020-09-12 DIAGNOSIS — S06.5XAA SUBDURAL HEMATOMA: ICD-10-CM

## 2020-09-12 DIAGNOSIS — K44.9 HIATAL HERNIA: ICD-10-CM

## 2020-09-12 DIAGNOSIS — I10 BENIGN ESSENTIAL HYPERTENSION: ICD-10-CM

## 2020-09-12 DIAGNOSIS — R00.1 BRADYCARDIA: ICD-10-CM

## 2020-09-12 DIAGNOSIS — I16.1 HYPERTENSIVE EMERGENCY: ICD-10-CM

## 2020-09-12 DIAGNOSIS — I49.3 PVC (PREMATURE VENTRICULAR CONTRACTION): ICD-10-CM

## 2020-09-12 PROBLEM — I48.11 LONGSTANDING PERSISTENT ATRIAL FIBRILLATION: Status: ACTIVE | Noted: 2020-09-12

## 2020-09-12 LAB
ABO + RH BLD: NORMAL
ALBUMIN SERPL BCP-MCNC: 3.5 G/DL (ref 3.5–5.2)
ALBUMIN SERPL BCP-MCNC: 4 G/DL (ref 3.5–5.2)
ALP SERPL-CCNC: 54 U/L (ref 55–135)
ALP SERPL-CCNC: 60 U/L (ref 55–135)
ALT SERPL W/O P-5'-P-CCNC: 13 U/L (ref 10–44)
ALT SERPL W/O P-5'-P-CCNC: 16 U/L (ref 10–44)
ANION GAP SERPL CALC-SCNC: 10 MMOL/L (ref 8–16)
ANION GAP SERPL CALC-SCNC: 10 MMOL/L (ref 8–16)
ANION GAP SERPL CALC-SCNC: 9 MMOL/L (ref 8–16)
APTT BLDCRRT: 26.3 SEC (ref 21–32)
AST SERPL-CCNC: 25 U/L (ref 10–40)
AST SERPL-CCNC: 29 U/L (ref 10–40)
BASOPHILS # BLD AUTO: 0.01 K/UL (ref 0–0.2)
BASOPHILS # BLD AUTO: 0.02 K/UL (ref 0–0.2)
BASOPHILS NFR BLD: 0.1 % (ref 0–1.9)
BASOPHILS NFR BLD: 0.2 % (ref 0–1.9)
BILIRUB SERPL-MCNC: 0.4 MG/DL (ref 0.1–1)
BILIRUB SERPL-MCNC: 0.8 MG/DL (ref 0.1–1)
BILIRUB UR QL STRIP: NEGATIVE
BILIRUB UR QL STRIP: NEGATIVE
BLD GP AB SCN CELLS X3 SERPL QL: NORMAL
BUN SERPL-MCNC: 39 MG/DL (ref 8–23)
BUN SERPL-MCNC: 39 MG/DL (ref 8–23)
BUN SERPL-MCNC: 47 MG/DL (ref 8–23)
CALCIUM SERPL-MCNC: 9.1 MG/DL (ref 8.7–10.5)
CALCIUM SERPL-MCNC: 9.1 MG/DL (ref 8.7–10.5)
CALCIUM SERPL-MCNC: 9.3 MG/DL (ref 8.7–10.5)
CHLORIDE SERPL-SCNC: 107 MMOL/L (ref 95–110)
CHLORIDE SERPL-SCNC: 109 MMOL/L (ref 95–110)
CHLORIDE SERPL-SCNC: 109 MMOL/L (ref 95–110)
CHOLEST SERPL-MCNC: 228 MG/DL (ref 120–199)
CHOLEST/HDLC SERPL: 2.5 {RATIO} (ref 2–5)
CLARITY UR REFRACT.AUTO: CLEAR
CLARITY UR: CLEAR
CO2 SERPL-SCNC: 22 MMOL/L (ref 23–29)
CO2 SERPL-SCNC: 22 MMOL/L (ref 23–29)
CO2 SERPL-SCNC: 25 MMOL/L (ref 23–29)
COLOR UR AUTO: ABNORMAL
COLOR UR: YELLOW
CREAT SERPL-MCNC: 1 MG/DL (ref 0.5–1.4)
DIFFERENTIAL METHOD: ABNORMAL
DIFFERENTIAL METHOD: ABNORMAL
EOSINOPHIL # BLD AUTO: 0 K/UL (ref 0–0.5)
EOSINOPHIL # BLD AUTO: 0 K/UL (ref 0–0.5)
EOSINOPHIL NFR BLD: 0 % (ref 0–8)
EOSINOPHIL NFR BLD: 0.1 % (ref 0–8)
ERYTHROCYTE [DISTWIDTH] IN BLOOD BY AUTOMATED COUNT: 12.9 % (ref 11.5–14.5)
ERYTHROCYTE [DISTWIDTH] IN BLOOD BY AUTOMATED COUNT: 13.1 % (ref 11.5–14.5)
EST. GFR  (AFRICAN AMERICAN): 58.1 ML/MIN/1.73 M^2
EST. GFR  (NON AFRICAN AMERICAN): 50.4 ML/MIN/1.73 M^2
ESTIMATED AVG GLUCOSE: 114 MG/DL (ref 68–131)
GLUCOSE SERPL-MCNC: 133 MG/DL (ref 70–110)
GLUCOSE SERPL-MCNC: 141 MG/DL (ref 70–110)
GLUCOSE SERPL-MCNC: 141 MG/DL (ref 70–110)
GLUCOSE UR QL STRIP: NEGATIVE
GLUCOSE UR QL STRIP: NEGATIVE
HBA1C MFR BLD HPLC: 5.6 % (ref 4–5.6)
HCT VFR BLD AUTO: 37.3 % (ref 37–48.5)
HCT VFR BLD AUTO: 37.8 % (ref 37–48.5)
HDLC SERPL-MCNC: 90 MG/DL (ref 40–75)
HDLC SERPL: 39.5 % (ref 20–50)
HGB BLD-MCNC: 11.3 G/DL (ref 12–16)
HGB BLD-MCNC: 12 G/DL (ref 12–16)
HGB UR QL STRIP: ABNORMAL
HGB UR QL STRIP: NEGATIVE
IMM GRANULOCYTES # BLD AUTO: 0.05 K/UL (ref 0–0.04)
IMM GRANULOCYTES # BLD AUTO: 0.06 K/UL (ref 0–0.04)
IMM GRANULOCYTES NFR BLD AUTO: 0.4 % (ref 0–0.5)
IMM GRANULOCYTES NFR BLD AUTO: 0.5 % (ref 0–0.5)
INR PPP: 1 (ref 0.8–1.2)
KETONES UR QL STRIP: NEGATIVE
KETONES UR QL STRIP: NEGATIVE
LDLC SERPL CALC-MCNC: 128 MG/DL (ref 63–159)
LEUKOCYTE ESTERASE UR QL STRIP: NEGATIVE
LEUKOCYTE ESTERASE UR QL STRIP: NEGATIVE
LIPASE SERPL-CCNC: 30 U/L (ref 4–60)
LYMPHOCYTES # BLD AUTO: 0.8 K/UL (ref 1–4.8)
LYMPHOCYTES # BLD AUTO: 0.9 K/UL (ref 1–4.8)
LYMPHOCYTES NFR BLD: 5.9 % (ref 18–48)
LYMPHOCYTES NFR BLD: 7.9 % (ref 18–48)
MAGNESIUM SERPL-MCNC: 2.1 MG/DL (ref 1.6–2.6)
MCH RBC QN AUTO: 30.4 PG (ref 27–31)
MCH RBC QN AUTO: 31.3 PG (ref 27–31)
MCHC RBC AUTO-ENTMCNC: 29.9 G/DL (ref 32–36)
MCHC RBC AUTO-ENTMCNC: 32.2 G/DL (ref 32–36)
MCV RBC AUTO: 102 FL (ref 82–98)
MCV RBC AUTO: 97 FL (ref 82–98)
MONOCYTES # BLD AUTO: 0.4 K/UL (ref 0.3–1)
MONOCYTES # BLD AUTO: 0.8 K/UL (ref 0.3–1)
MONOCYTES NFR BLD: 3.5 % (ref 4–15)
MONOCYTES NFR BLD: 6 % (ref 4–15)
NEUTROPHILS # BLD AUTO: 10.2 K/UL (ref 1.8–7.7)
NEUTROPHILS # BLD AUTO: 11.5 K/UL (ref 1.8–7.7)
NEUTROPHILS NFR BLD: 87.4 % (ref 38–73)
NEUTROPHILS NFR BLD: 88 % (ref 38–73)
NITRITE UR QL STRIP: NEGATIVE
NITRITE UR QL STRIP: NEGATIVE
NONHDLC SERPL-MCNC: 138 MG/DL
NRBC BLD-RTO: 0 /100 WBC
NRBC BLD-RTO: 0 /100 WBC
PH UR STRIP: 5 [PH] (ref 5–8)
PH UR STRIP: 6 [PH] (ref 5–8)
PHOSPHATE SERPL-MCNC: 3.9 MG/DL (ref 2.7–4.5)
PLATELET # BLD AUTO: 225 K/UL (ref 150–350)
PLATELET # BLD AUTO: 240 K/UL (ref 150–350)
PMV BLD AUTO: 9.2 FL (ref 9.2–12.9)
PMV BLD AUTO: 9.4 FL (ref 9.2–12.9)
POTASSIUM SERPL-SCNC: 5.3 MMOL/L (ref 3.5–5.1)
POTASSIUM SERPL-SCNC: 5.4 MMOL/L (ref 3.5–5.1)
POTASSIUM SERPL-SCNC: 5.4 MMOL/L (ref 3.5–5.1)
PROT SERPL-MCNC: 6.7 G/DL (ref 6–8.4)
PROT SERPL-MCNC: 7.2 G/DL (ref 6–8.4)
PROT UR QL STRIP: NEGATIVE
PROT UR QL STRIP: NEGATIVE
PROTHROMBIN TIME: 10 SEC (ref 9–12.5)
RBC # BLD AUTO: 3.72 M/UL (ref 4–5.4)
RBC # BLD AUTO: 3.83 M/UL (ref 4–5.4)
SARS-COV-2 RDRP RESP QL NAA+PROBE: NEGATIVE
SODIUM SERPL-SCNC: 141 MMOL/L (ref 136–145)
SP GR UR STRIP: 1.01 (ref 1–1.03)
SP GR UR STRIP: >=1.03 (ref 1–1.03)
T4 FREE SERPL-MCNC: 1.33 NG/DL (ref 0.71–1.51)
TRIGL SERPL-MCNC: 50 MG/DL (ref 30–150)
TROPONIN I SERPL DL<=0.01 NG/ML-MCNC: 0.06 NG/ML (ref 0.02–0.5)
TSH SERPL DL<=0.005 MIU/L-ACNC: 0.33 UIU/ML (ref 0.4–4)
URN SPEC COLLECT METH UR: ABNORMAL
URN SPEC COLLECT METH UR: ABNORMAL
UROBILINOGEN UR STRIP-ACNC: NEGATIVE EU/DL
WBC # BLD AUTO: 11.64 K/UL (ref 3.9–12.7)
WBC # BLD AUTO: 13.16 K/UL (ref 3.9–12.7)

## 2020-09-12 PROCEDURE — 90471 IMMUNIZATION ADMIN: CPT | Performed by: EMERGENCY MEDICINE

## 2020-09-12 PROCEDURE — 84484 ASSAY OF TROPONIN QUANT: CPT

## 2020-09-12 PROCEDURE — 25000003 PHARM REV CODE 250: Performed by: NURSE PRACTITIONER

## 2020-09-12 PROCEDURE — 85025 COMPLETE CBC W/AUTO DIFF WBC: CPT

## 2020-09-12 PROCEDURE — 93005 ELECTROCARDIOGRAM TRACING: CPT

## 2020-09-12 PROCEDURE — 71260 CT CHEST ABDOMEN PELVIS WITH CONTRAST (XPD): ICD-10-PCS | Mod: 26,,, | Performed by: RADIOLOGY

## 2020-09-12 PROCEDURE — 71260 CT THORAX DX C+: CPT | Mod: 26,,, | Performed by: RADIOLOGY

## 2020-09-12 PROCEDURE — 20000000 HC ICU ROOM

## 2020-09-12 PROCEDURE — 92610 EVALUATE SWALLOWING FUNCTION: CPT

## 2020-09-12 PROCEDURE — U0002 COVID-19 LAB TEST NON-CDC: HCPCS

## 2020-09-12 PROCEDURE — 63600531 PHARM REV CODE 636 NO ALT 250 W HCPCS: Mod: JG | Performed by: EMERGENCY MEDICINE

## 2020-09-12 PROCEDURE — 25000003 PHARM REV CODE 250: Performed by: STUDENT IN AN ORGANIZED HEALTH CARE EDUCATION/TRAINING PROGRAM

## 2020-09-12 PROCEDURE — 96365 THER/PROPH/DIAG IV INF INIT: CPT

## 2020-09-12 PROCEDURE — 72125 CT NECK SPINE W/O DYE: CPT | Mod: TC

## 2020-09-12 PROCEDURE — 74177 CT ABD & PELVIS W/CONTRAST: CPT | Mod: 26,,, | Performed by: RADIOLOGY

## 2020-09-12 PROCEDURE — A4216 STERILE WATER/SALINE, 10 ML: HCPCS | Performed by: EMERGENCY MEDICINE

## 2020-09-12 PROCEDURE — 84443 ASSAY THYROID STIM HORMONE: CPT

## 2020-09-12 PROCEDURE — 63600175 PHARM REV CODE 636 W HCPCS: Performed by: EMERGENCY MEDICINE

## 2020-09-12 PROCEDURE — 72125 CT NECK SPINE W/O DYE: CPT | Mod: 26,,, | Performed by: RADIOLOGY

## 2020-09-12 PROCEDURE — 63600175 PHARM REV CODE 636 W HCPCS: Performed by: NURSE PRACTITIONER

## 2020-09-12 PROCEDURE — 96365 THER/PROPH/DIAG IV INF INIT: CPT | Mod: 59

## 2020-09-12 PROCEDURE — 96375 TX/PRO/DX INJ NEW DRUG ADDON: CPT | Mod: 59

## 2020-09-12 PROCEDURE — 99291 PR CRITICAL CARE, E/M 30-74 MINUTES: ICD-10-PCS | Mod: ,,, | Performed by: NURSE PRACTITIONER

## 2020-09-12 PROCEDURE — 73502 X-RAY EXAM HIP UNI 2-3 VIEWS: CPT | Mod: TC,FY,LT

## 2020-09-12 PROCEDURE — 81003 URINALYSIS AUTO W/O SCOPE: CPT | Mod: 91

## 2020-09-12 PROCEDURE — 71045 XR CHEST 1 VIEW: ICD-10-PCS | Mod: 26,,, | Performed by: RADIOLOGY

## 2020-09-12 PROCEDURE — 81003 URINALYSIS AUTO W/O SCOPE: CPT

## 2020-09-12 PROCEDURE — 97161 PT EVAL LOW COMPLEX 20 MIN: CPT

## 2020-09-12 PROCEDURE — 97166 OT EVAL MOD COMPLEX 45 MIN: CPT

## 2020-09-12 PROCEDURE — 73502 X-RAY EXAM HIP UNI 2-3 VIEWS: CPT | Mod: 26,LT,, | Performed by: RADIOLOGY

## 2020-09-12 PROCEDURE — 97530 THERAPEUTIC ACTIVITIES: CPT

## 2020-09-12 PROCEDURE — C9132 KCENTRA, PER I.U.: HCPCS | Mod: JG | Performed by: EMERGENCY MEDICINE

## 2020-09-12 PROCEDURE — 93010 EKG 12-LEAD: ICD-10-PCS | Mod: ,,, | Performed by: INTERNAL MEDICINE

## 2020-09-12 PROCEDURE — 85025 COMPLETE CBC W/AUTO DIFF WBC: CPT | Mod: 91

## 2020-09-12 PROCEDURE — 83690 ASSAY OF LIPASE: CPT

## 2020-09-12 PROCEDURE — 70450 CT HEAD/BRAIN W/O DYE: CPT | Mod: 26,,, | Performed by: RADIOLOGY

## 2020-09-12 PROCEDURE — 99291 CRITICAL CARE FIRST HOUR: CPT | Mod: 25

## 2020-09-12 PROCEDURE — 85730 THROMBOPLASTIN TIME PARTIAL: CPT

## 2020-09-12 PROCEDURE — 73502 XR HIP 2 VIEW LEFT: ICD-10-PCS | Mod: 26,LT,, | Performed by: RADIOLOGY

## 2020-09-12 PROCEDURE — 80061 LIPID PANEL: CPT

## 2020-09-12 PROCEDURE — 70450 CT HEAD WITHOUT CONTRAST: ICD-10-PCS | Mod: 26,,, | Performed by: RADIOLOGY

## 2020-09-12 PROCEDURE — 99291 CRITICAL CARE FIRST HOUR: CPT | Mod: ,,, | Performed by: NURSE PRACTITIONER

## 2020-09-12 PROCEDURE — 71045 X-RAY EXAM CHEST 1 VIEW: CPT | Mod: TC,FY

## 2020-09-12 PROCEDURE — 93010 ELECTROCARDIOGRAM REPORT: CPT | Mod: ,,, | Performed by: INTERNAL MEDICINE

## 2020-09-12 PROCEDURE — 72125 CT CERVICAL SPINE WITHOUT CONTRAST: ICD-10-PCS | Mod: 26,,, | Performed by: RADIOLOGY

## 2020-09-12 PROCEDURE — 80053 COMPREHEN METABOLIC PANEL: CPT | Mod: 91

## 2020-09-12 PROCEDURE — 63600175 PHARM REV CODE 636 W HCPCS: Performed by: STUDENT IN AN ORGANIZED HEALTH CARE EDUCATION/TRAINING PROGRAM

## 2020-09-12 PROCEDURE — 74177 CT ABD & PELVIS W/CONTRAST: CPT | Mod: TC

## 2020-09-12 PROCEDURE — 99291 CRITICAL CARE FIRST HOUR: CPT | Mod: ,,, | Performed by: EMERGENCY MEDICINE

## 2020-09-12 PROCEDURE — 83036 HEMOGLOBIN GLYCOSYLATED A1C: CPT

## 2020-09-12 PROCEDURE — 25500020 PHARM REV CODE 255: Performed by: EMERGENCY MEDICINE

## 2020-09-12 PROCEDURE — 70450 CT HEAD/BRAIN W/O DYE: CPT | Mod: TC

## 2020-09-12 PROCEDURE — 84439 ASSAY OF FREE THYROXINE: CPT

## 2020-09-12 PROCEDURE — 25000003 PHARM REV CODE 250: Performed by: EMERGENCY MEDICINE

## 2020-09-12 PROCEDURE — 71045 X-RAY EXAM CHEST 1 VIEW: CPT | Mod: 26,,, | Performed by: RADIOLOGY

## 2020-09-12 PROCEDURE — 85610 PROTHROMBIN TIME: CPT

## 2020-09-12 PROCEDURE — 84100 ASSAY OF PHOSPHORUS: CPT

## 2020-09-12 PROCEDURE — 74177 CT CHEST ABDOMEN PELVIS WITH CONTRAST (XPD): ICD-10-PCS | Mod: 26,,, | Performed by: RADIOLOGY

## 2020-09-12 PROCEDURE — 80053 COMPREHEN METABOLIC PANEL: CPT

## 2020-09-12 PROCEDURE — 99291 PR CRITICAL CARE, E/M 30-74 MINUTES: ICD-10-PCS | Mod: ,,, | Performed by: EMERGENCY MEDICINE

## 2020-09-12 PROCEDURE — 90714 TD VACC NO PRESV 7 YRS+ IM: CPT | Performed by: EMERGENCY MEDICINE

## 2020-09-12 PROCEDURE — 86850 RBC ANTIBODY SCREEN: CPT

## 2020-09-12 PROCEDURE — 99285 EMERGENCY DEPT VISIT HI MDM: CPT | Mod: 25

## 2020-09-12 PROCEDURE — 36415 COLL VENOUS BLD VENIPUNCTURE: CPT

## 2020-09-12 PROCEDURE — 83735 ASSAY OF MAGNESIUM: CPT

## 2020-09-12 RX ORDER — METOPROLOL TARTRATE 25 MG/1
25 TABLET, FILM COATED ORAL 2 TIMES DAILY
Status: DISCONTINUED | OUTPATIENT
Start: 2020-09-12 | End: 2020-09-12

## 2020-09-12 RX ORDER — SODIUM CHLORIDE 0.9 % (FLUSH) 0.9 %
10 SYRINGE (ML) INJECTION
Status: DISCONTINUED | OUTPATIENT
Start: 2020-09-12 | End: 2020-09-15 | Stop reason: HOSPADM

## 2020-09-12 RX ORDER — OXYCODONE HYDROCHLORIDE 5 MG/1
5 TABLET ORAL EVERY 6 HOURS PRN
Status: DISCONTINUED | OUTPATIENT
Start: 2020-09-12 | End: 2020-09-15 | Stop reason: HOSPADM

## 2020-09-12 RX ORDER — FENTANYL CITRATE 50 UG/ML
50 INJECTION, SOLUTION INTRAMUSCULAR; INTRAVENOUS
Status: COMPLETED | OUTPATIENT
Start: 2020-09-12 | End: 2020-09-12

## 2020-09-12 RX ORDER — NICARDIPINE HYDROCHLORIDE 0.2 MG/ML
2.5 INJECTION INTRAVENOUS CONTINUOUS
Status: DISCONTINUED | OUTPATIENT
Start: 2020-09-12 | End: 2020-09-12 | Stop reason: HOSPADM

## 2020-09-12 RX ORDER — AMLODIPINE BESYLATE 2.5 MG/1
2.5 TABLET ORAL DAILY
Status: DISCONTINUED | OUTPATIENT
Start: 2020-09-12 | End: 2020-09-12

## 2020-09-12 RX ORDER — PANTOPRAZOLE SODIUM 40 MG/1
40 TABLET, DELAYED RELEASE ORAL DAILY
Status: DISCONTINUED | OUTPATIENT
Start: 2020-09-12 | End: 2020-09-15 | Stop reason: HOSPADM

## 2020-09-12 RX ORDER — FENTANYL CITRATE 50 UG/ML
25 INJECTION, SOLUTION INTRAMUSCULAR; INTRAVENOUS
Status: DISCONTINUED | OUTPATIENT
Start: 2020-09-12 | End: 2020-09-12

## 2020-09-12 RX ORDER — NICARDIPINE HYDROCHLORIDE 0.2 MG/ML
INJECTION INTRAVENOUS
Status: DISCONTINUED
Start: 2020-09-12 | End: 2020-09-12 | Stop reason: HOSPADM

## 2020-09-12 RX ORDER — LEVETIRACETAM 5 MG/ML
500 INJECTION INTRAVASCULAR EVERY 12 HOURS
Status: DISCONTINUED | OUTPATIENT
Start: 2020-09-12 | End: 2020-09-14

## 2020-09-12 RX ORDER — ONDANSETRON 2 MG/ML
4 INJECTION INTRAMUSCULAR; INTRAVENOUS
Status: COMPLETED | OUTPATIENT
Start: 2020-09-12 | End: 2020-09-12

## 2020-09-12 RX ORDER — AMOXICILLIN 250 MG
1 CAPSULE ORAL 2 TIMES DAILY
Status: DISCONTINUED | OUTPATIENT
Start: 2020-09-12 | End: 2020-09-15 | Stop reason: HOSPADM

## 2020-09-12 RX ORDER — AMLODIPINE BESYLATE 5 MG/1
5 TABLET ORAL DAILY
Status: DISCONTINUED | OUTPATIENT
Start: 2020-09-13 | End: 2020-09-13

## 2020-09-12 RX ORDER — LABETALOL HCL 20 MG/4 ML
10 SYRINGE (ML) INTRAVENOUS EVERY 4 HOURS PRN
Status: DISCONTINUED | OUTPATIENT
Start: 2020-09-12 | End: 2020-09-14

## 2020-09-12 RX ORDER — ACETAMINOPHEN 325 MG/1
650 TABLET ORAL EVERY 4 HOURS PRN
Status: DISCONTINUED | OUTPATIENT
Start: 2020-09-12 | End: 2020-09-15 | Stop reason: HOSPADM

## 2020-09-12 RX ORDER — FENTANYL CITRATE 50 UG/ML
12.5 INJECTION, SOLUTION INTRAMUSCULAR; INTRAVENOUS
Status: DISCONTINUED | OUTPATIENT
Start: 2020-09-12 | End: 2020-09-14

## 2020-09-12 RX ORDER — ONDANSETRON 2 MG/ML
4 INJECTION INTRAMUSCULAR; INTRAVENOUS EVERY 8 HOURS PRN
Status: DISCONTINUED | OUTPATIENT
Start: 2020-09-12 | End: 2020-09-15 | Stop reason: HOSPADM

## 2020-09-12 RX ORDER — METOPROLOL TARTRATE 25 MG/1
25 TABLET, FILM COATED ORAL 3 TIMES DAILY
Status: DISCONTINUED | OUTPATIENT
Start: 2020-09-12 | End: 2020-09-13

## 2020-09-12 RX ORDER — NICARDIPINE HYDROCHLORIDE 0.2 MG/ML
5 INJECTION INTRAVENOUS CONTINUOUS
Status: DISCONTINUED | OUTPATIENT
Start: 2020-09-12 | End: 2020-09-13

## 2020-09-12 RX ADMIN — DOCUSATE SODIUM 50MG AND SENNOSIDES 8.6MG 1 TABLET: 8.6; 5 TABLET, FILM COATED ORAL at 08:09

## 2020-09-12 RX ADMIN — TETANUS AND DIPHTHERIA TOXOIDS ADSORBED 0.5 ML: 2; 2 INJECTION INTRAMUSCULAR at 03:09

## 2020-09-12 RX ADMIN — METOPROLOL TARTRATE 25 MG: 25 TABLET, FILM COATED ORAL at 07:09

## 2020-09-12 RX ADMIN — FENTANYL CITRATE 25 MCG: 50 INJECTION INTRAMUSCULAR; INTRAVENOUS at 11:09

## 2020-09-12 RX ADMIN — PHYTONADIONE 10 MG: 10 INJECTION, EMULSION INTRAMUSCULAR; INTRAVENOUS; SUBCUTANEOUS at 07:09

## 2020-09-12 RX ADMIN — PANTOPRAZOLE SODIUM 40 MG: 40 TABLET, DELAYED RELEASE ORAL at 11:09

## 2020-09-12 RX ADMIN — METOPROLOL TARTRATE 25 MG: 25 TABLET, FILM COATED ORAL at 11:09

## 2020-09-12 RX ADMIN — Medication 10 MG: at 06:09

## 2020-09-12 RX ADMIN — NICARDIPINE HYDROCHLORIDE 5 MG/HR: 0.2 INJECTION, SOLUTION INTRAVENOUS at 05:09

## 2020-09-12 RX ADMIN — PROMETHAZINE HYDROCHLORIDE 12.5 MG: 25 INJECTION INTRAMUSCULAR; INTRAVENOUS at 03:09

## 2020-09-12 RX ADMIN — FENTANYL CITRATE 50 MCG: 50 INJECTION, SOLUTION INTRAMUSCULAR; INTRAVENOUS at 02:09

## 2020-09-12 RX ADMIN — LEVETIRACETAM 500 MG: 5 INJECTION INTRAVENOUS at 08:09

## 2020-09-12 RX ADMIN — AMLODIPINE BESYLATE 2.5 MG: 2.5 TABLET ORAL at 11:09

## 2020-09-12 RX ADMIN — NICARDIPINE HYDROCHLORIDE 2.5 MG/HR: 0.2 INJECTION, SOLUTION INTRAVENOUS at 03:09

## 2020-09-12 RX ADMIN — IOHEXOL 75 ML: 350 INJECTION, SOLUTION INTRAVENOUS at 02:09

## 2020-09-12 RX ADMIN — ONDANSETRON HYDROCHLORIDE 4 MG: 2 SOLUTION INTRAMUSCULAR; INTRAVENOUS at 02:09

## 2020-09-12 RX ADMIN — Medication 1290 UNITS: at 06:09

## 2020-09-12 RX ADMIN — ONDANSETRON 4 MG: 2 INJECTION INTRAMUSCULAR; INTRAVENOUS at 07:09

## 2020-09-12 RX ADMIN — FENTANYL CITRATE 25 MCG: 50 INJECTION INTRAMUSCULAR; INTRAVENOUS at 07:09

## 2020-09-12 RX ADMIN — LEVETIRACETAM 500 MG: 5 INJECTION INTRAVENOUS at 11:09

## 2020-09-12 NOTE — ASSESSMENT & PLAN NOTE
87 yo F w/ PMH of HTN, Afib on eliquis who presents with L acute holohemispheric thin SDH with 5mm maximal thickness 3mm MLS. Neuro intact on exam,     --Patient admitted to Neuro ICU on telemetry      -q1h neurochecks in ICU  --All labs and diagnostics reviewed  --Follow-up CTH and 6h scan for stability  --Reverse anti-plt/coag medications; KCentra, vitamin K.   coags wnl, no antiplatelet agents.   --SBP <140 (cardene ggt; hydralazine & labetalol PRN; transition to home meds when appropriate)  --Na >135  --Keppra 500 BID  --HOB >30  --Follow-up full pre-op labs (CBC/CMP/PT-INR/PTT/T&S)  --NPO at this time for possible operative intervention  --Continue to monitor clinically, notify NSGY immediately with any changes in neuro status

## 2020-09-12 NOTE — ED PROVIDER NOTES
Encounter Date: 9/12/2020       History     Chief Complaint   Patient presents with    Fall     88-year-old female past medical history significant for hypertension, irregular heartbeat on chronic anticoagulation presenting status post fall with complaints of headache, dizziness, nausea and left hip pain.  Patient reports he has been unable to stand since the fall due to pain in her hip.  Patient reports but shared some of the door turned around too quickly and fell over.  Denies chest pain, shortness of breath or abdominal pain prior to or after the fall.  Denies vision changes.  Denies numbness, tingling or weakness.  Reports increased pain to her head with palpation and with the lower extremity at attempts of range of motion and ambulation.  Denies specific alleviating factors.        Review of patient's allergies indicates:  No Known Allergies  Past Medical History:   Diagnosis Date    Cancer     skin cancer    Hypertension      History reviewed. No pertinent surgical history.  History reviewed. No pertinent family history.  Social History     Tobacco Use    Smoking status: Never Smoker   Substance Use Topics    Alcohol use: Never     Frequency: Never    Drug use: Never     Review of Systems   Constitutional: Negative for appetite change and fever.   HENT: Negative for congestion, rhinorrhea and sore throat.    Eyes: Negative for visual disturbance.   Respiratory: Negative for cough and shortness of breath.    Cardiovascular: Negative for chest pain.   Gastrointestinal: Positive for nausea and vomiting. Negative for abdominal pain.   Genitourinary: Negative for dysuria.   Musculoskeletal: Positive for arthralgias, gait problem and myalgias. Negative for back pain.   Skin: Negative for rash.   Neurological: Positive for dizziness and headaches. Negative for weakness.   Hematological: Does not bruise/bleed easily.       Physical Exam     Initial Vitals [09/12/20 0128]   BP Pulse Resp Temp SpO2   (!) 157/109  (!) 134 20 98.5 °F (36.9 °C) (!) 90 %      MAP       --         Physical Exam    Nursing note and vitals reviewed.  Constitutional: She appears well-developed and well-nourished.   HENT:   Head: Normocephalic.       Right Ear: No hemotympanum.   Left Ear: No hemotympanum.   Mouth/Throat: Uvula is midline and mucous membranes are normal. Posterior oropharyngeal erythema present.   Eyes: Conjunctivae and EOM are normal. Pupils are equal, round, and reactive to light.   Neck: Trachea normal. Neck supple.   No midline cervical spine step-off, crepitus.   Cardiovascular: Normal heart sounds.   Pulmonary/Chest: Effort normal. No tachypnea. She has decreased breath sounds in the right lower field and the left lower field.   Abdominal: Soft. Normal appearance and bowel sounds are normal. She exhibits no distension. There is no abdominal tenderness.   Musculoskeletal:      Comments: No midline thoracic, lumbar sacral spinal tenderness, step-off or crepitus    Tenderness to pelvis with compression to the left.  No palpable deformity.  Mild shortening on the left possibly positional.  2+ femoral, DP/PT pulses intact bilaterally.  CMS grossly intact.  Patient reports unable to stand or ambulate since injury due to pain   Neurological: She is alert and oriented to person, place, and time. GCS score is 15. GCS eye subscore is 4. GCS verbal subscore is 5. GCS motor subscore is 6.   No focal/lateralizing neuro deficit   Skin: Skin is warm and dry. Capillary refill takes less than 2 seconds.         ED Course   Procedures  Labs Reviewed   CBC W/ AUTO DIFFERENTIAL - Abnormal; Notable for the following components:       Result Value    WBC 13.16 (*)     RBC 3.83 (*)     Mean Corpuscular Hemoglobin 31.3 (*)     Gran # (ANC) 11.5 (*)     Immature Grans (Abs) 0.06 (*)     Lymph # 0.8 (*)     Gran% 87.4 (*)     Lymph% 5.9 (*)     All other components within normal limits   COMPREHENSIVE METABOLIC PANEL - Abnormal; Notable for the  following components:    Potassium 5.3 (*)     Glucose 133 (*)     BUN, Bld 47 (*)     Alkaline Phosphatase 54 (*)     eGFR if  58.1 (*)     eGFR if non  50.4 (*)     All other components within normal limits   LIPASE   TROPONIN I   APTT   PROTIME-INR   SARS-COV-2 RNA AMPLIFICATION, QUAL   PROTIME-INR   APTT   URINALYSIS, REFLEX TO URINE CULTURE     EKG Readings: (Independently Interpreted)   Initial Reading: No STEMI. Previous EKG: Compared with most recent EKG Previous EKG Date: 2/28/20. Rhythm: Sinus Tachycardia. Heart Rate: 113bpm. Ectopy: No Ectopy. Conduction: Normal. ST Segments: Normal ST Segments. T Waves: Normal. Axis: Normal. Other Impression: Sinus tachycardia 1st degree AV block no STEMI.  Nonspecific Q-waves in the septal leads.   milliseconds.  No acute/significant change noted compared to previous other than increased rate.       Imaging Results          CT Chest Abdomen Pelvis With Contrast (In process)                CT Cervical Spine Without Contrast (In process)                CT Head Without Contrast (In process)                X-Ray Chest 1 View (In process)                X-Ray Hip 2 View Left (In process)                  Medical Decision Making:   Initial Assessment:   88-year-old female nontoxic-appearing status post fall on chronic Eliquis nausea, left temporal contusion no midline spinal step-off.  GCS 15 left hip tenderness with compression with decreased range of motion and inability to ambulate will initiate testing rule out acute traumatic pathology.  Clinical Tests:   Lab Tests: Ordered and Reviewed  Radiological Study: Ordered and Reviewed  Medical Tests: Ordered and Reviewed           02:45 transfer center contacted regarding emergent transfer for traumatic subdural with 5 mm shift.  Attempts to contact Dr. Atkinson of neurosurgery per the transfer center to no avail.  Reports they left a message and will recontact      03:15 transfer center  recontact with [facility] who is accepting of this patient ED to ED to [facility]            Clinical Impression:       ICD-10-CM ICD-9-CM   1. Subdural hemorrhage  I62.00 432.1   2. Fall  W19.XXXA E888.9   3. Hypertensive emergency  I16.1 401.9   4. Closed fracture of superior ramus of left pubis, initial encounter  S32.512A 808.2   5. Bilateral renal cysts  N28.1 753.10   6. Hiatal hernia  K44.9 553.3         Disposition:   Disposition: Transferred     ED Disposition Condition    Transfer to Another Facility Stable                            Garry Bales, DO  09/12/20 0346

## 2020-09-12 NOTE — SUBJECTIVE & OBJECTIVE
Past Medical History:   Diagnosis Date    A-fib     Anticoagulant long-term use     Cancer     skin cancer    Hypertension      History reviewed. No pertinent surgical history.   Current Facility-Administered Medications on File Prior to Encounter   Medication Dose Route Frequency Provider Last Rate Last Dose    [COMPLETED] diptheria-tetanus toxoids 2-2 Lf unit/0.5 mL injection 0.5 mL  0.5 mL Intramuscular ED 1 Time Garry Bales, DO   0.5 mL at 09/12/20 0338    [COMPLETED] fentaNYL injection 50 mcg  50 mcg Intravenous ED 1 Time Garry Bales, DO   50 mcg at 09/12/20 0200    [COMPLETED] iohexoL (OMNIPAQUE 350) injection 75 mL  75 mL Intravenous ONCE PRN Garry Bales, DO   75 mL at 09/12/20 0258    [COMPLETED] ondansetron injection 4 mg  4 mg Intravenous ED 1 Time Garry Bales, DO   4 mg at 09/12/20 0200    [COMPLETED] promethazine (PHENERGAN) 12.5 mg in dextrose 5 % 50 mL IVPB  12.5 mg Intravenous ED 1 Time Garry Bales DO   Stopped at 09/12/20 0323    [DISCONTINUED] niCARdipine (CARDENE) 40 mg/200 mL (0.2 mg/mL) infusion             [DISCONTINUED] niCARdipine 40 mg/200 mL (0.2 mg/mL) infusion  2.5 mg/hr Intravenous Continuous Garry Bales DO 12.5 mL/hr at 09/12/20 0300 2.5 mg/hr at 09/12/20 0300     Current Outpatient Medications on File Prior to Encounter   Medication Sig Dispense Refill    amLODIPine (NORVASC) 2.5 MG tablet Take 2.5 mg by mouth once daily.      furosemide (LASIX) 40 MG tablet Take 1 tablet (40 mg total) by mouth once daily. 30 tablet 0    lisinopril (PRINIVIL,ZESTRIL) 40 MG tablet Take 40 mg by mouth once daily.      metoprolol succinate (TOPROL-XL) 50 MG 24 hr tablet Take 1 tablet (50 mg total) by mouth 2 (two) times daily. 60 tablet 0    pantoprazole (PROTONIX) 40 MG tablet Take 1 tablet (40 mg total) by mouth once daily. 30 tablet 0      Allergies: Patient has no known allergies.    History reviewed. No pertinent family history.  Social  History     Tobacco Use    Smoking status: Never Smoker   Substance Use Topics    Alcohol use: Never     Frequency: Never    Drug use: Never     Review of Systems   Constitutional: Negative.    HENT: Negative.    Eyes: Negative for photophobia and visual disturbance.   Respiratory: Negative for chest tightness and shortness of breath.    Cardiovascular: Negative for chest pain and palpitations.   Gastrointestinal: Negative.  Negative for abdominal distention.   Genitourinary: Negative.    Musculoskeletal: Negative for back pain, neck pain and neck stiffness.   Skin: Negative.    Neurological: Negative.  Negative for speech difficulty and weakness.   Hematological: Bruises/bleeds easily.   Psychiatric/Behavioral: Negative.      Objective:     Vitals:    Temp: 98 °F (36.7 °C)  Pulse: 101  BP: (!) 143/63  MAP (mmHg): 90  Resp: 18  SpO2: 100 %  O2 Device (Oxygen Therapy): room air    Temp  Min: 98 °F (36.7 °C)  Max: 98.5 °F (36.9 °C)  Pulse  Min: 101  Max: 134  BP  Min: 142/62  Max: 174/85  MAP (mmHg)  Min: 83  Max: 124  Resp  Min: 18  Max: 35  SpO2  Min: 90 %  Max: 100 %    No intake/output data recorded.           Physical Exam  Vitals signs and nursing note reviewed.   Constitutional:       Appearance: Normal appearance.   HENT:      Head: Normocephalic.      Comments: left frontal scalp bruising     Right Ear: External ear normal.      Left Ear: External ear normal.      Mouth/Throat:      Pharynx: Oropharynx is clear.   Eyes:      General: No scleral icterus.     Extraocular Movements: Extraocular movements intact.      Pupils: Pupils are equal, round, and reactive to light.      Comments: Glasses on at time of exam    Neck:      Musculoskeletal: Normal range of motion and neck supple.   Cardiovascular:      Pulses: Normal pulses.      Heart sounds: Normal heart sounds.   Pulmonary:      Effort: Pulmonary effort is normal.      Breath sounds: Normal breath sounds.   Abdominal:      General: Abdomen is flat.       Palpations: Abdomen is soft.   Skin:     General: Skin is warm and dry.      Capillary Refill: Capillary refill takes less than 2 seconds.   Neurological:      General: No focal deficit present.      Mental Status: She is alert and oriented to person, place, and time. Mental status is at baseline.      Motor: No weakness.      Comments: Pt is awake, alert, and oriented to person, place, time, and situation   PERRLA   EOMI  She is able able to flow commands and speech is clear  THOMPSON spontaneously with no weakness appreciated  Sensation intact throughout

## 2020-09-12 NOTE — HPI
Fiona Arteaga is an 87 yo female with a PMH of HTN, Afib on eliquis who presents after a mechanical fall. She was transitioning in her home from standing to chair when she fell hard and hit the left frontal aspect of her head. She did not lose consciousness.  She lives alone, and her  passed away a few years ago.  She has not had any other recent falls.  No other blood thinning agents.  No known history of stroke or seizure.   Denies neck pain, denies numbness or weakness.   She has mild left hip pain and dizziness, but does not endorse currently. She will be admitted to the Neuro ICU for a higher level of care and closer neurological monitoring.

## 2020-09-12 NOTE — ASSESSMENT & PLAN NOTE
88 y.o female with a hx of A-Fib maintained on Eliqis fell and hit her had. Currently she has no neuro deficits.        -Admit to Neuro ICU   -Consult Neurosurgery  -Neuro checks Q1hr   -Vital signs Q1hr   -Reverse anti-plt/coag medications; KCentra, vitamin K.          coags wnl, no antiplatelet agents.   -SBP <140   -Keppra 500 BID  -HOB >30  -CBC/CMP/PT-INR/PTT  -CMP, Mg, Phos  -PT/OT,SLP  -NPO

## 2020-09-12 NOTE — SUBJECTIVE & OBJECTIVE
(Not in a hospital admission)      Review of patient's allergies indicates:  No Known Allergies    Past Medical History:   Diagnosis Date    Cancer     skin cancer    Hypertension      No past surgical history on file.  Family History     None        Tobacco Use    Smoking status: Never Smoker   Substance and Sexual Activity    Alcohol use: Never     Frequency: Never    Drug use: Never    Sexual activity: Not Currently     Review of Systems   Constitutional: Positive for activity change. Negative for appetite change.   HENT: Negative for congestion and dental problem.    Eyes: Negative for discharge and itching.   Respiratory: Negative for apnea and chest tightness.    Cardiovascular: Negative for chest pain and leg swelling.   Gastrointestinal: Negative for abdominal distention and abdominal pain.   Endocrine: Negative for cold intolerance and heat intolerance.   Genitourinary: Negative for difficulty urinating and dyspareunia.   Musculoskeletal: Negative for arthralgias and back pain.   Allergic/Immunologic: Negative for environmental allergies and food allergies.   Neurological: Negative for dizziness and facial asymmetry.   Hematological: Negative for adenopathy. Does not bruise/bleed easily.   Psychiatric/Behavioral: Negative for agitation and behavioral problems.     Objective:     Weight: 57.6 kg (127 lb)  Body mass index is 24.8 kg/m².  Vital Signs (Most Recent):  Pulse: (!) 112 (09/12/20 0545)  BP: (!) 142/62 (09/12/20 0545)  SpO2: 100 % (09/12/20 0545) Vital Signs (24h Range):  Temp:  [98.5 °F (36.9 °C)] 98.5 °F (36.9 °C)  Pulse:  [110-134] 112  Resp:  [20-35] 21  SpO2:  [90 %-100 %] 100 %  BP: (142-174)/() 142/62                          Neurosurgery Physical Exam  General: well developed, well nourished, no distress.   Head: normocephalic, left frontal scalp bruising  Neurologic:   GCS: Eyes: 4/ Verbal: 5/ Motor: 6  Mental Status: Awake, Alert, Oriented x 3  Cranial nerves: PERRL, EOMI, face  symmetric, tongue midline, shoulder shrug equal.  No pronator drift, no dysmetria. 5/5 strength in upper extremities  Sensory: intact to light touch throughout  Motor Strength:Moves all extremities antigravity; L Hip pain on movement of L leg  DTR: 2+ symmetrically throughout.  Pulmonary: normal respirations, no signs of respiratory distress  Abdomen: soft, non-distended, not tender to palpation                           Significant Labs:  Recent Labs   Lab 09/12/20 0212 09/12/20  0514   * 141*    141   K 5.3* 5.4*    109   CO2 25 22*   BUN 47* 39*   CREATININE 1.0 1.0   CALCIUM 9.3 9.1     Recent Labs   Lab 09/12/20 0212 09/12/20  0514   WBC 13.16* 11.64   HGB 12.0 11.3*   HCT 37.3 37.8    225     Recent Labs   Lab 09/12/20  0241   INR 1.0   APTT 26.3     Microbiology Results (last 7 days)     ** No results found for the last 168 hours. **        All pertinent labs from the last 24 hours have been reviewed.    Significant Diagnostics:  I have reviewed all pertinent imaging results/findings within the past 24 hours.

## 2020-09-12 NOTE — CARE UPDATE
FLIGHT CARE TRANSPORT NOTE     Date of Transport: 2020  : 3/16/1932  Age: 88 y.o.  Medication Dosing Weight: 58.0kg  Sex: female  Race: White    MRN: [id number]  Time Of Patient Handoff: 0500    ASSESSMENT/INTERVENTIONS     This patient was transported by [facility] from the ED of [facility] by [facility]. The patient's overall condition remained unchanged throughout transport, with all vital signs remaining stable per the patient's current baseline. All lines, tubes, and devices remained patent and intact. Titratable nicardipine gtt ongoing to maintain SBP <160.  The patient was transferred from the [facility] stretcher to ER bed 2 where care was transitioned to bedside RNGhulam/Carrie/Mary Ellen without incident. The patient's Personal Belongings (purse, clothes) were left with receiving clinician.     Vital signs prior to flight team departure:  HR: 112  BP: 145/67 (95)  SpO2: 98% 2L NC O2  RR: 17 unlabored  Temperature: 97.1 temporal         FOLLOW-UP     Call [facility], Shoshana Blakely RN at [phone] for additional questions or concerns.

## 2020-09-12 NOTE — ED NOTES
Pt requested that RN calls niece.  RN called and phone went directly to voicemail, generic voicemail left for niece to return call.

## 2020-09-12 NOTE — PROGRESS NOTES
Patient arrived to [facility] from ED    Type of stroke/diagnosis: SDH    TPA start and end time (if applicable); n/a     Thrombectomy start and end time (if applicable): n/a     Current symptoms: slurred speech    Skin assessment done: Y  Wounds noted: L facial bruising  CHRISSIE Armband Applied: no, passed CHRISSIE  Patient Belongings on Admit: underwear, bra, pink longsleeve shirt, white pants, black purse (patient declined RN to go through purse to chart belongings inside)    NCC notified: name of person notified: DARRICK Taylor

## 2020-09-12 NOTE — PLAN OF CARE
Clinical Swallow Evaluation completed.  REC:  pt resume po intake with regular consistency diet with thin liquids, oral meds whole, aspiration precautions.  Recs reviewed with RN.  Cont ST per POC.    Alyssia Moreno, YOLI-SLP  9/12/2020      Problem: SLP Goal  Goal: SLP Goal  Description: Speech Language Pathology Goals  Goals expected to be met by 9/19/20    1.  Pt will complete Speech Language Cognitive evaluation to determine the need for additional goals.          Outcome: Ongoing, Progressing

## 2020-09-12 NOTE — PLAN OF CARE
OT evaluations initiated.  ANITRA Hardin  9/12/2020    Problem: Occupational Therapy Goal  Goal: Occupational Therapy Goal  Description: Goals set 9/12 to be addressed for 14 days with expiration date, 9/26:  Patient will increase functional independence with ADLs by performing:    Patient will demonstrate rolling to the right with modified independence.  Not met   Patient will demonstrate supine -sit with modified independence.   Not met  Patient will demonstrate stand pivot transfers with supervision.   Not met  Patient will demonstrate grooming while standing with supervision assist.   Not met  Patient will demonstrate upper body dressing with supervision while seated EOB.   Not met  Patient will demonstrate lower body dressing with supervision while seated EOB.   Not met  Patient will demonstrate toileting with supervision.   Not met  Patient will demonstrate bathing while seated EOB with supervision.   Not met  Patient's family / caregiver will demonstrate independence and safety with assisting patient with self-care skills and functional mobility.     Not met            Outcome: Ongoing, Progressing

## 2020-09-12 NOTE — ED NOTES
Pt's /51; HR 70s.  Pt resting comfortably in room.  RN stopped nicardipine drip that was running at 2.5mg/hr.

## 2020-09-12 NOTE — PT/OT/SLP EVAL
Speech Language Pathology Evaluation  Bedside Swallow    Patient Name:  Fiona Arteaga   MRN:  [id number]  Admitting Diagnosis: SDH (subdural hematoma)    Recommendations:                 General Recommendations:  Cognitive-linguistic evaluation  Diet recommendations:  Regular, Thin   Aspiration Precautions: Meds whole 1 at a time and Standard aspiration precautions   General Precautions: Standard, aspiration, fall, seizure  Communication strategies:  none    History:     Past Medical History:   Diagnosis Date    A-fib     Anticoagulant long-term use     Cancer     skin cancer    Hypertension        History reviewed. No pertinent surgical history.    Social History: Patient lives alone.    Prior Intubation HX:  None this admission    Modified Barium Swallow: none this admission    Chest X-Rays: 9/12/20:  Prominence of the pulmonary interstitium centrally may represent a pneumonitis or interstitial fibrosis however this is improved since February 2020.  Atherosclerosis.  Mild cardiomegaly.  Trace left pleural effusion.    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    Subjective     Pt was resting upon SLP presentation.  She was easily awakened and agreeable to evaluation.  Patient goals: none expressed     Pain/Comfort:  · Pain Rating 1: 0/10  · Pain Rating Post-Intervention 1: 0/10    Objective:     Oral Musculature Evaluation  · Oral Musculature: WFL  · Dentition: upper and lower dentures  · Secretion Management: adequate  · Mucosal Quality: adequate  · Mandibular Strength and Mobility: WFL  · Oral Labial Strength and Mobility: WFL  · Lingual Strength and Mobility: WFL  · Velar Elevation: WFL  · Buccal Strength and Mobility: WFL  · Volitional Cough: adequate  · Volitional Swallow: timely  · Voice Prior to PO Intake: clear    Bedside Swallow Eval:   Consistencies Assessed:  · Thin liquids tsp, cup and straw sips  · Puree tsp  bites  · Solids self fed bites     Oral Phase:   · WFL    Pharyngeal Phase:   · WFL  · no overt clinical signs/symptoms of aspiration  · no overt clinical signs/symptoms of pharyngeal dysphagia    Compensatory Strategies  · None    Treatment: Education was provided to pt re: SLP role, eval results, diet recs, aspiration precautions and SLP POC.  She indicated fair understanding.    Assessment:     Fiona Arteaga is a 88 y.o. female with an SLP diagnosis of Dysphagia.  She presents with a functional oropharyngeal swallow.  Ongoing Skilled Speech services are indicated for further evaluation of cognitive-communication skills.    Goals:   Multidisciplinary Problems     SLP Goals        Problem: SLP Goal    Goal Priority Disciplines Outcome   SLP Goal     SLP Ongoing, Progressing   Description: Speech Language Pathology Goals  Goals expected to be met by 9/19/20    1.  Pt will complete Speech Language Cognitive evaluation to determine the need for additional goals.                           Plan:     · Patient to be seen:  4 x/week   · Plan of Care expires:  10/11/20  · Plan of Care reviewed with:  patient   · SLP Follow-Up:  Yes       Discharge recommendations:  other (see comments)(pending pt/ot recs)   Barriers to Discharge:  Inaccessible Home Environment    Time Tracking:     SLP Treatment Date:   09/12/20  Speech Start Time:  1435  Speech Stop Time:  1445     Speech Total Time (min):  10 min    Billable Minutes: Eval Swallow and Oral Function 10    Alyssia Moreno CCC-SLP  09/12/2020

## 2020-09-12 NOTE — PT/OT/SLP EVAL
"Occupational Therapy   Evaluation    Name: Fiona Arteaga  MRN: [id number]  Admitting Diagnosis:  SDH (subdural hematoma)      Recommendations:     Discharge Recommendations: (pending further assessment; unable to assess sit-stand 2* left hip pain)  Discharge Equipment Recommendations:   TBD  Barriers to discharge:  None    Assessment:     Fiona Arteaga is a 88 y.o. female with a medical diagnosis of SDH (subdural hematoma).  She presents with performance deficits affecting function: impaired endurance, impaired balance, impaired self care skills, impaired functional mobilty, pain, decreased lower extremity function.      Rehab Prognosis: Good; patient would benefit from acute skilled OT services to address these deficits and reach maximum level of function.       Plan:     Patient to be seen 4 x/week to address the above listed problems via self-care/home management, therapeutic activities, therapeutic exercises  · Plan of Care Expires: 10/11/20  · Plan of Care Reviewed with: patient    Subjective     Patient:  "I was going to bed and the doorbell rang.  I went to go see who was there then decided I wouldn't and I'd just go to bed.  When I turned to go back, I fell in the hallway.  I had to crawl to get to the phone to call 911.  Sometime today, I need to call my niece and let her know where I am."  "I really want to sit up.  The pain is keeping me from rolling so I am stuck on my back.  I would love to sit up if you can help me do that."  Occupational Profile:  Per patient:  Patient resides in [facility] [place] alone in one story home with no steps to enter.  Patient is right handed.  Patient reports that she quit driving one month ago and has assistance from her niece with grocery shopping.  PTA patient independent with ADLs.  Hobbies: reading, alexander, hair dressing.    Pain/Comfort:  · Pain Rating 1: 10/10  · Location 1: abdomen  · Pain Addressed 1: Reposition, Nurse notified  · Pain Rating " Post-Intervention 1: 10/10  · Pain Rating 2: 10/10  · Location 2: back(and left hip)  · Pain Addressed 2: Nurse notified, Reposition  · Pain Rating Post-Intervention 2: 10/10    Patients cultural, spiritual, Adventist conflicts given the current situation: no    Objective:     Communicated with: Nurse prior to session.  Patient found supine with telemetry, peripheral IV, pulse ox (continuous), PureWick upon OT entry to room.  Family not present.    General Precautions: Standard, aspiration, fall, seizure, NPO   Orthopedic Precautions:N/A   Braces: N/A     Occupational Performance:    Bed Mobility:    · Patient completed Rolling/Turning to Left with  minimum assistance  · Patient completed Supine to Sit with moderate assistance  · Patient completed Sit to Supine with moderate assistance    Functional Mobility/Transfers:  · Deferred 2* 10/10 left hip pain    Activities of Daily Living:  · Feeding:  NPO    · Grooming: stand by assistance while seated unsupported EOB  · Upper Body Dressing: minimum assistance while seated EOB with assistance 2* lines    Cognitive/Visual Perceptual:  Cognitive/Psychosocial Skills:     -       Oriented to: Person, Place, Time and Situation   -       Follows Commands/attention:Follows two-step commands  -       Communication: clear/fluent  -       Safety awareness/insight to disability: intact   -       Mood/Affect/Coping skills/emotional control: Appropriate to situation and Cooperative  Visual/Perceptual:      -wears glasses      Physical Exam:  Postural examination/scapula alignment:    -       Rounded shoulders  Skin integrity: Thin and laceration above left eye  Upper Extremity Range of Motion:  -       Right Upper Extremity: WNL  -       Left Upper Extremity: WNL  Upper Extremity Strength:    -       Right Upper Extremity: WFL  -       Left Upper Extremity: WFL    AMPAC 6 Click ADL:  AMPAC Total Score: 12    Treatment & Education:  Patient education provided on role of OT and need  for continued OT upon discharge. Patient alert and oriented x 3; able to follow 4/4 one step commands.  Patient attentive and interactive throughout the session.  Patient able to identify 5/5 body parts.  Able to name 5/5 objects.  Able to sequence 7/7 days of the week and 12/12 months of the year.  Continued education, patient/ family training recommended.   Education:    Patient left supine with all lines intact, call button in reach and bed alarm on    GOALS:   Multidisciplinary Problems     Occupational Therapy Goals        Problem: Occupational Therapy Goal    Goal Priority Disciplines Outcome Interventions   Occupational Therapy Goal     OT, PT/OT Ongoing, Progressing    Description: Goals set 9/12 to be addressed for 14 days with expiration date, 9/26:  Patient will increase functional independence with ADLs by performing:    Patient will demonstrate rolling to the right with modified independence.  Not met   Patient will demonstrate supine -sit with modified independence.   Not met  Patient will demonstrate stand pivot transfers with supervision.   Not met  Patient will demonstrate grooming while standing with supervision assist.   Not met  Patient will demonstrate upper body dressing with supervision while seated EOB.   Not met  Patient will demonstrate lower body dressing with supervision while seated EOB.   Not met  Patient will demonstrate toileting with supervision.   Not met  Patient will demonstrate bathing while seated EOB with supervision.   Not met  Patient's family / caregiver will demonstrate independence and safety with assisting patient with self-care skills and functional mobility.     Not met                             History:     Past Medical History:   Diagnosis Date    A-fib     Anticoagulant long-term use     Cancer     skin cancer    Hypertension        History reviewed. No pertinent surgical history.    Time Tracking:     OT Date of Treatment: 09/12/20  OT Start Time: 0900  OT  Stop Time: 0925  OT Total Time (min): 25 min    Billable Minutes:Evaluation 15  Therapeutic Activity 10    ANITRA Hardin  9/12/2020

## 2020-09-12 NOTE — PT/OT/SLP EVAL
"Physical Therapy Evaluation    Patient Name:  Fiona Arteaga   MRN:  [id number]    Recommendations:     Discharge Recommendations:  (SNF (may progress pending further assessment))   Discharge Equipment Recommendations: (TBD)   Barriers to discharge: decreased functional mobility    Assessment:     Fiona Arteaga is a 88 y.o. female admitted with a medical diagnosis of SDH (subdural hematoma).  She presents with the following impairments/functional limitations:  weakness, impaired endurance, impaired self care skills, gait instability, impaired balance, pain, impaired functional mobilty, decreased lower extremity function.  Tolerated session with c/o abdominal, back and LLE pain.  Pt with limited participation on evaluation secondary to pain level despite max encouragement.  Pt performed mobility with CGA-min A. Transfer/gait assessment deferred d/t pt's participation.  Pt agreeable to perform sit<>stand transfer following max encouragement but eventually refused d/t fear of increased pain.  Pt demo good sitting balance requiring S while EOB.  Pt safe to transfer to sitting EOB with assistance of 1x person.  Pt would benefit from continued skilled acute PT 3x/wk to improve functional mobility.  D/C recommendation at this time is SNF as pt lives alone and demo limited mobility secondary to pain.  D/C rec may change pending progress.      Rehab Prognosis: Good; patient would benefit from acute skilled PT services to address these deficits and reach maximum level of function.    Recent Surgery: * No surgery found *      Plan:     During this hospitalization, patient to be seen 3 x/week to address the identified rehab impairments via gait training, therapeutic activities, therapeutic exercises, neuromuscular re-education and progress toward the following goals:    · Plan of Care Expires:  10/07/20    Subjective     Chief Complaint: pain  Patient/Family Comments/goals: "I can't stand. It's going to hurt " "more!"  Pain/Comfort:  · Pain Rating 1: (reports 10/10 abdominal, back, and L hip pain)    Patients cultural, spiritual, Synagogue conflicts given the current situation: no    Living Environment:  Pt lives alone in [facility] with 0STE.  PTA not driving, not working, reports no falls except the fall leading to this admission and pt enjoys reading/ alexander.  Pt reports having company most of the day and having good support.   Prior to admission, patients level of function was independent.  Equipment used at home: none.  DME owned (not currently used): none.  Upon discharge, patient will have assistance from family/friends.    Objective:     Communicated with RN prior to session.  Patient found HOB elevated with telemetry, peripheral IV, PureWick, pulse ox (continuous)  upon PT entry to room.    General Precautions: Standard, fall   Orthopedic Precautions:N/A   Braces: N/A     Exams:  · Cognitive Exam:  Patient is oriented to Person, Place, Time and Situation  · RLE ROM: WFL  · RLE Strength: WFL  · LLE ROM: WFL  · LLE Strength: grossly 4/5 - c/o increased pain    Functional Mobility:  · Bed Mobility:     · Rolling Left:  contact guard assistance  · Rolling Right: contact guard assistance  · Scooting: contact guard assistance  · Supine to Sit: minimum assistance as pt pulling on therapist  · Sit to Supine: minimum assistance for LLE support  · Transfers:     · Sit to Stand:  Unable to assess d/t pt refusal  · Balance: sitting (S)    Therapeutic Activities and Exercises:  Pt educated on: PT role/POC; safety c mobility; benefits of OOB activities; performing therex; d/c recs - v/u  -sat EOB x5mins  -repositioned in bed for comfort  -padding changed d/t soiled    AM-PAC 6 CLICK MOBILITY  Total Score:16     Patient left HOB elevated with all lines intact, call button in reach and RN notified.    GOALS:   Multidisciplinary Problems     Physical Therapy Goals        Problem: Physical Therapy Goal    Goal Priority Disciplines " Outcome Goal Variances Interventions   Physical Therapy Goal     PT, PT/OT Ongoing, Progressing     Description: Goals to be met by: 10/3/2020     Patient will increase functional independence with mobility by performin. Supine to sit with Modified [place]  2. Sit to supine with Modified [place]  3. Sit to stand transfer with Stand-by Assistance  4. Bed to chair transfer with Stand-by Assistance using LRAD  5. Gait  x 50 feet with Stand-by Assistance using LRAD                      History:     Past Medical History:   Diagnosis Date    A-fib     Anticoagulant long-term use     Cancer     skin cancer    Hypertension        History reviewed. No pertinent surgical history.    Time Tracking:     PT Received On: 20  PT Start Time: 1025     PT Stop Time: 1041  PT Total Time (min): 16 min     Billable Minutes: Evaluation 16 min      Kike Hilton, PT  2020

## 2020-09-12 NOTE — CONSULTS
Inpatient consult to Physical Medicine Rehab  Consult performed by: Nona Ramires NP  Consult ordered by: Lisbeth Adams NP  Reason for consult: assess rehab needs        Reviewed patient history and current admission.  Rehab team following.  Full consult to follow.    MANGO Gaines, FNP-C  Physical Medicine & Rehabilitation   09/12/2020

## 2020-09-12 NOTE — PLAN OF CARE
[facility] Care Plan    POC reviewed with Fiona Arteaga and family at 1400. Pt verbalized understanding. Questions and concerns addressed. No acute events today. Pt progressing toward goals. Will continue to monitor. See below and flowsheets for full assessment and VS info.       Neuro:  [place] Coma Scale  Best Eye Response: 4-->(E4) spontaneous  Best Motor Response: 6-->(M6) obeys commands  Best Verbal Response: 5-->(V5) oriented  [place] Coma Scale Score: 15  Assessment Qualifiers: patient not sedated/intubated        24 hr Temp:  [98 °F (36.7 °C)-98.5 °F (36.9 °C)]     CV:   Rhythm: normal sinus rhythm  BP goals:   SBP < 140  MAP > 65    Resp:   O2 Device (Oxygen Therapy): nasal cannula       Plan: N/A    GI/:  CHRISSIE Total Score: 20  Diet/Nutrition Received: low saturated fat/low cholesterol  Last Bowel Movement: 09/11/20  Voiding Characteristics: external catheter    Intake/Output Summary (Last 24 hours) at 9/12/2020 1758  Last data filed at 9/12/2020 1200  Gross per 24 hour   Intake 500 ml   Output 300 ml   Net 200 ml     Unmeasured Output  Stool Occurrence: 0    Labs/Accuchecks:  Recent Labs   Lab 09/12/20  0514   WBC 11.64   RBC 3.72*   HGB 11.3*   HCT 37.8         Recent Labs   Lab 09/12/20  0514     141   K 5.4*  5.4*   CO2 22*  22*     109   BUN 39*  39*   CREATININE 1.0  1.0   ALKPHOS 60   ALT 13   AST 29   BILITOT 0.4      Recent Labs   Lab 09/12/20  0241   INR 1.0   APTT 26.3      Recent Labs   Lab 09/12/20  0212   TROPONINI 0.06       Electrolytes: No replacement orders  Accuchecks: none    Gtts:   niCARdipine Stopped (09/12/20 1418)       LDA/Wounds:  Lines/Drains/Airways       Peripheral Intravenous Line                   Peripheral IV - Single Lumen 09/12/20 0224 20 G Left Antecubital less than 1 day         Peripheral IV - Single Lumen 09/12/20 0700 22 G Left Hand less than 1 day                  Wounds: Yes  Wound care consulted: Yes

## 2020-09-12 NOTE — ED PROVIDER NOTES
Encounter Date: 9/12/2020       History     Chief Complaint   Patient presents with    [facility] Transfer- Neuro Eval     Patient transferred for eval of SDH with 5mm shift. Patient arrives on Cardene. Patient on Eliquis.      Fiona Arteaga is a 88 F history of hypertension, AFib on Eliquis presenting as a transfer for an acute subdural hematoma with 5 mm midline shift.  Patient states she was walking, fell forward hitting her face.  Unknown LOC.  She presented to the emergency department at [facility] for headache, nausea, left hip pain and dizziness.  She was transferred here for further evaluation.  She currently denies, unilateral weakness, numbness/tingling, or difficulty with speech        Review of patient's allergies indicates:  No Known Allergies  Past Medical History:   Diagnosis Date    Cancer     skin cancer    Hypertension      No past surgical history on file.  No family history on file.  Social History     Tobacco Use    Smoking status: Never Smoker   Substance Use Topics    Alcohol use: Never     Frequency: Never    Drug use: Never     Review of Systems   Constitutional: Negative for chills and fever.   HENT: Positive for facial swelling. Negative for sore throat.    Respiratory: Negative for shortness of breath.    Cardiovascular: Negative for chest pain.   Gastrointestinal: Negative for abdominal pain, nausea and vomiting.   Genitourinary: Negative for dysuria.   Musculoskeletal: Negative for back pain.   Skin: Negative for rash.   Neurological: Positive for dizziness and headaches. Negative for weakness.   Hematological: Does not bruise/bleed easily.   Psychiatric/Behavioral: Negative for confusion.       Physical Exam     Initial Vitals   BP Pulse Resp Temp SpO2   -- -- -- -- --      MAP       --         Physical Exam    Nursing note and vitals reviewed.  Constitutional: She appears well-developed. No distress.   HENT:   Mouth/Throat: Oropharynx is clear and moist.   (+)  ecchymosis over left eyebrow   Eyes: Conjunctivae and EOM are normal. Pupils are equal, round, and reactive to light. No scleral icterus.   Neck: Neck supple. No JVD present.   No midline cervical, thoracic, or lumbar tenderness/step offs     Cardiovascular: Intact distal pulses.   Irregularly irregular   Pulmonary/Chest: Breath sounds normal. She has no wheezes. She has no rales.   Abdominal: Soft. Bowel sounds are normal. There is no abdominal tenderness. There is no rebound.   Musculoskeletal: No edema.      Comments: (+) left hip tenderness, decreased ROM   Neurological: She is alert and oriented to person, place, and time. She has normal strength. No cranial nerve deficit or sensory deficit.   Skin: Skin is warm. No rash noted.         ED Course   Procedures  Labs Reviewed   CBC W/ AUTO DIFFERENTIAL - Abnormal; Notable for the following components:       Result Value    RBC 3.72 (*)     Hemoglobin 11.3 (*)     Mean Corpuscular Volume 102 (*)     Mean Corpuscular Hemoglobin Conc 29.9 (*)     Gran # (ANC) 10.2 (*)     Immature Grans (Abs) 0.05 (*)     Lymph # 0.9 (*)     Gran% 88.0 (*)     Lymph% 7.9 (*)     Mono% 3.5 (*)     All other components within normal limits   BASIC METABOLIC PANEL - Abnormal; Notable for the following components:    Potassium 5.4 (*)     CO2 22 (*)     Glucose 141 (*)     BUN, Bld 39 (*)     eGFR if  58.1 (*)     eGFR if non  50.4 (*)     All other components within normal limits   URINALYSIS, REFLEX TO URINE CULTURE - Abnormal; Notable for the following components:    Specific Gravity, UA >=1.030 (*)     All other components within normal limits    Narrative:     Specimen Source->Urine   COMPREHENSIVE METABOLIC PANEL - Abnormal; Notable for the following components:    Potassium 5.4 (*)     CO2 22 (*)     Glucose 141 (*)     BUN, Bld 39 (*)     eGFR if  58.1 (*)     eGFR if non  50.4 (*)     All other components within  normal limits    Narrative:     ADD ON [facility] PER JOSE RAINES MD  09/12/2020  06:40    TSH - Abnormal; Notable for the following components:    TSH 0.329 (*)     All other components within normal limits    Narrative:     ADD ON [facility]GB PER JOSE RAINES MD  09/12/2020  06:40    LIPID PANEL - Abnormal; Notable for the following components:    Cholesterol 228 (*)     HDL 90 (*)     All other components within normal limits    Narrative:     ADD ON [facility] PER JOSE RAINES MD  09/12/2020  06:40    CBC W/ AUTO DIFFERENTIAL   COMPREHENSIVE METABOLIC PANEL   HEMOGLOBIN A1C   LIPID PANEL   MAGNESIUM   PHOSPHORUS   TSH   MAGNESIUM    Narrative:     ADD ON GHGB PER JOSE RAINES MD  09/12/2020  06:40    HEMOGLOBIN A1C    Narrative:     ADD ON GHGB PER JOSE RAINES MD  09/12/2020  06:40    PHOSPHORUS    Narrative:     ADD ON [facility]GB PER JSOE RAINES MD  09/12/2020  06:40    T4, FREE    Narrative:     ADD ON [facility] PER JOSE RAINES MD  09/12/2020  06:40    TYPE AND SCREEN PREOP          Imaging Results          CT Head Without Contrast (Final result)  Result time 09/12/20 09:03:36    Final result by Kale Cooper MD (09/12/20 09:03:36)                 Impression:      1. No significant interval change relative to prior scan performed 09/12/2020, 02:29 hours.  2. Left cerebral convexity and parafalcine/tentorial leaflet subdural hematoma without increased in volume or new mass effect.  No new hemorrhage.      Electronically signed by: Kale Cooper  Date:    09/12/2020  Time:    09:03             Narrative:    EXAMINATION:  CT HEAD WITHOUT CONTRAST    CLINICAL HISTORY:  survailance SDH;    TECHNIQUE:  Low dose axial images were obtained through the head.  Coronal and sagittal reformations were also performed. Contrast was not administered.    COMPARISON:  CT head performed 09/12/2020    FINDINGS:  Overall, relative to prior head CT performed 09/12/2020, 02:29 hours there is no significant oval change.  Similar appearance of a  hyperdense essentially holo hemispheric subdural hematoma about the left cerebral convexity measuring up to 6 mm.  Unchanged mild subfalcine herniation and 3 mm of rightward midline shift.  Additional thin left parafalcine and tentorial leaflet subdural blood demonstrates some redistribution without significant interval change.    Overall, no definite new or enlarging areas intracranial hemorrhage are seen.  No evidence of acute transcortical infarct by noncontrast CT.  Ventricular size and configuration is unchanged.  No evidence of worsening mass effect or new herniation syndrome is seen.    Additional findings unchanged over short interval follow-up.                                 Medical Decision Making:   History:   Old Medical Records: I decided to obtain old medical records.  Old Records Summarized: records from previous admission(s).  Initial Assessment:   Emergent evaluation of a 88-year-old female transferred from outside facility for acute subdural hematoma with midline shift.  On Eliquis.  Did not receive reversal agents prior to transfer.      Discussed with neurosurgery, recommending [facility].  Orders placed.    Neuro Critical Care consulted, they will admit patient for further treatment and evaluation.    On arrival, patient is hypertensive, on Cardene drip.  Titrating up to maintain SBP less than 160.  Differential Diagnosis:   Subdural hematoma with midline shift  Clinical Tests:   Lab Tests: Reviewed and Ordered  Radiological Study: Reviewed              Attending Attestation:         Attending Critical Care:   Critical Care Times:   ==============================================================  · Total Critical Care Time - exclusive of procedural time: 35 minutes.  ==============================================================  Critical care was necessary to treat or prevent imminent or life-threatening deterioration of the following conditions: CNS failure.   Critical care was time spent  personally by me on the following activities: examination of patient, review of old charts, ordering lab, x-rays, and/or EKG, evaluation of patient's response to treatment, discussion with consultants and re-evaluation of patient's conition.                         Clinical Impression:       ICD-10-CM ICD-9-CM   1. SDH (subdural hematoma)  S06.5X9A 432.1   2. SDH (subdural hematoma)  S06.5X9A 432.1                                  Whitney Murphy MD  09/12/20 [id number]

## 2020-09-12 NOTE — HPI
Fiona Arteaga is an 87 yo female with a PMH of HTN, Afib on eliquis who presents after a mechanical fall. She was transitioning in her home from standing to chair when she fell hard and hit the left frontal aspect of her head. She did not lose consciousness.  She lives alone, and her  passed away a few years ago.  She has not had any other recent falls.  No other blood thinning agents.  No known history of stroke or seizure.   Denies neck pain, denies numbness or weakness.   She has mild left hip pain and dizziness, but does not endorse currently.

## 2020-09-12 NOTE — CONSULTS
[facility]  Neurosurgery  Consult Note    Consults  Subjective:     Chief Complaint/Reason for Admission: L [facility]    History of Present Illness: Fiona Arteaga is an 89 yo female with a PMH of HTN, Afib on eliquis who presents after a mechanical fall. She was transitioning in her home from standing to chair when she fell hard and hit the left frontal aspect of her head. She did not lose consciousness.  She lives alone, and her  passed away a few years ago.  She has not had any other recent falls.  No other blood thinning agents.  No known history of stroke or seizure.   Denies neck pain, denies numbness or weakness.   She has mild left hip pain and dizziness, but does not endorse currently.     (Not in a hospital admission)      Review of patient's allergies indicates:  No Known Allergies    Past Medical History:   Diagnosis Date    Cancer     skin cancer    Hypertension      No past surgical history on file.  Family History     None        Tobacco Use    Smoking status: Never Smoker   Substance and Sexual Activity    Alcohol use: Never     Frequency: Never    Drug use: Never    Sexual activity: Not Currently     Review of Systems   Constitutional: Positive for activity change. Negative for appetite change.   HENT: Negative for congestion and dental problem.    Eyes: Negative for discharge and itching.   Respiratory: Negative for apnea and chest tightness.    Cardiovascular: Negative for chest pain and leg swelling.   Gastrointestinal: Negative for abdominal distention and abdominal pain.   Endocrine: Negative for cold intolerance and heat intolerance.   Genitourinary: Negative for difficulty urinating and dyspareunia.   Musculoskeletal: Negative for arthralgias and back pain.   Allergic/Immunologic: Negative for environmental allergies and food allergies.   Neurological: Negative for dizziness and facial asymmetry.   Hematological: Negative for adenopathy. Does not bruise/bleed  easily.   Psychiatric/Behavioral: Negative for agitation and behavioral problems.     Objective:     Weight: 57.6 kg (127 lb)  Body mass index is 24.8 kg/m².  Vital Signs (Most Recent):  Pulse: (!) 112 (09/12/20 0545)  BP: (!) 142/62 (09/12/20 0545)  SpO2: 100 % (09/12/20 0545) Vital Signs (24h Range):  Temp:  [98.5 °F (36.9 °C)] 98.5 °F (36.9 °C)  Pulse:  [110-134] 112  Resp:  [20-35] 21  SpO2:  [90 %-100 %] 100 %  BP: (142-174)/() 142/62                          Neurosurgery Physical Exam  General: well developed, well nourished, no distress.   Head: normocephalic, left frontal scalp bruising  Neurologic:   GCS: Eyes: 4/ Verbal: 5/ Motor: 6  Mental Status: Awake, Alert, Oriented x 3  Cranial nerves: PERRL, EOMI, face symmetric, tongue midline, shoulder shrug equal.  No pronator drift, no dysmetria. 5/5 strength in upper extremities  Sensory: intact to light touch throughout  Motor Strength:Moves all extremities antigravity; L Hip pain on movement of L leg  DTR: 2+ symmetrically throughout.  Pulmonary: normal respirations, no signs of respiratory distress  Abdomen: soft, non-distended, not tender to palpation  No C Spine tenderness                         Significant Labs:  Recent Labs   Lab 09/12/20 0212 09/12/20  0514   * 141*    141   K 5.3* 5.4*    109   CO2 25 22*   BUN 47* 39*   CREATININE 1.0 1.0   CALCIUM 9.3 9.1     Recent Labs   Lab 09/12/20 0212 09/12/20  0514   WBC 13.16* 11.64   HGB 12.0 11.3*   HCT 37.3 37.8    225     Recent Labs   Lab 09/12/20  0241   INR 1.0   APTT 26.3     Microbiology Results (last 7 days)     ** No results found for the last 168 hours. **        All pertinent labs from the last 24 hours have been reviewed.    Significant Diagnostics:  I have reviewed all pertinent imaging results/findings within the past 24 hours.    Assessment/Plan:     * SDH (subdural hematoma)   87 yo F w/ PMH of HTN, Afib on eliquis who presents with L acute holohemispheric  thin SDH with 5mm maximal thickness 3mm MLS. Neuro intact on exam,     --Patient admitted to Neuro ICU on telemetry      -q1h neurochecks in ICU  --All labs and diagnostics reviewed  --Follow-up CTH and 6h scan for stability  --Reverse anti-plt/coag medications; KCentra, vitamin K.   coags wnl, no antiplatelet agents.   --SBP <140 (cardene ggt; hydralazine & labetalol PRN; transition to home meds when appropriate)  --Na >135  --No neck pain, C Spine CT ([facility]) reviewed without acute fracture; no need for collar  --Further trauma work-up, Hip Xray per ED/Neuro ICU  --Keppra 500 BID  --HOB >30  --Follow-up full pre-op labs (CBC/CMP/PT-INR/PTT/T&S)  --NPO at this time for possible operative intervention  --Continue to monitor clinically, notify NSGY immediately with any changes in neuro status        Thank you for your consult. I will follow-up with patient. Please contact us if you have any additional questions.    Andrew Reyes MD  Neurosurgery  [facility]

## 2020-09-12 NOTE — ED NOTES
I assume care of patient at this time. Pt currently sleeping comfortably in Hospital bed, awakens easily to verbal stimuli. AAOX3 speaking in clear full sentences appropriately, + [place]. RR are spontaneous, even and unlabored on continuous oxygen @ 2 liters via NC, tolerating well. Pure Wic remains in place w/ clear yellow colored urine output noted. Pt denies pains or needs currently. Ordered Cardine continuous to infuse through patent IV @ 2.5mg/hr. Pt remains on cardiac monitor, continuous pulse oximetry and automatic blood pressure cuff cycling w/ alarms set. Bed placed in low locked position, side rails up x 2, call light is within reach of patient or family, alarms set and turned on for monitor and pulse ox, will continue to monitor.     Two patient identifiers have been checked and are correct.      Appearance: Pt awake, alert & oriented to person, place & time. Pt in no acute distress at present time. Pt is clean and well groomed with clothes appropriately fastened.   Skin: Skin warm, dry & intact. Color consistent with ethnicity. Mucous membranes moist. No breakdown or brusing noted.   Musculoskeletal: No obvious swelling or deformities noted. + left sided hip pains reported, decreased ROM noted to LLE due to pain upon movement.   Respiratory: Respirations spontaneous, even, and non-labored. Visible chest rise noted. Airway is open and patent. No accessory muscle use noted.   Neurologic: Sensation is intact. Speech is clear and appropriate. Eyes open spontaneously, behavior appropriate to situation, follows commands, facial expression symmetrical, bilateral hand grasp equal and even, purposeful motor response noted. * see full neuro assessment.   Cardiac: All peripheral pulses present. No Bilateral lower extremity edema. Cap refill is <3 seconds.  Abdomen: Abdomen soft, non-tender to palpation.   : Pt reports no dysuria or hematuria.     Fall risk band applied to patient at this time.

## 2020-09-12 NOTE — PLAN OF CARE
Problem: Physical Therapy Goal  Goal: Physical Therapy Goal  Description: Goals to be met by: 10/3/2020     Patient will increase functional independence with mobility by performin. Supine to sit with Modified [place]  2. Sit to supine with Modified [place]  3. Sit to stand transfer with Stand-by Assistance  4. Bed to chair transfer with Stand-by Assistance using LRAD  5. Gait  x 50 feet with Stand-by Assistance using LRAD     Outcome: Ongoing, Progressing   Eval completed and POC established    Kike Hilton PT,DPT  2020

## 2020-09-12 NOTE — ED NOTES
[facility] calls, patient accepted to [facility] to Dr. De Guzman. Number for report at [phone]. She states that the helicopter will be coming to transport the patient and will be at ED in approximately 20 minutes.

## 2020-09-12 NOTE — H&P
[facility]  Neurocritical Care  History & Physical    Admit Date: 9/12/2020  Service Date: 09/12/2020  Length of Stay: 0    Subjective:     Chief Complaint: SDH (subdural hematoma)    History of Present Illness: Fiona Arteaga is an 87 yo female with a PMH of HTN, Afib on eliquis who presents after a mechanical fall. She was transitioning in her home from standing to chair when she fell hard and hit the left frontal aspect of her head. She did not lose consciousness.  She lives alone, and her  passed away a few years ago.  She has not had any other recent falls.  No other blood thinning agents.  No known history of stroke or seizure.   Denies neck pain, denies numbness or weakness.   She has mild left hip pain and dizziness, but does not endorse currently. She will be admitted to the Neuro ICU for a higher level of care and closer neurological monitoring.     Past Medical History:   Diagnosis Date    A-fib     Anticoagulant long-term use     Cancer     skin cancer    Hypertension      History reviewed. No pertinent surgical history.   Current Facility-Administered Medications on File Prior to Encounter   Medication Dose Route Frequency Provider Last Rate Last Dose    [COMPLETED] diptheria-tetanus toxoids 2-2 Lf unit/0.5 mL injection 0.5 mL  0.5 mL Intramuscular ED 1 Time Garry Bales DO   0.5 mL at 09/12/20 0338    [COMPLETED] fentaNYL injection 50 mcg  50 mcg Intravenous ED 1 Time Garry Bales DO   50 mcg at 09/12/20 0200    [COMPLETED] iohexoL (OMNIPAQUE 350) injection 75 mL  75 mL Intravenous ONCE PRN Garry Bales DO   75 mL at 09/12/20 0258    [COMPLETED] ondansetron injection 4 mg  4 mg Intravenous ED 1 Time Garry Bales DO   4 mg at 09/12/20 0200    [COMPLETED] promethazine (PHENERGAN) 12.5 mg in dextrose 5 % 50 mL IVPB  12.5 mg Intravenous ED 1 Time Garry Bales DO   Stopped at 09/12/20 0323    [DISCONTINUED] niCARdipine (CARDENE) 40  mg/200 mL (0.2 mg/mL) infusion             [DISCONTINUED] niCARdipine 40 mg/200 mL (0.2 mg/mL) infusion  2.5 mg/hr Intravenous Continuous Garry Bales DO 12.5 mL/hr at 09/12/20 0300 2.5 mg/hr at 09/12/20 0300     Current Outpatient Medications on File Prior to Encounter   Medication Sig Dispense Refill    amLODIPine (NORVASC) 2.5 MG tablet Take 2.5 mg by mouth once daily.      furosemide (LASIX) 40 MG tablet Take 1 tablet (40 mg total) by mouth once daily. 30 tablet 0    lisinopril (PRINIVIL,ZESTRIL) 40 MG tablet Take 40 mg by mouth once daily.      metoprolol succinate (TOPROL-XL) 50 MG 24 hr tablet Take 1 tablet (50 mg total) by mouth 2 (two) times daily. 60 tablet 0    pantoprazole (PROTONIX) 40 MG tablet Take 1 tablet (40 mg total) by mouth once daily. 30 tablet 0      Allergies: Patient has no known allergies.    History reviewed. No pertinent family history.  Social History     Tobacco Use    Smoking status: Never Smoker   Substance Use Topics    Alcohol use: Never     Frequency: Never    Drug use: Never     Review of Systems   Constitutional: Negative.    HENT: Negative.    Eyes: Negative for photophobia and visual disturbance.   Respiratory: Negative for chest tightness and shortness of breath.    Cardiovascular: Negative for chest pain and palpitations.   Gastrointestinal: Negative.  Negative for abdominal distention.   Genitourinary: Negative.    Musculoskeletal: Negative for back pain, neck pain and neck stiffness.   Skin: Negative.    Neurological: Negative.  Negative for speech difficulty and weakness.   Hematological: Bruises/bleeds easily.   Psychiatric/Behavioral: Negative.      Objective:     Vitals:    Temp: 98 °F (36.7 °C)  Pulse: 101  BP: (!) 143/63  MAP (mmHg): 90  Resp: 18  SpO2: 100 %  O2 Device (Oxygen Therapy): room air    Temp  Min: 98 °F (36.7 °C)  Max: 98.5 °F (36.9 °C)  Pulse  Min: 101  Max: 134  BP  Min: 142/62  Max: 174/85  MAP (mmHg)  Min: 83  Max: 124  Resp  Min: 18   Max: 35  SpO2  Min: 90 %  Max: 100 %    No intake/output data recorded.           Physical Exam  Vitals signs and nursing note reviewed.   Constitutional:       Appearance: Normal appearance.   HENT:      Head: Normocephalic.      Comments: left frontal scalp bruising     Right Ear: External ear normal.      Left Ear: External ear normal.      Mouth/Throat:      Pharynx: Oropharynx is clear.   Eyes:      General: No scleral icterus.     Extraocular Movements: Extraocular movements intact.      Pupils: Pupils are equal, round, and reactive to light.      Comments: Glasses on at time of exam    Neck:      Musculoskeletal: Normal range of motion and neck supple.   Cardiovascular:      Pulses: Normal pulses.      Heart sounds: Normal heart sounds.   Pulmonary:      Effort: Pulmonary effort is normal.      Breath sounds: Normal breath sounds.   Abdominal:      General: Abdomen is flat.      Palpations: Abdomen is soft.   Skin:     General: Skin is warm and dry.      Capillary Refill: Capillary refill takes less than 2 seconds.   Neurological:      General: No focal deficit present.      Mental Status: She is alert and oriented to person, place, and time. Mental status is at baseline.      Motor: No weakness.      Comments: Pt is awake, alert, and oriented to person, place, time, and situation   PERRLA   EOMI  She is able able to flow commands and speech is clear  THOMPSON spontaneously with no weakness appreciated  Sensation intact throughout              Assessment/Plan:     Neuro  * SDH (subdural hematoma)   88 y.o female with a hx of A-Fib maintained on Eliqis fell and hit her had. Currently she has no neuro deficits.        -Admit to Neuro ICU   -Consult Neurosurgery  -Neuro checks Q1hr   -Vital signs Q1hr   -Reverse anti-plt/coag medications; KCentra, vitamin K.          coags wnl, no antiplatelet agents.   -SBP <140   -Keppra 500 BID  -HOB >30  -CBC/CMP/PT-INR/PTT  -CMP, Mg,  Phos  -PT/OT,SLP  -NPO    Cardiac/Vascular  Longstanding persistent atrial fibrillation  -Pt takes Metoprolol XL 50mg   -Will restart metoprolol 25mg BID for now     Benign essential hypertension  -Echo   -EKG  -restarted home dose norvasc       Renal/  CESARIO (acute kidney injury)  -strict I&Os   -monitor renal panel     Hyperkalemia  -Repeat CMP  -Follow renal function           The patient is being Prophylaxed for:  Venous Thromboembolism with: Mechanical  Stress Ulcer with: PPI  Ventilator Pneumonia with: not applicable    Activity Orders          Diet NPO: NPO starting at 09/12 0537        Full Code  Critical Care Time: 32 mins   Lisbeth Adams NP  Neurocritical Care  [facility]

## 2020-09-13 LAB
ALBUMIN SERPL BCP-MCNC: 3 G/DL (ref 3.5–5.2)
ALBUMIN SERPL BCP-MCNC: 3.2 G/DL (ref 3.5–5.2)
ALP SERPL-CCNC: 46 U/L (ref 55–135)
ALP SERPL-CCNC: 50 U/L (ref 55–135)
ALT SERPL W/O P-5'-P-CCNC: 8 U/L (ref 10–44)
ALT SERPL W/O P-5'-P-CCNC: 9 U/L (ref 10–44)
ANION GAP SERPL CALC-SCNC: 7 MMOL/L (ref 8–16)
ANION GAP SERPL CALC-SCNC: 8 MMOL/L (ref 8–16)
APTT BLDCRRT: 25.3 SEC (ref 21–32)
AST SERPL-CCNC: 13 U/L (ref 10–40)
AST SERPL-CCNC: 16 U/L (ref 10–40)
BASOPHILS # BLD AUTO: 0.02 K/UL (ref 0–0.2)
BASOPHILS NFR BLD: 0.2 % (ref 0–1.9)
BILIRUB SERPL-MCNC: 0.3 MG/DL (ref 0.1–1)
BILIRUB SERPL-MCNC: 0.4 MG/DL (ref 0.1–1)
BUN SERPL-MCNC: 30 MG/DL (ref 8–23)
BUN SERPL-MCNC: 33 MG/DL (ref 8–23)
CALCIUM SERPL-MCNC: 8.7 MG/DL (ref 8.7–10.5)
CALCIUM SERPL-MCNC: 8.8 MG/DL (ref 8.7–10.5)
CHLORIDE SERPL-SCNC: 104 MMOL/L (ref 95–110)
CHLORIDE SERPL-SCNC: 105 MMOL/L (ref 95–110)
CO2 SERPL-SCNC: 27 MMOL/L (ref 23–29)
CO2 SERPL-SCNC: 28 MMOL/L (ref 23–29)
CREAT SERPL-MCNC: 0.9 MG/DL (ref 0.5–1.4)
CREAT SERPL-MCNC: 0.9 MG/DL (ref 0.5–1.4)
DIFFERENTIAL METHOD: ABNORMAL
EOSINOPHIL # BLD AUTO: 0.1 K/UL (ref 0–0.5)
EOSINOPHIL NFR BLD: 1.1 % (ref 0–8)
ERYTHROCYTE [DISTWIDTH] IN BLOOD BY AUTOMATED COUNT: 13.2 % (ref 11.5–14.5)
EST. GFR  (AFRICAN AMERICAN): >60 ML/MIN/1.73 M^2
EST. GFR  (AFRICAN AMERICAN): >60 ML/MIN/1.73 M^2
EST. GFR  (NON AFRICAN AMERICAN): 57.3 ML/MIN/1.73 M^2
EST. GFR  (NON AFRICAN AMERICAN): 57.3 ML/MIN/1.73 M^2
GLUCOSE SERPL-MCNC: 116 MG/DL (ref 70–110)
GLUCOSE SERPL-MCNC: 123 MG/DL (ref 70–110)
HCT VFR BLD AUTO: 32.9 % (ref 37–48.5)
HGB BLD-MCNC: 10 G/DL (ref 12–16)
IMM GRANULOCYTES # BLD AUTO: 0.02 K/UL (ref 0–0.04)
IMM GRANULOCYTES NFR BLD AUTO: 0.2 % (ref 0–0.5)
LYMPHOCYTES # BLD AUTO: 2 K/UL (ref 1–4.8)
LYMPHOCYTES NFR BLD: 22.1 % (ref 18–48)
MAGNESIUM SERPL-MCNC: 2 MG/DL (ref 1.6–2.6)
MAGNESIUM SERPL-MCNC: 2.1 MG/DL (ref 1.6–2.6)
MCH RBC QN AUTO: 31.3 PG (ref 27–31)
MCHC RBC AUTO-ENTMCNC: 30.4 G/DL (ref 32–36)
MCV RBC AUTO: 103 FL (ref 82–98)
MONOCYTES # BLD AUTO: 0.9 K/UL (ref 0.3–1)
MONOCYTES NFR BLD: 10.3 % (ref 4–15)
NEUTROPHILS # BLD AUTO: 5.8 K/UL (ref 1.8–7.7)
NEUTROPHILS NFR BLD: 66.1 % (ref 38–73)
NRBC BLD-RTO: 0 /100 WBC
PHOSPHATE SERPL-MCNC: 4 MG/DL (ref 2.7–4.5)
PHOSPHATE SERPL-MCNC: 4 MG/DL (ref 2.7–4.5)
PLATELET # BLD AUTO: 204 K/UL (ref 150–350)
PMV BLD AUTO: 9.3 FL (ref 9.2–12.9)
POTASSIUM SERPL-SCNC: 5 MMOL/L (ref 3.5–5.1)
POTASSIUM SERPL-SCNC: 5.2 MMOL/L (ref 3.5–5.1)
PROT SERPL-MCNC: 6.1 G/DL (ref 6–8.4)
PROT SERPL-MCNC: 6.2 G/DL (ref 6–8.4)
RBC # BLD AUTO: 3.19 M/UL (ref 4–5.4)
SODIUM SERPL-SCNC: 139 MMOL/L (ref 136–145)
SODIUM SERPL-SCNC: 140 MMOL/L (ref 136–145)
TROPONIN I SERPL DL<=0.01 NG/ML-MCNC: 0.03 NG/ML (ref 0–0.03)
WBC # BLD AUTO: 8.81 K/UL (ref 3.9–12.7)

## 2020-09-13 PROCEDURE — 93005 ELECTROCARDIOGRAM TRACING: CPT

## 2020-09-13 PROCEDURE — 97535 SELF CARE MNGMENT TRAINING: CPT

## 2020-09-13 PROCEDURE — 25000003 PHARM REV CODE 250: Performed by: NURSE PRACTITIONER

## 2020-09-13 PROCEDURE — 93010 ELECTROCARDIOGRAM REPORT: CPT | Mod: ,,, | Performed by: INTERNAL MEDICINE

## 2020-09-13 PROCEDURE — 83735 ASSAY OF MAGNESIUM: CPT

## 2020-09-13 PROCEDURE — 80053 COMPREHEN METABOLIC PANEL: CPT | Mod: 91

## 2020-09-13 PROCEDURE — 63600175 PHARM REV CODE 636 W HCPCS: Performed by: STUDENT IN AN ORGANIZED HEALTH CARE EDUCATION/TRAINING PROGRAM

## 2020-09-13 PROCEDURE — 80053 COMPREHEN METABOLIC PANEL: CPT

## 2020-09-13 PROCEDURE — 83735 ASSAY OF MAGNESIUM: CPT | Mod: 91

## 2020-09-13 PROCEDURE — 63600175 PHARM REV CODE 636 W HCPCS: Performed by: NURSE PRACTITIONER

## 2020-09-13 PROCEDURE — 84484 ASSAY OF TROPONIN QUANT: CPT

## 2020-09-13 PROCEDURE — 84100 ASSAY OF PHOSPHORUS: CPT

## 2020-09-13 PROCEDURE — 27000221 HC OXYGEN, UP TO 24 HOURS

## 2020-09-13 PROCEDURE — 99232 PR SUBSEQUENT HOSPITAL CARE,LEVL II: ICD-10-PCS | Mod: ,,, | Performed by: NEUROLOGICAL SURGERY

## 2020-09-13 PROCEDURE — 93010 ELECTROCARDIOGRAM REPORT: CPT | Mod: 76,,, | Performed by: INTERNAL MEDICINE

## 2020-09-13 PROCEDURE — 99233 SBSQ HOSP IP/OBS HIGH 50: CPT | Mod: ,,, | Performed by: NURSE PRACTITIONER

## 2020-09-13 PROCEDURE — 94761 N-INVAS EAR/PLS OXIMETRY MLT: CPT

## 2020-09-13 PROCEDURE — 85025 COMPLETE CBC W/AUTO DIFF WBC: CPT

## 2020-09-13 PROCEDURE — 99232 SBSQ HOSP IP/OBS MODERATE 35: CPT | Mod: ,,, | Performed by: NEUROLOGICAL SURGERY

## 2020-09-13 PROCEDURE — 93010 EKG 12-LEAD: ICD-10-PCS | Mod: ,,, | Performed by: INTERNAL MEDICINE

## 2020-09-13 PROCEDURE — 36415 COLL VENOUS BLD VENIPUNCTURE: CPT

## 2020-09-13 PROCEDURE — 97530 THERAPEUTIC ACTIVITIES: CPT

## 2020-09-13 PROCEDURE — 99233 PR SUBSEQUENT HOSPITAL CARE,LEVL III: ICD-10-PCS | Mod: ,,, | Performed by: NURSE PRACTITIONER

## 2020-09-13 PROCEDURE — 85730 THROMBOPLASTIN TIME PARTIAL: CPT

## 2020-09-13 PROCEDURE — 84100 ASSAY OF PHOSPHORUS: CPT | Mod: 91

## 2020-09-13 PROCEDURE — 11000001 HC ACUTE MED/SURG PRIVATE ROOM

## 2020-09-13 RX ORDER — METOPROLOL TARTRATE 25 MG/1
25 TABLET, FILM COATED ORAL 2 TIMES DAILY
Status: DISCONTINUED | OUTPATIENT
Start: 2020-09-14 | End: 2020-09-13

## 2020-09-13 RX ORDER — HYDRALAZINE HYDROCHLORIDE 25 MG/1
25 TABLET, FILM COATED ORAL EVERY 8 HOURS
Status: DISCONTINUED | OUTPATIENT
Start: 2020-09-14 | End: 2020-09-14

## 2020-09-13 RX ORDER — AMLODIPINE BESYLATE 5 MG/1
10 TABLET ORAL DAILY
Status: DISCONTINUED | OUTPATIENT
Start: 2020-09-14 | End: 2020-09-14

## 2020-09-13 RX ADMIN — DOCUSATE SODIUM 50MG AND SENNOSIDES 8.6MG 1 TABLET: 8.6; 5 TABLET, FILM COATED ORAL at 08:09

## 2020-09-13 RX ADMIN — FENTANYL CITRATE 12.5 MCG: 50 INJECTION INTRAMUSCULAR; INTRAVENOUS at 06:09

## 2020-09-13 RX ADMIN — ACETAMINOPHEN 650 MG: 325 TABLET ORAL at 08:09

## 2020-09-13 RX ADMIN — PANTOPRAZOLE SODIUM 40 MG: 40 TABLET, DELAYED RELEASE ORAL at 08:09

## 2020-09-13 RX ADMIN — OXYCODONE HYDROCHLORIDE 5 MG: 5 TABLET ORAL at 11:09

## 2020-09-13 RX ADMIN — METOPROLOL TARTRATE 25 MG: 25 TABLET, FILM COATED ORAL at 03:09

## 2020-09-13 RX ADMIN — LEVETIRACETAM 500 MG: 5 INJECTION INTRAVENOUS at 08:09

## 2020-09-13 RX ADMIN — ACETAMINOPHEN 650 MG: 325 TABLET ORAL at 03:09

## 2020-09-13 RX ADMIN — OXYCODONE HYDROCHLORIDE 5 MG: 5 TABLET ORAL at 08:09

## 2020-09-13 RX ADMIN — OXYCODONE HYDROCHLORIDE 5 MG: 5 TABLET ORAL at 06:09

## 2020-09-13 RX ADMIN — AMLODIPINE BESYLATE 5 MG: 5 TABLET ORAL at 08:09

## 2020-09-13 RX ADMIN — METOPROLOL TARTRATE 25 MG: 25 TABLET, FILM COATED ORAL at 08:09

## 2020-09-13 NOTE — NURSING
After turning pt for linen change, pt felt nauseous, HR shahzad down to 10, then slowly returned back to NSR. Pt said she held her breath during turn because of pain in her hip. DARRICK Bob at bedside. 12 lead obtained, 250cc NS bolus given. CMP, Mg, Phos drawn and sent to lab. Pt says she feels better now and will try not to hold breath again. Neuro exam stable. VSS. Will continue to monitor.

## 2020-09-13 NOTE — PROGRESS NOTES
[facility]  Neurosurgery  Progress Note    Subjective:     History of Present Illness: Fiona Arteaga is an 89 yo female with a PMH of HTN, Afib on eliquis who presents after a mechanical fall. She was transitioning in her home from standing to chair when she fell hard and hit the left frontal aspect of her head. She did not lose consciousness.  She lives alone, and her  passed away a few years ago.  She has not had any other recent falls.  No other blood thinning agents.  No known history of stroke or seizure.   Denies neck pain, denies numbness or weakness.   She has mild left hip pain and dizziness, but does not endorse currently.     Post-Op Info:  * No surgery found *         Interval History: NAEON. Neurologically stable.  Some headache overnight.    Medications:  Continuous Infusions:   niCARdipine Stopped (09/12/20 1418)     Scheduled Meds:   [START ON 9/14/2020] amLODIPine  10 mg Oral Daily    levetiracetam IVPB  500 mg Intravenous Q12H    [START ON 9/14/2020] metoprolol tartrate  25 mg Oral BID    pantoprazole  40 mg Oral Daily    senna-docusate 8.6-50 mg  1 tablet Oral BID     PRN Meds:acetaminophen, fentaNYL, influenza, labetalol, ondansetron, oxyCODONE, pneumoc 13-key conj-dip cr(PF), sodium chloride 0.9%     Review of Systems  Objective:     Weight: 60.7 kg (133 lb 13.1 oz)  Body mass index is 26.13 kg/m².  Vital Signs (Most Recent):  Temp: 97.8 °F (36.6 °C) (09/13/20 1505)  Pulse: 78 (09/13/20 1600)  Resp: 18 (09/13/20 1600)  BP: (!) 154/69 (09/13/20 1600)  SpO2: 100 % (09/13/20 1600) Vital Signs (24h Range):  Temp:  [97.6 °F (36.4 °C)-98.5 °F (36.9 °C)] 97.8 °F (36.6 °C)  Pulse:  [61-98] 78  Resp:  [16-44] 18  SpO2:  [96 %-100 %] 100 %  BP: (118-154)/(56-90) 154/69     Date 09/13/20 0700 - 09/14/20 0659   Shift 6311-1017 6708-3930 2005-8352 24 Hour Total   INTAKE   P.O. 320   320   IV Piggyback 100 250  350   Shift Total(mL/kg) 420(6.9) 250(4.1)  670(11)   OUTPUT    Shift Total(mL/kg)       Weight (kg) 60.7 60.7 60.7 60.7              Oxygen Concentration (%):  [28] 28         Neurosurgery Physical Exam     E4 V5 M6  AOx3, PERRL, EOMI, face symmetric, facial sensation intact, tongue midline, shoulder shrug intact, hard of hearing  Extremities:  5/5 strength in all extremities in all muscle groups  No pronator drift  SILT        Significant Labs:  Recent Labs   Lab 09/12/20  0212 09/12/20  0514 09/13/20  0102   * 141*  141* 123*    141  141 140   K 5.3* 5.4*  5.4* 5.2*    109  109 105   CO2 25 22*  22* 27   BUN 47* 39*  39* 33*   CREATININE 1.0 1.0  1.0 0.9   CALCIUM 9.3 9.1  9.1 8.8   MG  --  2.1 2.1     Recent Labs   Lab 09/12/20  0212 09/12/20  0514 09/13/20  0102   WBC 13.16* 11.64 8.81   HGB 12.0 11.3* 10.0*   HCT 37.3 37.8 32.9*    225 204     Recent Labs   Lab 09/12/20  0241 09/13/20  0102   INR 1.0  --    APTT 26.3 25.3     Microbiology Results (last 7 days)     ** No results found for the last 168 hours. **        All pertinent labs from the last 24 hours have been reviewed.    Significant Diagnostics:  CT: No results found in the last 24 hours.  MRI: No results found in the last 24 hours.    Assessment/Plan:     * SDH (subdural hematoma)  87 yo F w/ PMH of HTN, Afib on eliquis who presents with L acute holohemispheric thin SDH with 5mm maximal thickness 3mm MLS. Neuro intact on exam    --Patient admitted to Neuro ICU on telemetry      -q1h neurochecks in ICU  --All labs and diagnostics reviewed  --Follow-up CTH stable  --coags wnl  --SBP <140 (cardene ggt; hydralazine & labetalol PRN; transition to home meds when appropriate)  --Na >135  --Keppra 500 BID  --HOB >30  --all labs reviewed  --ok to allow diet  --Continue to monitor clinically, notify NSGY immediately with any changes in neuro status        Aelx Silva MD  Neurosurgery  [facility]

## 2020-09-13 NOTE — SUBJECTIVE & OBJECTIVE
Interval History: NAEON. Neurologically stable.  Some headache overnight.    Medications:  Continuous Infusions:   niCARdipine Stopped (09/12/20 1418)     Scheduled Meds:   [START ON 9/14/2020] amLODIPine  10 mg Oral Daily    levetiracetam IVPB  500 mg Intravenous Q12H    [START ON 9/14/2020] metoprolol tartrate  25 mg Oral BID    pantoprazole  40 mg Oral Daily    senna-docusate 8.6-50 mg  1 tablet Oral BID     PRN Meds:acetaminophen, fentaNYL, influenza, labetalol, ondansetron, oxyCODONE, pneumoc 13-key conj-dip cr(PF), sodium chloride 0.9%     Review of Systems  Objective:     Weight: 60.7 kg (133 lb 13.1 oz)  Body mass index is 26.13 kg/m².  Vital Signs (Most Recent):  Temp: 97.8 °F (36.6 °C) (09/13/20 1505)  Pulse: 78 (09/13/20 1600)  Resp: 18 (09/13/20 1600)  BP: (!) 154/69 (09/13/20 1600)  SpO2: 100 % (09/13/20 1600) Vital Signs (24h Range):  Temp:  [97.6 °F (36.4 °C)-98.5 °F (36.9 °C)] 97.8 °F (36.6 °C)  Pulse:  [61-98] 78  Resp:  [16-44] 18  SpO2:  [96 %-100 %] 100 %  BP: (118-154)/(56-90) 154/69     Date 09/13/20 0700 - 09/14/20 0659   Shift 9504-2060 8681-1769 2904-8338 24 Hour Total   INTAKE   P.O. 320   320   IV Piggyback 100 250  350   Shift Total(mL/kg) 420(6.9) 250(4.1)  670(11)   OUTPUT   Shift Total(mL/kg)       Weight (kg) 60.7 60.7 60.7 60.7              Oxygen Concentration (%):  [28] 28         Neurosurgery Physical Exam     E4 V5 M6  AOx3, PERRL, EOMI, face symmetric, facial sensation intact, tongue midline, shoulder shrug intact, hard of hearing  Extremities:  5/5 strength in all extremities in all muscle groups  No pronator drift  SILT        Significant Labs:  Recent Labs   Lab 09/12/20 0212 09/12/20  0514 09/13/20 0102   * 141*  141* 123*    141  141 140   K 5.3* 5.4*  5.4* 5.2*    109  109 105   CO2 25 22*  22* 27   BUN 47* 39*  39* 33*   CREATININE 1.0 1.0  1.0 0.9   CALCIUM 9.3 9.1  9.1 8.8   MG  --  2.1 2.1     Recent Labs   Lab 09/12/20 0212  09/12/20  0514 09/13/20  0102   WBC 13.16* 11.64 8.81   HGB 12.0 11.3* 10.0*   HCT 37.3 37.8 32.9*    225 204     Recent Labs   Lab 09/12/20  0241 09/13/20  0102   INR 1.0  --    APTT 26.3 25.3     Microbiology Results (last 7 days)     ** No results found for the last 168 hours. **        All pertinent labs from the last 24 hours have been reviewed.    Significant Diagnostics:  CT: No results found in the last 24 hours.  MRI: No results found in the last 24 hours.

## 2020-09-13 NOTE — PLAN OF CARE
POC reviewed with pt and family at 1400. Pt verbalized understanding. Questions and concerns addressed. Pt had suspected vagal episode this afternoon. See prior note. Pt progressing toward goals. Will continue to monitor. See flowsheets for full assessment and VS info.

## 2020-09-13 NOTE — CARE UPDATE
Nursing Transfer Note       Transfer From 9098 To 946    Transfer via bed    Transfer with 2L NC to O2, tele box    Transported by TIFFANIE Chun    Medicines sent: no    Chart sent with patient: Yes    Belongings sent with patient: Clothes, purse, book, glasses    Notified: angela    Bedside Neuro assessment performed: Yes    Bedside Handoff given to: David    Upon arrival to floor: cardiac monitor applied, patient oriented to room, call bell in reach and bed in lowest position

## 2020-09-13 NOTE — ASSESSMENT & PLAN NOTE
87 yo F w/ PMH of HTN, Afib on eliquis who presents with L acute holohemispheric thin SDH with 5mm maximal thickness 3mm MLS. Neuro intact on exam    --Patient admitted to Neuro ICU on telemetry      -q1h neurochecks in ICU  --All labs and diagnostics reviewed  --Follow-up CTH stable  --coags wnl  --SBP <140 (cardene ggt; hydralazine & labetalol PRN; transition to home meds when appropriate)  --Na >135  --Keppra 500 BID  --HOB >30  --all labs reviewed  --ok to allow diet  --Continue to monitor clinically, notify NSGY immediately with any changes in neuro status

## 2020-09-13 NOTE — PLAN OF CARE
[facility] Care Plan    POC reviewed with Fiona Arteaga and family at 0300. Pt verbalized understanding. Questions and concerns addressed. No acute events overnight. PRNs given for pain throughout the shift. Pt progressing toward goals. Will continue to monitor. See below and flowsheets for full assessment and VS info.       Neuro:  Alfred Coma Scale  Best Eye Response: 4-->(E4) spontaneous  Best Motor Response: 6-->(M6) obeys commands  Best Verbal Response: 5-->(V5) oriented  [place] Coma Scale Score: 15  Assessment Qualifiers: patient not sedated/intubated, no eye obstruction present  Pupil PERRLA: yes     24hr Temp:  [98 °F (36.7 °C)-98.5 °F (36.9 °C)]     CV:   Rhythm: normal sinus rhythm  BP goals:   SBP < 140  MAP > 65    Resp:   O2 Device (Oxygen Therapy): nasal cannula  Oxygen Concentration (%): 28    Plan: N/A    GI/:  CHRISSIE Total Score: 20  Diet/Nutrition Received: low saturated fat/low cholesterol  Last Bowel Movement: 09/11/20  Voiding Characteristics: external catheter    Intake/Output Summary (Last 24 hours) at 9/13/2020 0529  Last data filed at 9/13/2020 0105  Gross per 24 hour   Intake 980 ml   Output 300 ml   Net 680 ml     Unmeasured Output  Stool Occurrence: 0    Labs/Accuchecks:  Recent Labs   Lab 09/13/20 0102   WBC 8.81   RBC 3.19*   HGB 10.0*   HCT 32.9*         Recent Labs   Lab 09/13/20 0102      K 5.2*   CO2 27      BUN 33*   CREATININE 0.9   ALKPHOS 50*   ALT 9*   AST 16   BILITOT 0.4      Recent Labs   Lab 09/12/20  0241 09/13/20  0102   INR 1.0  --    APTT 26.3 25.3      Recent Labs   Lab 09/12/20  0212   TROPONINI 0.06       Electrolytes: No replacement orders  Accuchecks: none    Gtts:   niCARdipine Stopped (09/12/20 1418)       LDA/Wounds:  Lines/Drains/Airways       Peripheral Intravenous Line                   Peripheral IV - Single Lumen 09/12/20 0224 20 G Left Antecubital 1 day         Peripheral IV - Single Lumen 09/12/20 0700 22 G Left Hand less than 1  day                  Wounds: Yes  Wound care consulted: Yes

## 2020-09-13 NOTE — PLAN OF CARE
Goals remain appropriate.  ANITRA Hardin  9/13/2020    Problem: Occupational Therapy Goal  Goal: Occupational Therapy Goal  Description: Goals set 9/12 to be addressed for 14 days with expiration date, 9/26:  Patient will increase functional independence with ADLs by performing:    Patient will demonstrate rolling to the right with modified independence.  Not met   Patient will demonstrate supine -sit with modified independence.   Not met  Patient will demonstrate stand pivot transfers with supervision.   Not met  Patient will demonstrate grooming while standing with supervision assist.   Not met  Patient will demonstrate upper body dressing with supervision while seated EOB.   Not met  Patient will demonstrate lower body dressing with supervision while seated EOB.   Not met  Patient will demonstrate toileting with supervision.   Not met  Patient will demonstrate bathing while seated EOB with supervision.   Not met  Patient's family / caregiver will demonstrate independence and safety with assisting patient with self-care skills and functional mobility.     Not met            Outcome: Ongoing, Progressing

## 2020-09-13 NOTE — PT/OT/SLP PROGRESS
"Occupational Therapy   Treatment    Name: Fiona Arteaga  MRN: [id number]  Admitting Diagnosis:  SDH (subdural hematoma)       Recommendations:     Discharge Recommendations: nursing facility, skilled  Discharge Equipment Recommendations:  bath bench, bedside commode  Barriers to discharge:  None    Assessment:     Fiona Arteaga is a 88 y.o. female with a medical diagnosis of SDH (subdural hematoma).  She presents with performance deficits affecting function are weakness, impaired endurance, impaired self care skills, impaired functional mobilty, gait instability, impaired balance, decreased lower extremity function, decreased coordination, decreased ROM, impaired coordination.     Rehab Prognosis:  Good; patient would benefit from acute skilled OT services to address these deficits and reach maximum level of function.       Plan:     Patient to be seen 5 x/week to address the above listed problems via self-care/home management, therapeutic activities, therapeutic exercises  · Plan of Care Expires: 10/11/20  · Plan of Care Reviewed with: patient    Subjective   Patient: "I live alone but my niece is there every day.  She does all my finances; she helps with grocery shopping and she'll bring meals over.  My  passed away 2 years ago.  I want to drive again."  "Thank you so much."  Pain/Comfort:  · Pain Rating 1: 10/10  · Location 1: head  · Pain Addressed 1: Pre-medicate for activity, Nurse notified  · Pain Rating Post-Intervention 1: 10/10  · Pain Rating 2: 8/10  · Location 2: (left hip)  · Pain Addressed 2: Pre-medicate for activity, Reposition, Nurse notified  · Pain Rating Post-Intervention 2: 8/10    Objective:     Communicated with: Nurse prior to session.  Patient found supine with bed alarm, blood pressure cuff, oxygen, peripheral IV, pulse ox (continuous) upon OT entry to room.  Family not present.    General Precautions: Standard, aspiration, fall, seizure   Orthopedic Precautions:N/A "   Braces: N/A     Occupational Performance:     Bed Mobility:    · Patient completed Rolling/Turning to Left with  moderate assistance  · Patient completed Rolling/Turning to Right with moderate assistance  · Patient completed Supine to Sit with maximal assistance  · Patient completed Sit to Supine with moderate assistance     Functional Mobility/Transfers:  · Patient completed Sit <> Stand Transfer with minimum assistance  with  no assistive device   · Patient completed Bed <> Chair Transfer using Stand Pivot technique with minimum assistance with no assistive device    Activities of Daily Living:  · Grooming: stand by assistance while seated EOB  · Upper Body Dressing: stand by assistance while seated EOB  · Lower Body Dressing: maximal assistance while seated EOB; assistance to initiate socks, maintain standing balance    [facility] 6 Click ADL: 16    Treatment & Education:  Patient education provided on role of OT and need for continued OT upon discharge. Patient alert and oriented x 3; able to follow 4/4 one step commands.  Patient attentive and interactive throughout the session. Continued education, patient/ family training recommended.   White board updated in patient's room.  OT asked if there were any other questions; patient had no further questions.     Patient left supine with all lines intact, call button in reach and bed alarm onEducation:      GOALS:   Multidisciplinary Problems     Occupational Therapy Goals        Problem: Occupational Therapy Goal    Goal Priority Disciplines Outcome Interventions   Occupational Therapy Goal     OT, PT/OT Ongoing, Progressing    Description: Goals set 9/12 to be addressed for 14 days with expiration date, 9/26:  Patient will increase functional independence with ADLs by performing:    Patient will demonstrate rolling to the right with modified independence.  Not met   Patient will demonstrate supine -sit with modified independence.   Not met  Patient will demonstrate  stand pivot transfers with supervision.   Not met  Patient will demonstrate grooming while standing with supervision assist.   Not met  Patient will demonstrate upper body dressing with supervision while seated EOB.   Not met  Patient will demonstrate lower body dressing with supervision while seated EOB.   Not met  Patient will demonstrate toileting with supervision.   Not met  Patient will demonstrate bathing while seated EOB with supervision.   Not met  Patient's family / caregiver will demonstrate independence and safety with assisting patient with self-care skills and functional mobility.     Not met                             Time Tracking:     OT Date of Treatment: 09/13/20  OT Start Time: 0613  OT Stop Time: 0638  OT Total Time (min): 25 min    Billable Minutes:Self Care/Home Management 15  Therapeutic Activity 10    ANITRA Hardin  9/13/2020     Patent

## 2020-09-13 NOTE — PLAN OF CARE
Admit Date: 9/12/2020  5:03 AM     Admit DX: SDH (subdural hematoma) [S06.5X9A]     SW completed Discharge Planning Assessment with patient via bedside. Discharge planning booklet given to patient/family. All questions answered.    Patient reported that niece will provide transportation upon discharge.     Functional status was independent prior and patient utilized no assistive equipment.     Patient lives in a single-story home with 0 steps to enter alone (niece comes by daily and helps provide care such as cooking).     SW reviewed possible placement of SNF or IRF post-discharge; patient declines any placement and prefers home with HH.    Emergency Contact: Extended Emergency Contact Information  Primary Emergency Contact: Angelic Edge  Mobile Phone: [phone]  Relation: Relative  Preferred language: English   needed? No     PCP: Primary Doctor No -- per patient she fired her previous PCP in March for letting her get sick enough to be admitted to the hospital. She doesn't feel she needs one right now.     Pharmacy:   [id number] - [place], [place] - [id number] [facility] [place] [address]  Phone: [phone] Fax: [phone]      Payor: Payor: MEDICARE / Plan: MEDICARE RAILROAD halfway / Product Type: [facility] /        09/13/20 1219   Discharge Assessment   Assessment Type Discharge Planning Assessment   Confirmed/corrected address and phone number on facesheet? Yes   Assessment information obtained from? Patient   Prior to hospitilization cognitive status: Alert/Oriented   Prior to hospitalization functional status: Independent   Current cognitive status: Alert/Oriented   Current Functional Status: Independent   [facility]   Lives With alone   Able to Return to Prior Arrangements yes   Is patient able to care for self after discharge? Yes   Who are your caregiver(s) and their phone number(s)? Angelic Edge (niece)  [phone]   Patient's perception of discharge disposition home health   Readmission Within the Last 30 Days no previous admission in last 30 days   Patient currently being followed by outpatient case management? No   Patient currently receives any other outside agency services? No   Equipment Currently Used at Home none   Do you have any problems affording any of your prescribed medications? No   Is the patient taking medications as prescribed? yes   Does the patient have transportation home? Yes   Transportation Anticipated family or friend will provide   Dialysis Name and Scheduled days n/a   Does the patient receive services at the [facility]? No   Discharge Plan A Home   Discharge Plan B Home Health   DME Needed Upon Discharge  other (see comments)  (TBD)   Patient/Family in Agreement with Plan yes           Jolie Swenson LMSW   - Case Management

## 2020-09-13 NOTE — PROGRESS NOTES
[facility]  Neurocritical Care  Progress Note    Admit Date: 9/12/2020  Service Date: 09/13/2020  Length of Stay: 1    Subjective:     Chief Complaint: SDH (subdural hematoma)    History of Present Illness: Fiona Arteaga is an 87 yo female with a PMH of HTN, Afib on eliquis who presents after a mechanical fall. She was transitioning in her home from standing to chair when she fell hard and hit the left frontal aspect of her head. She did not lose consciousness.  She lives alone, and her  passed away a few years ago.  She has not had any other recent falls.  No other blood thinning agents.  No known history of stroke or seizure.   Denies neck pain, denies numbness or weakness.   She has mild left hip pain and dizziness, but does not endorse currently. She will be admitted to the Neuro ICU for a higher level of care and closer neurological monitoring.     Hospital Course: 09/12/2020: Admit to neuro ICU.  09/13/2020: NAEON. Stable for transfer.       Interval History:  NAEON. No neuro deficits appreciated. Stable for stepdown.     Review of Systems   Constitutional: Negative.    HENT: Negative.    Eyes: Negative for photophobia and visual disturbance.   Respiratory: Negative for chest tightness and shortness of breath.    Cardiovascular: Negative for chest pain and palpitations.   Gastrointestinal: Negative.  Negative for abdominal distention.   Genitourinary: Negative.    Musculoskeletal: Negative for back pain, neck pain and neck stiffness.   Skin: Negative.    Neurological: Negative.  Negative for speech difficulty and weakness.   Hematological: Bruises/bleeds easily.   Psychiatric/Behavioral: Negative.        Objective:     Vitals:  Temp: 98.1 °F (36.7 °C)  Pulse: 61  Rhythm: normal sinus rhythm  BP: (!) 121/56  MAP (mmHg): 80  Resp: 18  SpO2: 100 %  Oxygen Concentration (%): 28  O2 Device (Oxygen Therapy): nasal cannula    Temp  Min: 97.6 °F (36.4 °C)  Max: 98.5 °F (36.9 °C)  Pulse   Min: 61  Max: 98  BP  Min: 118/58  Max: 142/63  MAP (mmHg)  Min: 80  Max: 104  Resp  Min: 16  Max: 44  SpO2  Min: 96 %  Max: 100 %  Oxygen Concentration (%)  Min: 28  Max: 28    09/12 0701 - 09/13 0700  In: 980 [P.O.:355; I.V.:25]  Out: 500 [Urine:500]   Unmeasured Output  Urine Occurrence: 1  Stool Occurrence: 0       Physical Exam  Vitals signs and nursing note reviewed.   Constitutional:       Appearance: Normal appearance.   HENT:      Head: Normocephalic.      Comments: left frontal scalp bruising     Right Ear: External ear normal.      Left Ear: External ear normal.      Mouth/Throat:      Pharynx: Oropharynx is clear.   Eyes:      General: No scleral icterus.     Extraocular Movements: Extraocular movements intact.      Pupils: Pupils are equal, round, and reactive to light.      Comments: Glasses on at time of exam    Neck:      Musculoskeletal: Normal range of motion and neck supple.   Cardiovascular:      Pulses: Normal pulses.      Heart sounds: Normal heart sounds.   Pulmonary:      Effort: Pulmonary effort is normal.      Breath sounds: Normal breath sounds.   Abdominal:      General: Abdomen is flat.      Palpations: Abdomen is soft.   Skin:     General: Skin is warm and dry.      Capillary Refill: Capillary refill takes less than 2 seconds.   Neurological:      General: No focal deficit present.      Mental Status: She is alert and oriented to person, place, and time. Mental status is at baseline.      Motor: No weakness.      Comments: Pt is awake, alert, and oriented to person, place, time, and situation   PERRLA   EOMI  She is able able to flow commands and speech is clear  THOMPSON spontaneously with no weakness appreciated  Sensation intact throughout        Medications:  ContinuousniCARdipine, Last Rate: Stopped (09/12/20 1418)    ScheduledamLODIPine, 5 mg, Daily  levetiracetam IVPB, 500 mg, Q12H  metoprolol tartrate, 25 mg, TID  pantoprazole, 40 mg, Daily  senna-docusate 8.6-50 mg, 1 tablet,  BID    PRNacetaminophen, 650 mg, Q4H PRN  fentaNYL, 12.5 mcg, Q3H PRN  influenza, 0.5 mL, Prior to discharge  labetalol, 10 mg, Q4H PRN  ondansetron, 4 mg, Q8H PRN  oxyCODONE, 5 mg, Q6H PRN  pneumoc 13-key conj-dip cr(PF), 0.5 mL, Prior to discharge  sodium chloride 0.9%, 10 mL, PRN      Today I personally reviewed pertinent imaging, notably: CT head stable. Left cerebral convexity and parafalcine/tentorial leaflet subdural hematoma without increased in volume or new mass effect.  No new hemorrhage.    Diet  Diet Cardiac  Diet Cardiac      Assessment/Plan:     Neuro  * SDH (subdural hematoma)   88 y.o female with a hx of A-Fib maintained on Eliqis fell and hit her had. Currently she has no neuro deficits.        -Admit to Neuro ICU   -Consult Neurosurgery  -Neuro checks Q1hr   -Vital signs Q1hr   -Reverse anti-plt/coag medications; KCentra, vitamin K.          coags wnl, no antiplatelet agents.   -SBP <140   -Keppra 500 BID  -HOB >30  -CBC/CMP/PT-INR/PTT  -CMP, Mg, Phos  -PT/OT,SLP  -NPO    Cardiac/Vascular  Longstanding persistent atrial fibrillation  -Pt takes Metoprolol XL 50mg   -Will restart metoprolol 25mg TID    Benign essential hypertension  -Echo   -EKG  -restarted home dose norvasc       Renal/  CESARIO (acute kidney injury)  -strict I&Os   -monitor renal panel     Hyperkalemia  -Repeat CMP  -Follow renal function           The patient is being Prophylaxed for:  Venous Thromboembolism with: Mechanical  Stress Ulcer with: Not Applicable   Ventilator Pneumonia with: not applicable    Activity Orders          Diet Cardiac: Cardiac starting at 09/12 1232        Full Code  Level III  Lisbeth Adams NP  Neurocritical Care  [facility]

## 2020-09-13 NOTE — SUBJECTIVE & OBJECTIVE
Interval History:  NAEON. No neuro deficits appreciated. Stable for stepdown.     Review of Systems   Constitutional: Negative.    HENT: Negative.    Eyes: Negative for photophobia and visual disturbance.   Respiratory: Negative for chest tightness and shortness of breath.    Cardiovascular: Negative for chest pain and palpitations.   Gastrointestinal: Negative.  Negative for abdominal distention.   Genitourinary: Negative.    Musculoskeletal: Negative for back pain, neck pain and neck stiffness.   Skin: Negative.    Neurological: Negative.  Negative for speech difficulty and weakness.   Hematological: Bruises/bleeds easily.   Psychiatric/Behavioral: Negative.        Objective:     Vitals:  Temp: 98.1 °F (36.7 °C)  Pulse: 61  Rhythm: normal sinus rhythm  BP: (!) 121/56  MAP (mmHg): 80  Resp: 18  SpO2: 100 %  Oxygen Concentration (%): 28  O2 Device (Oxygen Therapy): nasal cannula    Temp  Min: 97.6 °F (36.4 °C)  Max: 98.5 °F (36.9 °C)  Pulse  Min: 61  Max: 98  BP  Min: 118/58  Max: 142/63  MAP (mmHg)  Min: 80  Max: 104  Resp  Min: 16  Max: 44  SpO2  Min: 96 %  Max: 100 %  Oxygen Concentration (%)  Min: 28  Max: 28    09/12 0701 - 09/13 0700  In: 980 [P.O.:355; I.V.:25]  Out: 500 [Urine:500]   Unmeasured Output  Urine Occurrence: 1  Stool Occurrence: 0       Physical Exam  Vitals signs and nursing note reviewed.   Constitutional:       Appearance: Normal appearance.   HENT:      Head: Normocephalic.      Comments: left frontal scalp bruising     Right Ear: External ear normal.      Left Ear: External ear normal.      Mouth/Throat:      Pharynx: Oropharynx is clear.   Eyes:      General: No scleral icterus.     Extraocular Movements: Extraocular movements intact.      Pupils: Pupils are equal, round, and reactive to light.      Comments: Glasses on at time of exam    Neck:      Musculoskeletal: Normal range of motion and neck supple.   Cardiovascular:      Pulses: Normal pulses.      Heart sounds: Normal heart sounds.    Pulmonary:      Effort: Pulmonary effort is normal.      Breath sounds: Normal breath sounds.   Abdominal:      General: Abdomen is flat.      Palpations: Abdomen is soft.   Skin:     General: Skin is warm and dry.      Capillary Refill: Capillary refill takes less than 2 seconds.   Neurological:      General: No focal deficit present.      Mental Status: She is alert and oriented to person, place, and time. Mental status is at baseline.      Motor: No weakness.      Comments: Pt is awake, alert, and oriented to person, place, time, and situation   PERRLA   EOMI  She is able able to flow commands and speech is clear  THOMPSON spontaneously with no weakness appreciated  Sensation intact throughout        Medications:  ContinuousniCARdipine, Last Rate: Stopped (09/12/20 1418)    ScheduledamLODIPine, 5 mg, Daily  levetiracetam IVPB, 500 mg, Q12H  metoprolol tartrate, 25 mg, TID  pantoprazole, 40 mg, Daily  senna-docusate 8.6-50 mg, 1 tablet, BID    PRNacetaminophen, 650 mg, Q4H PRN  fentaNYL, 12.5 mcg, Q3H PRN  influenza, 0.5 mL, Prior to discharge  labetalol, 10 mg, Q4H PRN  ondansetron, 4 mg, Q8H PRN  oxyCODONE, 5 mg, Q6H PRN  pneumoc 13-key conj-dip cr(PF), 0.5 mL, Prior to discharge  sodium chloride 0.9%, 10 mL, PRN      Today I personally reviewed pertinent imaging, notably: CT head stable. Left cerebral convexity and parafalcine/tentorial leaflet subdural hematoma without increased in volume or new mass effect.  No new hemorrhage.    Diet  Diet Cardiac  Diet Cardiac

## 2020-09-14 LAB
ALBUMIN SERPL BCP-MCNC: 2.9 G/DL (ref 3.5–5.2)
ALP SERPL-CCNC: 45 U/L (ref 55–135)
ALT SERPL W/O P-5'-P-CCNC: 9 U/L (ref 10–44)
ANION GAP SERPL CALC-SCNC: 7 MMOL/L (ref 8–16)
ASCENDING AORTA: 2.96 CM
AST SERPL-CCNC: 12 U/L (ref 10–40)
AV INDEX (PROSTH): 0.67
AV MEAN GRADIENT: 8 MMHG
AV PEAK GRADIENT: 12 MMHG
AV VALVE AREA: 1.68 CM2
AV VELOCITY RATIO: 0.68
BASOPHILS # BLD AUTO: 0.01 K/UL (ref 0–0.2)
BASOPHILS NFR BLD: 0.1 % (ref 0–1.9)
BILIRUB SERPL-MCNC: 0.4 MG/DL (ref 0.1–1)
BSA FOR ECHO PROCEDURE: 1.6 M2
BUN SERPL-MCNC: 24 MG/DL (ref 8–23)
CALCIUM SERPL-MCNC: 8.7 MG/DL (ref 8.7–10.5)
CHLORIDE SERPL-SCNC: 104 MMOL/L (ref 95–110)
CO2 SERPL-SCNC: 28 MMOL/L (ref 23–29)
CREAT SERPL-MCNC: 0.8 MG/DL (ref 0.5–1.4)
CV ECHO LV RWT: 0.34 CM
DIFFERENTIAL METHOD: ABNORMAL
DOP CALC AO PEAK VEL: 1.74 M/S
DOP CALC AO VTI: 32.64 CM
DOP CALC LVOT AREA: 2.5 CM2
DOP CALC LVOT DIAMETER: 1.79 CM
DOP CALC LVOT PEAK VEL: 1.19 M/S
DOP CALC LVOT STROKE VOLUME: 54.83 CM3
DOP CALCLVOT PEAK VEL VTI: 21.8 CM
E WAVE DECELERATION TIME: 164.55 MSEC
E/A RATIO: 4.06
E/E' RATIO: 22.33 M/S
ECHO LV POSTERIOR WALL: 0.87 CM (ref 0.6–1.1)
EOSINOPHIL # BLD AUTO: 0.1 K/UL (ref 0–0.5)
EOSINOPHIL NFR BLD: 1.4 % (ref 0–8)
ERYTHROCYTE [DISTWIDTH] IN BLOOD BY AUTOMATED COUNT: 13 % (ref 11.5–14.5)
EST. GFR  (AFRICAN AMERICAN): >60 ML/MIN/1.73 M^2
EST. GFR  (NON AFRICAN AMERICAN): >60 ML/MIN/1.73 M^2
FRACTIONAL SHORTENING: 41 % (ref 28–44)
GLUCOSE SERPL-MCNC: 122 MG/DL (ref 70–110)
HCT VFR BLD AUTO: 32.7 % (ref 37–48.5)
HGB BLD-MCNC: 9.8 G/DL (ref 12–16)
IMM GRANULOCYTES # BLD AUTO: 0.03 K/UL (ref 0–0.04)
IMM GRANULOCYTES NFR BLD AUTO: 0.3 % (ref 0–0.5)
INTERVENTRICULAR SEPTUM: 0.75 CM (ref 0.6–1.1)
LA MAJOR: 3.99 CM
LA MINOR: 4.95 CM
LA WIDTH: 3.18 CM
LEFT INTERNAL DIMENSION IN SYSTOLE: 3.02 CM (ref 2.1–4)
LEFT VENTRICLE DIASTOLIC VOLUME INDEX: 78.39 ML/M2
LEFT VENTRICLE DIASTOLIC VOLUME: 123.03 ML
LEFT VENTRICLE MASS INDEX: 91 G/M2
LEFT VENTRICLE SYSTOLIC VOLUME INDEX: 22.6 ML/M2
LEFT VENTRICLE SYSTOLIC VOLUME: 35.44 ML
LEFT VENTRICULAR INTERNAL DIMENSION IN DIASTOLE: 5.09 CM (ref 3.5–6)
LEFT VENTRICULAR MASS: 142.24 G
LV LATERAL E/E' RATIO: 22.33 M/S
LV SEPTAL E/E' RATIO: 22.33 M/S
LYMPHOCYTES # BLD AUTO: 1.7 K/UL (ref 1–4.8)
LYMPHOCYTES NFR BLD: 17.2 % (ref 18–48)
MAGNESIUM SERPL-MCNC: 1.8 MG/DL (ref 1.6–2.6)
MCH RBC QN AUTO: 31.3 PG (ref 27–31)
MCHC RBC AUTO-ENTMCNC: 30 G/DL (ref 32–36)
MCV RBC AUTO: 105 FL (ref 82–98)
MONOCYTES # BLD AUTO: 1 K/UL (ref 0.3–1)
MONOCYTES NFR BLD: 9.8 % (ref 4–15)
MV PEAK A VEL: 0.33 M/S
MV PEAK E VEL: 1.34 M/S
MV STENOSIS PRESSURE HALF TIME: 47.72 MS
MV VALVE AREA P 1/2 METHOD: 4.61 CM2
NEUTROPHILS # BLD AUTO: 6.9 K/UL (ref 1.8–7.7)
NEUTROPHILS NFR BLD: 71.2 % (ref 38–73)
NRBC BLD-RTO: 0 /100 WBC
PHOSPHATE SERPL-MCNC: 4 MG/DL (ref 2.7–4.5)
PLATELET # BLD AUTO: 185 K/UL (ref 150–350)
PMV BLD AUTO: 9 FL (ref 9.2–12.9)
POTASSIUM SERPL-SCNC: 5.4 MMOL/L (ref 3.5–5.1)
PROT SERPL-MCNC: 6 G/DL (ref 6–8.4)
RA MAJOR: 3.87 CM
RA PRESSURE: 3 MMHG
RA WIDTH: 2.07 CM
RBC # BLD AUTO: 3.13 M/UL (ref 4–5.4)
SARS-COV-2 RNA RESP QL NAA+PROBE: NOT DETECTED
SINUS: 2.52 CM
SODIUM SERPL-SCNC: 139 MMOL/L (ref 136–145)
STJ: 3.11 CM
TDI LATERAL: 0.06 M/S
TDI SEPTAL: 0.06 M/S
TDI: 0.06 M/S
TRICUSPID ANNULAR PLANE SYSTOLIC EXCURSION: 1.29 CM
TROPONIN I SERPL DL<=0.01 NG/ML-MCNC: 0.02 NG/ML (ref 0–0.03)
TROPONIN I SERPL DL<=0.01 NG/ML-MCNC: 0.03 NG/ML (ref 0–0.03)
WBC # BLD AUTO: 9.7 K/UL (ref 3.9–12.7)

## 2020-09-14 PROCEDURE — 99222 1ST HOSP IP/OBS MODERATE 55: CPT | Mod: ,,, | Performed by: NURSE PRACTITIONER

## 2020-09-14 PROCEDURE — 25000003 PHARM REV CODE 250: Performed by: NURSE PRACTITIONER

## 2020-09-14 PROCEDURE — 84100 ASSAY OF PHOSPHORUS: CPT

## 2020-09-14 PROCEDURE — 27000221 HC OXYGEN, UP TO 24 HOURS

## 2020-09-14 PROCEDURE — 25000003 PHARM REV CODE 250: Performed by: STUDENT IN AN ORGANIZED HEALTH CARE EDUCATION/TRAINING PROGRAM

## 2020-09-14 PROCEDURE — 99232 PR SUBSEQUENT HOSPITAL CARE,LEVL II: ICD-10-PCS | Mod: ,,, | Performed by: NEUROLOGICAL SURGERY

## 2020-09-14 PROCEDURE — 85025 COMPLETE CBC W/AUTO DIFF WBC: CPT

## 2020-09-14 PROCEDURE — 99222 PR INITIAL HOSPITAL CARE,LEVL II: ICD-10-PCS | Mod: ,,, | Performed by: NURSE PRACTITIONER

## 2020-09-14 PROCEDURE — 63600175 PHARM REV CODE 636 W HCPCS: Performed by: STUDENT IN AN ORGANIZED HEALTH CARE EDUCATION/TRAINING PROGRAM

## 2020-09-14 PROCEDURE — 83735 ASSAY OF MAGNESIUM: CPT

## 2020-09-14 PROCEDURE — 25000003 PHARM REV CODE 250: Performed by: PHYSICIAN ASSISTANT

## 2020-09-14 PROCEDURE — 80053 COMPREHEN METABOLIC PANEL: CPT

## 2020-09-14 PROCEDURE — 97535 SELF CARE MNGMENT TRAINING: CPT

## 2020-09-14 PROCEDURE — U0003 INFECTIOUS AGENT DETECTION BY NUCLEIC ACID (DNA OR RNA); SEVERE ACUTE RESPIRATORY SYNDROME CORONAVIRUS 2 (SARS-COV-2) (CORONAVIRUS DISEASE [COVID-19]), AMPLIFIED PROBE TECHNIQUE, MAKING USE OF HIGH THROUGHPUT TECHNOLOGIES AS DESCRIBED BY CMS-2020-01-R: HCPCS

## 2020-09-14 PROCEDURE — 84484 ASSAY OF TROPONIN QUANT: CPT | Mod: 91

## 2020-09-14 PROCEDURE — 94761 N-INVAS EAR/PLS OXIMETRY MLT: CPT

## 2020-09-14 PROCEDURE — 63600175 PHARM REV CODE 636 W HCPCS: Performed by: PHYSICIAN ASSISTANT

## 2020-09-14 PROCEDURE — 92523 SPEECH SOUND LANG COMPREHEN: CPT

## 2020-09-14 PROCEDURE — 97802 MEDICAL NUTRITION INDIV IN: CPT

## 2020-09-14 PROCEDURE — 36415 COLL VENOUS BLD VENIPUNCTURE: CPT

## 2020-09-14 PROCEDURE — 63600175 PHARM REV CODE 636 W HCPCS: Performed by: NURSE PRACTITIONER

## 2020-09-14 PROCEDURE — 99232 SBSQ HOSP IP/OBS MODERATE 35: CPT | Mod: ,,, | Performed by: NEUROLOGICAL SURGERY

## 2020-09-14 PROCEDURE — 11000001 HC ACUTE MED/SURG PRIVATE ROOM

## 2020-09-14 RX ORDER — HEPARIN SODIUM 5000 [USP'U]/ML
5000 INJECTION, SOLUTION INTRAVENOUS; SUBCUTANEOUS EVERY 8 HOURS
Status: DISCONTINUED | OUTPATIENT
Start: 2020-09-14 | End: 2020-09-15 | Stop reason: HOSPADM

## 2020-09-14 RX ORDER — FUROSEMIDE 40 MG/1
40 TABLET ORAL DAILY
Status: DISCONTINUED | OUTPATIENT
Start: 2020-09-14 | End: 2020-09-15 | Stop reason: HOSPADM

## 2020-09-14 RX ORDER — LANOLIN ALCOHOL/MO/W.PET/CERES
400 CREAM (GRAM) TOPICAL 2 TIMES DAILY
Status: COMPLETED | OUTPATIENT
Start: 2020-09-14 | End: 2020-09-14

## 2020-09-14 RX ORDER — HYDRALAZINE HYDROCHLORIDE 25 MG/1
25 TABLET, FILM COATED ORAL EVERY 8 HOURS PRN
Status: DISCONTINUED | OUTPATIENT
Start: 2020-09-14 | End: 2020-09-15 | Stop reason: HOSPADM

## 2020-09-14 RX ORDER — LISINOPRIL 20 MG/1
40 TABLET ORAL DAILY
Status: DISCONTINUED | OUTPATIENT
Start: 2020-09-14 | End: 2020-09-14

## 2020-09-14 RX ORDER — AMLODIPINE BESYLATE 2.5 MG/1
2.5 TABLET ORAL DAILY
Status: DISCONTINUED | OUTPATIENT
Start: 2020-09-15 | End: 2020-09-15 | Stop reason: HOSPADM

## 2020-09-14 RX ORDER — LEVETIRACETAM 500 MG/1
500 TABLET ORAL 2 TIMES DAILY
Status: DISCONTINUED | OUTPATIENT
Start: 2020-09-14 | End: 2020-09-15 | Stop reason: HOSPADM

## 2020-09-14 RX ORDER — LISINOPRIL 20 MG/1
40 TABLET ORAL DAILY
Status: DISCONTINUED | OUTPATIENT
Start: 2020-09-15 | End: 2020-09-15 | Stop reason: HOSPADM

## 2020-09-14 RX ADMIN — DOCUSATE SODIUM 50MG AND SENNOSIDES 8.6MG 1 TABLET: 8.6; 5 TABLET, FILM COATED ORAL at 09:09

## 2020-09-14 RX ADMIN — FUROSEMIDE 40 MG: 40 TABLET ORAL at 09:09

## 2020-09-14 RX ADMIN — LEVETIRACETAM 500 MG: 5 INJECTION INTRAVENOUS at 08:09

## 2020-09-14 RX ADMIN — OXYCODONE HYDROCHLORIDE 5 MG: 5 TABLET ORAL at 03:09

## 2020-09-14 RX ADMIN — PANTOPRAZOLE SODIUM 40 MG: 40 TABLET, DELAYED RELEASE ORAL at 08:09

## 2020-09-14 RX ADMIN — ONDANSETRON 4 MG: 2 INJECTION INTRAMUSCULAR; INTRAVENOUS at 06:09

## 2020-09-14 RX ADMIN — DOCUSATE SODIUM 50MG AND SENNOSIDES 8.6MG 1 TABLET: 8.6; 5 TABLET, FILM COATED ORAL at 08:09

## 2020-09-14 RX ADMIN — OXYCODONE HYDROCHLORIDE 5 MG: 5 TABLET ORAL at 09:09

## 2020-09-14 RX ADMIN — ACETAMINOPHEN 650 MG: 325 TABLET ORAL at 11:09

## 2020-09-14 RX ADMIN — ACETAMINOPHEN 650 MG: 325 TABLET ORAL at 05:09

## 2020-09-14 RX ADMIN — ACETAMINOPHEN 650 MG: 325 TABLET ORAL at 06:09

## 2020-09-14 RX ADMIN — AMLODIPINE BESYLATE 10 MG: 5 TABLET ORAL at 08:09

## 2020-09-14 RX ADMIN — HYDRALAZINE HYDROCHLORIDE 25 MG: 25 TABLET, FILM COATED ORAL at 05:09

## 2020-09-14 RX ADMIN — MAGNESIUM OXIDE 400 MG (241.3 MG MAGNESIUM) TABLET 400 MG: TABLET at 09:09

## 2020-09-14 RX ADMIN — LEVETIRACETAM 500 MG: 500 TABLET ORAL at 09:09

## 2020-09-14 RX ADMIN — HEPARIN SODIUM 5000 UNITS: 5000 INJECTION INTRAVENOUS; SUBCUTANEOUS at 09:09

## 2020-09-14 NOTE — PLAN OF CARE
Goals remain appropriate.  ANITRA Hardin  9/14/2020    Problem: Occupational Therapy Goal  Goal: Occupational Therapy Goal  Description: Goals set 9/12 to be addressed for 14 days with expiration date, 9/26:  Patient will increase functional independence with ADLs by performing:    Patient will demonstrate rolling to the right with modified independence.  Not met   Patient will demonstrate supine -sit with modified independence.   Not met  Patient will demonstrate stand pivot transfers with supervision.   Not met  Patient will demonstrate grooming while standing with supervision assist.   Not met  Patient will demonstrate upper body dressing with supervision while seated EOB.   Not met  Patient will demonstrate lower body dressing with supervision while seated EOB.   Not met  Patient will demonstrate toileting with supervision.   Not met  Patient will demonstrate bathing while seated EOB with supervision.   Not met  Patient's family / caregiver will demonstrate independence and safety with assisting patient with self-care skills and functional mobility.     Not met            Outcome: Ongoing, Progressing      (0) swallows foods/liquids without difficulty

## 2020-09-14 NOTE — CARE UPDATE
Spoke to Neurosurgery regarding the need for continued Medicine consult for bradycardia and BP management after Cardiology had already seen the pt yesterday. They said there would be no further need for a Medicine consult and they will re-consult in the future if necessary.

## 2020-09-14 NOTE — PLAN OF CARE
Problem: SLP Goal  Goal: SLP Goal  Description: Speech Language Pathology Goals  Goals expected to be met by 9/19/20    1. Pt will answer m/u YNQ with 80% Accuracy or higher, MOD I  2. Pt will complete general reasoning/problem solving tasks with 90% accuracy, Supervision  3. Pt will complete fx math tasks for time/money management with 90% accuracy, Supervision  4. Pt will participate in ongoing assessment of memory, reading, writing and visiospatial skills to determine ongoing POC needs  5. Educate Pt and family on S/S aspiration and aspiration precautions      Outcome: Ongoing, Progressing     Pt seen for further assessment of speech, language and cognition. Patient with Mild Cognitive Impairment.  Goals updated. ST to continue to follow. Pt would benefit from ongoing ST via SNF upon d/c from acute to continue to improve cognitive skills for highest level of Lee.     CATY Polanco., [facility]-SLP  Speech-Language Pathology  Pager: [phone]  9/14/2020

## 2020-09-14 NOTE — PROGRESS NOTES
[facility]  Neurosurgery  Progress Note    Subjective:     History of Present Illness: Fiona Arteaga is an 89 yo female with a PMH of HTN, Afib on eliquis who presents after a mechanical fall. She was transitioning in her home from standing to chair when she fell hard and hit the left frontal aspect of her head. She did not lose consciousness.  She lives alone, and her  passed away a few years ago.  She has not had any other recent falls.  No other blood thinning agents.  No known history of stroke or seizure.   Denies neck pain, denies numbness or weakness.   She has mild left hip pain and dizziness, but does not endorse currently.     Post-Op Info:  * No surgery found *         Interval History: NAEON. Stepped down from ICU. Pt bradycardic on telemetry, EKG with 1st degree AVB. Pt asymptomatic and neurologically intact. AAOx3. Reports HA and previous L hip pain are improving, more mobile today. Denies vision changes, speech difficulty, and acute weakness/numbness. PT/OT recs for SNF.    Medications:  Continuous Infusions:  Scheduled Meds:   [START ON 9/15/2020] amLODIPine  2.5 mg Oral Daily    furosemide  40 mg Oral Daily    levETIRAcetam  500 mg Oral BID    [START ON 9/15/2020] lisinopriL  40 mg Oral Daily    magnesium oxide  400 mg Oral BID    pantoprazole  40 mg Oral Daily    senna-docusate 8.6-50 mg  1 tablet Oral BID     PRN Meds:acetaminophen, fentaNYL, hydrALAZINE, influenza, ondansetron, oxyCODONE, pneumoc 13-key conj-dip cr(PF), sodium chloride 0.9%     Review of Systems  Objective:     Weight: 60.3 kg (133 lb)  Body mass index is 25.97 kg/m².  Vital Signs (Most Recent):  Temp: 98.7 °F (37.1 °C) (09/14/20 0758)  Pulse: 95 (09/14/20 1150)  Resp: 18 (09/14/20 0758)  BP: 105/61 (09/14/20 0758)  SpO2: 98 % (09/14/20 0758) Vital Signs (24h Range):  Temp:  [97.8 °F (36.6 °C)-99 °F (37.2 °C)] 98.7 °F (37.1 °C)  Pulse:  [67-95] 95  Resp:  [16-21] 18  SpO2:  [95 %-100 %] 98  %  BP: (105-167)/(57-76) 105/61                Oxygen Concentration (%):  [28] 28         Neurosurgery Physical Exam    General: well developed, well nourished, no distress.   Head: normocephalic. L frontal forehead abrasion.  Neck: No tracheal deviation. No palpable masses. Full ROM.   Neurologic: Alert and oriented. Thought content appropriate.  GCS: Motor: 6/Verbal: 5/Eyes: 4 GCS Total: 15  Mental Status: Awake, Alert, Oriented x 4. Responds appropriately, follows commands.  Language: No aphasia  Speech: No dysarthria  Cranial nerves: face symmetric, tongue midline, CN II-XII grossly intact.   Eyes: pupils equal, round, reactive to light with accomodation, EOMI.  Ears: No drainage.   Pulmonary: normal respirations, no signs of respiratory distress  Abdomen: soft, non-distended, not tender to palpation    Sensory: intact to light touch throughout  Motor Strength: Moves all extremities spontaneously with good tone.  Full strength upper and lower extremities. No abnormal movements seen.     Pronator Drift: no drift noted  Finger-to-nose: Intact bilaterally  Vascular: Pulses 2+ and symmetric radial and dorsalis pedis. No LE edema.   Skin: Skin is warm, dry and intact.      Significant Labs:  Recent Labs   Lab 09/13/20  0102 09/13/20  1610 09/14/20  0254   * 116* 122*    139 139   K 5.2* 5.0 5.4*    104 104   CO2 27 28 28   BUN 33* 30* 24*   CREATININE 0.9 0.9 0.8   CALCIUM 8.8 8.7 8.7   MG 2.1 2.0 1.8     Recent Labs   Lab 09/13/20  0102 09/14/20  0254   WBC 8.81 9.70   HGB 10.0* 9.8*   HCT 32.9* 32.7*    185     Recent Labs   Lab 09/13/20  0102   APTT 25.3     Microbiology Results (last 7 days)     ** No results found for the last 168 hours. **        All pertinent labs from the last 24 hours have been reviewed.    Significant Diagnostics:  I have reviewed and interpreted all pertinent imaging results/findings within the past 24 hours.    Assessment/Plan:     * SDH (subdural hematoma)  88  yo F w/ PMH of HTN, Afib on eliquis who presents with L acute holohemispheric thin SDH with 5mm maximal thickness 3mm MLS.     Stepped down to floor under NSGY 9/13. Remains neurologically intact on exam.    --Neuro checks q4h  --Telemetry  --All labs and diagnostics reviewed  --s/p KCentra, VitK for reversal of Eliquis  --Follow-up CTH 9/12 stable  --Coags wnl  --SBP < 140  --Na >135  --Keppra 500 BID for seizure ppx  --HOB >30  --Consider MMA embolization, likely as outpatient    Longstanding persistent atrial fibrillation  -Home meds: Metoprolol XL 50mg, Eliquis  -Continue to hold home Eliquis in setting of SDH. Resume once cleared by NSGY after f/u outpatient with repeat CTH.  -Patient bradycardic. Metoprolol discontinued per Cardiology recs.  -Continue telemetry monitoring     Benign essential hypertension  Bradycardia  -EKG 9/13: sinus rhythm with 1st degree AV block, frequent PVC's  -Troponin 9/13 0.027, trended down to 0.025  -TTE 9/14:   · Normal left ventricular systolic function. The estimated ejection fraction is 65%.  · Septal wall has abnormal motion.  · Local segmental wall motion abnormalities.  · Indeterminate left ventricular diastolic function.  · Normal right ventricular systolic function.  · Mild aortic valve stenosis.  · Aortic valve area is 1.68 cm2; peak velocity is 1.74 m/s; mean gradient is 8 mmHg.  · Mild-to-moderate mitral regurgitation. Normal central venous pressure (3 mmHg).    -Cardiology consulted, appreciate recs:    -Hold further betablocker and non-dihydropyridine CCB.   -Use ACEI/ARB or Hydralazine for BP control if needed   -Avoid Eliquis given recent SDH and high risk of falls   -Maintain K>4 and Mg>2   -Continue telemetry monitoring   -Goal SBP < 140  -Restarted home dose norvasc 2.5 mg daily, lisinopril 40 mg daily, lasix 40 mg daily  -Hydralazine PRN for SBP > 160     CESARIO (acute kidney injury)  -Improving  -strict I&Os   -monitor renal panel      Hyperkalemia  -CTM. Repeat  CMP.  -Follow renal function     -DVT prophylaxis: MARIANA's, SCD's, SQH  -Bowel regimen: senna BID and miralax daily  -Atelectasis prevention: IS hourly while awake, PT/OT, OOB > 6 hours per day  --Diet as tolerated  --Continue to monitor clinically, notify NSGY immediately with any changes in neuro status    Dispo: PT/OT recs for SNF. Pending placement, likely medically stable for discharge tomorrow.        EFRAIN CamiloC  Neurosurgery  [facility]

## 2020-09-14 NOTE — CARE UPDATE
Consulted for management of Afib with bradycardia with PVCs in a patient who was admitted for SDH.     87 yo female with a PMH of HTN, Afib on eliquis who presents after a mechanical fall. Imaging shows a L-acute holohemispheric thin SDH with 5mm maximal thickness 3mm midline shift. Neuro exam is intact. Review of vitals with primary team shows shahzad down to 50s that is transient and asymptomatic. No EKG to demonstrate this.     Vitals:    09/13/20 2209   BP: 137/61   Pulse: 94   Resp: 18   Temp: 99 °F (37.2 °C)     1-Bradycardia  2-First degree AVB  3-Atrial fibrillation  4-SDH    Recommendations:  -Hold further betablocker and non-dihydropyridine CCB  -Use ACEI/ARB or Hydralazine for BP control if needed  -Avoid Eliquis given recent SDH and high risk of falls  -Maintain K>4 and Mg>2  -Continue telemetry monitoring     Will sign off. Call us for any questions.

## 2020-09-14 NOTE — PT/OT/SLP PROGRESS
"Occupational Therapy   Treatment    Name: Fiona Arteaga  MRN: [id number]  Admitting Diagnosis:  SDH (subdural hematoma)       Recommendations:     Discharge Recommendations: nursing facility, skilled  Discharge Equipment Recommendations:  bath bench, bedside commode  Barriers to discharge:  None    Assessment:     Fiona Arteaga is a 88 y.o. female with a medical diagnosis of SDH (subdural hematoma).  She presents with performance deficits affecting function are weakness, impaired endurance, impaired cognition, decreased coordination, impaired sensation, impaired self care skills, impaired functional mobilty, gait instability, impaired balance, decreased lower extremity function.     Rehab Prognosis:  Good; patient would benefit from acute skilled OT services to address these deficits and reach maximum level of function.       Plan:     Patient to be seen 5 x/week to address the above listed problems via self-care/home management, therapeutic activities, therapeutic exercises, neuromuscular re-education  · Plan of Care Expires: 10/11/20  · Plan of Care Reviewed with: patient    Subjective   Patient:  "I don't think I can do it myself."--in reference to getting out of bed.  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  Patient found supine with bed alarm, blood pressure cuff, oxygen, peripheral IV, pulse ox (continuous) upon OT entry to room.  Family not present.  General Precautions: Standard, " aspiration, fall, seizure   Orthopedic Precautions:N/A   Braces: N/A     Occupational Performance:     Bed Mobility:    · Patient completed Rolling/Turning to Right with minimum assistance  · Patient completed Supine to Sit with moderate assistance  · Patient completed Sit to Supine with minimum assistance     Functional Mobility/Transfers:  · Patient completed Sit <> Stand Transfer with minimum assistance  with  no assistive device   · Patient completed Bed <> Chair Transfer using Stand Pivot technique with minimum assistance with no assistive device    Activities of Daily Living:  · Grooming: minimum assistance while standing  · Upper Body Dressing: SBA while seated EOB  · Lower Body Dressing: maximal assistance while seated EOB    [facility] 6 Click ADL: 18    Treatment & Education:  Patient education provided on role of OT and need for continued OT upon discharge. Patient alert and oriented x 3; able to follow 4/4 one step commands.  Patient attentive and interactive throughout the session. Continued education, patient/ family training recommended.   White board updated in patient's room.  OT asked if there were any other questions; patient had no further questions.    Patient left supine with all lines intact, call button in reach and bed alarm onEducation:      GOALS:   Multidisciplinary Problems     Occupational Therapy Goals        Problem: Occupational Therapy Goal    Goal Priority Disciplines Outcome Interventions   Occupational Therapy Goal     OT, PT/OT Ongoing, Progressing    Description: Goals set 9/12 to be addressed for 14 days with expiration date, 9/26:  Patient will increase functional independence with ADLs by performing:    Patient will demonstrate rolling to the right with modified independence.  Not met   Patient will demonstrate supine -sit with modified independence.   Not met  Patient will demonstrate stand pivot transfers with supervision.   Not met  Patient will demonstrate grooming while  standing with supervision assist.   Not met  Patient will demonstrate upper body dressing with supervision while seated EOB.   Not met  Patient will demonstrate lower body dressing with supervision while seated EOB.   Not met  Patient will demonstrate toileting with supervision.   Not met  Patient will demonstrate bathing while seated EOB with supervision.   Not met  Patient's family / caregiver will demonstrate independence and safety with assisting patient with self-care skills and functional mobility.     Not met                             Time Tracking:     OT Date of Treatment: 09/14/20  OT Start Time: 0610  OT Stop Time: 0635  OT Total Time (min): 25 min    Billable Minutes:Self Care/Home Management 25    ANITRA Hardin  9/14/2020

## 2020-09-14 NOTE — SUBJECTIVE & OBJECTIVE
Past Medical History:   Diagnosis Date    A-fib     Anticoagulant long-term use     Cancer     skin cancer    Hypertension      History reviewed. No pertinent surgical history.  Review of patient's allergies indicates:  No Known Allergies    Scheduled Medications:    [START ON 9/15/2020] amLODIPine  2.5 mg Oral Daily    furosemide  40 mg Oral Daily    levETIRAcetam  500 mg Oral BID    [START ON 9/15/2020] lisinopriL  40 mg Oral Daily    magnesium oxide  400 mg Oral BID    pantoprazole  40 mg Oral Daily    senna-docusate 8.6-50 mg  1 tablet Oral BID       PRN Medications: acetaminophen, fentaNYL, hydrALAZINE, influenza, ondansetron, oxyCODONE, pneumoc 13-key conj-dip cr(PF), sodium chloride 0.9%    Family History     None        Tobacco Use    Smoking status: Never Smoker   Substance and Sexual Activity    Alcohol use: Never     Frequency: Never    Drug use: Never    Sexual activity: Not Currently     Review of Systems   Constitutional: Positive for activity change. Negative for fatigue and fever.   HENT: Negative for sore throat and trouble swallowing.    Eyes: Negative for visual disturbance.   Respiratory: Negative for cough and shortness of breath.    Cardiovascular: Negative for chest pain and leg swelling.   Gastrointestinal: Negative for abdominal distention and abdominal pain.   Genitourinary: Negative for difficulty urinating.   Musculoskeletal: Positive for gait problem. Negative for back pain.   Skin: Negative for color change.   Neurological: Positive for weakness. Negative for dizziness, light-headedness and headaches.   Psychiatric/Behavioral: Negative for agitation and confusion.     Objective:     Vital Signs (Most Recent):  Temp: 98.7 °F (37.1 °C) (09/14/20 0758)  Pulse: 73 (09/14/20 0758)  Resp: 18 (09/14/20 0758)  BP: 105/61 (09/14/20 0758)  SpO2: 98 % (09/14/20 0758)    Vital Signs (24h Range):  Temp:  [97.8 °F (36.6 °C)-99 °F (37.2 °C)] 98.7 °F (37.1 °C)  Pulse:  [61-94] 73  Resp:   [16-21] 18  SpO2:  [95 %-100 %] 98 %  BP: (105-167)/(56-76) 105/61     Body mass index is 25.97 kg/m².    Physical Exam  Vitals signs and nursing note reviewed.   Constitutional:       Appearance: Normal appearance. She is well-developed.   HENT:      Head: Normocephalic and atraumatic.      Nose: Nose normal.      Mouth/Throat:      Mouth: Mucous membranes are moist.   Eyes:      Extraocular Movements: Extraocular movements intact.      Pupils: Pupils are equal, round, and reactive to light.   Neck:      Musculoskeletal: Normal range of motion and neck supple.   Pulmonary:      Effort: Pulmonary effort is normal. No respiratory distress.   Musculoskeletal: Normal range of motion.   Skin:     General: Skin is warm and dry.   Neurological:      Mental Status: She is alert. Mental status is at baseline.   Psychiatric:         Mood and Affect: Mood normal.         Behavior: Behavior normal.         Thought Content: Thought content normal.       NEUROLOGICAL EXAMINATION:     CRANIAL NERVES     CN III, IV, VI   Pupils are equal, round, and reactive to light.      Diagnostic Results: Labs: Reviewed  ECG: Reviewed  CT: Reviewed

## 2020-09-14 NOTE — CONSULTS
[facility]  Adult Nutrition  Consult Note    SUMMARY       Recommendations    1. Continue cardaic diet as tolerated.     2. If po intake poor, recommend adding Boost Plus BID.     3. RD following.     Goals: continue to consume % of all meals by RD follow-up  Nutrition Goal Status: new  Communication of RD Recs: (POC)    Reason for Assessment    Reason For Assessment: consult  Diagnosis: (SDH)  Relevant Medical History: HTN, Afib  Interdisciplinary Rounds: did not attend  General Information Comments: Pt resting in bed with no family members at bedside. Pt reports okay appetite with some nausea. Per chart review, pt eating % of meals. Pt reports appetite normal with UBW 127lbs. NFPE not warranted. Pt appears with age related wasting.   Nutrition Discharge Planning: cardiac diet    Nutrition Risk Screen    Nutrition Risk Screen: no indicators present    Nutrition/Diet History    Spiritual, Cultural Beliefs, Anabaptist Practices, Values that Affect Care: no  Factors Affecting Nutritional Intake: None identified at this time    Anthropometrics    Temp: 98.3 °F (36.8 °C)  Height Method: Stated  Height: 5' (152.4 cm)  Height (inches): 60 in  Weight Method: Bed Scale  Weight: 60.7 kg (133 lb 13.1 oz)  Weight (lb): 133.82 lb  Ideal Body Weight (IBW), Female: 100 lb  % Ideal Body Weight, Female (lb): 133.82 %  BMI (Calculated): 26.1  BMI Grade: 25 - 29.9 - overweight     Lab/Procedures/Meds    Pertinent Labs Reviewed: reviewed  Pertinent Labs Comments: K 5.4, BUN 24, glucose 122, alk phos 45, ALT 9  Pertinent Medications Reviewed: reviewed  Pertinent Medications Comments: amlodipine, pantoprazole, docusate    Estimated/Assessed Needs    Weight Used For Calorie Calculations: 60.7 kg (133 lb 13.1 oz)  Energy Calorie Requirements (kcal): 1517 kcal/day  Energy Need Method: Kcal/kg(25)  Protein Requirements: 61-73 gm/day(1.0-1.2 gm/kg)  Weight Used For Protein Calculations: 60.7 kg (133 lb 13.1  oz)  Fluid Requirements (mL): 1 mL/kcal or per MD     RDA Method (mL): 1517     Nutrition Prescription Ordered    Current Diet Order: Cardiac    Evaluation of Received Nutrient/Fluid Intake    I/O: +1.265L since admit  Comments: LBM 9/11  Tolerance: tolerating  % Intake of Estimated Energy Needs: 75 - 100 %  % Meal Intake: 75 - 100 %    Nutrition Risk    Level of Risk/Frequency of Follow-up: low(f/u 1 x wk)     Assessment and Plan    No nutrition diagnosis at this time. Will continue to monitor.     Monitor and Evaluation    Food and Nutrient Intake: energy intake, food and beverage intake  Food and Nutrient Adminstration: diet order  Physical Activity and Function: nutrition-related ADLs and IADLs  Anthropometric Measurements: weight, weight change, body mass index  Biochemical Data, Medical Tests and Procedures: electrolyte and renal panel, gastrointestinal profile, glucose/endocrine profile, inflammatory profile, lipid profile  Nutrition-Focused Physical Findings: overall appearance     Nutrition Follow-Up    RD Follow-up?: Yes

## 2020-09-14 NOTE — SUBJECTIVE & OBJECTIVE
Interval History: NAEON. Stepped down from ICU. Pt bradycardic on telemetry, EKG with 1st degree AVB. Pt asymptomatic and neurologically intact. AAOx3. Reports HA and previous L hip pain are improving, more mobile today. Denies vision changes, speech difficulty, and acute weakness/numbness. PT/OT recs for SNF.    Medications:  Continuous Infusions:  Scheduled Meds:   [START ON 9/15/2020] amLODIPine  2.5 mg Oral Daily    furosemide  40 mg Oral Daily    levETIRAcetam  500 mg Oral BID    [START ON 9/15/2020] lisinopriL  40 mg Oral Daily    magnesium oxide  400 mg Oral BID    pantoprazole  40 mg Oral Daily    senna-docusate 8.6-50 mg  1 tablet Oral BID     PRN Meds:acetaminophen, fentaNYL, hydrALAZINE, influenza, ondansetron, oxyCODONE, pneumoc 13-key conj-dip cr(PF), sodium chloride 0.9%     Review of Systems  Objective:     Weight: 60.3 kg (133 lb)  Body mass index is 25.97 kg/m².  Vital Signs (Most Recent):  Temp: 98.7 °F (37.1 °C) (09/14/20 0758)  Pulse: 95 (09/14/20 1150)  Resp: 18 (09/14/20 0758)  BP: 105/61 (09/14/20 0758)  SpO2: 98 % (09/14/20 0758) Vital Signs (24h Range):  Temp:  [97.8 °F (36.6 °C)-99 °F (37.2 °C)] 98.7 °F (37.1 °C)  Pulse:  [67-95] 95  Resp:  [16-21] 18  SpO2:  [95 %-100 %] 98 %  BP: (105-167)/(57-76) 105/61                Oxygen Concentration (%):  [28] 28         Neurosurgery Physical Exam    General: well developed, well nourished, no distress.   Head: normocephalic. L frontal forehead abrasion.  Neck: No tracheal deviation. No palpable masses. Full ROM.   Neurologic: Alert and oriented. Thought content appropriate.  GCS: Motor: 6/Verbal: 5/Eyes: 4 GCS Total: 15  Mental Status: Awake, Alert, Oriented x 4. Responds appropriately, follows commands.  Language: No aphasia  Speech: No dysarthria  Cranial nerves: face symmetric, tongue midline, CN II-XII grossly intact.   Eyes: pupils equal, round, reactive to light with accomodation, EOMI.  Ears: No drainage.   Pulmonary: normal  respirations, no signs of respiratory distress  Abdomen: soft, non-distended, not tender to palpation    Sensory: intact to light touch throughout  Motor Strength: Moves all extremities spontaneously with good tone.  Full strength upper and lower extremities. No abnormal movements seen.     Pronator Drift: no drift noted  Finger-to-nose: Intact bilaterally  Vascular: Pulses 2+ and symmetric radial and dorsalis pedis. No LE edema.   Skin: Skin is warm, dry and intact.      Significant Labs:  Recent Labs   Lab 09/13/20  0102 09/13/20  1610 09/14/20  0254   * 116* 122*    139 139   K 5.2* 5.0 5.4*    104 104   CO2 27 28 28   BUN 33* 30* 24*   CREATININE 0.9 0.9 0.8   CALCIUM 8.8 8.7 8.7   MG 2.1 2.0 1.8     Recent Labs   Lab 09/13/20 0102 09/14/20  0254   WBC 8.81 9.70   HGB 10.0* 9.8*   HCT 32.9* 32.7*    185     Recent Labs   Lab 09/13/20 0102   APTT 25.3     Microbiology Results (last 7 days)     ** No results found for the last 168 hours. **        All pertinent labs from the last 24 hours have been reviewed.    Significant Diagnostics:  I have reviewed and interpreted all pertinent imaging results/findings within the past 24 hours.

## 2020-09-14 NOTE — PLAN OF CARE
CM met with patient and spoke with richi regarding discharge plan.  Richi Angelic in agreement to [facility] SNF.  Referral sent.   09/14/20 [id number]   Post-Acute Status   Post-Acute Authorization Placement   Post-Acute Placement Status Referrals Sent

## 2020-09-14 NOTE — HOSPITAL COURSE
09/12/2020: Admit to neuro ICU.  09/13/2020: NAEON. Neurologically stable. Some headache overnight. Stable for transfer to floor.   9/14: NAEON. Stepped down from ICU. Pt bradycardic on telemetry, EKG with 1st degree AVB. Pt asymptomatic and neurologically intact. AAOx3. Reports HA and previous L hip pain are improving, more mobile today. Denies vision changes, speech difficulty, and acute weakness/numbness. PT/OT recs for SNF.  9/15: NAEON. Patient reports improvement in LLE strength. Reports continued headache. Denies vision changes, new weakness or numbness. Bradycardia resolved. Medically stable for discharge to SNF.

## 2020-09-14 NOTE — ASSESSMENT & PLAN NOTE
87 yo F w/ PMH of HTN, Afib on eliquis who presents with L acute holohemispheric thin SDH with 5mm maximal thickness 3mm MLS.     Stepped down to floor under NSGY 9/13. Remains neurologically intact on exam.    --Neuro checks q4h  --Telemetry  --All labs and diagnostics reviewed  --s/p KCentra, VitK for reversal of Eliquis  --Follow-up CTH 9/12 stable  --Coags wnl  --SBP < 140  --Na >135  --Keppra 500 BID for seizure ppx  --HOB >30  --Consider MMA embolization, likely as outpatient    Longstanding persistent atrial fibrillation  -Home meds: Metoprolol XL 50mg, Eliquis  -Continue to hold home Eliquis in setting of SDH. Resume once cleared by NSGY after f/u outpatient with repeat CTH.  -Patient bradycardic. Metoprolol discontinued per Cardiology recs.  -Continue telemetry monitoring     Benign essential hypertension  Bradycardia  -EKG 9/13: sinus rhythm with 1st degree AV block, frequent PVC's  -Troponin 9/13 0.027, trended down to 0.025  -TTE 9/14:   · Normal left ventricular systolic function. The estimated ejection fraction is 65%.  · Septal wall has abnormal motion.  · Local segmental wall motion abnormalities.  · Indeterminate left ventricular diastolic function.  · Normal right ventricular systolic function.  · Mild aortic valve stenosis.  · Aortic valve area is 1.68 cm2; peak velocity is 1.74 m/s; mean gradient is 8 mmHg.  · Mild-to-moderate mitral regurgitation. Normal central venous pressure (3 mmHg).    -Cardiology consulted, appreciate recs:    -Hold further betablocker and non-dihydropyridine CCB.   -Use ACEI/ARB or Hydralazine for BP control if needed   -Avoid Eliquis given recent SDH and high risk of falls   -Maintain K>4 and Mg>2   -Continue telemetry monitoring   -Goal SBP < 140  -Restarted home dose norvasc 2.5 mg daily, lisinopril 40 mg daily, lasix 40 mg daily  -Hydralazine PRN for SBP > 160     CESARIO (acute kidney injury)  -Improving  -strict I&Os   -monitor renal panel      Hyperkalemia  -CTM.  Repeat CMP.  -Follow renal function     -DVT prophylaxis: MARIANA's, SCD's, SQH  -Bowel regimen: senna BID and miralax daily  -Atelectasis prevention: IS hourly while awake, PT/OT, OOB > 6 hours per day  --Diet as tolerated  --Continue to monitor clinically, notify NSGY immediately with any changes in neuro status    Dispo: PT/OT recs for SNF. Pending placement, likely medically stable for discharge tomorrow.

## 2020-09-14 NOTE — PLAN OF CARE
Problem: Fall Injury Risk  Goal: Absence of Fall and Fall-Related Injury  Outcome: Ongoing, Progressing     Problem: Adjustment to Illness (Stroke, Hemorrhagic)  Goal: Optimal Coping  Outcome: Ongoing, Progressing     Problem: Bowel Elimination Impaired (Stroke, Hemorrhagic)  Goal: Effective Bowel Elimination  Outcome: Ongoing, Progressing     Problem: Cerebral Tissue Perfusion Risk (Stroke, Hemorrhagic)  Goal: Optimal Cerebral Tissue Perfusion  Outcome: Ongoing, Progressing     POC reviewed with pt, A/O x 4, incont/cont, bedside commode, COVID screening completed, pt tolerated it well, hob elevated, suction at the bedside, alarm on and audible informed to call for assistance when needed, will continue to monitor.

## 2020-09-14 NOTE — PLAN OF CARE
Recommendations    1. Continue cardaic diet as tolerated.     2. If po intake poor, recommend adding Boost Plus BID.     3. RD following.     Goals: continue to consume % of all meals by RD follow-up  Nutrition Goal Status: new

## 2020-09-14 NOTE — PT/OT/SLP EVAL
"Speech Language Pathology Evaluation  Cognitive Communication    Patient Name:  Fiona Arteaga   MRN:  [id number]  Admitting Diagnosis: SDH (subdural hematoma)    Recommendations:     Recommendations:                General Recommendations:  Speech/language therapy and Cognitive-linguistic therapy  Diet recommendations:  Regular, Thin   Aspiration Precautions: Standard aspiration precautions   General Precautions: Standard, aspiration, fall, seizure  Communication strategies:  provide increased time to answer, go to room if call light pushed and utilize increased intensity 2/2 pt is [place]    History:     Past Medical History:   Diagnosis Date    A-fib     Anticoagulant long-term use     Cancer     skin cancer    Hypertension        History reviewed. No pertinent surgical history.    Social History: Patient lives alone in home in [place], [place]    CT Head: 9/12: Impression:     1. No significant interval change relative to prior scan performed 09/12/2020, 02:29 hours.  2. Left cerebral convexity and parafalcine/tentorial leaflet subdural hematoma without increased in volume or new mass effect.  No new hemorrhage.     Chest X-Rays: 9/12/20: Prominence of the pulmonary interstitium centrally may represent a pneumonitis or interstitial fibrosis however this is improved since February 2020.  Atherosclerosis.  Mild cardiomegaly.  Trace left pleural effusion.    Prior diet: regular, thin     Occupation/hobbies/homemaking: Retired, Pt Independent with ADLs, has support from her Niece      Subjective     SLP reviewed Pt with RN  Pt presents calm  She explains, "I can't think right now"    Pain/Comfort:  · Pain Rating 1: 8/10  · Location - Orientation 1: generalized  · Location 1: head  · Pain Addressed 1: Pre-medicate for activity, Distraction, Cessation of Activity  · Pain Rating Post-Intervention 1: 8/10    Objective:   Cognitive Status:    Arousal/Alertness Appropriate response to stimuli  Attention: No obvious " deficits observed at the sustained level of attention, ognoing assessment of divided attention warranted  Perseveration: Not present  Orientation: Oriented x4  Memory:  LTM: 60% accuracy, I'ly; STM: TBA  Problem Solving Solutions : Pt provided appropriate solutions to household sitautions 80% of attempts I'ly and up to 100% of the time provided clarification and cues to expand. Pt DEP for organization tasks.   Safety awareness: Pt aware of call light precautions   Math/TIme Management:  Pt DEP for fx math tasks for time mngt.       Receptive Language:   Comprehension:      Questions Complex yes/no :60% accuracy I'ly, up to 90% accuracy with moderate verbal cues  Commands  One step WNL  two step basic commands 90% accuracy provided extra time     Pragmatics:    Pt with appropriate affect and eye contact    Expressive Language:  Verbal:    Automatic Speech  Counting WNL #1-10  Initiation : timely  Naming Confrontation WNL for 4/4 objects and Single word responsive naming WNL   Conversational speech : Pt with occasional hesitations and mild cirumlocution at the conversational level         Motor Speech:  WFL    Voice:   WFL    Visual-Spatial:  TBA, reading glasses present in room    Reading:   Pt completed fx reading tasks  WNL, ST to continue to assess reading in session      Written Expression:   TBA    Treatment: Pt found awake in bed watching television. Call light in reach t/o session. She was educated on SLP role, safety precautions, recommendations for ongoing tx via SNF upon d/c from acute and SLP POC. Pt verbalized understanding of SLP education and asked questions re:therpay offerings at SNF. SLP reviewed questions. No additional questions. Whiteboard current. Pt remained in bed with call light in reach upon SLP exit.     Assessment:   Fiona Arteaga is a 88 y.o. female with an SLP diagnosis of Mild Cognitive Impairment.  She presents with decreased memory, math, higher-level comprehension skills. ST to  continue to follow.     Goals:   Multidisciplinary Problems     SLP Goals        Problem: SLP Goal    Goal Priority Disciplines Outcome   SLP Goal     SLP Ongoing, Progressing   Description: Speech Language Pathology Goals  Goals expected to be met by 9/19/20    1. Pt will answer m/u YNQ with 80% Accuracy or higher, MOD I  2. Pt will complete general reasoning/problem solving tasks with 90% accuracy, Supervision  3. Pt will complete fx math tasks for time/money management with 90% accuracy, Supervision  4. Pt will participate in ongoing assessment of memory, reading, writing and visiospatial skills to determine ongoing POC needs  5. Educate Pt and family on S/S aspiration and aspiration precautions                       Plan:   · Patient to be seen:  4 x/week   · Plan of Care expires:  10/11/20  · Plan of Care reviewed with:  patient   · SLP Follow-Up:  Yes       Discharge recommendations:  Discharge Facility/Level of Care Needs: nursing facility, skilled     Time Tracking:   SLP Treatment Date:   09/14/20  Speech Start Time:  1348  Speech Stop Time:  1404     Speech Total Time (min):  16 min    Billable Minutes: Eval 8  and Self Care/Home Management Training 8    DEEPTI Polanco, [facility]-SLP  Speech-Language Pathology  Pager: [phone]    09/14/2020

## 2020-09-14 NOTE — PLAN OF CARE
DX: Subdural Hematoma    Shift Events:  Pt with frequent pvc's through the night. Ekg and troponin performed. Pt rested comfortably.     Plan of Care: Monitor neuro status and pain control.     Neuro: AAO X 4     Respiratory:  2L NC     Cardiac: NSR with frequent pvc's     Diet:  Cardiac     Gtts: Saline locked     Skin: Bruising to left face and hip

## 2020-09-14 NOTE — NURSING
0629 call received from telemetry monitoing reporting pt hr was in low 30's around 0612. Pt assessed to be in room awake and alert and in no distress. Pt reports bearing down trying to reposition self with hip discomfort from fall. Pt hx of shahzad cardia and recent vagal episode on 9/12. Encouraged pt to deep breath while repositioning.    0634 call received from telemetry pt hr in 30's. Pt assessed and found to be sitting on side of bed working with pt. ekg leads not intact. Leads replaced. Reinforced need to deep breath while moving. Pt verbalized understanding.

## 2020-09-14 NOTE — HOSPITAL COURSE
9/12/20: Evaluated by PT and OT. Bed mobility CGA- Aleksey. Grooming SBA. UBD Aleksey.   Passed for reg and thin diet.

## 2020-09-14 NOTE — PLAN OF CARE
09/14/20 1337   Discharge Reassessment   Assessment Type Discharge Planning Reassessment   Provided patient/caregiver education on the expected discharge date and the discharge plan Yes   Do you have any problems affording any of your prescribed medications? No   Discharge Plan A Skilled Nursing Facility   Discharge Plan B Rehab   DME Needed Upon Discharge  other (see comments)  (tbd)   Patient choice form signed by patient/caregiver N/A   Anticipated Discharge Disposition SNF   Can the patient/caregiver answer the patient profile reliably? Yes, cognitively intact   Describe the patient's ability to walk at the present time. Does not walk or unable to take any steps at all   Post-Acute Status   Post-Acute Authorization Placement   Post-Acute Placement Status Pending Post-Acute Clinical Review   Discharge Delays None known at this time

## 2020-09-14 NOTE — CONSULTS
[facility]  Physical Medicine & Rehab  Consult Note    Patient Name: Fiona Arteaga  MRN: [id number]  Admission Date: 9/12/2020  Hospital Length of Stay: 2 days  Attending Physician: Emory De Guzman MD     Inpatient consult to Physical Medicine & Rehabilitation  Consult performed by: Alicia Gates NP  Consult requested by:  Emory De Guzman MD    Collaborating Physician: Tasia David MD  Reason for Consult:  Assess rehabilitation needs     Consults  Subjective:     Principal Problem: SDH (subdural hematoma)    HPI: Fiona Gonzalez is a 88-year-old female with PMHx of HTN and A fib (Eliquis). Patient presented to [facility] on 9/12 for a mechanical fall. On arrival, found to have SDH. Repeat CTH stable. Began Norvasc for HTN.     Functional History: Patient lives alone in [facility] with 0STE  Prior to admission, I. DME: none. Home support niece checks in on her daily.     Hospital Course: 9/12/20: Evaluated by PT and OT. Bed mobility CGA- Aleksey. Grooming SBA. UBD Aleksey.   Passed for reg and thin diet.     Past Medical History:   Diagnosis Date    A-fib     Anticoagulant long-term use     Cancer     skin cancer    Hypertension      History reviewed. No pertinent surgical history.  Review of patient's allergies indicates:  No Known Allergies    Scheduled Medications:    [START ON 9/15/2020] amLODIPine  2.5 mg Oral Daily    furosemide  40 mg Oral Daily    levETIRAcetam  500 mg Oral BID    [START ON 9/15/2020] lisinopriL  40 mg Oral Daily    magnesium oxide  400 mg Oral BID    pantoprazole  40 mg Oral Daily    senna-docusate 8.6-50 mg  1 tablet Oral BID       PRN Medications: acetaminophen, fentaNYL, hydrALAZINE, influenza, ondansetron, oxyCODONE, pneumoc 13-key conj-dip cr(PF), sodium chloride 0.9%    Family History     None        Tobacco Use    Smoking status: Never Smoker   Substance and Sexual Activity    Alcohol use: Never     Frequency: Never    Drug use: Never    Sexual  activity: Not Currently     Review of Systems   Constitutional: Positive for activity change. Negative for fatigue and fever.   HENT: Negative for sore throat and trouble swallowing.    Eyes: Negative for visual disturbance.   Respiratory: Negative for cough and shortness of breath.    Cardiovascular: Negative for chest pain and leg swelling.   Gastrointestinal: Negative for abdominal distention and abdominal pain.   Genitourinary: Negative for difficulty urinating.   Musculoskeletal: Positive for gait problem. Negative for back pain.   Skin: Negative for color change.   Neurological: Positive for weakness. Negative for dizziness, light-headedness and headaches.   Psychiatric/Behavioral: Negative for agitation and confusion.     Objective:     Vital Signs (Most Recent):  Temp: 98.7 °F (37.1 °C) (09/14/20 0758)  Pulse: 73 (09/14/20 0758)  Resp: 18 (09/14/20 0758)  BP: 105/61 (09/14/20 0758)  SpO2: 98 % (09/14/20 0758)    Vital Signs (24h Range):  Temp:  [97.8 °F (36.6 °C)-99 °F (37.2 °C)] 98.7 °F (37.1 °C)  Pulse:  [61-94] 73  Resp:  [16-21] 18  SpO2:  [95 %-100 %] 98 %  BP: (105-167)/(56-76) 105/61     Body mass index is 25.97 kg/m².    Physical Exam  Vitals signs and nursing note reviewed.   Constitutional:       Appearance: Normal appearance. She is well-developed.   HENT:      Head: Normocephalic and atraumatic.      Nose: Nose normal.      Mouth/Throat:      Mouth: Mucous membranes are moist.   Eyes:      Extraocular Movements: Extraocular movements intact.      Pupils: Pupils are equal, round, and reactive to light.   Neck:      Musculoskeletal: Normal range of motion and neck supple.   Pulmonary:      Effort: Pulmonary effort is normal. No respiratory distress.   Musculoskeletal: Normal range of motion.   Skin:     General: Skin is warm and dry.   Neurological:      Mental Status: She is alert. Mental status is at baseline.   Psychiatric:         Mood and Affect: Mood normal.         Behavior: Behavior normal.          Thought Content: Thought content normal.       Diagnostic Results:   Labs: Reviewed  ECG: Reviewed  CT: Reviewed    Assessment/Plan:     * SDH (subdural hematoma)  - Related to prolonged/acute hospital course.     Recommendations  -  Encourage mobility, OOB in chair at least 3 hours per day, and early ambulation as appropriate  -  PT/OT evaluate and treat  -  Pain management  -  Monitor for and prevent skin breakdown and pressure ulcers  · Early mobility, repositioning/weight shifting every 20-30 minutes when sitting, turn patient every 2 hours, proper mattress/overlay and chair cushioning, pressure relief/heel protector boots  -  DVT prophylaxis    -  Reviewed discharge options (IP rehab, SNF, HH therapy, and OP therapy)    Benign essential hypertension  - Began Norvasc      Patient prefers to go home with home health. Has support from [employer] daily.     Thank you for your consult.     Alicia Gates NP  Department of Physical Medicine & Rehab  [facility]

## 2020-09-14 NOTE — HPI
Fiona Gonzalez is a 88-year-old female with PMHx of HTN and A fib (Eliquis). Patient presented to [facility] on 9/12 for a mechanical fall. On arrival, found to have SDH. Repeat CTH stable. Began Norvasc for HTN.     Functional History: Patient lives alone in [facility] with 0STE  Prior to admission, I. DME: none. Home support niece checks in on her daily.

## 2020-09-15 ENCOUNTER — HOSPITAL ENCOUNTER (INPATIENT)
Facility: HOSPITAL | Age: 85
LOS: 10 days | Discharge: HOME HEALTH | DRG: 082 | End: 2020-09-25
Attending: HOSPITALIST | Admitting: INTERNAL MEDICINE
Payer: MEDICARE

## 2020-09-15 VITALS
SYSTOLIC BLOOD PRESSURE: 140 MMHG | RESPIRATION RATE: 20 BRPM | DIASTOLIC BLOOD PRESSURE: 58 MMHG | TEMPERATURE: 99 F | BODY MASS INDEX: 26.11 KG/M2 | OXYGEN SATURATION: 90 % | WEIGHT: 133 LBS | HEIGHT: 60 IN | HEART RATE: 104 BPM

## 2020-09-15 DIAGNOSIS — S06.5XAA SDH (SUBDURAL HEMATOMA): ICD-10-CM

## 2020-09-15 LAB
ALBUMIN SERPL BCP-MCNC: 2.5 G/DL (ref 3.5–5.2)
ALP SERPL-CCNC: 41 U/L (ref 55–135)
ALT SERPL W/O P-5'-P-CCNC: 7 U/L (ref 10–44)
ANION GAP SERPL CALC-SCNC: 5 MMOL/L (ref 8–16)
AST SERPL-CCNC: 11 U/L (ref 10–40)
BASOPHILS # BLD AUTO: 0.01 K/UL (ref 0–0.2)
BASOPHILS NFR BLD: 0.1 % (ref 0–1.9)
BILIRUB SERPL-MCNC: 0.5 MG/DL (ref 0.1–1)
BUN SERPL-MCNC: 24 MG/DL (ref 8–23)
CALCIUM SERPL-MCNC: 8.6 MG/DL (ref 8.7–10.5)
CHLORIDE SERPL-SCNC: 102 MMOL/L (ref 95–110)
CO2 SERPL-SCNC: 30 MMOL/L (ref 23–29)
CREAT SERPL-MCNC: 0.8 MG/DL (ref 0.5–1.4)
DIFFERENTIAL METHOD: ABNORMAL
EOSINOPHIL # BLD AUTO: 0.3 K/UL (ref 0–0.5)
EOSINOPHIL NFR BLD: 3.7 % (ref 0–8)
ERYTHROCYTE [DISTWIDTH] IN BLOOD BY AUTOMATED COUNT: 12.5 % (ref 11.5–14.5)
EST. GFR  (AFRICAN AMERICAN): >60 ML/MIN/1.73 M^2
EST. GFR  (NON AFRICAN AMERICAN): >60 ML/MIN/1.73 M^2
GLUCOSE SERPL-MCNC: 96 MG/DL (ref 70–110)
HCT VFR BLD AUTO: 31 % (ref 37–48.5)
HGB BLD-MCNC: 9.4 G/DL (ref 12–16)
IMM GRANULOCYTES # BLD AUTO: 0.02 K/UL (ref 0–0.04)
IMM GRANULOCYTES NFR BLD AUTO: 0.3 % (ref 0–0.5)
LYMPHOCYTES # BLD AUTO: 1.4 K/UL (ref 1–4.8)
LYMPHOCYTES NFR BLD: 17.9 % (ref 18–48)
MAGNESIUM SERPL-MCNC: 1.8 MG/DL (ref 1.6–2.6)
MCH RBC QN AUTO: 30.8 PG (ref 27–31)
MCHC RBC AUTO-ENTMCNC: 30.3 G/DL (ref 32–36)
MCV RBC AUTO: 102 FL (ref 82–98)
MONOCYTES # BLD AUTO: 0.8 K/UL (ref 0.3–1)
MONOCYTES NFR BLD: 10.8 % (ref 4–15)
NEUTROPHILS # BLD AUTO: 5.1 K/UL (ref 1.8–7.7)
NEUTROPHILS NFR BLD: 67.2 % (ref 38–73)
NRBC BLD-RTO: 0 /100 WBC
PHOSPHATE SERPL-MCNC: 3.8 MG/DL (ref 2.7–4.5)
PLATELET # BLD AUTO: 202 K/UL (ref 150–350)
PMV BLD AUTO: 10 FL (ref 9.2–12.9)
POTASSIUM SERPL-SCNC: 4.8 MMOL/L (ref 3.5–5.1)
PROT SERPL-MCNC: 5.6 G/DL (ref 6–8.4)
RBC # BLD AUTO: 3.05 M/UL (ref 4–5.4)
SODIUM SERPL-SCNC: 137 MMOL/L (ref 136–145)
WBC # BLD AUTO: 7.59 K/UL (ref 3.9–12.7)

## 2020-09-15 PROCEDURE — 97116 GAIT TRAINING THERAPY: CPT

## 2020-09-15 PROCEDURE — 63600175 PHARM REV CODE 636 W HCPCS: Performed by: PHYSICIAN ASSISTANT

## 2020-09-15 PROCEDURE — 11000004 HC SNF PRIVATE

## 2020-09-15 PROCEDURE — 25000003 PHARM REV CODE 250: Performed by: NURSE PRACTITIONER

## 2020-09-15 PROCEDURE — 97535 SELF CARE MNGMENT TRAINING: CPT

## 2020-09-15 PROCEDURE — 83735 ASSAY OF MAGNESIUM: CPT

## 2020-09-15 PROCEDURE — 99232 SBSQ HOSP IP/OBS MODERATE 35: CPT | Mod: ,,, | Performed by: NEUROLOGICAL SURGERY

## 2020-09-15 PROCEDURE — 25000003 PHARM REV CODE 250: Performed by: PHYSICIAN ASSISTANT

## 2020-09-15 PROCEDURE — 84100 ASSAY OF PHOSPHORUS: CPT

## 2020-09-15 PROCEDURE — 99232 PR SUBSEQUENT HOSPITAL CARE,LEVL II: ICD-10-PCS | Mod: ,,, | Performed by: NEUROLOGICAL SURGERY

## 2020-09-15 PROCEDURE — 36415 COLL VENOUS BLD VENIPUNCTURE: CPT

## 2020-09-15 PROCEDURE — 94761 N-INVAS EAR/PLS OXIMETRY MLT: CPT

## 2020-09-15 PROCEDURE — 80053 COMPREHEN METABOLIC PANEL: CPT

## 2020-09-15 PROCEDURE — 85025 COMPLETE CBC W/AUTO DIFF WBC: CPT

## 2020-09-15 RX ORDER — HEPARIN SODIUM 5000 [USP'U]/ML
5000 INJECTION, SOLUTION INTRAVENOUS; SUBCUTANEOUS EVERY 8 HOURS
Status: DISCONTINUED | OUTPATIENT
Start: 2020-09-15 | End: 2020-09-25 | Stop reason: HOSPADM

## 2020-09-15 RX ORDER — LISINOPRIL 20 MG/1
40 TABLET ORAL DAILY
Status: DISCONTINUED | OUTPATIENT
Start: 2020-09-16 | End: 2020-09-24

## 2020-09-15 RX ORDER — LEVETIRACETAM 500 MG/1
500 TABLET ORAL 2 TIMES DAILY
Status: CANCELLED | OUTPATIENT
Start: 2020-09-15 | End: 2020-09-19

## 2020-09-15 RX ORDER — ACETAMINOPHEN 325 MG/1
650 TABLET ORAL EVERY 6 HOURS PRN
Status: CANCELLED | OUTPATIENT
Start: 2020-09-15

## 2020-09-15 RX ORDER — ONDANSETRON 2 MG/ML
4 INJECTION INTRAMUSCULAR; INTRAVENOUS EVERY 8 HOURS PRN
Status: CANCELLED | OUTPATIENT
Start: 2020-09-15

## 2020-09-15 RX ORDER — FUROSEMIDE 40 MG/1
40 TABLET ORAL DAILY
Status: DISCONTINUED | OUTPATIENT
Start: 2020-09-16 | End: 2020-09-24

## 2020-09-15 RX ORDER — LEVETIRACETAM 500 MG/1
500 TABLET ORAL 2 TIMES DAILY
Status: COMPLETED | OUTPATIENT
Start: 2020-09-15 | End: 2020-09-19

## 2020-09-15 RX ORDER — ACETAMINOPHEN 325 MG/1
650 TABLET ORAL EVERY 4 HOURS PRN
Status: DISCONTINUED | OUTPATIENT
Start: 2020-09-15 | End: 2020-09-25 | Stop reason: HOSPADM

## 2020-09-15 RX ORDER — CALCIUM CARBONATE 200(500)MG
500 TABLET,CHEWABLE ORAL 2 TIMES DAILY PRN
Status: CANCELLED | OUTPATIENT
Start: 2020-09-15

## 2020-09-15 RX ORDER — AMOXICILLIN 250 MG
1 CAPSULE ORAL 2 TIMES DAILY
Status: DISCONTINUED | OUTPATIENT
Start: 2020-09-15 | End: 2020-09-15

## 2020-09-15 RX ORDER — AMLODIPINE BESYLATE 2.5 MG/1
2.5 TABLET ORAL DAILY
Status: CANCELLED | OUTPATIENT
Start: 2020-09-16

## 2020-09-15 RX ORDER — FUROSEMIDE 40 MG/1
40 TABLET ORAL DAILY
Status: CANCELLED | OUTPATIENT
Start: 2020-09-16

## 2020-09-15 RX ORDER — CALCIUM CARBONATE 200(500)MG
500 TABLET,CHEWABLE ORAL 2 TIMES DAILY PRN
Status: DISCONTINUED | OUTPATIENT
Start: 2020-09-15 | End: 2020-09-25 | Stop reason: HOSPADM

## 2020-09-15 RX ORDER — LEVETIRACETAM 500 MG/1
500 TABLET ORAL 2 TIMES DAILY
Qty: 8 TABLET | Refills: 0 | Status: ON HOLD
Start: 2020-09-15 | End: 2020-09-23 | Stop reason: HOSPADM

## 2020-09-15 RX ORDER — LEVETIRACETAM 500 MG/1
500 TABLET ORAL 2 TIMES DAILY
Qty: 8 TABLET | Refills: 0 | Status: SHIPPED | OUTPATIENT
Start: 2020-09-15 | End: 2020-09-15

## 2020-09-15 RX ORDER — HEPARIN SODIUM 5000 [USP'U]/ML
5000 INJECTION, SOLUTION INTRAVENOUS; SUBCUTANEOUS EVERY 8 HOURS
Status: CANCELLED | OUTPATIENT
Start: 2020-09-15

## 2020-09-15 RX ORDER — AMOXICILLIN 250 MG
1 CAPSULE ORAL 2 TIMES DAILY
Status: DISCONTINUED | OUTPATIENT
Start: 2020-09-15 | End: 2020-09-25 | Stop reason: HOSPADM

## 2020-09-15 RX ORDER — PANTOPRAZOLE SODIUM 40 MG/1
40 TABLET, DELAYED RELEASE ORAL DAILY
Status: DISCONTINUED | OUTPATIENT
Start: 2020-09-16 | End: 2020-09-25 | Stop reason: HOSPADM

## 2020-09-15 RX ORDER — TALC
6 POWDER (GRAM) TOPICAL NIGHTLY PRN
Status: CANCELLED | OUTPATIENT
Start: 2020-09-15

## 2020-09-15 RX ORDER — HYDRALAZINE HYDROCHLORIDE 25 MG/1
25 TABLET, FILM COATED ORAL EVERY 8 HOURS PRN
Status: DISCONTINUED | OUTPATIENT
Start: 2020-09-15 | End: 2020-09-25 | Stop reason: HOSPADM

## 2020-09-15 RX ORDER — PANTOPRAZOLE SODIUM 40 MG/1
40 TABLET, DELAYED RELEASE ORAL DAILY
Status: CANCELLED | OUTPATIENT
Start: 2020-09-16

## 2020-09-15 RX ORDER — AMOXICILLIN 250 MG
1 CAPSULE ORAL 2 TIMES DAILY
Status: CANCELLED | OUTPATIENT
Start: 2020-09-15

## 2020-09-15 RX ORDER — AMLODIPINE BESYLATE 2.5 MG/1
2.5 TABLET ORAL DAILY
Status: DISCONTINUED | OUTPATIENT
Start: 2020-09-16 | End: 2020-09-21

## 2020-09-15 RX ORDER — TALC
6 POWDER (GRAM) TOPICAL NIGHTLY PRN
Status: DISCONTINUED | OUTPATIENT
Start: 2020-09-15 | End: 2020-09-25 | Stop reason: HOSPADM

## 2020-09-15 RX ORDER — ONDANSETRON 2 MG/ML
4 INJECTION INTRAMUSCULAR; INTRAVENOUS EVERY 8 HOURS PRN
Status: DISCONTINUED | OUTPATIENT
Start: 2020-09-15 | End: 2020-09-17

## 2020-09-15 RX ORDER — LISINOPRIL 20 MG/1
40 TABLET ORAL DAILY
Status: CANCELLED | OUTPATIENT
Start: 2020-09-16

## 2020-09-15 RX ORDER — SODIUM CHLORIDE 0.9 % (FLUSH) 0.9 %
10 SYRINGE (ML) INJECTION
Status: CANCELLED | OUTPATIENT
Start: 2020-09-15

## 2020-09-15 RX ORDER — HYDRALAZINE HYDROCHLORIDE 25 MG/1
25 TABLET, FILM COATED ORAL EVERY 8 HOURS PRN
Status: CANCELLED | OUTPATIENT
Start: 2020-09-15

## 2020-09-15 RX ORDER — ACETAMINOPHEN 325 MG/1
650 TABLET ORAL EVERY 6 HOURS PRN
Status: DISCONTINUED | OUTPATIENT
Start: 2020-09-15 | End: 2020-09-16

## 2020-09-15 RX ORDER — ACETAMINOPHEN 325 MG/1
650 TABLET ORAL EVERY 4 HOURS PRN
Status: CANCELLED | OUTPATIENT
Start: 2020-09-15

## 2020-09-15 RX ADMIN — ACETAMINOPHEN 650 MG: 325 TABLET ORAL at 08:09

## 2020-09-15 RX ADMIN — DOCUSATE SODIUM 50MG AND SENNOSIDES 8.6MG 1 TABLET: 8.6; 5 TABLET, FILM COATED ORAL at 08:09

## 2020-09-15 RX ADMIN — HEPARIN SODIUM 5000 UNITS: 5000 INJECTION INTRAVENOUS; SUBCUTANEOUS at 05:09

## 2020-09-15 RX ADMIN — LEVETIRACETAM 500 MG: 500 TABLET ORAL at 08:09

## 2020-09-15 RX ADMIN — AMLODIPINE BESYLATE 2.5 MG: 2.5 TABLET ORAL at 08:09

## 2020-09-15 RX ADMIN — FUROSEMIDE 40 MG: 40 TABLET ORAL at 08:09

## 2020-09-15 RX ADMIN — HEPARIN SODIUM 5000 UNITS: 5000 INJECTION INTRAVENOUS; SUBCUTANEOUS at 10:09

## 2020-09-15 RX ADMIN — ACETAMINOPHEN 650 MG: 325 TABLET ORAL at 03:09

## 2020-09-15 RX ADMIN — OXYCODONE HYDROCHLORIDE 5 MG: 5 TABLET ORAL at 04:09

## 2020-09-15 RX ADMIN — OXYCODONE HYDROCHLORIDE 5 MG: 5 TABLET ORAL at 12:09

## 2020-09-15 RX ADMIN — HEPARIN SODIUM 5000 UNITS: 5000 INJECTION INTRAVENOUS; SUBCUTANEOUS at 02:09

## 2020-09-15 RX ADMIN — LISINOPRIL 40 MG: 20 TABLET ORAL at 08:09

## 2020-09-15 RX ADMIN — ACETAMINOPHEN 650 MG: 325 TABLET ORAL at 12:09

## 2020-09-15 RX ADMIN — PANTOPRAZOLE SODIUM 40 MG: 40 TABLET, DELAYED RELEASE ORAL at 08:09

## 2020-09-15 NOTE — PLAN OF CARE
Patient to be discharged to [facility].  Care deferred to [facility].  Patient to be transported via wheelchair van.  Ken Angelic notified of discharge to [facility]. Neurosurgery clinic to schedule follow up appointment.     09/15/20 9657   Final Note   Assessment Type Final Discharge Note   Anticipated Discharge Disposition SNF   Hospital Follow Up  Appt(s) scheduled?   (Neurosurgery clinic to schedule follow up appointment.)   Discharge plans and expectations educations in teach back method with documentation complete? Yes   Right Care Referral Info   Post Acute Recommendation SNF / Sub-Acute Rehab   Post-Acute Status   Post-Acute Authorization Placement   Post-Acute Placement Status Set-up Complete   Discharge Delays None known at this time
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
## 2020-09-15 NOTE — PROGRESS NOTES
"Saline lock and telemetry monitor were dc'd. Attempted to call report to [facility], but unsuccessful. Nurse stated that she will call back to get report.Informed nurse that transport is set to  pt at 5pm. Pt awake,alert,verbally responsive.No distress noted,breathing unlabored.Pt c/o nagging headache "Which never goes away".Will continue to monitor.  "

## 2020-09-15 NOTE — DISCHARGE INSTRUCTIONS
Please follow ONLY the instructions that are checked below.    Activity Restrictions:  [x]  Return to work will be determined on an individual basis.  [x]  No lifting greater than 10 pounds.  [x]  No driving or operating machinery:  [x]  until cleared by your surgeon.  [x]  while taking narcotic pain medications or muscle relaxants.  [x]  Increase ambulation over the next 2 weeks so that you are walking 2 miles per day at 2 weeks post-operatively.  [x]  Walk on paved surfaces only. It is okay to walk up and down stairs while holding onto a side rail.  [x]  No sexual activity for 2-3 weeks.    Discharge Medication/Follow-up:  [x]  10-14 days post-op with Dr. Arredondo  [x]  with CT / MRI   [x]  An appointment will be mailed to you.  --Plan for possible Left Middle Meningeal Artery Embolization outpatient with Dr. Arredondo.     Call your doctor or go to the Emergency Room for any signs of infection, including: increased redness, drainage, pain, or fever (temperature ?101.5 for 24 hours). Call your doctor or go to the Emergency Room if there are any localized neurological changes; problems with speech, vision, numbness, tingling, weakness, or severe headache; or for other concerns.    Special Instructions:  [x]  No use of tobacco products.  [x]  Diet: Please eat a regular diet as tolerated.  Physicians need 3 days' notice for pain medicine to be refilled. Pain medicine will only be refilled between 8 AM and 5 PM, Monday through Friday, due to Food and Drug Administration regulation of documentation.    If you have any questions about this form, please call [phone].    Form No. 84711 (Revised 10/31/2013)

## 2020-09-15 NOTE — PLAN OF CARE
09/15/20 1406   Post-Acute Status   Post-Acute Authorization Placement   Post-Acute Placement Status Set-up Complete       Pt has been accepted by [facility]. Pt going to rm [id number] Nurse can call report to [phone]. Transport setup with a W/C for 5pm.  SW in contact with CM and Medical staff. Will continue to follow and offer support as needed.     Ramos Pretty, [facility]  [facility]   Ext. [phone]

## 2020-09-15 NOTE — PLAN OF CARE
Problem: Fall Injury Risk  Goal: Absence of Fall and Fall-Related Injury  Outcome: Ongoing, Progressing     Problem: Bowel Elimination Impaired (Stroke, Hemorrhagic)  Goal: Effective Bowel Elimination  Outcome: Ongoing, Progressing     Problem: Pain (Stroke, Hemorrhagic)  Goal: Acceptable Pain Control  Outcome: Ongoing, Progressing     Problem: Adult Inpatient Plan of Care  Goal: Plan of Care Review  Outcome: Ongoing, Progressing     Problem: Skin Injury Risk Increased  Goal: Skin Health and Integrity  Outcome: Ongoing, Progressing     POC reviewed with patient. All questions and concerns reviewed. VSS throughout shift. Fall/safety precautions implemented and maintained. Bed locked in lowest position. Call bell within reach. Will continue to monitor.

## 2020-09-15 NOTE — PLAN OF CARE
Problem: SLP Goal  Goal: SLP Goal  Description: Speech Language Pathology Goals  Goals expected to be met by 9/19/20    1. Pt will answer m/u YNQ with 80% Accuracy or higher, MOD I  2. Pt will complete general reasoning/problem solving tasks with 90% accuracy, Supervision  3. Pt will complete fx math tasks for time/money management with 90% accuracy, Supervision  4. Pt will participate in ongoing assessment of memory, reading, writing and visiospatial skills to determine ongoing POC needs  5. Educate Pt and family on S/S aspiration and aspiration precautions      Outcome: Ongoing, Progressing     Current goals remain ongoing. ST to continue to follow.    CATY Polanco., [facility]-SLP  Speech-Language Pathology  Pager: [phone]  9/15/2020

## 2020-09-15 NOTE — PT/OT/SLP PROGRESS
"Physical Therapy Treatment    Patient Name:  Fiona Arteaga   MRN:  [id number]    Recommendations:     Discharge Recommendations:  nursing facility, skilled   Discharge Equipment Recommendations: bath bench, bedside commode, walker, rolling   Barriers to discharge: Decreased caregiver support  Requires assist for mobility    Assessment:     Fiona Arteaga is a 88 y.o. female admitted with a medical diagnosis of SDH (subdural hematoma).  She presents with the following impairments/functional limitations:  weakness, gait instability, impaired endurance, impaired balance, impaired functional mobilty, decreased coordination, decreased lower extremity function . Pt is unsafe with functional mobility at this time due to pt requires minimal assist for bed mobility, minimal assist for transfers, and minimal assist for gait due to L LE pain and weakness. Pt is motivated to progress with functional mobility.    Rehab Prognosis: Good; patient would benefit from acute skilled PT services to address these deficits and reach maximum level of function.    Recent Surgery: * No surgery found *      Plan:     During this hospitalization, patient to be seen 3 x/week to address the identified rehab impairments via gait training, therapeutic activities, therapeutic exercises, neuromuscular re-education and progress toward the following goals:    · Plan of Care Expires:  10/07/20    Subjective   "My leg is stronger today"    Pain/Comfort:  · Pain Rating 1: 8/10("head pain 8/10; L hip pain 6/10")  · Location - Orientation 1: generalized  · Location 1: head  · Pain Addressed 1: Reposition, Distraction, Cessation of Activity, Nurse notified  · Pain Rating Post-Intervention 1: 8/10      Objective:     Communicated with nurse prior to session.  Patient found supine with telemetry, SCD upon PT entry to room.     General Precautions: Standard, aspiration, fall, seizure   Orthopedic Precautions:N/A   Braces: N/A     Functional " Mobility:  · Bed Mobility:     · Rolling Left:  minimum assistance  · Supine to Sit: minimum assistance  · Sit to Supine: moderate assistance  · Transfers:     · Sit to Stand:  minimum assistance with rolling walker   · Transfer bedside commode to bed with no AD with minimal assist  · Gait: 20ft then 25ft with RW with minimal assist. Pt performed gait with flexed trunk, decreased step length, decreased clearance of L>R foot during swing phase, antalgic gait on the L LE, and at slow pace.     AM-PAC 6 CLICK MOBILITY  Turning over in bed (including adjusting bedclothes, sheets and blankets)?: 3  Sitting down on and standing up from a chair with arms (e.g., wheelchair, bedside commode, etc.): 3  Moving from lying on back to sitting on the side of the bed?: 3  Moving to and from a bed to a chair (including a wheelchair)?: 3  Need to walk in hospital room?: 3  Climbing 3-5 steps with a railing?: 2  Basic Mobility Total Score: 17     Patient left supine with all lines intact, call button in reach, bed alarm on and nurse notified..    GOALS:   Multidisciplinary Problems     Physical Therapy Goals        Problem: Physical Therapy Goal    Goal Priority Disciplines Outcome Goal Variances Interventions   Physical Therapy Goal     PT, PT/OT Ongoing, Progressing     Description: Goals to be met by: 10/3/2020     Patient will increase functional independence with mobility by performin. Supine to sit with Modified [place]  2. Sit to supine with Modified [place]  3. Sit to stand transfer with Stand-by Assistance  4. Bed to chair transfer with Stand-by Assistance using LRAD  5. Gait  x 50 feet with Stand-by Assistance using LRAD                      Time Tracking:     PT Received On: 09/15/20  PT Start Time: 1322     PT Stop Time: 1352  PT Total Time (min): 30 min     Billable Minutes: Gait Training 30    Treatment Type: Treatment  PT/PTA: PT           Tasha Núñez, PT  09/15/2020

## 2020-09-16 ENCOUNTER — TELEPHONE (OUTPATIENT)
Dept: NEUROSURGERY | Facility: CLINIC | Age: 85
End: 2020-09-16

## 2020-09-16 PROBLEM — Z74.09 IMPAIRED MOBILITY AND ENDURANCE: Status: ACTIVE | Noted: 2020-09-16

## 2020-09-16 PROBLEM — D63.8 ANEMIA OF CHRONIC DISEASE: Status: ACTIVE | Noted: 2020-09-16
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
## 2020-09-16 LAB
ALBUMIN SERPL BCP-MCNC: 2.9 G/DL (ref 3.5–5.2)
ALP SERPL-CCNC: 46 U/L (ref 55–135)
ALT SERPL W/O P-5'-P-CCNC: 7 U/L (ref 10–44)
ANION GAP SERPL CALC-SCNC: 7 MMOL/L (ref 8–16)
AST SERPL-CCNC: 13 U/L (ref 10–40)
BASOPHILS # BLD AUTO: 0.02 K/UL (ref 0–0.2)
BASOPHILS NFR BLD: 0.3 % (ref 0–1.9)
BILIRUB SERPL-MCNC: 0.5 MG/DL (ref 0.1–1)
BUN SERPL-MCNC: 25 MG/DL (ref 8–23)
CALCIUM SERPL-MCNC: 9 MG/DL (ref 8.7–10.5)
CHLORIDE SERPL-SCNC: 101 MMOL/L (ref 95–110)
CO2 SERPL-SCNC: 30 MMOL/L (ref 23–29)
CREAT SERPL-MCNC: 0.9 MG/DL (ref 0.5–1.4)
DIFFERENTIAL METHOD: ABNORMAL
EOSINOPHIL # BLD AUTO: 0.3 K/UL (ref 0–0.5)
EOSINOPHIL NFR BLD: 3.8 % (ref 0–8)
ERYTHROCYTE [DISTWIDTH] IN BLOOD BY AUTOMATED COUNT: 12.2 % (ref 11.5–14.5)
EST. GFR  (AFRICAN AMERICAN): >60 ML/MIN/1.73 M^2
EST. GFR  (NON AFRICAN AMERICAN): 57.3 ML/MIN/1.73 M^2
GLUCOSE SERPL-MCNC: 100 MG/DL (ref 70–110)
HCT VFR BLD AUTO: 31.5 % (ref 37–48.5)
HGB BLD-MCNC: 9.9 G/DL (ref 12–16)
IMM GRANULOCYTES # BLD AUTO: 0.03 K/UL (ref 0–0.04)
IMM GRANULOCYTES NFR BLD AUTO: 0.4 % (ref 0–0.5)
LYMPHOCYTES # BLD AUTO: 1.6 K/UL (ref 1–4.8)
LYMPHOCYTES NFR BLD: 21.8 % (ref 18–48)
MAGNESIUM SERPL-MCNC: 1.7 MG/DL (ref 1.6–2.6)
MCH RBC QN AUTO: 31.2 PG (ref 27–31)
MCHC RBC AUTO-ENTMCNC: 31.4 G/DL (ref 32–36)
MCV RBC AUTO: 99 FL (ref 82–98)
MONOCYTES # BLD AUTO: 1 K/UL (ref 0.3–1)
MONOCYTES NFR BLD: 13.4 % (ref 4–15)
NEUTROPHILS # BLD AUTO: 4.4 K/UL (ref 1.8–7.7)
NEUTROPHILS NFR BLD: 60.3 % (ref 38–73)
NRBC BLD-RTO: 0 /100 WBC
PHOSPHATE SERPL-MCNC: 3.7 MG/DL (ref 2.7–4.5)
PLATELET # BLD AUTO: 240 K/UL (ref 150–350)
PMV BLD AUTO: 10 FL (ref 9.2–12.9)
POTASSIUM SERPL-SCNC: 5.1 MMOL/L (ref 3.5–5.1)
PROT SERPL-MCNC: 6.2 G/DL (ref 6–8.4)
RBC # BLD AUTO: 3.17 M/UL (ref 4–5.4)
SODIUM SERPL-SCNC: 138 MMOL/L (ref 136–145)
WBC # BLD AUTO: 7.29 K/UL (ref 3.9–12.7)

## 2020-09-16 PROCEDURE — 25000003 PHARM REV CODE 250: Performed by: PHYSICIAN ASSISTANT

## 2020-09-16 PROCEDURE — 97116 GAIT TRAINING THERAPY: CPT

## 2020-09-16 PROCEDURE — 11000004 HC SNF PRIVATE

## 2020-09-16 PROCEDURE — 85025 COMPLETE CBC W/AUTO DIFF WBC: CPT

## 2020-09-16 PROCEDURE — 92523 SPEECH SOUND LANG COMPREHEN: CPT

## 2020-09-16 PROCEDURE — 84100 ASSAY OF PHOSPHORUS: CPT

## 2020-09-16 PROCEDURE — 99306 1ST NF CARE HIGH MDM 50: CPT | Mod: ,,, | Performed by: HOSPITALIST

## 2020-09-16 PROCEDURE — 63600175 PHARM REV CODE 636 W HCPCS: Performed by: PHYSICIAN ASSISTANT

## 2020-09-16 PROCEDURE — 99306 PR NURSING FACILITY CARE, INIT, HIGH SEVERITY: ICD-10-PCS | Mod: ,,, | Performed by: HOSPITALIST

## 2020-09-16 PROCEDURE — 97802 MEDICAL NUTRITION INDIV IN: CPT

## 2020-09-16 PROCEDURE — 83735 ASSAY OF MAGNESIUM: CPT

## 2020-09-16 PROCEDURE — 97530 THERAPEUTIC ACTIVITIES: CPT

## 2020-09-16 PROCEDURE — 97535 SELF CARE MNGMENT TRAINING: CPT

## 2020-09-16 PROCEDURE — 97162 PT EVAL MOD COMPLEX 30 MIN: CPT

## 2020-09-16 PROCEDURE — 80053 COMPREHEN METABOLIC PANEL: CPT

## 2020-09-16 PROCEDURE — 92610 EVALUATE SWALLOWING FUNCTION: CPT

## 2020-09-16 PROCEDURE — 97165 OT EVAL LOW COMPLEX 30 MIN: CPT

## 2020-09-16 PROCEDURE — 36415 COLL VENOUS BLD VENIPUNCTURE: CPT

## 2020-09-16 RX ADMIN — LEVETIRACETAM 500 MG: 500 TABLET ORAL at 09:09

## 2020-09-16 RX ADMIN — PANTOPRAZOLE SODIUM 40 MG: 40 TABLET, DELAYED RELEASE ORAL at 08:09

## 2020-09-16 RX ADMIN — ACETAMINOPHEN 650 MG: 325 TABLET ORAL at 08:09

## 2020-09-16 RX ADMIN — MELATONIN TAB 3 MG 6 MG: 3 TAB at 09:09

## 2020-09-16 RX ADMIN — ACETAMINOPHEN 650 MG: 325 TABLET ORAL at 03:09

## 2020-09-16 RX ADMIN — LISINOPRIL 40 MG: 20 TABLET ORAL at 08:09

## 2020-09-16 RX ADMIN — HEPARIN SODIUM 5000 UNITS: 5000 INJECTION INTRAVENOUS; SUBCUTANEOUS at 05:09

## 2020-09-16 RX ADMIN — HEPARIN SODIUM 5000 UNITS: 5000 INJECTION INTRAVENOUS; SUBCUTANEOUS at 01:09

## 2020-09-16 RX ADMIN — HEPARIN SODIUM 5000 UNITS: 5000 INJECTION INTRAVENOUS; SUBCUTANEOUS at 09:09

## 2020-09-16 RX ADMIN — LEVETIRACETAM 500 MG: 500 TABLET ORAL at 08:09

## 2020-09-16 RX ADMIN — AMLODIPINE BESYLATE 2.5 MG: 2.5 TABLET ORAL at 08:09

## 2020-09-16 RX ADMIN — DOCUSATE SODIUM 50MG AND SENNOSIDES 8.6MG 1 TABLET: 8.6; 5 TABLET, FILM COATED ORAL at 09:09

## 2020-09-16 RX ADMIN — FUROSEMIDE 40 MG: 40 TABLET ORAL at 08:09

## 2020-09-16 RX ADMIN — ACETAMINOPHEN 650 MG: 325 TABLET ORAL at 07:09

## 2020-09-16 RX ADMIN — DOCUSATE SODIUM 50MG AND SENNOSIDES 8.6MG 1 TABLET: 8.6; 5 TABLET, FILM COATED ORAL at 08:09

## 2020-09-16 NOTE — PLAN OF CARE
Problem: SLP Goal  Goal: SLP Goal  Description: Speech Language Pathology Goals  Goals expected to be met by 9/23:   1. Pt will recall 3/3 novel items after a 2-3 minute delay given modified independence.   2. Pt will list an average of 12 items in a category in one minute.   3. Pt will complete paragraph level reading tasks with 70% accuracy given mod cues.   4. Pt will complete visual spatial tasks with 70% accuracy given min-mod cues.   5. Pt will participate in evaluation of math/time/money management abilities.     Outcome: Ongoing, Progressing  Speech/language/cognitive and swallowing evaluations completed.  Pt demonstrated reading and visual spatial impairments.  Will follow 3x/wk to address.   RABIA Barajas, CCC-SLP  Speech Language Pathologist  [phone]  9/16/2020

## 2020-09-16 NOTE — TELEPHONE ENCOUNTER
Patient scheduled for 9/29 with Dr. Arredondo along with imaging before. Patient still admitted into hospital. Appointments letter mailed to patient address on file. MARÍA

## 2020-09-16 NOTE — PLAN OF CARE
Problem: Occupational Therapy Goal  Goal: Occupational Therapy Goal  Description: Goals to be met by: 9/30/2020    Patient will increase functional independence with ADLs by performing:    UE Dressing with Supervision.  LE Dressing with Supervision.  Grooming while standing at sink with Set-up Assistance.  Toileting from toilet with Supervision for hygiene and clothing management.   Bathing from  sitting at sink with Supervision.  Rolling to Bilateral with Supervision.   Supine to sit with Supervision.  Stand pivot transfers with Supervision.  Toilet transfer to toilet with Supervision.      9/16/2020 1303 by MEGHANA Glover  Outcome: Ongoing, Progressing

## 2020-09-16 NOTE — PLAN OF CARE
PT eval completed. Pt will begin PT POC.   Laura Umaña, PT  2020    Problem: Physical Therapy Goal  Goal: Physical Therapy Goal  Description: Goals to be met by: 20     Patient will increase functional independence with mobility by performin. Supine to sit with Set-up [place]  2. Sit to supine with Set-up [place]  3. Rolling to Left and Right with Set-up Assistance.  4. Sit to stand transfer with Supervision  5. Bed to chair transfer with Supervision using Rolling Walker  6. Gait  x 100 feet with Supervision using Rolling Walker.   7. Ascend/Descend 4 inch curb step with Stand-by Assistance using Rolling Walker.  8. Stand for 3 minutes with Stand-by Assistance using Rolling Walker while performing an activity  9. Pt will complete a car transfer (Actual or Simulated) w/ RW and SBA  10. Pt will use a reacher to  5 objects from the floor in a standing position w/ a RW and SPV    Outcome: Ongoing, Progressing

## 2020-09-16 NOTE — DISCHARGE SUMMARY
[facility]  Neurosurgery  Discharge Summary      Patient Name: Fiona Arteaga  MRN: [id number]  Admission Date: 9/12/2020  Hospital Length of Stay: 3 days  Discharge Date and Time:  09/15/2020 8:53 PM  Attending Physician: Dileep Arredondo MD   Discharging Provider: Nory Roe PA-C  Primary Care Provider: Primary Doctor No    HPI:   Fiona Arteaga is an 89 yo female with a PMH of HTN, Afib on eliquis who presents after a mechanical fall. She was transitioning in her home from standing to chair when she fell hard and hit the left frontal aspect of her head. She did not lose consciousness.  She lives alone, and her  passed away a few years ago.  She has not had any other recent falls.  No other blood thinning agents.  No known history of stroke or seizure.   Denies neck pain, denies numbness or weakness.   She has mild left hip pain and dizziness, but does not endorse currently.     * No surgery found *     Hospital Course: 09/12/2020: Admit to neuro ICU.  09/13/2020: NAEON. Neurologically stable. Some headache overnight. Stable for transfer to floor.   9/14: NAEON. Stepped down from ICU. Pt bradycardic on telemetry, EKG with 1st degree AVB. Pt asymptomatic and neurologically intact. AAOx3. Reports HA and previous L hip pain are improving, more mobile today. Denies vision changes, speech difficulty, and acute weakness/numbness. PT/OT recs for SNF.  9/15: NAEON. Patient reports improvement in LLE strength. Reports continued headache. Denies vision changes, new weakness or numbness. Bradycardia resolved. Medically stable for discharge to SNF.     Consults: PT/OT  Consults (From admission, onward)        Status Ordering Provider     Inpatient consult to Physical Medicine Rehab  Once     Provider:  (Not yet assigned)    Completed JOHN TALAVERA     Inpatient consult to Registered Dietitian/Nutritionist  Once     Provider:  (Not yet assigned)    Completed JOHN TALAVERA      IP consult to case management/social work  Once     Provider:  (Not yet assigned)    Completed JOHN TALAVERA          Significant Diagnostic Studies: Labs:   BMP:   Recent Labs   Lab 09/14/20  0254 09/15/20  0424   * 96    137   K 5.4* 4.8    102   CO2 28 30*   BUN 24* 24*   CREATININE 0.8 0.8   CALCIUM 8.7 8.6*   MG 1.8 1.8   , CMP   Recent Labs   Lab 09/14/20  0254 09/15/20  0424    137   K 5.4* 4.8    102   CO2 28 30*   * 96   BUN 24* 24*   CREATININE 0.8 0.8   CALCIUM 8.7 8.6*   PROT 6.0 5.6*   ALBUMIN 2.9* 2.5*   BILITOT 0.4 0.5   ALKPHOS 45* 41*   AST 12 11   ALT 9* 7*   ANIONGAP 7* 5*   ESTGFRAFRICA >60.0 >60.0   EGFRNONAA >60.0 >60.0   , CBC   Recent Labs   Lab 09/14/20  0254 09/15/20  0424   WBC 9.70 7.59   HGB 9.8* 9.4*   HCT 32.7* 31.0*    202   , INR   Lab Results   Component Value Date    INR 1.0 09/12/2020    INR 1.1 02/23/2020    INR 1.1 02/22/2020   , Lipid Panel   Lab Results   Component Value Date    CHOL 228 (H) 09/12/2020    HDL 90 (H) 09/12/2020    LDLCALC 128.0 09/12/2020    TRIG 50 09/12/2020    CHOLHDL 39.5 09/12/2020    and Troponin   Recent Labs   Lab 09/13/20  2234 09/14/20  0254 09/14/20  0545   TROPONINI 0.027* 0.019 0.025   Radiology: CTH, CT CAP, CT C-spine, CXR, hip XRay      Pending Diagnostic Studies:     None        Final Active Diagnoses:    Diagnosis Date Noted POA    PRINCIPAL PROBLEM:  SDH (subdural hematoma) [S06.5X9A] 09/12/2020 Yes    Hyperkalemia [E87.5] 09/12/2020 Yes    CESARIO (acute kidney injury) [N17.9] 09/12/2020 Yes    Longstanding persistent atrial fibrillation [I48.11] 09/12/2020 Yes    Benign essential hypertension [I10] 02/22/2020 Yes      Problems Resolved During this Admission:      Discharged Condition: good    Disposition: Home or Self Care    Follow Up:  Follow-up Information     Primary Doctor No.    Why: Outpatient Services           Dileep Arredondo MD In 2 weeks.    Specialty: Neurosurgery  Why:  Post-op follow up, with CT head  Contact information:  [id number]  [facility] [address]  [phone]                 Patient Instructions:      CT Head Without Contrast   Standing Status: Future Standing Exp. Date: 09/15/21     Order Specific Question Answer Comments   May the Radiologist modify the order per protocol to meet the clinical needs of the patient? Yes      Medications:  Reconciled Home Medications:      Medication List      START taking these medications    levETIRAcetam 500 MG Tab  Commonly known as: KEPPRA  Take 1 tablet (500 mg total) by mouth 2 (two) times daily. for 4 days        CONTINUE taking these medications    amLODIPine 2.5 MG tablet  Commonly known as: NORVASC  Take 2.5 mg by mouth once daily.     furosemide 40 MG tablet  Commonly known as: LASIX  Take 1 tablet (40 mg total) by mouth once daily.     lisinopriL 40 MG tablet  Commonly known as: PRINIVIL,ZESTRIL  Take 40 mg by mouth once daily.     pantoprazole 40 MG tablet  Commonly known as: PROTONIX  Take 1 tablet (40 mg total) by mouth once daily.        STOP taking these medications    metoprolol succinate 50 MG 24 hr tablet  Commonly known as: TOPROL-XL            Nory Roe PA-C  Neurosurgery  [facility]

## 2020-09-16 NOTE — H&P
[facility] Medicine  Skilled Nursing Facility   History and Physical Exam      Date of Service: 09/16/2020      Patient Name: Fiona Arteaga  MRN: [id number]  Admission Date: 9/15/2020  Attending Physician: Dev Jones MD  Primary Care Provider: Primary Doctor No  Code Status: Full Code      Principal problem:  SDH (subdural hematoma)      Chief Complaint:   Chief Complaint   Patient presents with    Weakness     Admitted to [facility] for rehabilitation following hospitalization for SDH after a fall.           HPI:   Ms. Arteaga is an 87 yo female with HTN and chronic atrial fibrillation on apixaban who [place] [facility] [place] a fall. She was found to have a subdural hematoma. She was evaluated by NSGY and did not require evacuation. She received KCentra and vitamin K and was monitored in the hospital. She did not have any significant increase in the SDH on serial CT head. She was started on a 7 day course of Keppra as seizure prophylaxis. She was evaluated by therapy and recommended for SNF placement prior to returning home.     She is feeling better today and says that she is feeling better each day. She reports having some left leg pain since the fall as well. She is still having a mild HA and nausea.     Patient admitted with skilled services with PT and OT to improve functional status and ability to perform ADLs.       Past Medical History:   Past Medical History:   Diagnosis Date    A-fib     Anticoagulant long-term use     Cancer     skin cancer    Hypertension        Past Surgical History:   History reviewed. No pertinent surgical history.    Social History:   Tobacco Use    Smoking status: Never Smoker   Substance and Sexual Activity    Alcohol use: Never     Frequency: Never    Drug use: Never    Sexual activity: Not Currently       Family History:   Family History     Problem Relation (Age of Onset)    No Known Problems Mother, Father          Current Outpatient Medications  on File Prior to Encounter   Medication Sig    amLODIPine (NORVASC) 2.5 MG tablet Take 2.5 mg by mouth once daily.    furosemide (LASIX) 40 MG tablet Take 1 tablet (40 mg total) by mouth once daily.    levETIRAcetam (KEPPRA) 500 MG Tab Take 1 tablet (500 mg total) by mouth 2 (two) times daily. for 4 days    lisinopril (PRINIVIL,ZESTRIL) 40 MG tablet Take 40 mg by mouth once daily.    pantoprazole (PROTONIX) 40 MG tablet Take 1 tablet (40 mg total) by mouth once daily.       Allergies:   Review of patient's allergies indicates:  No Known Allergies    ROS:  Review of Systems   Constitutional: Positive for appetite change. Negative for diaphoresis and fever.   HENT: Positive for hearing loss. Negative for congestion, nosebleeds, sinus pressure and sore throat.    Eyes: Negative for pain and itching.   Respiratory: Negative for cough, shortness of breath and wheezing.    Cardiovascular: Negative for chest pain, palpitations and leg swelling.   Gastrointestinal: Positive for constipation and nausea. Negative for abdominal pain, blood in stool and vomiting.   Endocrine: Negative for polydipsia and polyphagia.   Genitourinary: Negative for difficulty urinating, dysuria, frequency and hematuria.   Musculoskeletal: Positive for myalgias. Negative for arthralgias and back pain.   Skin: Negative for pallor and rash.   Allergic/Immunologic: Negative for environmental allergies and food allergies.   Neurological: Positive for weakness and headaches. Negative for dizziness, syncope and light-headedness.   Hematological: Bruises/bleeds easily.   Psychiatric/Behavioral: Negative for agitation and confusion. The patient is not nervous/anxious.        Objective:  Temp:  [98.1 °F (36.7 °C)-98.9 °F (37.2 °C)]   Pulse:  []   Resp:  [16-22]   BP: (140-158)/(58-67)   SpO2:  [85 %-95 %]     Body mass index is 25.58 kg/m².      Physical Exam  Vitals signs and nursing note reviewed.   Constitutional:       General: She is not in  acute distress.     Appearance: She is well-developed.   HENT:      Head: Normocephalic. Abrasion present.      Comments: Bruising present on left side of head     Right Ear: External ear normal.      Left Ear: External ear normal.      Nose: No nasal deformity or mucosal edema.      Mouth/Throat:      Mouth: Mucous membranes are moist.      Dentition: Has dentures.      Pharynx: Uvula midline. No oropharyngeal exudate.   Eyes:      General: No scleral icterus.     Conjunctiva/sclera: Conjunctivae normal.      Pupils: Pupils are equal, round, and reactive to light.   Neck:      Musculoskeletal: Neck supple. No muscular tenderness.      Trachea: Phonation normal. No tracheal deviation.   Cardiovascular:      Rate and Rhythm: Normal rate. Rhythm irregular.      Pulses:           Radial pulses are 2+ on the right side and 2+ on the left side.      Heart sounds: S1 normal and S2 normal. Murmur present.   Pulmonary:      Effort: Pulmonary effort is normal. No respiratory distress.      Breath sounds: Normal breath sounds. No wheezing or rales.   Chest:      Chest wall: No tenderness or crepitus.   Abdominal:      General: Bowel sounds are normal. There is no distension.      Palpations: Abdomen is soft.      Tenderness: There is no abdominal tenderness. There is no guarding or rebound.      Hernia: No hernia is present.   Musculoskeletal:      Left upper leg: She exhibits tenderness.   Lymphadenopathy:      Cervical:      Right cervical: No superficial cervical adenopathy.     Left cervical: No superficial cervical adenopathy.      Upper Body:      Right upper body: No supraclavicular adenopathy.      Left upper body: No supraclavicular adenopathy.   Skin:     General: Skin is warm and dry.      Findings: Bruising present. No erythema or rash.      Nails: There is no clubbing.     Neurological:      Mental Status: She is alert and oriented to person, place, and time.      Motor: No tremor or seizure activity.    Psychiatric:         Mood and Affect: Mood normal.         Behavior: Behavior normal. Behavior is cooperative.         Thought Content: Thought content normal.         Cognition and Memory: Cognition normal.         Significant Labs:   A1C:   Recent Labs   Lab 09/12/20  0514   HGBA1C 5.6     CBC:   Recent Labs   Lab 09/15/20  0424 09/16/20  0530   WBC 7.59 7.29   HGB 9.4* 9.9*   HCT 31.0* 31.5*    240     CMP:   Recent Labs   Lab 09/15/20  0424 09/16/20  0530    138   K 4.8 5.1    101   CO2 30* 30*   GLU 96 100   BUN 24* 25*   CREATININE 0.8 0.9   CALCIUM 8.6* 9.0   PROT 5.6* 6.2   ALBUMIN 2.5* 2.9*   BILITOT 0.5 0.5   ALKPHOS 41* 46*   AST 11 13   ALT 7* 7*   ANIONGAP 5* 7*   EGFRNONAA >60.0 57.3*     Magnesium:   Recent Labs   Lab 09/15/20  0424 09/16/20  0530   MG 1.8 1.7     TSH:   Recent Labs   Lab 09/12/20  0514   TSH 0.329*       Significant Imaging: I have reviewed all pertinent imaging results/findings completed during prior hospitalization.      Assessment and Plan:  Active Diagnoses:    Diagnosis Date Noted POA    PRINCIPAL PROBLEM:  SDH (subdural hematoma) [S06.5X9A] 09/12/2020 Yes    Anemia of chronic disease [D63.8] 09/16/2020 Yes    Impaired mobility and endurance [Z74.09] 09/16/2020 Yes    Fall [W19.XXXA] 09/16/2020 Yes    Bradycardia [R00.1]  Yes    Longstanding persistent atrial fibrillation [I48.11] 09/12/2020 Yes    Idiopathic thrombocytopenic purpura (ITP) [D69.3] 02/26/2020 Yes    (HFpEF) heart failure with preserved ejection fraction [I50.30] 02/24/2020 Yes    Benign essential hypertension [I10] 02/22/2020 Yes      Problems Resolved During this Admission:     Traumatic subdural hematoma  Fall  Impaired mobility and endurance  Continue with PT/OT for gait training and strengthening and restoration of ADL's   Encourage mobility, OOB in chair, and early ambulation as appropriate  Fall precautions   Monitor for bowel and bladder dysfunction  Monitor for and prevent  skin breakdown and pressure ulcers  Continue DVT prophylaxis with SCDs  Follow up with NSGY after discharge with repeat CT head.   Holding apixaban until follow up.   Fall precautions.   Continue Keppra 500 mg BID until 9/19/20.     Long standing persistent atrial fibrillation  Bradycardia  Had bradycardia during hospital stay so metoprolol succinate 50 mg BID on hold.   Holding apixaban for now until follow up with NSGY.   Consider resumption of metoprolol succinate.     Essential hypertension  Heart failure with preserved ejection fraction  Continue amlodipine 2.5 mg daily, lisinopril 40 mg daily, and furosemide 40 mg daily.   Holding home metoprolol succinate.   Monitor blood pressure.     Anemia of chronic disease  Hgb is stable.   Continue to monitor.     ITP  Platelet count is WNL.   Continue to monitor.     GERD  Continue daily PPI.     Constipation  Continue pericolace BID and miralax daily.     Goals of Care:   Restorative  Optimize nutrition  Wound healing  Muscle strengthening  Restoration of ADL's  Improve mobility      Anticipated Disposition:  Home with home health      Future Appointments   Date Time Provider Department Center   9/17/2020  7:55 AM [facility] SPEC LAB [facility] SPECLAB [facility]       I certify that SNF services are required to be given on an inpatient basis because Fiona Arteaga needs for skilled nursing care and/or skilled rehabilitation are required on a daily basis and such services can only practically be provided in a skilled nursing facility setting and are for an ongoing condition for which she received inpatient care in the hospital.       Dev Jones MD  Department of Hospital Medicine   [facility] - Skilled Nursing Care

## 2020-09-16 NOTE — PT/OT/SLP EVAL
"Speech Language Pathology  Evaluation    Fiona Arteaga   MRN: [id number]   Admitting Diagnosis: <principal problem not specified>    Diet recommendations: Solid Diet Level: Regular  Liquid Diet Level: Thin HOB to 90 degrees, Monitor for s/s of aspiration and Standard aspiration precautions    SLP Treatment Date: 09/16/20  Speech Start Time: 1123     Speech Stop Time: 1150     Speech Total (min): 27 min       TREATMENT BILLABLE MINUTES:  Eval 11 , Eval Swallow and Oral Function 8 and Seld Care/Home Management Training 8    Diagnosis: <principal problem not specified>    Past Medical History:   Diagnosis Date    A-fib     Anticoagulant long-term use     Cancer     skin cancer    Hypertension      No past surgical history on file.    Has the patient been evaluated by SLP for swallowing? : Yes  Keep patient NPO?: No   General Precautions: Standard, aspiration, fall(pt reports being hard of hearing, but does not wear hearing aids)    Current Respiratory Status: nasal cannula     Social Hx/Occupational/hobbies/homemaking:Patient lives alone.  Pt was independent, driving, cooking, grocery shopping, and managing medications. She manages her finances, but has some assistance from niece.  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 Pt's hobbies include reading, alexander, and fixing wigs.     Prior diet: regular solids/thin liquids    Subjective:  "I think my mind is pretty good for my age."       Objective:        Oral Musculature Evaluation  Oral Musculature: WNL  Dentition: upper and lower dentures  Secretion Management: adequate  Mucosal Quality: good  Mandibular Strength and Mobility: WNL  Oral Labial Strength and Mobility: WNL  Lingual Strength and Mobility: WNL  Volitional Cough: strong  Volitional Swallow: elicited  Voice Prior to PO Intake: dry, clear     Cognitive Status:  Behavioral Observations: alert, appropriate and " cooperative-  Memory and Orientation: O x 4. Immediate recall is WFL. Following a 2 minute delay, pt recalled 3/3 unrelated words given min cues (2/3 ind'ly).   Attention: general attention intact  Problem Solving: Pt provided solutions to problems and generated multiple causes for a problem situation with good ability.  Pragmatics: wfl    Auditory Comprehension: answers yes/no questions complex - 100%. Pt followed 2-step command with rep for [place].  She was able to follow a 3-step command.  Pt reports [place], but does not wear hearing aids.  [place] impacted auditory comprehension at times.     Verbal Expression: conversational speech Pt able to express simple and complex information without evidence of expressive language deficits. During a word fluency task, pt listed 10 items in a category in one minute (15-20 is wnl). Pt named 5/5 items in the room.     Motor Speech: wfl    Voice: wfl    Reading: Pt reading lines from paragraphs with multiple errors.  Pt with novel at bedside and reports reading being a hobby.  Pt denies changes in reading abilities, but attributes difficulty to glasses breaking when she fell.      Writing: Pt used her right/dominant hand to write her first and last name with good legibility.     Visual-Spatial: Pt bob the face of a clock placing the numbers on the right side of the clock.      Bedside Swallow Eval:  Consistencies Assessed: Thin liquids straw sips x 3  Solids 1/4 cracker x 1  Oral Phase: WFL  Pharyngeal Phase: no overt clinical signs/symptoms of aspiration  no overt clinical signs/symptoms of pharyngeal dysphagia    Additional Treatment:  Education provided to pt regarding role of SLP, reason for cognitive and swallowing evaluations, results of cognitive evaluation, visual spatial abilities, suspected changes in reading and visual spatial abilities, and plan to for ongoing assessment with SLP services.  Pt expressed understanding and agreement, but attributed reading and visual spatial  difficulties to needing new glasses after falling and breaking glasses.  Pt wearing glasses which appeared intact without careful observation.     Assessment:  Fiona Arteaga is a 88 y.o. female with a medical diagnosis of <principal problem not specified> and presents with visual spatial deficits.        Discharge recommendations: Discharge Facility/Level of Care Needs: (tbd)     Diet recommendations: Solid Diet Level: Regular  Liquid Diet Level: Thin     Goals:   Multidisciplinary Problems     SLP Goals        Problem: SLP Goal    Goal Priority Disciplines Outcome   SLP Goal     SLP Ongoing, Progressing   Description: Speech Language Pathology Goals  Goals expected to be met by 9/23:   1. Pt will recall 3/3 novel items after a 2-3 minute delay given modified independence.   2. Pt will list an average of 12 items in a category in one minute.   3. Pt will complete paragraph level reading tasks with 70% accuracy given mod cues.   4. Pt will complete visual spatial tasks with 70% accuracy given min-mod cues.   5. Pt will participate in evaluation of math/time/money management abilities.                             Plan:   Patient to be seen Therapy Frequency: 3 x/week   Planned Interventions: Cognitive-Linguistic Therapy  Plan of Care expires:    Plan of Care reviewed with: patient  SLP Follow-up?: Yes  SLP - Next Visit Date: 09/18/20           RABIA Barajas, CCC-SLP  09/16/2020    RABIA Barajas, CCC-SLP  Speech Language Pathologist  [phone]  9/16/2020

## 2020-09-16 NOTE — PT/OT/SLP EVAL
PhysicalTherapy   Evaluation    Fiona Arteaga   MRN: [id number]     PT Received On: 09/16/20  PT Start Time: 0937     PT Stop Time: 1025    PT Total Time (min): 48 min       Billable Minutes:  Evaluation 10, Gait Training 15, Therapeutic Activity 23 and Total Time 38    Diagnosis: SDH  Past Medical History:   Diagnosis Date    A-fib     Anticoagulant long-term use     Cancer     skin cancer    Hypertension       No past surgical history on file.      General Precautions: Standard, aspiration, fall, seizure  Orthopedic Precautions: N/A   Braces: N/A    Spiritual, Cultural Beliefs, Lutheran Practices, Values that Affect Care: no    Patient History:  Lives With: alone  Living Arrangements: house  Home Accessibility: wheelchair accessible  Home Layout: Able to live on 1st floor  Stair Railings at Home: none  Transportation Anticipated: family or friend will provide  Living Environment Comment: Pt lives alone in a 1SH w/ 0 SONAL. She has a walk-in showre w/ a grab bar and built in seat. Pt's angela comes over often to check on her. Her neice lives next door and works from home.   Equipment Currently Used at Home: none  DME owned (not currently used): none    Previous Level of Function: PTA pt was IND w/ all mobility and ADLs w/o an AD. She drives and does her own grocery shopping. Pt reports that the fall leading to her admit was her only fall- caused by turning too fast.  Ambulation Skills: independent  Transfer Skills: independent  ADL Skills: independent  Work/Leisure Activity: independent    Subjective:  Communicated with patient prior to session.  Pt agreeable to session.  Chief Complaint: LLE pain  Patient goals: to be as IND as possible    Pain/Comfort  Pain Rating 1: 8/10  Location - Side 1: Left  Location - Orientation 1: generalized  Location 1: head  Pain Addressed 1: Pre-medicate for activity, Reposition  Pain Rating Post-Intervention 1: 8/10  Pain Rating 2: 8/10  Location - Side 2: Left  Location -  Orientation 2: generalized  Location 2: hip  Pain Addressed 2: Pre-medicate for activity, Reposition  Pain Rating Post-Intervention 2: 8/10    Objective:  Patient found supine in bed. Patient found with: oxygen    Cognitive Exam:  Oriented to: Person, Place, Time and Situation  Follows Commands/attention: Follows multistep  commands  Communication: clear/fluent  Safety awareness/insight to disability: intact    Physical Exam:  Postural examination/scapula alignment:    -       Forward head    Skin integrity: Visible skin intact  Edema: None noted     Sensation:      -       Intact    Upper Extremity Range of Motion:  Right Upper Extremity: WFL  Left Upper Extremity: WFL    Upper Extremity Strength:  Right Upper Extremity: WFL  Left Upper Extremity: WFL    Lower Extremity Range of Motion:  Right Lower Extremity: WFL  Left Lower Extremity: WFL    Lower Extremity Strength:  Right Lower Extremity: WFL except HF 4/5, KE/KF 4/5  Left Lower Extremity: WFL except HF 2+/5, KE/KF 4/5     Fine motor coordination:     -       Intact  Left hand, finger to nose and Right hand, finger to nose    Gross motor coordination: WFL      AM-PAC 6 CLICK MOBILITY  Total Score:17    Bed Mobility:  Sit>Supine: ModA  Supine>Sit: on bed w/ Rain  Roll Left: on bed w/ Rain  HOB elevated and w/ side rail  inc'd time and cues required for technique    Transfers:  Sit<>Stand: to/from EOB, bedside commode, and bedside chair; all w/ RW and CGA  Stand Pivot Transfer: to/from EOB, bedside commode, and bedside chair; all w/ RW and CGA  Cues for hand/foot placement and forward lean    Gait:  Amb 3 trials (28ft, 15ft, and 15ft) w/ RW and CGA for safety  Cues for posture and to inc step length  3 point gait leading w/ LLE  Limited by fatigue     Balance:  Static sit at EOB w/ SPV  Static stand w/ RW and CGA/SBA  Dynamic standing during toileting (sebas care) w/ RW and CGA    Additional Treatment:  Pt in standing using a reacher to  8 rings from the  floor w/ a RW and CGA    Pt was educated on PT role and POC. Pt verb understanding.    Patient left up in chair with all lines intact and call button in reach.    Assessment:  Fiona Arteaga is a 88 y.o. female with a medical diagnosis of SDH. Pt calista session well w/ good participation despite both head and LLE pain. PTA she was IND w/ mobility/ADLs but following her recent hospitalization she is limited by the below listed deficits requiring CGA for safety w/ transfers and gait. Pt is appropriate for skilled PT services and will begin PT POC.    Rehab identified problem list/impairments: weakness, impaired endurance, impaired self care skills, gait instability, impaired functional mobilty, impaired balance, decreased lower extremity function, pain    Rehab potential is good.    Activity tolerance: Good    Discharge recommendations: home with home health(w/ intermittent assistance recomended for safety)     Barriers to discharge: None    Equipment recommendations: bedside commode, walker, rolling     GOALS:   Multidisciplinary Problems     Physical Therapy Goals        Problem: Physical Therapy Goal    Goal Priority Disciplines Outcome Goal Variances Interventions   Physical Therapy Goal     PT, PT/OT Ongoing, Progressing     Description: Goals to be met by: 20     Patient will increase functional independence with mobility by performin. Supine to sit with Set-up [place]  2. Sit to supine with Set-up [place]  3. Rolling to Left and Right with Set-up Assistance.  4. Sit to stand transfer with Supervision  5. Bed to chair transfer with Supervision using Rolling Walker  6. Gait  x 100 feet with Supervision using Rolling Walker.   7. Ascend/Descend 4 inch curb step with Stand-by Assistance using Rolling Walker.  8. Stand for 3 minutes with Stand-by Assistance using Rolling Walker while performing an activity  9. Pt will complete a car transfer (Actual or Simulated) w/ RW and SBA  10. Pt will  use a reacher to  5 objects from the floor in a standing position w/ a RW and SPV                     PLAN:    Patient to be seen 5 x/week  to address the above listed problems via gait training, therapeutic activities, therapeutic exercises  Plan of Care Expires: 10/16/20    Laura Umaña, PT 9/16/2020

## 2020-09-16 NOTE — PROGRESS NOTES
[facility]  Neurosurgery  Progress Note    Subjective:     History of Present Illness: Fiona Arteaga is an 87 yo female with a PMH of HTN, Afib on eliquis who presents after a mechanical fall. She was transitioning in her home from standing to chair when she fell hard and hit the left frontal aspect of her head. She did not lose consciousness.  She lives alone, and her  passed away a few years ago.  She has not had any other recent falls.  No other blood thinning agents.  No known history of stroke or seizure.   Denies neck pain, denies numbness or weakness.   She has mild left hip pain and dizziness, but does not endorse currently.     Post-Op Info:  * No surgery found *         Interval History: NAEON. Patient reports improvement in LLE strength. States getting up to bedside commode easily with assistance. Reports continued headache. Denies vision changes, new weakness or numbness. Bradycardia resolved. Medically stable for discharge to SNF.     Medications:  Continuous Infusions:  Scheduled Meds:  PRN Meds:     Review of Systems  Objective:     Weight: 60.3 kg (133 lb)  Body mass index is 25.97 kg/m².  Vital Signs (Most Recent):  Temp: 98.9 °F (37.2 °C) (09/15/20 1554)  Pulse: 104 (09/15/20 1602)  Resp: 20 (09/15/20 1602)  BP: (!) 140/58 (09/15/20 1554)  SpO2: (!) 90 % (09/15/20 1602) Vital Signs (24h Range):  Temp:  [97.9 °F (36.6 °C)-98.9 °F (37.2 °C)] 98.3 °F (36.8 °C)  Pulse:  [] 104  Resp:  [16-20] 18  SpO2:  [85 %-96 %] 94 %  BP: (106-159)/(51-72) 145/65                Oxygen Concentration (%):  [0] 0         Neurosurgery Physical Exam  General: well developed, well nourished, no distress.   Head: normocephalic. L frontal forehead abrasion.  Neck: No tracheal deviation. No palpable masses. Full ROM.   Neurologic: Alert and oriented. Thought content appropriate.  GCS: Motor: 6/Verbal: 5/Eyes: 4 GCS Total: 15  Mental Status: Awake, Alert, Oriented x 4. Responds  appropriately, follows commands.  Language: No aphasia  Speech: No dysarthria  Cranial nerves: face symmetric, tongue midline, CN II-XII grossly intact.   Eyes: pupils equal, round, reactive to light with accomodation, EOMI.  Ears: No drainage.   Pulmonary: normal respirations, no signs of respiratory distress  Abdomen: soft, non-distended, not tender to palpation     Sensory: intact to light touch throughout  Motor Strength: Moves all extremities spontaneously with good tone. Full strength BUE. 5/5 RLE. 4+/5 LLE. No abnormal movements seen.      Pronator Drift: no drift noted  Finger-to-nose: Intact bilaterally  Vascular: No LE edema.   Skin: Skin is warm, dry and intact.    Significant Labs:  Recent Labs   Lab 09/14/20  0254 09/15/20  0424   * 96    137   K 5.4* 4.8    102   CO2 28 30*   BUN 24* 24*   CREATININE 0.8 0.8   CALCIUM 8.7 8.6*   MG 1.8 1.8     Recent Labs   Lab 09/14/20  0254 09/15/20  0424   WBC 9.70 7.59   HGB 9.8* 9.4*   HCT 32.7* 31.0*    202     No results for input(s): LABPT, INR, APTT in the last 48 hours.  Microbiology Results (last 7 days)     ** No results found for the last 168 hours. **        All pertinent labs from the last 24 hours have been reviewed.    Significant Diagnostics:  I have reviewed all pertinent imaging results/findings within the past 24 hours.    Assessment/Plan:     * SDH (subdural hematoma)  87 yo F w/ PMH of HTN, Afib on eliquis who presents with L acute holohemispheric thin SDH with 5mm maximal thickness 3mm MLS.     Stepped down to floor under NSGY 9/13. Remains neurologically intact on exam.    --Neuro checks q4h  --Telemetry  --All labs and diagnostics personally reviewed  --s/p KCentra, VitK for reversal of Eliquis  --Follow-up CTH 9/12 stable  --Coags wnl  --SBP < 140  --Na >135  --Keppra 500 BID for seizure ppx. Complete 7 day course. Last dose 9/19.   --HOB >30  --Possible MMA embolization, likely as outpatient    Longstanding  persistent atrial fibrillation  -Home meds: Metoprolol XL 50mg, Eliquis  -Continue to hold home Eliquis in setting of SDH. Resume once cleared by NSGY after f/u outpatient with repeat CTH.  -Patient bradycardic. Metoprolol discontinued per Cardiology recs.  -Continue telemetry monitoring     Benign essential hypertension  Bradycardia  -EKG 9/13: sinus rhythm with 1st degree AV block, frequent PVC's  -Troponin 9/13 0.027, trended down to 0.025  -TTE 9/14:   · Normal left ventricular systolic function. The estimated ejection fraction is 65%.  · Septal wall has abnormal motion.  · Local segmental wall motion abnormalities.  · Indeterminate left ventricular diastolic function.  · Normal right ventricular systolic function.  · Mild aortic valve stenosis.  · Aortic valve area is 1.68 cm2; peak velocity is 1.74 m/s; mean gradient is 8 mmHg.  · Mild-to-moderate mitral regurgitation. Normal central venous pressure (3 mmHg).    -Cardiology consulted, appreciate recs:    -Hold further betablocker and non-dihydropyridine CCB.   -Use ACEI/ARB or Hydralazine for BP control if needed   -Avoid Eliquis given recent SDH and high risk of falls   -Maintain K>4 and Mg>2   -Continue telemetry monitoring   -Goal SBP < 140  -Restarted home dose norvasc 2.5 mg daily, lisinopril 40 mg daily, lasix 40 mg daily  -Hydralazine PRN for SBP > 160     CESARIO (acute kidney injury)  -Improving  -strict I&Os   -monitor renal panel      Hyperkalemia  -Repeat CMP with resolution of hyperkalemia. K 4.8 today.   -Follow renal function     -DVT prophylaxis: MARIANA's, SCD's, SQH  -Bowel regimen: senna BID and miralax daily  -Atelectasis prevention: IS hourly while awake, PT/OT, OOB > 6 hours per day  --Diet as tolerated    --F/u in NSGY clinic in 2 weeks with CTH.     Dispo: Medically stable for discharge to SNF. Activity recommendations reviewed. Plan of care discussed with patient and she voiced understanding. Her questions were all answered. Follow-up in  Neurosurgery clinic arranged.    Discussed with Dr. Maria Elena Roe PA-C  Neurosurgery  [facility]

## 2020-09-16 NOTE — PLAN OF CARE
Problem: Wound  Goal: Optimal Wound Healing  Outcome: Ongoing, Progressing     Problem: Fall Injury Risk  Goal: Absence of Fall and Fall-Related Injury  Outcome: Ongoing, Progressing     Problem: Skin Injury Risk Increased  Goal: Skin Health and Integrity  Outcome: Ongoing, Progressing

## 2020-09-16 NOTE — PLAN OF CARE
"   09/16/20 1159   Discharge Assessment   Assessment Type Discharge Planning Assessment   Confirmed/corrected address and phone number on facesheet? Yes   Assessment information obtained from? Patient;Caregiver   Expected Length of Stay (days) 14   Communicated expected length of stay with patient/caregiver yes   Prior to hospitilization cognitive status: Alert/Oriented   Prior to hospitalization functional status: Independent;Assistive Equipment   Current Functional Status: Needs Assistance   Lives With alone   Able to Return to Prior Arrangements yes   Is patient able to care for self after discharge? Unable to determine at this time (comments)   Patient's perception of discharge disposition home or selfcare   Readmission Within the Last 30 Days no previous admission in last 30 days   Patient currently being followed by outpatient case management? No   Patient currently receives any other outside agency services? No   Equipment Currently Used at Home walker, rolling   Do you have any problems affording any of your prescribed medications? No   Is the patient taking medications as prescribed? yes   Does the patient have transportation home? Yes   Transportation Anticipated family or friend will provide   Does the patient receive services at the Coumadin Clinic? No   Discharge Plan A Home Health   Discharge Plan B Home with family   DME Needed Upon Discharge  other (see comments)  (TBD)   Patient/Family in Agreement with Plan yes     CM conducted discharge planning assessment by phone with Angelic (relative). Patient lives alone. Lives in a one story home with 1 step to enter. Has a walker at home but rarely uses it. Hard of Hearing. Angelic described her as "Stubborn". She lives alone but has relatives that stop by daily. CM will continue to follow up and assist with needs as indicated.    Primary Doctor No      [id number] - [place], [place] - [address]  [address]  [place] " [id number]  Phone: [phone] Fax: [phone]    Payor: MEDICARE / Plan: MEDICARE RAILROAD [employer] / Product Type: Government /     Grazyna Griffith RN Case Manager  [facility]

## 2020-09-16 NOTE — SUBJECTIVE & OBJECTIVE
Interval History: NAEON. Patient reports improvement in LLE strength. States getting up to bedside commode easily with assistance. Reports continued headache. Denies vision changes, new weakness or numbness. Bradycardia resolved. Medically stable for discharge to SNF.     Medications:  Continuous Infusions:  Scheduled Meds:  PRN Meds:     Review of Systems  Objective:     Weight: 60.3 kg (133 lb)  Body mass index is 25.97 kg/m².  Vital Signs (Most Recent):  Temp: 98.9 °F (37.2 °C) (09/15/20 1554)  Pulse: 104 (09/15/20 1602)  Resp: 20 (09/15/20 1602)  BP: (!) 140/58 (09/15/20 1554)  SpO2: (!) 90 % (09/15/20 1602) Vital Signs (24h Range):  Temp:  [97.9 °F (36.6 °C)-98.9 °F (37.2 °C)] 98.3 °F (36.8 °C)  Pulse:  [] 104  Resp:  [16-20] 18  SpO2:  [85 %-96 %] 94 %  BP: (106-159)/(51-72) 145/65                Oxygen Concentration (%):  [0] 0         Neurosurgery Physical Exam  General: well developed, well nourished, no distress.   Head: normocephalic. L frontal forehead abrasion.  Neck: No tracheal deviation. No palpable masses. Full ROM.   Neurologic: Alert and oriented. Thought content appropriate.  GCS: Motor: 6/Verbal: 5/Eyes: 4 GCS Total: 15  Mental Status: Awake, Alert, Oriented x 4. Responds appropriately, follows commands.  Language: No aphasia  Speech: No dysarthria  Cranial nerves: face symmetric, tongue midline, CN II-XII grossly intact.   Eyes: pupils equal, round, reactive to light with accomodation, EOMI.  Ears: No drainage.   Pulmonary: normal respirations, no signs of respiratory distress  Abdomen: soft, non-distended, not tender to palpation     Sensory: intact to light touch throughout  Motor Strength: Moves all extremities spontaneously with good tone. Full strength BUE. 5/5 RLE. 4+/5 LLE. No abnormal movements seen.      Pronator Drift: no drift noted  Finger-to-nose: Intact bilaterally  Vascular: No LE edema.   Skin: Skin is warm, dry and intact.    Significant Labs:  Recent Labs   Lab  09/14/20 0254 09/15/20  0424   * 96    137   K 5.4* 4.8    102   CO2 28 30*   BUN 24* 24*   CREATININE 0.8 0.8   CALCIUM 8.7 8.6*   MG 1.8 1.8     Recent Labs   Lab 09/14/20 0254 09/15/20  0424   WBC 9.70 7.59   HGB 9.8* 9.4*   HCT 32.7* 31.0*    202     No results for input(s): LABPT, INR, APTT in the last 48 hours.  Microbiology Results (last 7 days)     ** No results found for the last 168 hours. **        All pertinent labs from the last 24 hours have been reviewed.    Significant Diagnostics:  I have reviewed all pertinent imaging results/findings within the past 24 hours.

## 2020-09-16 NOTE — ASSESSMENT & PLAN NOTE
89 yo F w/ PMH of HTN, Afib on eliquis who presents with L acute holohemispheric thin SDH with 5mm maximal thickness 3mm MLS.     Stepped down to floor under NSGY 9/13. Remains neurologically intact on exam.    --Neuro checks q4h  --Telemetry  --All labs and diagnostics personally reviewed  --s/p KCentra, VitK for reversal of Eliquis  --Follow-up CTH 9/12 stable  --Coags wnl  --SBP < 140  --Na >135  --Keppra 500 BID for seizure ppx. Complete 7 day course. Last dose 9/19.   --HOB >30  --Possible MMA embolization, likely as outpatient    Longstanding persistent atrial fibrillation  -Home meds: Metoprolol XL 50mg, Eliquis  -Continue to hold home Eliquis in setting of SDH. Resume once cleared by NSGY after f/u outpatient with repeat CTH.  -Patient bradycardic. Metoprolol discontinued per Cardiology recs.  -Continue telemetry monitoring     Benign essential hypertension  Bradycardia  -EKG 9/13: sinus rhythm with 1st degree AV block, frequent PVC's  -Troponin 9/13 0.027, trended down to 0.025  -TTE 9/14:   · Normal left ventricular systolic function. The estimated ejection fraction is 65%.  · Septal wall has abnormal motion.  · Local segmental wall motion abnormalities.  · Indeterminate left ventricular diastolic function.  · Normal right ventricular systolic function.  · Mild aortic valve stenosis.  · Aortic valve area is 1.68 cm2; peak velocity is 1.74 m/s; mean gradient is 8 mmHg.  · Mild-to-moderate mitral regurgitation. Normal central venous pressure (3 mmHg).    -Cardiology consulted, appreciate recs:    -Hold further betablocker and non-dihydropyridine CCB.   -Use ACEI/ARB or Hydralazine for BP control if needed   -Avoid Eliquis given recent SDH and high risk of falls   -Maintain K>4 and Mg>2   -Continue telemetry monitoring   -Goal SBP < 140  -Restarted home dose norvasc 2.5 mg daily, lisinopril 40 mg daily, lasix 40 mg daily  -Hydralazine PRN for SBP > 160     CESARIO (acute kidney injury)  -Improving  -strict I&Os    -monitor renal panel      Hyperkalemia  -Repeat CMP with resolution of hyperkalemia. K 4.8 today.   -Follow renal function     -DVT prophylaxis: MARIANA's, SCD's, SQH  -Bowel regimen: senna BID and miralax daily  -Atelectasis prevention: IS hourly while awake, PT/OT, OOB > 6 hours per day  --Diet as tolerated    --F/u in NSGY clinic in 2 weeks with CTH.     Dispo: Medically stable for discharge to SNF. Activity recommendations reviewed. Plan of care discussed with patient and she voiced understanding. Her questions were all answered. Follow-up in Neurosurgery clinic arranged.    Discussed with Dr. Arredondo

## 2020-09-16 NOTE — PLAN OF CARE
Problem: Adult Inpatient Plan of Care  Goal: Plan of Care Review  Outcome: Ongoing, Progressing  Goal: Patient-Specific Goal (Individualization)  Outcome: Ongoing, Progressing  Goal: Absence of Hospital-Acquired Illness or Injury  Outcome: Ongoing, Progressing  Goal: Optimal Comfort and Wellbeing  Outcome: Ongoing, Progressing  Goal: Readiness for Transition of Care  Outcome: Ongoing, Progressing  Goal: Rounds/Family Conference  Outcome: Ongoing, Progressing     Problem: Adult Inpatient Plan of Care  Goal: Plan of Care Review  Outcome: Ongoing, Progressing  Goal: Patient-Specific Goal (Individualization)  Outcome: Ongoing, Progressing  Goal: Absence of Hospital-Acquired Illness or Injury  Outcome: Ongoing, Progressing  Goal: Optimal Comfort and Wellbeing  Outcome: Ongoing, Progressing  Goal: Readiness for Transition of Care  Outcome: Ongoing, Progressing  Goal: Rounds/Family Conference  Outcome: Ongoing, Progressing     Problem: Adult Inpatient Plan of Care  Goal: Plan of Care Review  Outcome: Ongoing, Progressing  Goal: Patient-Specific Goal (Individualization)  Outcome: Ongoing, Progressing  Goal: Absence of Hospital-Acquired Illness or Injury  Outcome: Ongoing, Progressing  Goal: Optimal Comfort and Wellbeing  Outcome: Ongoing, Progressing  Goal: Readiness for Transition of Care  Outcome: Ongoing, Progressing  Goal: Rounds/Family Conference  Outcome: Ongoing, Progressing     Problem: Adult Inpatient Plan of Care  Goal: Plan of Care Review  Outcome: Ongoing, Progressing  Goal: Patient-Specific Goal (Individualization)  Outcome: Ongoing, Progressing  Goal: Absence of Hospital-Acquired Illness or Injury  Outcome: Ongoing, Progressing  Goal: Optimal Comfort and Wellbeing  Outcome: Ongoing, Progressing  Goal: Readiness for Transition of Care  Outcome: Ongoing, Progressing  Goal: Rounds/Family Conference  Outcome: Ongoing, Progressing     Problem: Adult Inpatient Plan of Care  Goal: Plan of Care Review  Outcome:  Ongoing, Progressing  Goal: Patient-Specific Goal (Individualization)  Outcome: Ongoing, Progressing  Goal: Absence of Hospital-Acquired Illness or Injury  Outcome: Ongoing, Progressing  Goal: Optimal Comfort and Wellbeing  Outcome: Ongoing, Progressing  Goal: Readiness for Transition of Care  Outcome: Ongoing, Progressing  Goal: Rounds/Family Conference  Outcome: Ongoing, Progressing     Problem: Adult Inpatient Plan of Care  Goal: Plan of Care Review  Outcome: Ongoing, Progressing  Goal: Patient-Specific Goal (Individualization)  Outcome: Ongoing, Progressing  Goal: Absence of Hospital-Acquired Illness or Injury  Outcome: Ongoing, Progressing  Goal: Optimal Comfort and Wellbeing  Outcome: Ongoing, Progressing  Goal: Readiness for Transition of Care  Outcome: Ongoing, Progressing  Goal: Rounds/Family Conference  Outcome: Ongoing, Progressing

## 2020-09-16 NOTE — PT/OT/SLP EVAL
Occupational Therapy  Evaluation/Treatment    Fiona Arteaga   MRN: [id number]   Admitting Diagnosis: SDH (subdural hematoma)          OT Stop Time: 0942  OT Total Time (min): 53 min    Billable Minutes:  Evaluation 15  Self Care/Home Management 38    Diagnosis: SDH (subdural hematoma)    Past Medical History:   Diagnosis Date    A-fib     Anticoagulant long-term use     Cancer     skin cancer    Hypertension       History reviewed. No pertinent surgical history.      General Precautions: Standard, NPO, aspiration, fall, seizure  Orthopedic Precautions:  N/A  Braces:  N/A    Spiritual, Cultural Beliefs, Shinto Practices, Values that Affect Care: no     Patient History:  Lives With: alone, other (see comments)(niece comes over 3x/day to help cook and clean)  Living Arrangements: house  Home Accessibility: wheelchair accessible  Home Layout: Able to live on 1st floor  Stair Railings at Home: none  Transportation Anticipated: family or friend will provide  Living Environment Comment: Pt lives in [place] with no steps inside or out. She has a walk-in shower with a bench and grab bar. Niece comes 3x day to help with ADLs and IADLs  Equipment Currently Used at Home: none    Prior level of function:    Bed Mobility/Transfers: needs device  Grooming: independent  Bathing: needs assist  Upper Body Dressing: needs assist  Lower Body Dressing: needs assist  Toileting: needs assist  Homemaking Responsibilities: Yes  Meal Prep Responsibility: Secondary  Laundry Responsibility: Secondary  Cleaning Responsibility: Secondary  Shopping Responsibility: Secondary     Dominant hand: right    Subjective:  Communicated with nurse prior to session.  Chief Complaint: Headache  Patient/Family stated goals: To be strong enough to go home     Objective:      Cognitive Exam:  Oriented to: Person, Place, Time and Situation  Follows Commands/attention: Follows multistep commands  Communication: clear/fluent  Memory:  No Deficits  "noted  Safety awareness/insight to disability: intact  Coping skills/emotional control: Appropriate to situation    Visual/perceptual:  Pt was asked to draw a clock and bob all 12 numbers on R side    Physical Exam:  Postural examination/scapula alignment:    -       Rounded shoulders  Skin integrity: Visible skin intact and Bruising of L eye  Edema: None noted   Sensation:      -       Intact  Pt reports that socks irritate skin on feet    Upper Extremity Range of Motion:  Right Upper Extremity: WFL  Left Upper Extremity: WFL    Upper Extremity Strength:  Right Upper Extremity: WFL  Left Upper Extremity: WFL   Strength: WFL    Fine motor coordination:      -       Intact    Gross motor coordination: WFL    Occupational Performance:    Bed Mobility:    · Patient completed Rolling/Turning to Left with  contact guard assistance  · Patient completed Scooting/Bridging with min assist. (A) to scoot LLE to EOB  · Patient completed Supine to Sit with min assistance with (A) to lower LLE to floor.    Functional Mobility/Transfers:  · Patient completed Sit <> Stand Transfer with contact guard assistance  to  rolling walker   · Patient completed Bed <> Chair Transfer using Stand Pivot technique with contact guard assistance with rolling walker  · Functional Mobility: PT ambulated 2 ft from Bed to W/C with CGA and RW    Activities of Daily Living:  · Grooming: setup assistance  · Bathing: minimum assistance to bathe below knees  · Upper Body Dressing: stand by assistance   · Lower Body Dressing: minimum assistance. Pt required (A) to thread LLE through undergarment.  Pt donned pants without issue but refused to don socks due to socks " cause burning in my feet"      [facility] 6 Click:  AMPA Total Score:  18    Additional Treatment:  Pt edu on role of OT, POC, safety when performing self care tasks , benefit of performing OOB activity, and safety when performing functional transfers and mobility.  - Self care tasks " completed-- as noted above     Patient left up in chair with all lines intact, call button in reach and nursing notified    Assessment:  Fiona Arteaga is a 88 y.o. female with a medical diagnosis of SDH (subdural hematoma. She presents with impaired endurance, impaired sensation, impaired self care skills, impaired functional mobility, visual deficits, decreased lower extremity function, and pain. Pt is motivated and would benefit from OT intervention to decrease fall risk, to reduce caregiver burden of care, and improve quality of life and functional performance of functional activities.    Rehab identified problem list/impairments: impaired endurance, impaired sensation, impaired self care skills, impaired functional mobilty, pain, visual deficits, decreased lower extremity function    Rehab potential is good    Activity tolerance: Good    Discharge recommendations: home with home health     Barriers to discharge: None     Equipment recommendations: walker, rolling     GOALS:   Multidisciplinary Problems     Occupational Therapy Goals        Problem: Occupational Therapy Goal    Goal Priority Disciplines Outcome Interventions   Occupational Therapy Goal     OT, PT/OT Ongoing, Progressing    Description: Goals to be met by: 9/30/2020    Patient will increase functional independence with ADLs by performing:    UE Dressing with Supervision.  LE Dressing with Supervision.  Grooming while standing at sink with Set-up Assistance.  Toileting from toilet with Supervision for hygiene and clothing management.   Bathing from  sitting at sink with Supervision.  Rolling to Bilateral with Supervision.   Supine to sit with Supervision.  Stand pivot transfers with Supervision.  Toilet transfer to toilet with Supervision.                       PLAN: Patient to be seen 5 x/week to address the above listed problems via self-care/home management  Plan of Care expires: 10/16/20  Plan of Care reviewed with: patient    Alex BALES  Demetrio, OTR/L  09/16/2020     I certify that I was present in the room directing the student in service delivery and guiding them using my skilled judgment.  As the co-signing therapist I have reviewed the student's documentation and am responsible for the treatment, assessment, and plan.    Alex Atkinson, OTR/L  9/16/2020

## 2020-09-16 NOTE — CONSULTS
"  [facility] - Skilled Nursing Care  Adult Nutrition  Consult Note    SUMMARY     Recommendations  1) Continue cardiac diet as tolerated   2) Recommend medication management to help relieve constipation  3) RD to monitor and follow up.    Goals: Pt to meet 75% estimated energy needs by RD follow up  Nutrition Goal Status: new  Communication of RD Recs: POC    Reason for Assessment  Reason For Assessment: consult  Diagnosis: (SDH)  Relevant Medical History: HTN, cancer, Afib  Interdisciplinary Rounds: did not attend  General Information Comments: Pt admitted to [facility] for SDH, course complicated by hyperkalemia with CESARIO. Pt c/o constipation, LBM 3 days ago, asking for "something stronger" to help with bowel movement as she has abdominal discomfort, on senna-docusate - RD communicated with RN. With some nausea and loss of appetite r/t constipation. She also does not like food in the hospital, RD discussed menu options. At home, pt lives alone and prepares food for herself. Reports  lbs, consistent with current weight of 130 lbs. NFPE showing age-related signs of muscle/fat wasting. Noted contusion to face and puncture from fall.  Nutrition Discharge Planning: Cardiac diet    Nutrition Risk Screen  Nutrition Risk Screen: no indicators present    Nutrition/Diet History  Spiritual, Cultural Beliefs, Cheondoism Practices, Values that Affect Care: no  Food Allergies: NKFA    Anthropometrics  Temp: 98.3 °F (36.8 °C)  Height: 5' (152.4 cm)  Height (inches): 60 in  Weight Method: Bed Scale  Weight: 59.4 kg (130 lb 15.3 oz)  Weight (lb): 130.95 lb  Ideal Body Weight (IBW), Female: 100 lb  % Ideal Body Weight, Female (lb): 133 %  BMI (Calculated): 25.6     Lab/Procedures/Meds  Pertinent Labs Reviewed: reviewed  Pertinent Labs Comments: Hgb 9.4, Hct 31,   Pertinent Medications Reviewed: reviewed  Pertinent Medications Comments: furosemide, heparin, lisinopril, pantoprazole, senna-docusate    Estimated/Assessed " Needs  Weight Used For Calorie Calculations: 59 kg (130 lb)  Energy Calorie Requirements (kcal): 1474(25 kcal/kg)  Energy Need Method: Kcal/kg  Protein Requirements: 59-71 g/day(1-1.2 g/kg)  Weight Used For Protein Calculations: 59 kg (130 lb)  Fluid Requirements (mL): 1 mL/kcal or per MD  Estimated Fluid Requirement Method: RDA Method  RDA Method (mL): 1474     Nutrition Prescription Ordered  Current Diet Order: Cardiac    Evaluation of Received Nutrient/Fluid Intake  Energy Calories Required: not meeting needs  Protein Required: not meeting needs  Fluid Required: not meeting needs  Comments: Per chart LBM 9/14. Per pt LBM 9/13  Tolerance: tolerating  % Intake of Estimated Energy Needs: 50 - 75 %  % Meal Intake: 50 - 75 %    Nutrition Risk  Level of Risk/Frequency of Follow-up: low     Assessment and Plan  Nutrition Problem  Inadequate oral intake    Related to (etiology):   Decreased appetite, constipation, food preferences    Signs and Symptoms (as evidenced by):   Pt report of LBM x 3 days, abdominal discomfort and decreased appetite due to constipation and disliking food at facility    Interventions  (treatment strategy):  1) Collaboration with other providers  2) Fat and mineral modified diet (Na) Cardiac diet    Nutrition Diagnosis Status:   New    Monitor and Evaluation  Food and Nutrient Intake: energy intake, food and beverage intake  Food and Nutrient Adminstration: diet order  Physical Activity and Function: nutrition-related ADLs and IADLs  Anthropometric Measurements: weight change, weight, body mass index  Biochemical Data, Medical Tests and Procedures: electrolyte and renal panel, gastrointestinal profile, glucose/endocrine profile, inflammatory profile, lipid profile  Nutrition-Focused Physical Findings: overall appearance     Malnutrition Assessment (9/16)  Skin (Micronutrient): bruised, cuts unhealed(on face from fall)   Orbital Region (Subcutaneous Fat Loss): well nourished  Upper Arm Region  (Subcutaneous Fat Loss): mild depletion   Restoration Region (Muscle Loss): mild depletion  Clavicle Bone Region (Muscle Loss): mild depletion  Clavicle and Acromion Bone Region (Muscle Loss): well nourished  Dorsal Hand (Muscle Loss): well nourished  Anterior Thigh Region (Muscle Loss): well nourished  Posterior Calf Region (Muscle Loss): well nourished       Nutrition Follow-Up  RD Follow-up?: Yes

## 2020-09-16 NOTE — PLAN OF CARE
Goals to be met by: ???    Patient will increase functional independence with ADLs by performing:    UE Dressing with Supervision.  LE Dressing with Supervision.  Grooming while seated at sink with Set-up Assistance.  Toileting from toilet with Supervision for hygiene and clothing management.   Bathing from  sitting at sink with Supervision.  Rolling to Bilateral with Supervision  Supine to sit with Supervision.  Stand pivot transfers with Supervision.  Toilet transfer to toilet with Supervision.

## 2020-09-16 NOTE — PLAN OF CARE
Recommendations  1) Continue cardiac diet as tolerated   2) Recommend medication management to help relieve constipation  3) RD to monitor and follow up.     Goals: Pt to meet 75% estimated energy needs and achieve normal BM by RD follow up  Nutrition Goal Status: new      Assessment and Plan  Nutrition Problem  Inadequate oral intake     Related to (etiology):   Decreased appetite, constipation, food preferences     Signs and Symptoms (as evidenced by):   Pt report of LBM x 3 days, abdominal discomfort and decreased appetite due to constipation and disliking food at facility     Interventions  (treatment strategy):  1) Collaboration with other providers  2) Fat and mineral modified diet (Na) Cardiac diet     Nutrition Diagnosis Status:   New

## 2020-09-16 NOTE — TELEPHONE ENCOUNTER
----- Message from Noyr Roe PA-C sent at 9/15/2020  9:03 AM CDT -----  Regarding: Hospital Follow up  Hello,     Ms. Arteaga is s/p SDH on 9/12, she needs a follow up in 2 weeks with Dr. Arredondo w/ a [facility]. I have ordered the imaging.    Thank you!  Nory

## 2020-09-17 ENCOUNTER — LAB VISIT (OUTPATIENT)
Dept: LAB | Facility: OTHER | Age: 85
End: 2020-09-17
Payer: MEDICARE

## 2020-09-17 DIAGNOSIS — Z03.818 ENCOUNTER FOR OBSERVATION FOR SUSPECTED EXPOSURE TO OTHER BIOLOGICAL AGENTS RULED OUT: ICD-10-CM

## 2020-09-17 LAB
ANION GAP SERPL CALC-SCNC: 10 MMOL/L (ref 8–16)
BASOPHILS # BLD AUTO: 0.01 K/UL (ref 0–0.2)
BASOPHILS NFR BLD: 0.2 % (ref 0–1.9)
BUN SERPL-MCNC: 29 MG/DL (ref 8–23)
CALCIUM SERPL-MCNC: 8.7 MG/DL (ref 8.7–10.5)
CHLORIDE SERPL-SCNC: 98 MMOL/L (ref 95–110)
CO2 SERPL-SCNC: 30 MMOL/L (ref 23–29)
CREAT SERPL-MCNC: 1 MG/DL (ref 0.5–1.4)
DIFFERENTIAL METHOD: ABNORMAL
EOSINOPHIL # BLD AUTO: 0.2 K/UL (ref 0–0.5)
EOSINOPHIL NFR BLD: 3.5 % (ref 0–8)
ERYTHROCYTE [DISTWIDTH] IN BLOOD BY AUTOMATED COUNT: 12.3 % (ref 11.5–14.5)
EST. GFR  (AFRICAN AMERICAN): 58.1 ML/MIN/1.73 M^2
EST. GFR  (NON AFRICAN AMERICAN): 50.4 ML/MIN/1.73 M^2
GLUCOSE SERPL-MCNC: 91 MG/DL (ref 70–110)
HCT VFR BLD AUTO: 30.8 % (ref 37–48.5)
HGB BLD-MCNC: 9.5 G/DL (ref 12–16)
IMM GRANULOCYTES # BLD AUTO: 0.05 K/UL (ref 0–0.04)
IMM GRANULOCYTES NFR BLD AUTO: 0.8 % (ref 0–0.5)
LYMPHOCYTES # BLD AUTO: 1.2 K/UL (ref 1–4.8)
LYMPHOCYTES NFR BLD: 19.1 % (ref 18–48)
MAGNESIUM SERPL-MCNC: 1.8 MG/DL (ref 1.6–2.6)
MCH RBC QN AUTO: 31 PG (ref 27–31)
MCHC RBC AUTO-ENTMCNC: 30.8 G/DL (ref 32–36)
MCV RBC AUTO: 101 FL (ref 82–98)
MONOCYTES # BLD AUTO: 1 K/UL (ref 0.3–1)
MONOCYTES NFR BLD: 15.1 % (ref 4–15)
NEUTROPHILS # BLD AUTO: 3.9 K/UL (ref 1.8–7.7)
NEUTROPHILS NFR BLD: 61.3 % (ref 38–73)
NRBC BLD-RTO: 0 /100 WBC
PHOSPHATE SERPL-MCNC: 4.4 MG/DL (ref 2.7–4.5)
PLATELET # BLD AUTO: 231 K/UL (ref 150–350)
PMV BLD AUTO: 10.3 FL (ref 9.2–12.9)
POTASSIUM SERPL-SCNC: 4.3 MMOL/L (ref 3.5–5.1)
RBC # BLD AUTO: 3.06 M/UL (ref 4–5.4)
SARS-COV-2 RNA RESP QL NAA+PROBE: NOT DETECTED
SODIUM SERPL-SCNC: 138 MMOL/L (ref 136–145)
WBC # BLD AUTO: 6.35 K/UL (ref 3.9–12.7)

## 2020-09-17 PROCEDURE — 97530 THERAPEUTIC ACTIVITIES: CPT | Mod: CQ

## 2020-09-17 PROCEDURE — 25000003 PHARM REV CODE 250: Performed by: PHYSICIAN ASSISTANT

## 2020-09-17 PROCEDURE — 94761 N-INVAS EAR/PLS OXIMETRY MLT: CPT

## 2020-09-17 PROCEDURE — 85025 COMPLETE CBC W/AUTO DIFF WBC: CPT

## 2020-09-17 PROCEDURE — 83735 ASSAY OF MAGNESIUM: CPT

## 2020-09-17 PROCEDURE — 27000221 HC OXYGEN, UP TO 24 HOURS

## 2020-09-17 PROCEDURE — 11000004 HC SNF PRIVATE

## 2020-09-17 PROCEDURE — 97535 SELF CARE MNGMENT TRAINING: CPT

## 2020-09-17 PROCEDURE — 84100 ASSAY OF PHOSPHORUS: CPT

## 2020-09-17 PROCEDURE — 80048 BASIC METABOLIC PNL TOTAL CA: CPT

## 2020-09-17 PROCEDURE — 25000003 PHARM REV CODE 250: Performed by: NURSE PRACTITIONER

## 2020-09-17 PROCEDURE — 97530 THERAPEUTIC ACTIVITIES: CPT

## 2020-09-17 PROCEDURE — 63600175 PHARM REV CODE 636 W HCPCS: Performed by: PHYSICIAN ASSISTANT

## 2020-09-17 PROCEDURE — 36415 COLL VENOUS BLD VENIPUNCTURE: CPT

## 2020-09-17 PROCEDURE — 97116 GAIT TRAINING THERAPY: CPT | Mod: CQ

## 2020-09-17 PROCEDURE — U0003 INFECTIOUS AGENT DETECTION BY NUCLEIC ACID (DNA OR RNA); SEVERE ACUTE RESPIRATORY SYNDROME CORONAVIRUS 2 (SARS-COV-2) (CORONAVIRUS DISEASE [COVID-19]), AMPLIFIED PROBE TECHNIQUE, MAKING USE OF HIGH THROUGHPUT TECHNOLOGIES AS DESCRIBED BY CMS-2020-01-R: HCPCS

## 2020-09-17 RX ORDER — DOCUSATE SODIUM 283 MG/5ML
1 LIQUID RECTAL DAILY PRN
Status: DISCONTINUED | OUTPATIENT
Start: 2020-09-17 | End: 2020-09-25 | Stop reason: HOSPADM

## 2020-09-17 RX ORDER — LANOLIN ALCOHOL/MO/W.PET/CERES
400 CREAM (GRAM) TOPICAL 2 TIMES DAILY
Status: COMPLETED | OUTPATIENT
Start: 2020-09-17 | End: 2020-09-18

## 2020-09-17 RX ORDER — ONDANSETRON 4 MG/1
4 TABLET, ORALLY DISINTEGRATING ORAL EVERY 8 HOURS PRN
Status: DISCONTINUED | OUTPATIENT
Start: 2020-09-17 | End: 2020-09-25 | Stop reason: HOSPADM

## 2020-09-17 RX ORDER — POLYETHYLENE GLYCOL 3350 17 G/17G
17 POWDER, FOR SOLUTION ORAL DAILY
Status: DISCONTINUED | OUTPATIENT
Start: 2020-09-17 | End: 2020-09-25 | Stop reason: HOSPADM

## 2020-09-17 RX ADMIN — AMLODIPINE BESYLATE 2.5 MG: 2.5 TABLET ORAL at 08:09

## 2020-09-17 RX ADMIN — MELATONIN TAB 3 MG 6 MG: 3 TAB at 08:09

## 2020-09-17 RX ADMIN — FUROSEMIDE 40 MG: 40 TABLET ORAL at 08:09

## 2020-09-17 RX ADMIN — Medication 400 MG: at 12:09

## 2020-09-17 RX ADMIN — Medication 400 MG: at 08:09

## 2020-09-17 RX ADMIN — LEVETIRACETAM 500 MG: 500 TABLET ORAL at 08:09

## 2020-09-17 RX ADMIN — ACETAMINOPHEN 650 MG: 325 TABLET ORAL at 12:09

## 2020-09-17 RX ADMIN — DOCUSATE SODIUM 50MG AND SENNOSIDES 8.6MG 1 TABLET: 8.6; 5 TABLET, FILM COATED ORAL at 08:09

## 2020-09-17 RX ADMIN — HEPARIN SODIUM 5000 UNITS: 5000 INJECTION INTRAVENOUS; SUBCUTANEOUS at 05:09

## 2020-09-17 RX ADMIN — ACETAMINOPHEN 650 MG: 325 TABLET ORAL at 04:09

## 2020-09-17 RX ADMIN — HEPARIN SODIUM 5000 UNITS: 5000 INJECTION INTRAVENOUS; SUBCUTANEOUS at 01:09

## 2020-09-17 RX ADMIN — LISINOPRIL 40 MG: 20 TABLET ORAL at 08:09

## 2020-09-17 RX ADMIN — PANTOPRAZOLE SODIUM 40 MG: 40 TABLET, DELAYED RELEASE ORAL at 08:09

## 2020-09-17 RX ADMIN — HEPARIN SODIUM 5000 UNITS: 5000 INJECTION INTRAVENOUS; SUBCUTANEOUS at 10:09

## 2020-09-17 RX ADMIN — POLYETHYLENE GLYCOL 3350 17 G: 17 POWDER, FOR SOLUTION ORAL at 05:09

## 2020-09-17 RX ADMIN — ACETAMINOPHEN 650 MG: 325 TABLET ORAL at 09:09

## 2020-09-17 RX ADMIN — ACETAMINOPHEN 650 MG: 325 TABLET ORAL at 05:09

## 2020-09-17 NOTE — PT/OT/SLP PROGRESS
"Physical Therapy  Treatment    Fiona Arteaga   MRN: [id number]   Admitting Diagnosis: SDH (subdural hematoma)    PT Received On: 09/17/20          Billable Minutes:  Gait Training 15 and Therapeutic Activity 23    Treatment Type: Treatment  PT/PTA: PTA     PTA Visit Number: 1       General Precautions: Standard, aspiration, fall, seizure  Orthopedic Precautions: N/A   Braces: N/A    Spiritual, Cultural Beliefs, Jain Practices, Values that Affect Care: no    Subjective:  "I'm alright, my head just hurts a lot" Pt agreeable to therapy    Pain/Comfort  Pain Rating 1: 8/10  Location - Side 1: Left  Location - Orientation 1: generalized  Location 1: head  Pain Addressed 1: Pre-medicate for activity, Reposition, Distraction, Cessation of Activity, Nurse notified  Pain Rating Post-Intervention 1: 8/10    Objective:  Patient found supine with       AM-PAC 6 CLICK MOBILITY  Total Score:     Bed Mobility:  Sit>Supine: Mod A for LLE mvmt  Supine>Sit: Max A for LLE and trunk support HOB elevated, bed rail for support  Scooting: to EOB SBA, to HOB Max A HOB flat    Transfers:  Sit<>Stand: t/f bed RW CGA      Gait:  Amb 40' RW close SBA slow frankie FFP vcs for upright posture and forward gaze c/o increased pain and nausea following activity     Therex:  Pt refused on this date due to increased pain and nausea    Balance:  EOB SBA, static stand RW CG/SBA  No LOB noted throughout session    Additional Treatment:  Patient educated on importance of increased time out of bed and PITER throughout the day  discussed/educated on home environment safety, DME needs and HHPT pt verbalized understanding all questions answered within PTA scope of practice and all other questions directed to appropriate persons    Patient left HOB elevated with call button in reach.    Assessment:  Fiona Arteaga is a 88 y.o. female with a medical diagnosis of SDH (subdural hematoma).  Patient tolerated treatment fairly well focusing on bed " mobility, transfers and gait. Patient will continue to improve with skilled physical therapy services in order to return to functional baseline.    Rehab identified problem list/impairments: weakness, impaired endurance, impaired self care skills, gait instability, impaired functional mobilty, impaired balance, decreased lower extremity function, pain    Rehab potential is good.    Activity tolerance: Good    Discharge recommendations: home with home health(w/ intermittent assistance recomended for safety)     Barriers to discharge: None    Equipment recommendations: bedside commode, walker, rolling     GOALS:   Multidisciplinary Problems     Physical Therapy Goals        Problem: Physical Therapy Goal    Goal Priority Disciplines Outcome Goal Variances Interventions   Physical Therapy Goal     PT, PT/OT Ongoing, Progressing     Description: Goals to be met by: 20     Patient will increase functional independence with mobility by performin. Supine to sit with Set-up [place]  2. Sit to supine with Set-up [place]  3. Rolling to Left and Right with Set-up Assistance.  4. Sit to stand transfer with Supervision  5. Bed to chair transfer with Supervision using Rolling Walker  6. Gait  x 100 feet with Supervision using Rolling Walker.   7. Ascend/Descend 4 inch curb step with Stand-by Assistance using Rolling Walker.  8. Stand for 3 minutes with Stand-by Assistance using Rolling Walker while performing an activity  9. Pt will complete a car transfer (Actual or Simulated) w/ RW and SBA  10. Pt will use a reacher to  5 objects from the floor in a standing position w/ a RW and SPV                     PLAN:    Patient to be seen 5 x/week  to address the above listed problems via gait training, therapeutic activities, therapeutic exercises  Plan of Care expires: 10/16/20  Plan of Care reviewed with: patient    Aurora Cabrera, PTA  2020

## 2020-09-17 NOTE — CLINICAL REVIEW
Clinical Pharmacy Chart Review Note      Admit Date: 9/15/2020   LOS: 2 days       Fiona Arteaga is a 88 y.o. female admitted to SNF for PT/OT after hospitalization for SDH (subdural hematoma).    Active Hospital Problems    Diagnosis  POA    *SDH (subdural hematoma) [S06.5X9A]  Yes    Anemia of chronic disease [D63.8]  Yes    Impaired mobility and endurance [Z74.09]  Yes    Fall [W19.XXXA]  Yes    Bradycardia [R00.1]  Yes    Longstanding persistent atrial fibrillation [I48.11]  Yes    Idiopathic thrombocytopenic purpura (ITP) [D69.3]  Yes    (HFpEF) heart failure with preserved ejection fraction [I50.30]  Yes    Benign essential hypertension [I10]  Yes      Resolved Hospital Problems   No resolved problems to display.     Review of patient's allergies indicates:  No Known Allergies  Patient Active Problem List    Diagnosis Date Noted    Anemia of chronic disease 09/16/2020    Impaired mobility and endurance 09/16/2020    Fall 09/16/2020    Bradycardia     SDH (subdural hematoma) 09/12/2020    Hyperkalemia 09/12/2020    CESARIO (acute kidney injury) 09/12/2020    Longstanding persistent atrial fibrillation 09/12/2020    Constipation 02/28/2020    Metabolic alkalosis 02/27/2020    Acute cystitis 02/27/2020    Idiopathic thrombocytopenic purpura (ITP) 02/26/2020    Heart failure, chronic, with acute decompensation 02/24/2020    (HFpEF) heart failure with preserved ejection fraction 02/24/2020    MR (mitral regurgitation) 02/23/2020    Thrombocytopenia 02/22/2020    Atrial fibrillation with rapid ventricular response 02/22/2020    Benign essential hypertension 02/22/2020    Acute respiratory failure with hypoxia 02/22/2020       Scheduled Meds:    amLODIPine  2.5 mg Oral Daily    furosemide  40 mg Oral Daily    heparin (porcine)  5,000 Units Subcutaneous Q8H    levETIRAcetam  500 mg Oral BID    lisinopriL  40 mg Oral Daily    magnesium oxide  400 mg Oral BID    pantoprazole  40 mg  Oral Daily    senna-docusate 8.6-50 mg  1 tablet Oral BID     Continuous Infusions:   PRN Meds: acetaminophen, calcium carbonate, hydrALAZINE, influenza, melatonin, ondansetron, pneumoc 13-key conj-dip cr(PF)    OBJECTIVE:     Vital Signs (Last 24H)  Temp:  [98.2 °F (36.8 °C)]   Pulse:  []   Resp:  [18]   BP: (123-131)/(60-62)   SpO2:  [93 %-96 %]     Laboratory:  CBC:   Recent Labs   Lab 09/15/20  0424 09/16/20  0530 09/17/20  0506   WBC 7.59 7.29 6.35   RBC 3.05* 3.17* 3.06*   HGB 9.4* 9.9* 9.5*   HCT 31.0* 31.5* 30.8*    240 231   * 99* 101*   MCH 30.8 31.2* 31.0   MCHC 30.3* 31.4* 30.8*     BMP:   Recent Labs   Lab 09/15/20  0424 09/16/20  0530 09/17/20  0506   GLU 96 100 91    138 138   K 4.8 5.1 4.3    101 98   CO2 30* 30* 30*   BUN 24* 25* 29*   CREATININE 0.8 0.9 1.0   CALCIUM 8.6* 9.0 8.7   MG 1.8 1.7 1.8     CMP:   Recent Labs   Lab 09/14/20  0254 09/15/20  0424 09/16/20  0530 09/17/20  0506   * 96 100 91   CALCIUM 8.7 8.6* 9.0 8.7   ALBUMIN 2.9* 2.5* 2.9*  --    PROT 6.0 5.6* 6.2  --     137 138 138   K 5.4* 4.8 5.1 4.3   CO2 28 30* 30* 30*    102 101 98   BUN 24* 24* 25* 29*   CREATININE 0.8 0.8 0.9 1.0   ALKPHOS 45* 41* 46*  --    ALT 9* 7* 7*  --    AST 12 11 13  --    BILITOT 0.4 0.5 0.5  --      LFTs:   Recent Labs   Lab 09/14/20 0254 09/15/20  0424 09/16/20  0530   ALT 9* 7* 7*   AST 12 11 13   ALKPHOS 45* 41* 46*   BILITOT 0.4 0.5 0.5   PROT 6.0 5.6* 6.2   ALBUMIN 2.9* 2.5* 2.9*     Coagulation:   Recent Labs   Lab 09/12/20  0241 09/13/20  0102   INR 1.0  --    APTT 26.3 25.3     Others:   Recent Labs   Lab 09/12/20  0514   TSH 0.329*       ASSESSMENT/PLAN:     I have reviewed the medications in compliance with CMS Regulation F756 of the [place]. Based on information gathered, the following items may need to be addressed:    **According to PMH and home medication list, patient takes the following medications at home. These medications are not  currently ordered at [facility]:  · Apixaban 2.5 mg twice daily (on hold until f/u with NSGY)  · Metoprolol  Succinate 50 mg twice daily (held at [facility] for bradycardia, current HR )    Medications reviewed by PharmD, please re-consult if needed.      Tasha Casas, Pharm. D.  Clinical Pharmacist  [facility]

## 2020-09-17 NOTE — PROGRESS NOTES
[facility]                                  Skilled Nursing Facility                   Progress Note     Admit Date: 9/15/2020  KYLE TBD   Principal Problem:  SDH (subdural hematoma)   HPI obtained from patient interview and chart review     Chief Complaint: Establish Care/ Initial Visit     HPI:   Ms. Arteaga is an 88 year old female PMHx of HTN and chronic atrial fibrillation on apixaban who presents to SNF following hospitalization for subdural hematoma.  Admission to SNF for secondary weakness and debility.    Patient originally  presented to [facility] ED on 09/12 for evaluation after a fall.  CT of the head revealed subdural hematoma. She was evaluated by NSGY and did not require evacuation. She received KCentra and vitamin K and was monitored in the hospital. She did not have any significant increase in the SDH on serial CT head. She was started on a 7 day course of Keppra as seizure prophylaxis. She was evaluated by therapy and recommended for SNF placement prior to returning home.      Today, patient reports intermittent dizziness and constipation.  Her last BM was on 09/14.  She has active bowel sounds throughout endorses passing flatus.    Patient will be treated at [facility] with PT and OT to improve functional status and ability to perform ADLs.     Past Medical History: Patient has a past medical history of A-fib, Anticoagulant long-term use, Cancer, and Hypertension.    Past Surgical History: Patient has no past surgical history on file.    Social History: Patient reports that she has never smoked. She does not have any smokeless tobacco history on file. She reports that she does not drink alcohol or use drugs.    Family History:  No significant history to report    Allergies: Patient has No Known Allergies.    ROS  Constitutional: Negative for fever   Eyes: Negative for blurred vision, double vision   Respiratory: Negative for  cough, shortness of breath   Cardiovascular: Negative for chest pain, palpitations, and leg swelling.   Gastrointestinal: Negative for abdominal pain,, diarrhea, nausea, vomiting.  +constipation  Genitourinary: Negative for dysuria, frequency   Musculoskeletal:  + generalized weakness. Negative for back pain and myalgias.   Skin: Negative for itching and rash.   Neurological:  + for dizziness, headaches.   Psychiatric/Behavioral: Negative for depression. The patient is not nervous/anxious.      24 hour Vital Sign Range   Temp:  [98.2 °F (36.8 °C)]   Pulse:  []   Resp:  [18]   BP: (123-131)/(60-62)   SpO2:  [93 %-96 %]     PEx  Constitutional: Patient appears debilitated.  No distress noted  HENT:   Head: Normocephalic and atraumatic.   Eyes: Pupils are equal, round  Neck: Normal range of motion. Neck supple.   Cardiovascular: Normal rate, irregular rhythm +murmur    Pulmonary/Chest: Effort normal and breath sounds are clear  Abdominal: Soft. Bowel sounds are normal.   Musculoskeletal: Normal range of motion.   Neurological: Alert and oriented to person, place, and time.   Psychiatric: Normal mood and affect. Behavior is normal.   Skin: Skin is warm and dry. Full skin assessment completed.  Ecchymosis to left eye.  No skin breakdown noted    Recent Labs   Lab 09/17/20  0506      K 4.3   CL 98   CO2 30*   BUN 29*   CREATININE 1.0   MG 1.8       Recent Labs   Lab 09/17/20  0506   WBC 6.35   RBC 3.06*   HGB 9.5*   HCT 30.8*      *   MCH 31.0   MCHC 30.8*         Assessment and Plan:    Traumatic subdural hematoma  Fall  - Follow up with NSGY after discharge with repeat CT head.   - Holding apixaban until follow up.   - Fall precautions.   - Continue Keppra 500 mg BID until 9/19/20.      Long standing persistent atrial fibrillation  Bradycardia  - Had bradycardia during hospital stay so metoprolol succinate 50 mg BID on hold.   - Holding apixaban for now until follow up with NSGY.   - Consider  resumption of metoprolol succinate.      Essential hypertension  Heart failure with preserved ejection fraction  - Continue amlodipine 2.5 mg daily, lisinopril 40 mg daily, and furosemide 40 mg daily.   - Holding home metoprolol succinate.   - Monitor blood pressure.      Anemia of chronic disease  - Hgb is stable.   - Continue to monitor.      ITP  - - Platelet count is WNL.   Continue to monitor.      GERD  - Continue daily PPI.      Constipation  - initiated MiraLax daily, Continue pericolace BID     Debility   - Continue with PT/OT for gait training and strengthening and restoration of ADL's   - Encourage mobility, OOB in chair, and early ambulation as appropriate  - Fall precautions   - Monitor for bowel and bladder dysfunction  - Monitor for and prevent skin breakdown and pressure ulcers  - Continue DVT prophylaxis with  SCDs and frequent ambulation     Goals of Care:   Restorative  Optimize nutrition  Wound healing  Muscle strengthening  Restoration of ADL's  Improve mobility        Anticipated Disposition:  Home with home health; patient lives alone       Future Appointments   Date Time Provider Department Center   9/29/2020 12:15 PM [facility] OIC-CT1 500 LB LIMIT [facility]   9/29/2020  1:30 PM Dileep Arredondo MD [facility] NEUROS7 [place] I certify that SNF services are required to be given on an inpatient basis because Fiona Arteaga needs for skilled nursing care and/or skilled rehabilitation are required on a daily basis and such services can only practically be provided in a skilled nursing facility setting and are for an ongoing condition for which she received inpatient care in the hospital.     Total time of the visit 68 minutes [id number]  Non physical exam/ non charting time: 48 minutes   Description of non physical exam/non charting time: counseling patient on clinical conditions and therapies provided regarding pain control regimen, antihypertensive regimen common importance  of a bowel regimen, importance of follow-up appointments and the beginning of discharge planning.  Extensive chart review completed including all consultation notes.  All pertinent laboratory and radiographical images reviewed.        Shoshana Gann NP  Department of Hospital Medicine   [facility]     DOS: 9/17/2020       Patient note was created using MModal Dictation.  Any errors in syntax or even information may not have been identified and edited on initial review prior to signing this note.

## 2020-09-17 NOTE — PT/OT/SLP PROGRESS
"Occupational Therapy  Treatment    Fiona Arteaga   MRN: [id number]   Admitting Diagnosis: SDH (subdural hematoma)    OT Date of Treatment: 09/17/20     Billable Minutes:  Self Care/Home Management 33 and Therapeutic Activity 21    General Precautions: Standard, aspiration, fall  Orthopedic Precautions: N/A  Braces: N/A    Spiritual, Cultural Beliefs, Advent Practices, Values that Affect Care: no    Subjective:  Communicated with nursing prior to session.  "My head still hurts."    Pain/Comfort  Pain Rating 1: 7/10  Location - Side 1: Left  Location 1: head(and LLE)  Pain Addressed 1: Cessation of Activity    Objective:  Patient found with: (supine in bed)    Occupational Performance:    Bed Mobility:    · Patient completed Rolling/Turning to Left with  supervision  · Patient completed Scooting/Bridging with supervision  · Patient completed Supine to Sit with minimum assistance to lower LLE to floor     Functional Mobility/Transfers:  · Patient completed Sit <> Stand Transfer with supervision  with  rolling walker   · Patient completed Bed <> Chair Transfer using Stand Pivot technique with contact guard assistance with rolling walker  · Functional Mobility:Pt ambulated 3 ft from bed to W/C with CGA and RW    Activities of Daily Living:  · Grooming: set up assistance  · Bathing: CGA when standing to wash sebas area. Stand by assistance provided with V/Cs when washing below knees  · Upper Body Dressing: supervision to toby gown from W/C  · Lower Body Dressing: contact guard assistance when standing to pull pants over hips. Pt required no assist to toby / doff socks     Additional Treatment:  Pt performed spatial relations board to observe Pt's spatial awareness. During SLP evaluation, pt bob clock with numbers only on R side.    Patient left up in chair with call button in reach and nusing notified    [facility] 6 Click:  AMPA Total Score: 21    ASSESSMENT:  Fiona Arteaga is a 88 y.o. female with a medical " diagnosis of SDH (subdural hematoma). Pt presents with headache and pain in LLE but is motivated to participate in treatment in order to go home quicker.   Pt would benefit from OT intervention to decrease fall risk, to reduce caregiver burden of care, and improve quality of life and functional performance of functional activities.    Rehab identified problem list/impairments: impaired endurance, impaired sensation, impaired self care skills, impaired functional mobilty, pain, visual deficits, decreased lower extremity function    Rehab potential is good    Activity tolerance: Good    Discharge recommendations: home with home health     Barriers to discharge: None     Equipment recommendations: walker, rolling     GOALS:   Multidisciplinary Problems     Occupational Therapy Goals        Problem: Occupational Therapy Goal    Goal Priority Disciplines Outcome Interventions   Occupational Therapy Goal     OT, PT/OT Ongoing, Progressing    Description: Goals to be met by: 9/30/2020    Patient will increase functional independence with ADLs by performing:    UE Dressing with Supervision.  LE Dressing with Supervision.  Grooming while standing at sink with Set-up Assistance.  Toileting from toilet with Supervision for hygiene and clothing management.   Bathing from  sitting at sink with Supervision.  Rolling to Bilateral with Supervision.   Supine to sit with Supervision.  Stand pivot transfers with Supervision.  Toilet transfer to toilet with Supervision.                     Plan:  Patient to be seen 5 x/week to address the above listed problems via self-care/home management  Plan of Care expires: 10/16/20  Plan of Care reviewed with: patient    Sae Monroykhalida, SOANASTASIA  09/17/2020     I certify that I was present in the room directing the student in service delivery and guiding them using my skilled judgment.  As the co-signing therapist I have reviewed the student's documentation and am responsible for the treatment,  assessment, and plan.    Sae Ortiz, SOT  9/17/2020

## 2020-09-17 NOTE — PLAN OF CARE
Problem: Occupational Therapy Goal  Goal: Occupational Therapy Goal  Description: Goals to be met by: 9/30/2020    Patient will increase functional independence with ADLs by performing:    UE Dressing with Supervision.  LE Dressing with Supervision.  Grooming while standing at sink with Set-up Assistance.  Toileting from toilet with Supervision for hygiene and clothing management.   Bathing from  sitting at sink with Supervision.  Rolling to Bilateral with Supervision.   Supine to sit with Supervision.  Stand pivot transfers with Supervision.  Toilet transfer to toilet with Supervision.      Outcome: Ongoing, Progressing

## 2020-09-18 PROCEDURE — 11000004 HC SNF PRIVATE

## 2020-09-18 PROCEDURE — 25000003 PHARM REV CODE 250: Performed by: NURSE PRACTITIONER

## 2020-09-18 PROCEDURE — 25000003 PHARM REV CODE 250: Performed by: PHYSICIAN ASSISTANT

## 2020-09-18 PROCEDURE — 63600175 PHARM REV CODE 636 W HCPCS: Performed by: PHYSICIAN ASSISTANT

## 2020-09-18 PROCEDURE — 94761 N-INVAS EAR/PLS OXIMETRY MLT: CPT

## 2020-09-18 PROCEDURE — 97530 THERAPEUTIC ACTIVITIES: CPT | Mod: CQ

## 2020-09-18 PROCEDURE — 97129 THER IVNTJ 1ST 15 MIN: CPT

## 2020-09-18 PROCEDURE — 27000221 HC OXYGEN, UP TO 24 HOURS

## 2020-09-18 PROCEDURE — 97535 SELF CARE MNGMENT TRAINING: CPT

## 2020-09-18 PROCEDURE — 97116 GAIT TRAINING THERAPY: CPT | Mod: CQ

## 2020-09-18 RX ADMIN — PANTOPRAZOLE SODIUM 40 MG: 40 TABLET, DELAYED RELEASE ORAL at 09:09

## 2020-09-18 RX ADMIN — POLYETHYLENE GLYCOL 3350 17 G: 17 POWDER, FOR SOLUTION ORAL at 09:09

## 2020-09-18 RX ADMIN — TRAMADOL HYDROCHLORIDE 25 MG: 50 TABLET, FILM COATED ORAL at 03:09

## 2020-09-18 RX ADMIN — MELATONIN TAB 3 MG 6 MG: 3 TAB at 09:09

## 2020-09-18 RX ADMIN — AMLODIPINE BESYLATE 2.5 MG: 2.5 TABLET ORAL at 09:09

## 2020-09-18 RX ADMIN — LISINOPRIL 40 MG: 20 TABLET ORAL at 09:09

## 2020-09-18 RX ADMIN — HEPARIN SODIUM 5000 UNITS: 5000 INJECTION INTRAVENOUS; SUBCUTANEOUS at 09:09

## 2020-09-18 RX ADMIN — DOCUSATE SODIUM 50MG AND SENNOSIDES 8.6MG 1 TABLET: 8.6; 5 TABLET, FILM COATED ORAL at 09:09

## 2020-09-18 RX ADMIN — Medication 400 MG: at 09:09

## 2020-09-18 RX ADMIN — LEVETIRACETAM 500 MG: 500 TABLET ORAL at 09:09

## 2020-09-18 RX ADMIN — FUROSEMIDE 40 MG: 40 TABLET ORAL at 09:09

## 2020-09-18 RX ADMIN — HEPARIN SODIUM 5000 UNITS: 5000 INJECTION INTRAVENOUS; SUBCUTANEOUS at 03:09

## 2020-09-18 RX ADMIN — ACETAMINOPHEN 650 MG: 325 TABLET ORAL at 09:09

## 2020-09-18 RX ADMIN — ACETAMINOPHEN 650 MG: 325 TABLET ORAL at 03:09

## 2020-09-18 RX ADMIN — HEPARIN SODIUM 5000 UNITS: 5000 INJECTION INTRAVENOUS; SUBCUTANEOUS at 05:09

## 2020-09-18 RX ADMIN — ACETAMINOPHEN 650 MG: 325 TABLET ORAL at 02:09

## 2020-09-18 RX ADMIN — TRAMADOL HYDROCHLORIDE 25 MG: 50 TABLET, FILM COATED ORAL at 09:09

## 2020-09-18 NOTE — PT/OT/SLP PROGRESS
Occupational Therapy  Treatment    Fiona Arteaga   MRN: [id number]   Admitting Diagnosis: SDH (subdural hematoma)    OT Date of Treatment: 09/18/20       Billable Minutes:  Self Care/Home Management 23    General Precautions: Standard, aspiration, fall  Orthopedic Precautions: N/A  Braces: N/A    Spiritual, Cultural Beliefs, Jew Practices, Values that Affect Care: no    Subjective:  Communicated with nurse during  session.  Pt. Reported that her head still hurt as well as her left leg    Pain/Comfort  Pain Rating 1: 8/10  Location - Side 1: Left  Location 1: head  Pain Rating Post-Intervention 1: 8/10    Objective:  Patient found with: (supine in bed)    Occupational Performance:    Bed Mobility:    · Patient completed Supine to Sit with minimum assistance     Functional Mobility/Transfers:  · Patient completed Sit <> Stand Transfer with contact guard assistance  with  rolling walker   · Patient completed Bed <> Chair Transfer using Stand Pivot technique with contact guard assistance with rolling walker  · Functional Mobility: Not tested    Activities of Daily Living:  · Feeding:  set up A seated for breakfast  · Grooming: supervision seated in w/c to wash face and brush teeth  · Bathing: contact guard assistance when standing to wash sebas region at sink  · Upper Body Dressing: supervision seated to don gown forward    AMPA 6 Click:  AMPA Total Score: 21    OT Exercises: pt. declined    Additional Treatment:  Pt. Educated on safety with ADL task perforrmance and mobility    Patient left up in chair with call button in reach eating breakfast with nurse present    ASSESSMENT:  Fiona Arteaga is a 88 y.o. female with a medical diagnosis of SDH (subdural hematoma) and presents with deficits in functional mobility, self-care skills and endurance. Pt. Noted to have pain in hip on this date. Overall, tolerated session well but did not complete there ex on this date 2/2 fatigue. .    Rehab identified  problem list/impairments: impaired endurance, impaired sensation, impaired self care skills, impaired functional mobilty, pain, visual deficits, decreased lower extremity function    Rehab potential is good    Activity tolerance: Good    Discharge recommendations: home with home health     Barriers to discharge: None     Equipment recommendations: walker, rolling     GOALS:   Multidisciplinary Problems     Occupational Therapy Goals        Problem: Occupational Therapy Goal    Goal Priority Disciplines Outcome Interventions   Occupational Therapy Goal     OT, PT/OT Ongoing, Progressing    Description: Goals to be met by: 9/30/2020    Patient will increase functional independence with ADLs by performing:    UE Dressing with Supervision.  LE Dressing with Supervision.  Grooming while standing at sink with Set-up Assistance.  Toileting from toilet with Supervision for hygiene and clothing management.   Bathing from  sitting at sink with Supervision.  Rolling to Bilateral with Supervision.   Supine to sit with Supervision.  Stand pivot transfers with Supervision.  Toilet transfer to toilet with Supervision.                       Plan:  Patient to be seen 5 x/week to address the above listed problems via self-care/home management  Plan of Care expires: 10/16/20  Plan of Care reviewed with: patient    ANITRA Zmimer  09/18/2020

## 2020-09-18 NOTE — PT/OT/SLP PROGRESS
"Speech Language Pathology  Treatment/Discharge    Fiona Arteaga   MRN: [id number]   Admitting Diagnosis: SDH (subdural hematoma)    Diet recommendations: Solid Diet Level: Regular  Liquid Diet Level: Thin Monitor for s/s of aspiration and Standard aspiration precautions    SLP Treatment Date: 09/18/20  Speech Start Time: 1348     Speech Stop Time: 1401     Speech Total (min): 13 min       TREATMENT BILLABLE MINUTES:  Speech Therapy Individual 13    Has the patient been evaluated by SLP for swallowing? : Yes  Keep patient NPO?: No   General Precautions: Standard, aspiration, fall  Current Respiratory Status: nasal cannula       Subjective:  "They said it's going to take a while to go away." pt referring to headache. Pt reporting 9/10 pain from headache and hip pain. Nurse notified.      Objective:      Pt seen at bedside for ongoing assessment of memory, reading, and visual spatial abilities.  Pt not wearing glasses as she reports that bifocal glasses are now bent and causing visual stimuli (I.e., letters and words) to run together. Pt was able to read sign on wall across from bed with improved ability without glasses. She also read a paragraph without her glasses with improved ability compared to reading assessment yesterday.  Pt states she is not able to read her novel because of her visual difficulties with broken bifocals. Pt also reporting blurriness in left eye after fall, suspecting changes in lenses after fall.  Pt states plan to follow up with eye doctor upon discharge.  In addition, reading comprehension and recall of functional information from paragraph was good.  Pt completed another clock drawing task to further assess visual spatial abilities.  Placement of numbers improved, though pt's new visual difficulties continue to impact performance.  Skilled SLP services do not appear warranted at this time as cognitive-linguistic abilities appear WFL and at baseline.  Visual impairments appear to be " ocular in nature and also related to broken bifocals. Pt in agreement with plan to discharge from SLP services at this time, but agrees to report any noted changes in cognition.     Assessment:  Fiona Arteaga is a 88 y.o. female with a medical diagnosis of SDH (subdural hematoma) and presents with functional cognitive and linguistic abilities.  Reported changes in vision appear to be ocular in nature and also related to broken bifocal lenses.  Recommend pt follow up with eye doctor.     Discharge recommendations: Discharge Facility/Level of Care Needs: (no further skilled SLP services warranted at this time)     Goals:   Multidisciplinary Problems     SLP Goals     Not on file          Multidisciplinary Problems (Resolved)        Problem: SLP Goal    Goal Priority Disciplines Outcome   SLP Goal   (Resolved)     SLP Met   Description: Speech Language Pathology Goals  Goals expected to be met by 9/23:   1. Pt will recall 3/3 novel items after a 2-3 minute delay given modified independence.   2. Pt will list an average of 12 items in a category in one minute.   3. Pt will complete paragraph level reading tasks with 70% accuracy given mod cues.   4. Pt will complete visual spatial tasks with 70% accuracy given min-mod cues.   5. Pt will participate in evaluation of math/time/money management abilities.                             Plan:   SLP Follow-up?: No  SLP - Next Visit Date: 09/18/20           RABIA Barajas, YOLI-SLP  09/18/2020     RABIA Barajas, CCC-SLP  Speech Language Pathologist  [phone]  9/18/2020

## 2020-09-18 NOTE — PLAN OF CARE
Problem: SLP Goal  Goal: SLP Goal  Description: Speech Language Pathology Goals  Goals expected to be met by 9/23:   1. Pt will recall 3/3 novel items after a 2-3 minute delay given modified independence.   2. Pt will list an average of 12 items in a category in one minute.   3. Pt will complete paragraph level reading tasks with 70% accuracy given mod cues.   4. Pt will complete visual spatial tasks with 70% accuracy given min-mod cues.   5. Pt will participate in evaluation of math/time/money management abilities.           Outcome: Met  Visual spatial deficits suspected appear to be related to bifocals having been damaged during fall at home.  Pt also suspects damage to lenses from cataracts during fall. Plans to follow up with eye doctor upon discharge.  Cognitive-linguistic abilities appear to be WFL and at baseline.  No further skilled SLP services warranted at this time.   RABIA Barajas, CCC-SLP  Speech Language Pathologist  [phone]  9/18/2020

## 2020-09-18 NOTE — PLAN OF CARE
Problem: Adult Inpatient Plan of Care  Goal: Plan of Care Review  Outcome: Ongoing, Progressing  Goal: Patient-Specific Goal (Individualization)  Outcome: Ongoing, Progressing  Goal: Absence of Hospital-Acquired Illness or Injury  Outcome: Ongoing, Progressing  Goal: Optimal Comfort and Wellbeing  Outcome: Ongoing, Progressing  Goal: Readiness for Transition of Care  Outcome: Ongoing, Progressing  Goal: Rounds/Family Conference  Outcome: Ongoing, Progressing     Problem: Oral Intake Inadequate (Acute Kidney Injury/Impairment)  Goal: Optimal Nutrition Intake  Outcome: Ongoing, Progressing     Problem: Wound  Goal: Optimal Wound Healing  Outcome: Ongoing, Progressing     Problem: Fall Injury Risk  Goal: Absence of Fall and Fall-Related Injury  Outcome: Ongoing, Progressing     Problem: Skin Injury Risk Increased  Goal: Skin Health and Integrity  Outcome: Ongoing, Progressing     Problem: Urinary Elimination Impaired (Stroke, Hemorrhagic)  Goal: Effective Urinary Elimination  Outcome: Ongoing, Progressing

## 2020-09-19 PROCEDURE — 25000003 PHARM REV CODE 250: Performed by: NURSE PRACTITIONER

## 2020-09-19 PROCEDURE — 11000004 HC SNF PRIVATE

## 2020-09-19 PROCEDURE — 63600175 PHARM REV CODE 636 W HCPCS: Performed by: PHYSICIAN ASSISTANT

## 2020-09-19 PROCEDURE — 97535 SELF CARE MNGMENT TRAINING: CPT

## 2020-09-19 PROCEDURE — 97110 THERAPEUTIC EXERCISES: CPT

## 2020-09-19 PROCEDURE — 25000003 PHARM REV CODE 250: Performed by: PHYSICIAN ASSISTANT

## 2020-09-19 RX ORDER — GUAIFENESIN 100 MG/5ML
200 SOLUTION ORAL EVERY 4 HOURS PRN
Status: DISCONTINUED | OUTPATIENT
Start: 2020-09-19 | End: 2020-09-25 | Stop reason: HOSPADM

## 2020-09-19 RX ADMIN — LEVETIRACETAM 500 MG: 500 TABLET ORAL at 09:09

## 2020-09-19 RX ADMIN — POLYETHYLENE GLYCOL 3350 17 G: 17 POWDER, FOR SOLUTION ORAL at 09:09

## 2020-09-19 RX ADMIN — HEPARIN SODIUM 5000 UNITS: 5000 INJECTION INTRAVENOUS; SUBCUTANEOUS at 02:09

## 2020-09-19 RX ADMIN — DOCUSATE SODIUM 50MG AND SENNOSIDES 8.6MG 1 TABLET: 8.6; 5 TABLET, FILM COATED ORAL at 09:09

## 2020-09-19 RX ADMIN — FUROSEMIDE 40 MG: 40 TABLET ORAL at 09:09

## 2020-09-19 RX ADMIN — TRAMADOL HYDROCHLORIDE 25 MG: 50 TABLET, FILM COATED ORAL at 09:09

## 2020-09-19 RX ADMIN — TRAMADOL HYDROCHLORIDE 25 MG: 50 TABLET, FILM COATED ORAL at 03:09

## 2020-09-19 RX ADMIN — TRAMADOL HYDROCHLORIDE 25 MG: 50 TABLET, FILM COATED ORAL at 06:09

## 2020-09-19 RX ADMIN — ACETAMINOPHEN 650 MG: 325 TABLET ORAL at 09:09

## 2020-09-19 RX ADMIN — HEPARIN SODIUM 5000 UNITS: 5000 INJECTION INTRAVENOUS; SUBCUTANEOUS at 09:09

## 2020-09-19 RX ADMIN — MELATONIN TAB 3 MG 6 MG: 3 TAB at 09:09

## 2020-09-19 RX ADMIN — HEPARIN SODIUM 5000 UNITS: 5000 INJECTION INTRAVENOUS; SUBCUTANEOUS at 05:09

## 2020-09-19 RX ADMIN — PANTOPRAZOLE SODIUM 40 MG: 40 TABLET, DELAYED RELEASE ORAL at 09:09

## 2020-09-19 RX ADMIN — AMLODIPINE BESYLATE 2.5 MG: 2.5 TABLET ORAL at 09:09

## 2020-09-19 RX ADMIN — LISINOPRIL 40 MG: 20 TABLET ORAL at 09:09

## 2020-09-19 NOTE — PT/OT/SLP PROGRESS
"Occupational Therapy  Treatment    Fiona Arteaga   MRN: [id number]   Admitting Diagnosis: SDH (subdural hematoma)    OT Date of Treatment: 09/19/20       Billable Minutes:  Self Care/Home Management 39 and Therapeutic Exercise 8    General Precautions: Standard, aspiration, fall  Orthopedic Precautions: N/A  Braces: N/A    Spiritual, Cultural Beliefs, Buddhist Practices, Values that Affect Care: no    Subjective:  Communicated with nursing prior to session.  Pt was awake and ready for treatment. "I'm already stronger than I was when I got here."    Pain/Comfort  Pain Rating 1: 7/10  Location - Side 1: Left  Location - Orientation 1: generalized  Location 1: head  Pain Addressed 1: Cessation of Activity, Nurse notified  Pain Rating Post-Intervention 1: 6/10    Objective:  Patient found with: oxygen(supine in bed)    Occupational Performance:    Bed Mobility:    · Patient completed Rolling/Turning to Left with  supervision  · Patient completed Scooting/Bridging with supervision  · Patient completed Supine to Sit with supervision     Functional Mobility/Transfers:  · Patient completed Sit <> Stand Transfer with contact guard assistance  with  rolling walker   · Patient completed Bed <> Chair Transfer using Stand Pivot technique with contact guard assistance with rolling walker  · Functional Mobility: Pt ambulated 3 ft from bed to W/C and 6 ft from W/C to bedside chair all with CGA and RW    Activities of Daily Living:  · Grooming: set up assistance from sinkside  · Bathing: Min (A) with (A) to wash feet and CGA when standing  to wash rear and sebas area  · Upper Body Dressing: supervision to toby gown from W/C  · Lower Body Dressing: contact guard assistance when standing from W/C to pull pants over hips    AMPAC 6 Click:  AMPAC Total Score: 21    OT Exercises: UE Ergometer 8 min on mod resistance UE exercises performed to increase functional endurance and strength in order increase independence when performing " self care tasks, functional ambulation, W/C propulsion , functional standing activities as well as when performing functional tasks.    Additional Treatment:  Pt edu on role of OT, POC, safety when performing self care tasks , benefit of performing OOB activity, and safety when performing functional transfers and mobility.  - Self care tasks completed-- as noted above    Patient left up in chair with all lines intact and call button in reach    ASSESSMENT:  Fiona Arteaga is a 88 y.o. female with a medical diagnosis of SDH (subdural hematoma). Pt presents with headache and pain in LLE but is motivated to participate in treatment in order to go home quicker.   Pt would benefit from OT intervention to decrease fall risk, to reduce caregiver burden of care, and improve quality of life and functional performance of functional activities.    Rehab identified problem list/impairments: impaired endurance, impaired sensation, impaired self care skills, impaired functional mobilty, pain, visual deficits, decreased lower extremity function    Rehab potential is good    Activity tolerance: Good    Discharge recommendations: home with home health     Barriers to discharge: None     Equipment recommendations: walker, rolling     GOALS:   Multidisciplinary Problems     Occupational Therapy Goals        Problem: Occupational Therapy Goal    Goal Priority Disciplines Outcome Interventions   Occupational Therapy Goal     OT, PT/OT Ongoing, Progressing    Description: Goals to be met by: 9/30/2020    Patient will increase functional independence with ADLs by performing:    UE Dressing with Supervision.  LE Dressing with Supervision.  Grooming while standing at sink with Set-up Assistance.  Toileting from toilet with Supervision for hygiene and clothing management.   Bathing from  sitting at sink with Supervision.  Rolling to Bilateral with Supervision.   Supine to sit with Supervision.  Stand pivot transfers with  Supervision.  Toilet transfer to toilet with Supervision.                       Plan:  Patient to be seen 5 x/week to address the above listed problems via self-care/home management  Plan of Care expires: 10/16/20  Plan of Care reviewed with: patient    Bubbadon Diana, CARLITOST  09/19/2020     I certify that I was present in the room directing the student in service delivery and guiding them using my skilled judgment.  As the co-signing therapist I have reviewed the student's documentation and am responsible for the treatment, assessment, and plan.    Sae Ortiz, CARLITOST  9/19/2020

## 2020-09-20 PROCEDURE — 97530 THERAPEUTIC ACTIVITIES: CPT | Mod: CQ

## 2020-09-20 PROCEDURE — 11000004 HC SNF PRIVATE

## 2020-09-20 PROCEDURE — 97110 THERAPEUTIC EXERCISES: CPT | Mod: CQ

## 2020-09-20 PROCEDURE — 25000003 PHARM REV CODE 250: Performed by: PHYSICIAN ASSISTANT

## 2020-09-20 PROCEDURE — 97116 GAIT TRAINING THERAPY: CPT | Mod: CQ

## 2020-09-20 PROCEDURE — 63600175 PHARM REV CODE 636 W HCPCS: Performed by: PHYSICIAN ASSISTANT

## 2020-09-20 PROCEDURE — 25000003 PHARM REV CODE 250: Performed by: NURSE PRACTITIONER

## 2020-09-20 RX ADMIN — HEPARIN SODIUM 5000 UNITS: 5000 INJECTION INTRAVENOUS; SUBCUTANEOUS at 06:09

## 2020-09-20 RX ADMIN — FUROSEMIDE 40 MG: 40 TABLET ORAL at 08:09

## 2020-09-20 RX ADMIN — TRAMADOL HYDROCHLORIDE 25 MG: 50 TABLET, FILM COATED ORAL at 08:09

## 2020-09-20 RX ADMIN — HEPARIN SODIUM 5000 UNITS: 5000 INJECTION INTRAVENOUS; SUBCUTANEOUS at 09:09

## 2020-09-20 RX ADMIN — MELATONIN TAB 3 MG 6 MG: 3 TAB at 08:09

## 2020-09-20 RX ADMIN — POLYETHYLENE GLYCOL 3350 17 G: 17 POWDER, FOR SOLUTION ORAL at 08:09

## 2020-09-20 RX ADMIN — PANTOPRAZOLE SODIUM 40 MG: 40 TABLET, DELAYED RELEASE ORAL at 08:09

## 2020-09-20 RX ADMIN — AMLODIPINE BESYLATE 2.5 MG: 2.5 TABLET ORAL at 08:09

## 2020-09-20 RX ADMIN — CALCIUM CARBONATE (ANTACID) CHEW TAB 500 MG 500 MG: 500 CHEW TAB at 08:09

## 2020-09-20 RX ADMIN — LISINOPRIL 40 MG: 20 TABLET ORAL at 08:09

## 2020-09-20 RX ADMIN — DOCUSATE SODIUM 50MG AND SENNOSIDES 8.6MG 1 TABLET: 8.6; 5 TABLET, FILM COATED ORAL at 08:09

## 2020-09-20 RX ADMIN — TRAMADOL HYDROCHLORIDE 25 MG: 50 TABLET, FILM COATED ORAL at 03:09

## 2020-09-20 RX ADMIN — ACETAMINOPHEN 650 MG: 325 TABLET ORAL at 06:09

## 2020-09-20 RX ADMIN — ACETAMINOPHEN 650 MG: 325 TABLET ORAL at 02:09

## 2020-09-20 RX ADMIN — HEPARIN SODIUM 5000 UNITS: 5000 INJECTION INTRAVENOUS; SUBCUTANEOUS at 02:09

## 2020-09-20 RX ADMIN — DOCUSATE SODIUM 1 ENEMA: 283 LIQUID RECTAL at 08:09

## 2020-09-20 NOTE — PT/OT/SLP PROGRESS
"Physical Therapy  Treatment    Fiona Arteaga   MRN: [id number]   Admitting Diagnosis: SDH (subdural hematoma)    PT Received On: 09/20/20        Billable Minutes:  Gait Training 10 and Therapeutic activity 13    Treatment Type: Treatment  PT/PTA: PTA     PTA Visit Number: 3       General Precautions: Standard, aspiration, fall, seizure  Orthopedic Precautions: N/A   Braces: N/A    Spiritual, Cultural Beliefs, Church Practices, Values that Affect Care: no    Subjective:  1st attempt "HA/nausea" nsg aware meds given, 2nd attempt pt wanting to go to the bathroom, "I'll try a little, still HA and nausea" nsg aware    Pain/Comfort  Pain Rating 1: 8/10  Location - Side 1: Left  Location - Orientation 1: generalized  Location 1: hip(HA )  Pain Addressed 1: Pre-medicate for activity, Reposition, Distraction, Cessation of Activity, Nurse notified  Pain Rating Post-Intervention 1: 8/10    Objective:   Patient found with: oxygen     AM-PAC 6 CLICK MOBILITY  Total Score:17    Bed Mobility:  Sit>Supine:min A with LLE  Supine>Sit: CGA with trunk    Transfers:  Sit<>Stand: with RW CG/SBA  Stand Pivot Transfer: with RW CGA/SBA    Gait:  Amb with RW CGA ~ 15 ft to bathroom then ~50 ft in room     Therex:limited tolerable ROM   Sat EOB 2x10 reps LAQ,hip flex    Balance:  EOB SBA/S    Additional Treatment:  toileting CGA with trfs with RW, CGA for sebas care in standing, total A to doff wet diaper and toby clean, set up for hand hygiene in sitting    Patient left supine with all lines intact, call button in reach and belongings in reach.    Assessment:  Fiona Arteaga is a 88 y.o. female with a medical diagnosis of SDH (subdural hematoma).  Pt tolerated fairly well, however limited session 2* to repts of HA and nausea, pt would continue to benefit from skilled PT services to improve overall functional mobility, strength and endurance.  .    Rehab identified problem list/impairments: weakness, impaired endurance, impaired " self care skills, gait instability, impaired functional mobilty, impaired balance, decreased lower extremity function, pain    Rehab potential is good.    Activity tolerance: Fair    Discharge recommendations: home with home health(w/ intermittent assistance recomended for safety)     Barriers to discharge: None    Equipment recommendations: bedside commode, walker, rolling     GOALS:   Multidisciplinary Problems     Physical Therapy Goals        Problem: Physical Therapy Goal    Goal Priority Disciplines Outcome Goal Variances Interventions   Physical Therapy Goal     PT, PT/OT Ongoing, Progressing     Description: Goals to be met by: 20     Patient will increase functional independence with mobility by performin. Supine to sit with Set-up [place]  2. Sit to supine with Set-up [place]  3. Rolling to Left and Right with Set-up Assistance.  4. Sit to stand transfer with Supervision  5. Bed to chair transfer with Supervision using Rolling Walker  6. Gait  x 100 feet with Supervision using Rolling Walker.   7. Ascend/Descend 4 inch curb step with Stand-by Assistance using Rolling Walker.  8. Stand for 3 minutes with Stand-by Assistance using Rolling Walker while performing an activity  9. Pt will complete a car transfer (Actual or Simulated) w/ RW and SBA  10. Pt will use a reacher to  5 objects from the floor in a standing position w/ a RW and SPV                     PLAN:    Patient to be seen 5 x/week  to address the above listed problems via gait training, therapeutic activities, therapeutic exercises  Plan of Care expires: 10/16/20  Plan of Care reviewed with: patient    Beata Rodgers, PTA  2020

## 2020-09-21 LAB
ANION GAP SERPL CALC-SCNC: 9 MMOL/L (ref 8–16)
BASOPHILS # BLD AUTO: 0.01 K/UL (ref 0–0.2)
BASOPHILS NFR BLD: 0.2 % (ref 0–1.9)
BUN SERPL-MCNC: 32 MG/DL (ref 8–23)
CALCIUM SERPL-MCNC: 9.2 MG/DL (ref 8.7–10.5)
CHLORIDE SERPL-SCNC: 97 MMOL/L (ref 95–110)
CO2 SERPL-SCNC: 30 MMOL/L (ref 23–29)
CREAT SERPL-MCNC: 1 MG/DL (ref 0.5–1.4)
DIFFERENTIAL METHOD: ABNORMAL
EOSINOPHIL # BLD AUTO: 0.2 K/UL (ref 0–0.5)
EOSINOPHIL NFR BLD: 2.9 % (ref 0–8)
ERYTHROCYTE [DISTWIDTH] IN BLOOD BY AUTOMATED COUNT: 12.3 % (ref 11.5–14.5)
EST. GFR  (AFRICAN AMERICAN): 58.1 ML/MIN/1.73 M^2
EST. GFR  (NON AFRICAN AMERICAN): 50.4 ML/MIN/1.73 M^2
GLUCOSE SERPL-MCNC: 95 MG/DL (ref 70–110)
HCT VFR BLD AUTO: 32.3 % (ref 37–48.5)
HGB BLD-MCNC: 9.9 G/DL (ref 12–16)
IMM GRANULOCYTES # BLD AUTO: 0.11 K/UL (ref 0–0.04)
IMM GRANULOCYTES NFR BLD AUTO: 1.7 % (ref 0–0.5)
LYMPHOCYTES # BLD AUTO: 1.8 K/UL (ref 1–4.8)
LYMPHOCYTES NFR BLD: 27.2 % (ref 18–48)
MAGNESIUM SERPL-MCNC: 2 MG/DL (ref 1.6–2.6)
MCH RBC QN AUTO: 30.9 PG (ref 27–31)
MCHC RBC AUTO-ENTMCNC: 30.7 G/DL (ref 32–36)
MCV RBC AUTO: 101 FL (ref 82–98)
MONOCYTES # BLD AUTO: 1 K/UL (ref 0.3–1)
MONOCYTES NFR BLD: 15.3 % (ref 4–15)
NEUTROPHILS # BLD AUTO: 3.4 K/UL (ref 1.8–7.7)
NEUTROPHILS NFR BLD: 52.7 % (ref 38–73)
NRBC BLD-RTO: 0 /100 WBC
PHOSPHATE SERPL-MCNC: 4.4 MG/DL (ref 2.7–4.5)
PLATELET # BLD AUTO: 326 K/UL (ref 150–350)
PMV BLD AUTO: 10 FL (ref 9.2–12.9)
POTASSIUM SERPL-SCNC: 4.9 MMOL/L (ref 3.5–5.1)
RBC # BLD AUTO: 3.2 M/UL (ref 4–5.4)
SODIUM SERPL-SCNC: 136 MMOL/L (ref 136–145)
WBC # BLD AUTO: 6.47 K/UL (ref 3.9–12.7)

## 2020-09-21 PROCEDURE — 25000003 PHARM REV CODE 250: Performed by: PHYSICIAN ASSISTANT

## 2020-09-21 PROCEDURE — 85025 COMPLETE CBC W/AUTO DIFF WBC: CPT

## 2020-09-21 PROCEDURE — 25000003 PHARM REV CODE 250: Performed by: NURSE PRACTITIONER

## 2020-09-21 PROCEDURE — 97535 SELF CARE MNGMENT TRAINING: CPT

## 2020-09-21 PROCEDURE — 83735 ASSAY OF MAGNESIUM: CPT

## 2020-09-21 PROCEDURE — 94761 N-INVAS EAR/PLS OXIMETRY MLT: CPT

## 2020-09-21 PROCEDURE — 97110 THERAPEUTIC EXERCISES: CPT

## 2020-09-21 PROCEDURE — 11000004 HC SNF PRIVATE

## 2020-09-21 PROCEDURE — 97530 THERAPEUTIC ACTIVITIES: CPT

## 2020-09-21 PROCEDURE — 80048 BASIC METABOLIC PNL TOTAL CA: CPT

## 2020-09-21 PROCEDURE — 63600175 PHARM REV CODE 636 W HCPCS: Performed by: PHYSICIAN ASSISTANT

## 2020-09-21 PROCEDURE — 27000221 HC OXYGEN, UP TO 24 HOURS

## 2020-09-21 PROCEDURE — 36415 COLL VENOUS BLD VENIPUNCTURE: CPT

## 2020-09-21 PROCEDURE — 84100 ASSAY OF PHOSPHORUS: CPT

## 2020-09-21 PROCEDURE — 97116 GAIT TRAINING THERAPY: CPT

## 2020-09-21 PROCEDURE — 25000003 PHARM REV CODE 250: Performed by: HOSPITALIST

## 2020-09-21 RX ORDER — METOPROLOL SUCCINATE 25 MG/1
25 TABLET, EXTENDED RELEASE ORAL DAILY
Status: DISCONTINUED | OUTPATIENT
Start: 2020-09-22 | End: 2020-09-25 | Stop reason: HOSPADM

## 2020-09-21 RX ADMIN — AMLODIPINE BESYLATE 2.5 MG: 2.5 TABLET ORAL at 09:09

## 2020-09-21 RX ADMIN — HEPARIN SODIUM 5000 UNITS: 5000 INJECTION INTRAVENOUS; SUBCUTANEOUS at 05:09

## 2020-09-21 RX ADMIN — LISINOPRIL 40 MG: 20 TABLET ORAL at 09:09

## 2020-09-21 RX ADMIN — DOCUSATE SODIUM 50MG AND SENNOSIDES 8.6MG 1 TABLET: 8.6; 5 TABLET, FILM COATED ORAL at 10:09

## 2020-09-21 RX ADMIN — ACETAMINOPHEN 650 MG: 325 TABLET ORAL at 12:09

## 2020-09-21 RX ADMIN — TRAMADOL HYDROCHLORIDE 25 MG: 50 TABLET, FILM COATED ORAL at 05:09

## 2020-09-21 RX ADMIN — MELATONIN TAB 3 MG 6 MG: 3 TAB at 10:09

## 2020-09-21 RX ADMIN — HEPARIN SODIUM 5000 UNITS: 5000 INJECTION INTRAVENOUS; SUBCUTANEOUS at 10:09

## 2020-09-21 RX ADMIN — FUROSEMIDE 40 MG: 40 TABLET ORAL at 09:09

## 2020-09-21 RX ADMIN — TRAMADOL HYDROCHLORIDE 25 MG: 50 TABLET, FILM COATED ORAL at 01:09

## 2020-09-21 RX ADMIN — HEPARIN SODIUM 5000 UNITS: 5000 INJECTION INTRAVENOUS; SUBCUTANEOUS at 01:09

## 2020-09-21 RX ADMIN — DOCUSATE SODIUM 50MG AND SENNOSIDES 8.6MG 1 TABLET: 8.6; 5 TABLET, FILM COATED ORAL at 09:09

## 2020-09-21 RX ADMIN — TRAMADOL HYDROCHLORIDE 25 MG: 50 TABLET, FILM COATED ORAL at 10:09

## 2020-09-21 RX ADMIN — ONDANSETRON 4 MG: 4 TABLET, ORALLY DISINTEGRATING ORAL at 01:09

## 2020-09-21 RX ADMIN — PANTOPRAZOLE SODIUM 40 MG: 40 TABLET, DELAYED RELEASE ORAL at 09:09

## 2020-09-21 NOTE — PT/OT/SLP PROGRESS
"Occupational Therapy  Treatment    Fiona Arteaga   MRN: [id number]   Admitting Diagnosis: SDH (subdural hematoma)    OT Date of Treatment: 09/21/20       Billable Minutes:  Self Care/Home Management 28 and Therapeutic Exercise 17    General Precautions: Standard, fall  Orthopedic Precautions: N/A  Braces: N/A    Spiritual, Cultural Beliefs, Christian Practices, Values that Affect Care: no    Subjective:  Communicated with PT and nursing prior to session.  "I do everything for myself that I can"    Pain/Comfort  Pain Rating 1: 10/10  Location - Side 1: Left  Location - Orientation 1: generalized  Location 1: hip  Pain Addressed 1: Pre-medicate for activity  Pain Rating Post-Intervention 1: 10/10  Pain Rating 2: 10/10  Location - Side 2: Left  Location - Orientation 2: generalized  Location 2: head  Pain Addressed 2: Pre-medicate for activity  Pain Rating Post-Intervention 2: 8/10    Objective:  Patient found with: (up in bedside chair)    Occupational Performance:    Bed Mobility:    · Did not perform. Pt was up in chair following PT    Functional Mobility/Transfers:  · Patient completed Sit <> Stand Transfer with stand by assistance  with  rolling walker   · Patient completed Bed <> Chair Transfer using Step Transfer technique with contact guard assistance with rolling walker secondary to fatigue  · Functional Mobility: Pt ambulated 6 ft from bedisde chair to W/c and 6 ft back with CGA and RW    Activities of Daily Living:  · Grooming: set up assistance seated sinkside   · Bathing: set up assitance seated sinkside  · Upper Body Dressing: supervision standing sinkside to toby gown  · Lower Body Dressing: minimum assistance to thread LLE through pants. Pt stood and pulled pants over hips without aid    OT Exercises: UE Ergometer 17 min on low resistance.  UE exercises performed to increase functional endurance and strength in order increase independence when performing self care tasks, functional ambulation, W/C " propulsion , functional standing activities as well as when performing functional tasks.    Additional Treatment:  Pt edu on role of OT, POC, safety when performing self care tasks , benefit of performing OOB activity, and safety when performing functional transfers and mobility.  - Self care tasks completed-- as noted above     Patient left up in chair with all lines intact, call button in reach and nursing notified    [facility] 6 Click:  [facility] Total Score: 21    ASSESSMENT:  Fiona Arteaga is a 88 y.o. female with a medical diagnosis of SDH (subdural hematoma). Pt presents with headache and pain in LLE but is motivated to participate in treatment in order to go home quicker.   Pt would benefit from OT intervention to decrease fall risk, to reduce caregiver burden of care, and improve quality of life and functional performance of functional activities.    Rehab identified problem list/impairments: impaired endurance, impaired sensation, impaired self care skills, impaired functional mobilty, pain, visual deficits, decreased lower extremity function    Rehab potential is good    Activity tolerance: Good    Discharge recommendations: home with home health     Barriers to discharge: None     Equipment recommendations: walker, rolling     GOALS:   Multidisciplinary Problems     Occupational Therapy Goals        Problem: Occupational Therapy Goal    Goal Priority Disciplines Outcome Interventions   Occupational Therapy Goal     OT, PT/OT Ongoing, Progressing    Description: Goals to be met by: 9/30/2020    Patient will increase functional independence with ADLs by performing:    UE Dressing with Supervision.  LE Dressing with Supervision.  Grooming while standing at sink with Set-up Assistance.  Toileting from toilet with Supervision for hygiene and clothing management.   Bathing from  sitting at sink with Supervision.  Rolling to Bilateral with Supervision.   Supine to sit with Supervision.  Stand pivot transfers with  Supervision.  Toilet transfer to toilet with Supervision.                       Plan:  Patient to be seen 5 x/week to address the above listed problems via self-care/home management  Plan of Care expires: 10/16/20  Plan of Care reviewed with: patient    Bubbadon Diana, SOT  09/21/2020     I certify that I was present in the room directing the student in service delivery and guiding them using my skilled judgment.  As the co-signing therapist I have reviewed the student's documentation and am responsible for the treatment, assessment, and plan.    Sae Ortiz, CARLITOST  9/21/2020

## 2020-09-21 NOTE — HPI
Ms. Arteaga is an 88 year old female PMHx of HTN and chronic atrial fibrillation on apixaban who presents to SNF following hospitalization for subdural hematoma.  Admission to SNF for secondary weakness and debility.    [place] [facility] after a [place].  CT of the head revealed subdural hematoma. She was evaluated by NSGY and did not require evacuation. She received KCentra and vitamin K and was monitored in the hospital. She did not have any significant increase in the SDH on serial CT head. She was started on a 7 day course of Keppra as seizure prophylaxis. She was evaluated by therapy and recommended for SNF placement prior to returning home.      Patient will be treated at [facility] with PT and OT to improve functional status and ability to perform ADLs.

## 2020-09-21 NOTE — PLAN OF CARE
Problem: Adult Inpatient Plan of Care  Goal: Plan of Care Review  Outcome: Ongoing, Progressing  Goal: Patient-Specific Goal (Individualization)  Outcome: Ongoing, Progressing  Goal: Absence of Hospital-Acquired Illness or Injury  Outcome: Ongoing, Progressing  Goal: Optimal Comfort and Wellbeing  Outcome: Ongoing, Progressing  Goal: Readiness for Transition of Care  Outcome: Ongoing, Progressing  Goal: Rounds/Family Conference  Outcome: Ongoing, Progressing     Problem: Adult Inpatient Plan of Care  Goal: Plan of Care Review  Outcome: Ongoing, Progressing  Goal: Patient-Specific Goal (Individualization)  Outcome: Ongoing, Progressing  Goal: Absence of Hospital-Acquired Illness or Injury  Outcome: Ongoing, Progressing  Goal: Optimal Comfort and Wellbeing  Outcome: Ongoing, Progressing  Goal: Readiness for Transition of Care  Outcome: Ongoing, Progressing  Goal: Rounds/Family Conference  Outcome: Ongoing, Progressing     Problem: Adult Inpatient Plan of Care  Goal: Plan of Care Review  Outcome: Ongoing, Progressing  Goal: Patient-Specific Goal (Individualization)  Outcome: Ongoing, Progressing  Goal: Absence of Hospital-Acquired Illness or Injury  Outcome: Ongoing, Progressing  Goal: Optimal Comfort and Wellbeing  Outcome: Ongoing, Progressing  Goal: Readiness for Transition of Care  Outcome: Ongoing, Progressing  Goal: Rounds/Family Conference  Outcome: Ongoing, Progressing

## 2020-09-21 NOTE — PT/OT/SLP PROGRESS
"Physical Therapy  Treatment    Fiona Arteaga   MRN: [id number]   Admitting Diagnosis: SDH (subdural hematoma)    PT Received On: 09/21/20          Billable Minutes:  Gait Training 20, Therapeutic Activity 10 and Therapeutic Exercise 14    Treatment Type: Treatment  PT/PTA: PT     PTA Visit Number: 0       General Precautions: Standard, aspiration, fall, seizure  Orthopedic Precautions: N/A   Braces: N/A    Spiritual, Cultural Beliefs, Sikhism Practices, Values that Affect Care: no    Subjective:  Communicated with patient prior to session.  Reports feels better today. Continues w/ headache and left hip pain. "It's like a toothache, a horrible pounding pain"    Pain/Comfort  Pain Rating 1: 8/10  Location - Side 1: Left  Location - Orientation 1: generalized  Location 1: hip  Pain Addressed 1: Pre-medicate for activity, Reposition, Distraction  Pain Rating Post-Intervention 1: 8/10  Pain Rating 2: 8/10  Location - Side 2: Left  Location - Orientation 2: generalized  Location 2: head  Pain Addressed 2: Pre-medicate for activity, Reposition, Distraction(reports had pain meds)  Pain Rating Post-Intervention 2: 8/10    Objective:  Patient found HOB elevated w/ oxgyen 1 LPM with       AM-PAC 6 CLICK MOBILITY  Total Score:17    Bed Mobility:  Sit>Supine:not performed  Supine>Sit: CGA w/ HOB elevated    Transfers:  Sit<>Stand: to/from bed, chair w/ RW and SBA multiple trials  Stand Pivot Transfer: w/ RW and SBA to chair  Cues for hand placement    Gait:  Amb w/ RW and CGA progressing to SBA w/ oxygen  In tow 110 feet  After seated rest break, amb w/ RW and SBA 85 feet. Oxygen in tow, 1 LPM;   Slow pace, slightly forward flexed posture, no LOB     Advanced Gait:  Stairs: not performed  Curb Step: not performed      Therex:  Quad sets,   Glute sets,   Ankle  Pumps,   hip abduction/adduction,  heelslides,   LAQ   Hip flexion  x20 reps w/ assist as needed    Balance:  Stands w/ RW and SBA for diaper fastening w/o " LOB    Additional Treatment:  Doff/toby diaper at EOB and standing w/ RW  Nurse provides meds.  Patient educated on PT POC, gait and trf technique    Patient left up in chair with all lines intact and call button in reach.    Assessment:  Fiona Arteaga is a 88 y.o. female with a medical diagnosis of SDH (subdural hematoma).  Patient participated well inspite of high level of headache pain which she describes as 'usual with this injury'. She reports feeling much better than she did over the weekend. Patient will benefit from continued physical therapy to address deficits and improve safety and functional mobility. Continue with physical therapy plan of care. .    Rehab identified problem list/impairments: weakness, impaired endurance, impaired self care skills, gait instability, impaired functional mobilty, impaired balance, decreased lower extremity function, pain    Rehab potential is good.    Activity tolerance: Good    Discharge recommendations: home with home health(w/ intermittent assistance recomended for safety)     Barriers to discharge: None    Equipment recommendations: bedside commode, walker, rolling     GOALS:   Multidisciplinary Problems     Physical Therapy Goals        Problem: Physical Therapy Goal    Goal Priority Disciplines Outcome Goal Variances Interventions   Physical Therapy Goal     PT, PT/OT Ongoing, Progressing     Description: Goals to be met by: 20     Patient will increase functional independence with mobility by performin. Supine to sit with Set-up [place]  2. Sit to supine with Set-up [place]  3. Rolling to Left and Right with Set-up Assistance.  4. Sit to stand transfer with Supervision  5. Bed to chair transfer with Supervision using Rolling Walker  6. Gait  x 100 feet with Supervision using Rolling Walker.   7. Ascend/Descend 4 inch curb step with Stand-by Assistance using Rolling Walker.  8. Stand for 3 minutes with Stand-by Assistance using Rolling  Walker while performing an activity  9. Pt will complete a car transfer (Actual or Simulated) w/ RW and SBA  10. Pt will use a reacher to  5 objects from the floor in a standing position w/ a RW and SPV                     PLAN:    Patient to be seen 5 x/week  to address the above listed problems via gait training, therapeutic activities, therapeutic exercises  Plan of Care expires: 10/16/20  Plan of Care reviewed with: patient    Ila POE Jason, PT  09/21/2020

## 2020-09-21 NOTE — HOSPITAL COURSE
Patient progressed well with PT and OT. Patient had no significant events during their stay at SNF.  CESARIO has resolved on BMP by giving gentle hydration.  Patient educated to insure to maintain p.o. hydration upon DC.  Patient is rectus follow-up with PCP within 1 week.  Home health was set up. DME was ordered if needed. Follow up appointment to be made by patient within one week. All prescriptions and discharge instructions were ordered to be given to the patient prior to discharge.     PEx  Constitutional: Patient appears debilitated.  No distress noted  HENT:   Head: Normocephalic and atraumatic.   Eyes: Pupils are equal, round  Neck: Normal range of motion. Neck supple.   Cardiovascular: Normal rate, irregular rhythm +murmur    Pulmonary/Chest: Effort normal and breath sounds are clear  Abdominal: Soft. Bowel sounds are normal.   Musculoskeletal: Normal range of motion.   Neurological: Alert and oriented to person, place, and time.   Psychiatric: Normal mood and affect. Behavior is normal.   Skin: Skin is warm and dry. Ecchymosis to left eye.  No skin breakdown noted

## 2020-09-21 NOTE — PROGRESS NOTES
[facility]                                  Skilled Nursing Facility                   Progress Note     Admit Date: 9/15/2020  KYLE 9/30/2020  Principal Problem:  SDH (subdural hematoma)   HPI obtained from patient interview and chart review     Chief Complaint:Re-evaluation of medical treatment and therapy status: Lab review, re-evaluation of acute hypoxic respiratory failure, patient reporting continued headaches    HPI:   Ms. Arteaga is an 88 year old female PMHx of HTN and chronic atrial fibrillation on apixaban who presents to SNF following hospitalization for subdural hematoma.  Admission to SNF for secondary weakness and debility.    Interval history: All of today's labs reviewed and are listed below.  24 hr vital sign ranges listed below.  Upon entering the room, patient is still utilizing 1 L supplemental O2.  Oxygen was removed during her assessment and SpO2 remain 97-98% on room air.  Oxygen discontinued at this time.  Patient continues to endorse intermittent headaches, this is to be expected with a history of her subdural hematoma.  Nursing notified to administer PRN pain medication.  Patient also endorses intermittent nausea, nursing asked to administer PRN Zofran.  Patient educated on PRN medication availability and reminded to ask nursing staff.  Patient reports an inadequate appetite.  Patient denies dysuria.  Patient reports having regular bowel movements.  Patient progessing with PT/OT-Amb w/ RW and CGA progressing to SBA w/ oxygen  In tow 110 feet, After seated rest break, amb w/ RW and SBA 85 feet. Oxygen in tow, 1 LPM; Slow pace, slightly forward flexed posture, no LOB. Continuing to follow and treat all acute and chronic conditions.      Past Medical History: Patient has a past medical history of A-fib, Anticoagulant long-term use, Cancer, and Hypertension.    Past Surgical History: Patient has no past surgical history  on file.    Social History: Patient reports that she has never smoked. She does not have any smokeless tobacco history on file. She reports that she does not drink alcohol or use drugs.    Family History:  No significant history to report    Allergies: Patient has No Known Allergies.    ROS  Constitutional: Negative for fever   Eyes: Negative for blurred vision, double vision   Respiratory: Negative for cough, shortness of breath   Cardiovascular: Negative for chest pain, palpitations, and leg swelling.   Gastrointestinal: Negative for abdominal pain,, diarrhea, vomiting, constipation.  +nausea  Genitourinary: Negative for dysuria, frequency   Musculoskeletal:  + generalized weakness. Negative for back pain and myalgias.   Skin: Negative for itching and rash.   Neurological:  + for dizziness, headaches.   Psychiatric/Behavioral: Negative for depression. The patient is not nervous/anxious.      24 hour Vital Sign Range   Temp:  [98 °F (36.7 °C)-98.4 °F (36.9 °C)]   Pulse:  [85-92]   Resp:  [18]   BP: (114-122)/(53-57)   SpO2:  [95 %-97 %]     PEx  Constitutional: Patient appears debilitated.  No distress noted  HENT:   Head: Normocephalic and atraumatic.   Eyes: Pupils are equal, round  Neck: Normal range of motion. Neck supple.   Cardiovascular: Normal rate, irregular rhythm +murmur    Pulmonary/Chest: Effort normal and breath sounds are clear  Abdominal: Soft. Bowel sounds are normal.   Musculoskeletal: Normal range of motion.   Neurological: Alert and oriented to person, place, and time.   Psychiatric: Normal mood and affect. Behavior is normal.   Skin: Skin is warm and dry. Ecchymosis to left eye.  No skin breakdown noted    Recent Labs   Lab 09/21/20  0515      K 4.9   CL 97   CO2 30*   BUN 32*   CREATININE 1.0   MG 2.0       Recent Labs   Lab 09/21/20  0515   WBC 6.47   RBC 3.20*   HGB 9.9*   HCT 32.3*      *   MCH 30.9   MCHC 30.7*         Assessment and Plan:     Problems addressed  today    Long standing persistent atrial fibrillation  Bradycardia  - Had bradycardia during hospital stay so metoprolol succinate 50 mg BID on hold.   - Holding apixaban for now until follow up with NSGY.   - 9/21 heart rates are improving and are averaging between 70s and 90s resumed metoprolol succinate at 25 mg daily to start, monitor BP's close    Essential hypertension  Heart failure with preserved ejection fraction  - Continue amlodipine 2.5 mg daily, lisinopril 40 mg daily, and furosemide 40 mg daily.   - previously held home metoprolol succinate.   - 9/21 discontinue amlodipine, holding parameters placed on lisinopril, re-initiated metoprolol XL 25 mg daily to start with.  Monitor BP closely as patient is low to normotensive.    Anemia of chronic disease  - Hgb is stable, continue to monitor twice weekly CBCs       Ongoing but stable problems      Traumatic subdural hematoma  Fall  - Follow up with NSGY after discharge with repeat CT head.   - Holding apixaban until follow up.   - Fall precautions.   - Continue Keppra 500 mg BID until 9/19/20.      ITP  -  Platelet count is WNL.   Continue to monitor.      GERD  - Continue daily PPI.      Constipation  - stable, continue MiraLax daily, Continue pericolace BID     Debility   - Continue with PT/OT for gait training and strengthening and restoration of ADL's   - Encourage mobility, OOB in chair, and early ambulation as appropriate  - Fall precautions   - Monitor for bowel and bladder dysfunction  - Monitor for and prevent skin breakdown and pressure ulcers  - Continue DVT prophylaxis with  SCDs and frequent ambulation     Goals of Care:   Restorative  Optimize nutrition  Wound healing  Muscle strengthening  Restoration of ADL's  Improve mobility        Anticipated Disposition:  Home with home health; patient lives alone       Future Appointments   Date Time Provider Department Center   9/29/2020 12:15 PM [facility] 500 LB LIMIT [facility]    9/29/2020  1:30 PM Dileep Arredondo MD [facility] NEUROS7 [facility]         Shoshana Gann NP  Department of [facility] Medicine   [facility]     DOS: 9/21/2020       Patient note was created using MModal Dictation.  Any errors in syntax or even information may not have been identified and edited on initial review prior to signing this note.

## 2020-09-22 PROCEDURE — 97535 SELF CARE MNGMENT TRAINING: CPT

## 2020-09-22 PROCEDURE — 63600175 PHARM REV CODE 636 W HCPCS: Performed by: PHYSICIAN ASSISTANT

## 2020-09-22 PROCEDURE — 25000003 PHARM REV CODE 250: Performed by: NURSE PRACTITIONER

## 2020-09-22 PROCEDURE — 25000003 PHARM REV CODE 250: Performed by: PHYSICIAN ASSISTANT

## 2020-09-22 PROCEDURE — 97110 THERAPEUTIC EXERCISES: CPT | Mod: CQ

## 2020-09-22 PROCEDURE — 25000003 PHARM REV CODE 250: Performed by: HOSPITALIST

## 2020-09-22 PROCEDURE — 97116 GAIT TRAINING THERAPY: CPT | Mod: CQ

## 2020-09-22 PROCEDURE — 27000221 HC OXYGEN, UP TO 24 HOURS

## 2020-09-22 PROCEDURE — 97110 THERAPEUTIC EXERCISES: CPT

## 2020-09-22 PROCEDURE — 97803 MED NUTRITION INDIV SUBSEQ: CPT

## 2020-09-22 PROCEDURE — 11000004 HC SNF PRIVATE

## 2020-09-22 PROCEDURE — 97530 THERAPEUTIC ACTIVITIES: CPT | Mod: CQ

## 2020-09-22 RX ADMIN — ONDANSETRON 4 MG: 4 TABLET, ORALLY DISINTEGRATING ORAL at 08:09

## 2020-09-22 RX ADMIN — LISINOPRIL 40 MG: 20 TABLET ORAL at 08:09

## 2020-09-22 RX ADMIN — HEPARIN SODIUM 5000 UNITS: 5000 INJECTION INTRAVENOUS; SUBCUTANEOUS at 06:09

## 2020-09-22 RX ADMIN — HEPARIN SODIUM 5000 UNITS: 5000 INJECTION INTRAVENOUS; SUBCUTANEOUS at 09:09

## 2020-09-22 RX ADMIN — HEPARIN SODIUM 5000 UNITS: 5000 INJECTION INTRAVENOUS; SUBCUTANEOUS at 01:09

## 2020-09-22 RX ADMIN — POLYETHYLENE GLYCOL 3350 17 G: 17 POWDER, FOR SOLUTION ORAL at 12:09

## 2020-09-22 RX ADMIN — DOCUSATE SODIUM 50MG AND SENNOSIDES 8.6MG 1 TABLET: 8.6; 5 TABLET, FILM COATED ORAL at 09:09

## 2020-09-22 RX ADMIN — ACETAMINOPHEN 650 MG: 325 TABLET ORAL at 01:09

## 2020-09-22 RX ADMIN — FUROSEMIDE 40 MG: 40 TABLET ORAL at 08:09

## 2020-09-22 RX ADMIN — METOPROLOL SUCCINATE 25 MG: 25 TABLET, EXTENDED RELEASE ORAL at 08:09

## 2020-09-22 RX ADMIN — TRAMADOL HYDROCHLORIDE 25 MG: 50 TABLET, FILM COATED ORAL at 09:09

## 2020-09-22 RX ADMIN — DOCUSATE SODIUM 50MG AND SENNOSIDES 8.6MG 1 TABLET: 8.6; 5 TABLET, FILM COATED ORAL at 08:09

## 2020-09-22 RX ADMIN — PANTOPRAZOLE SODIUM 40 MG: 40 TABLET, DELAYED RELEASE ORAL at 08:09

## 2020-09-22 RX ADMIN — MELATONIN TAB 3 MG 6 MG: 3 TAB at 09:09

## 2020-09-22 RX ADMIN — TRAMADOL HYDROCHLORIDE 25 MG: 50 TABLET, FILM COATED ORAL at 08:09

## 2020-09-22 NOTE — PT/OT/SLP PROGRESS
Physical Therapy  Treatment    Fiona Arteaga   MRN: [id number]   Admitting Diagnosis: SDH (subdural hematoma)    PT Received On: 09/22/20          Billable Minutes:  Gait Training 12, Therapeutic Activity 10 and Therapeutic Exercise 16    Treatment Type: Treatment  PT/PTA: PTA     PTA Visit Number: 1       General Precautions: Standard, aspiration, fall, seizure  Orthopedic Precautions: N/A   Braces: N/A    Spiritual, Cultural Beliefs, Tenriism Practices, Values that Affect Care: no    Subjective:  Communicated with nursing prior to session.  Pt agreed to work with therapy despite c/o of having an upset stomach.     Pain/Comfort  Pain Rating 1: (Pt did not rate. )  Location - Side 1: Left  Location - Orientation 1: generalized  Location 1: hip  Pain Addressed 1: Pre-medicate for activity  Pain Rating Post-Intervention 1: (Pt did not rate. )    Objective:  Patient found supine in bed.  Patient found with: oxygen     AM-PAC 6 CLICK MOBILITY  Total Score:17    Bed Mobility:  Sit>Supine:on bed with SBA  Supine>Sit: on bed with SBA    Transfers:  Sit<>Stand: to/from EOB, to/from bedside chair x2 trials w/ RW and SBA  Stand Pivot Transfer: bedside chair to EOB w/ RW and SBA for safety    Gait:  Amb x2 trials of 100ft each with RW and CGA for safety. No LOB noted. Pt required a brief seated rest break b/w trials.    Advanced Gait:  Curb Step: Pt declined curb step on this date.     Therex:  BLE therex x20 reps:   -AP   -LAQ   -GS   -Hip flexion    -Hip abd/add   -QS    Balance:  -Pt stood with RW for ~2 min with RW for unilateral support and SBA for safety while pt moved (2) 2lb handweights left<>right alternating BUEs on tray    Patient left supine with call button in reach and nursing notified.    Assessment:  Fiona Arteaga is a 88 y.o. female with a medical diagnosis of SDH (subdural hematoma).  Pt tolerated treatment well, and will continue to benefit from PT services at this time. Continue with PT POC as  indicated.    Rehab identified problem list/impairments: weakness, impaired endurance, impaired self care skills, gait instability, impaired functional mobilty, impaired balance, decreased lower extremity function, pain    Rehab potential is good.    Activity tolerance: Good    Discharge recommendations: home with home health(w/ intermittent assistance recomended for safety)     Barriers to discharge: None    Equipment recommendations: bedside commode, walker, rolling     GOALS:   Multidisciplinary Problems     Physical Therapy Goals        Problem: Physical Therapy Goal    Goal Priority Disciplines Outcome Goal Variances Interventions   Physical Therapy Goal     PT, PT/OT Ongoing, Progressing     Description: Goals to be met by: 20     Patient will increase functional independence with mobility by performin. Supine to sit with Set-up [place]  2. Sit to supine with Set-up [place]  3. Rolling to Left and Right with Set-up Assistance.  4. Sit to stand transfer with Supervision  5. Bed to chair transfer with Supervision using Rolling Walker  6. Gait  x 100 feet with Supervision using Rolling Walker.   7. Ascend/Descend 4 inch curb step with Stand-by Assistance using Rolling Walker.  8. Stand for 3 minutes with Stand-by Assistance using Rolling Walker while performing an activity  9. Pt will complete a car transfer (Actual or Simulated) w/ RW and SBA  10. Pt will use a reacher to  5 objects from the floor in a standing position w/ a RW and SPV                     PLAN:    Patient to be seen 5 x/week  to address the above listed problems via gait training, therapeutic activities, therapeutic exercises  Plan of Care expires: 10/16/20  Plan of Care reviewed with: patient    Tasha Ewing, PTA  2020

## 2020-09-22 NOTE — PROGRESS NOTES
[facility] - Skilled Nursing Care  Adult Nutrition  Progress Note    SUMMARY     Recommendations  1) Continue stool softener/laxatives   2) Continue Cardiac diet   3) RD added prune juice to trays   4) Recommend MVI if medically indicated    Goals: Pt to meet 75% estimated energy needs by RD follow up  Nutrition Goal Status: goal met  Communication of RD Recs: other (comment)(POC)    Reason for Assessment  Reason For Assessment: RD follow-up  Diagnosis: (SDH)  Relevant Medical History: HTN, cancer, Afib  Interdisciplinary Rounds: did not attend  General Information Comments: Pt reports PO intake of 75%, howevere she has been feeling nausea related to her fall and hitting her head, she also continues with constipation so she said her appetite is decreased. Pt on Polyethylene glycol and senna-docusate. RD added prune juce to trays to promote gastric motility. NFPE completed 9/16. Pt does not currenyly meet malnutrition criteria.   Nutrition Discharge Planning: Cardiac diet    Nutrition Risk Screen  Nutrition Risk Screen: no indicators present    Nutrition/Diet History  Spiritual, Cultural Beliefs, Anabaptist Practices, Values that Affect Care: no  Food Allergies: NKFA    Anthropometrics  Temp: 97.3 °F (36.3 °C)  Height: 5' (152.4 cm)  Height (inches): 60 in  Weight Method: Standard Scale  Weight: 60.2 kg (132 lb 11.5 oz)  Weight (lb): 132.72 lb  Ideal Body Weight (IBW), Female: 100 lb  % Ideal Body Weight, Female (lb): 133 %  BMI (Calculated): 25.9     Lab/Procedures/Meds  Pertinent Labs Reviewed: reviewed  Pertinent Labs Comments: Hgb 9.9, Hct 32.3,   Pertinent Medications Reviewed: reviewed  Pertinent Medications Comments: furosemide, heparin, lisinopril, pantoprazole, polyethylene glycol, senna-docusate    Estimated/Assessed Needs  Weight Used For Calorie Calculations: 59 kg (130 lb)  Energy Calorie Requirements (kcal): 1474(25 kcal/kg)  Energy Need Method: Kcal/kg  Protein Requirements: 59-71 g/day(1-1.2  g/kg)  Weight Used For Protein Calculations: 59 kg (130 lb)  Fluid Requirements (mL): 1 mL/kcal or per MD  Estimated Fluid Requirement Method: RDA Method  RDA Method (mL): 1474     Nutrition Prescription Ordered  Current Diet Order: Cardiac    Evaluation of Received Nutrient/Fluid Intake  Energy Calories Required: meeting needs  Protein Required: meeting needs  Fluid Required: meeting needs  Comments: LBM 9/21  Tolerance: tolerating  % Intake of Estimated Energy Needs: 75 - 100 %  % Meal Intake: 75 - 100 %    Nutrition Risk  Level of Risk/Frequency of Follow-up: low(Follow up weekly)     Assessment and Plan  Nutrition Problem  Inadequate oral intake     Related to (etiology):   Decreased appetite, constipation, food preferences     Signs and Symptoms (as evidenced by):   Pt report of LBM x 3 days, abdominal discomfort and decreased appetite due to constipation and disliking food at facility     Interventions  (treatment strategy):  1) Collaboration with other providers  2) Fat and mineral modified diet (Na) Cardiac diet     Nutrition Diagnosis Status:   Ongoing    Monitor and Evaluation  Food and Nutrient Intake: energy intake, food and beverage intake  Food and Nutrient Adminstration: diet order  Physical Activity and Function: nutrition-related ADLs and IADLs  Anthropometric Measurements: weight change, weight, body mass index  Biochemical Data, Medical Tests and Procedures: electrolyte and renal panel, gastrointestinal profile, glucose/endocrine profile, inflammatory profile, lipid profile  Nutrition-Focused Physical Findings: overall appearance     Malnutrition Assessment (9/16)  Skin (Micronutrient): bruised, cuts unhealed(on face from fall)   Orbital Region (Subcutaneous Fat Loss): well nourished  Upper Arm Region (Subcutaneous Fat Loss): mild depletion   Mormon Region (Muscle Loss): mild depletion  Clavicle Bone Region (Muscle Loss): mild depletion  Clavicle and Acromion Bone Region (Muscle Loss): well  nourished  Dorsal Hand (Muscle Loss): well nourished  Anterior Thigh Region (Muscle Loss): well nourished  Posterior Calf Region (Muscle Loss): well nourished       Nutrition Follow-Up  RD Follow-up?: Yes

## 2020-09-22 NOTE — PLAN OF CARE
Problem: Adult Inpatient Plan of Care  Goal: Plan of Care Review  Outcome: Ongoing, Progressing  Goal: Patient-Specific Goal (Individualization)  Outcome: Ongoing, Progressing  Goal: Absence of Hospital-Acquired Illness or Injury  Outcome: Ongoing, Progressing  Goal: Optimal Comfort and Wellbeing  Outcome: Ongoing, Progressing  Goal: Readiness for Transition of Care  Outcome: Ongoing, Progressing  Goal: Rounds/Family Conference  Outcome: Ongoing, Progressing     Problem: Adult Inpatient Plan of Care  Goal: Plan of Care Review  Outcome: Ongoing, Progressing  Goal: Patient-Specific Goal (Individualization)  Outcome: Ongoing, Progressing  Goal: Absence of Hospital-Acquired Illness or Injury  Outcome: Ongoing, Progressing  Goal: Optimal Comfort and Wellbeing  Outcome: Ongoing, Progressing  Goal: Readiness for Transition of Care  Outcome: Ongoing, Progressing  Goal: Rounds/Family Conference  Outcome: Ongoing, Progressing     Problem: Adult Inpatient Plan of Care  Goal: Plan of Care Review  Outcome: Ongoing, Progressing  Goal: Patient-Specific Goal (Individualization)  Outcome: Ongoing, Progressing  Goal: Absence of Hospital-Acquired Illness or Injury  Outcome: Ongoing, Progressing  Goal: Optimal Comfort and Wellbeing  Outcome: Ongoing, Progressing  Goal: Readiness for Transition of Care  Outcome: Ongoing, Progressing  Goal: Rounds/Family Conference  Outcome: Ongoing, Progressing     Problem: Adult Inpatient Plan of Care  Goal: Plan of Care Review  Outcome: Ongoing, Progressing  Goal: Patient-Specific Goal (Individualization)  Outcome: Ongoing, Progressing  Goal: Absence of Hospital-Acquired Illness or Injury  Outcome: Ongoing, Progressing  Goal: Optimal Comfort and Wellbeing  Outcome: Ongoing, Progressing  Goal: Readiness for Transition of Care  Outcome: Ongoing, Progressing  Goal: Rounds/Family Conference  Outcome: Ongoing, Progressing     Problem: Adult Inpatient Plan of Care  Goal: Plan of Care Review  Outcome:  Ongoing, Progressing  Goal: Patient-Specific Goal (Individualization)  Outcome: Ongoing, Progressing  Goal: Absence of Hospital-Acquired Illness or Injury  Outcome: Ongoing, Progressing  Goal: Optimal Comfort and Wellbeing  Outcome: Ongoing, Progressing  Goal: Readiness for Transition of Care  Outcome: Ongoing, Progressing  Goal: Rounds/Family Conference  Outcome: Ongoing, Progressing     Problem: Adult Inpatient Plan of Care  Goal: Plan of Care Review  Outcome: Ongoing, Progressing  Goal: Patient-Specific Goal (Individualization)  Outcome: Ongoing, Progressing  Goal: Absence of Hospital-Acquired Illness or Injury  Outcome: Ongoing, Progressing  Goal: Optimal Comfort and Wellbeing  Outcome: Ongoing, Progressing  Goal: Readiness for Transition of Care  Outcome: Ongoing, Progressing  Goal: Rounds/Family Conference  Outcome: Ongoing, Progressing     Problem: Adult Inpatient Plan of Care  Goal: Plan of Care Review  Outcome: Ongoing, Progressing  Goal: Patient-Specific Goal (Individualization)  Outcome: Ongoing, Progressing  Goal: Absence of Hospital-Acquired Illness or Injury  Outcome: Ongoing, Progressing  Goal: Optimal Comfort and Wellbeing  Outcome: Ongoing, Progressing  Goal: Readiness for Transition of Care  Outcome: Ongoing, Progressing  Goal: Rounds/Family Conference  Outcome: Ongoing, Progressing     Problem: Adult Inpatient Plan of Care  Goal: Plan of Care Review  Outcome: Ongoing, Progressing  Goal: Patient-Specific Goal (Individualization)  Outcome: Ongoing, Progressing  Goal: Absence of Hospital-Acquired Illness or Injury  Outcome: Ongoing, Progressing  Goal: Optimal Comfort and Wellbeing  Outcome: Ongoing, Progressing  Goal: Readiness for Transition of Care  Outcome: Ongoing, Progressing  Goal: Rounds/Family Conference  Outcome: Ongoing, Progressing     Problem: Adult Inpatient Plan of Care  Goal: Plan of Care Review  Outcome: Ongoing, Progressing  Goal: Patient-Specific Goal (Individualization)  Outcome:  Ongoing, Progressing  Goal: Absence of Hospital-Acquired Illness or Injury  Outcome: Ongoing, Progressing  Goal: Optimal Comfort and Wellbeing  Outcome: Ongoing, Progressing  Goal: Readiness for Transition of Care  Outcome: Ongoing, Progressing  Goal: Rounds/Family Conference  Outcome: Ongoing, Progressing     Problem: Adult Inpatient Plan of Care  Goal: Plan of Care Review  Outcome: Ongoing, Progressing  Goal: Patient-Specific Goal (Individualization)  Outcome: Ongoing, Progressing  Goal: Absence of Hospital-Acquired Illness or Injury  Outcome: Ongoing, Progressing  Goal: Optimal Comfort and Wellbeing  Outcome: Ongoing, Progressing  Goal: Readiness for Transition of Care  Outcome: Ongoing, Progressing  Goal: Rounds/Family Conference  Outcome: Ongoing, Progressing     Problem: Adult Inpatient Plan of Care  Goal: Plan of Care Review  Outcome: Ongoing, Progressing  Goal: Patient-Specific Goal (Individualization)  Outcome: Ongoing, Progressing  Goal: Absence of Hospital-Acquired Illness or Injury  Outcome: Ongoing, Progressing  Goal: Optimal Comfort and Wellbeing  Outcome: Ongoing, Progressing  Goal: Readiness for Transition of Care  Outcome: Ongoing, Progressing  Goal: Rounds/Family Conference  Outcome: Ongoing, Progressing     Problem: Adult Inpatient Plan of Care  Goal: Plan of Care Review  Outcome: Ongoing, Progressing  Goal: Patient-Specific Goal (Individualization)  Outcome: Ongoing, Progressing  Goal: Absence of Hospital-Acquired Illness or Injury  Outcome: Ongoing, Progressing  Goal: Optimal Comfort and Wellbeing  Outcome: Ongoing, Progressing  Goal: Readiness for Transition of Care  Outcome: Ongoing, Progressing  Goal: Rounds/Family Conference  Outcome: Ongoing, Progressing     Problem: Adult Inpatient Plan of Care  Goal: Plan of Care Review  Outcome: Ongoing, Progressing  Goal: Patient-Specific Goal (Individualization)  Outcome: Ongoing, Progressing  Goal: Absence of Hospital-Acquired Illness or  Injury  Outcome: Ongoing, Progressing  Goal: Optimal Comfort and Wellbeing  Outcome: Ongoing, Progressing  Goal: Readiness for Transition of Care  Outcome: Ongoing, Progressing  Goal: Rounds/Family Conference  Outcome: Ongoing, Progressing     Problem: Adult Inpatient Plan of Care  Goal: Plan of Care Review  Outcome: Ongoing, Progressing  Goal: Patient-Specific Goal (Individualization)  Outcome: Ongoing, Progressing  Goal: Absence of Hospital-Acquired Illness or Injury  Outcome: Ongoing, Progressing  Goal: Optimal Comfort and Wellbeing  Outcome: Ongoing, Progressing  Goal: Readiness for Transition of Care  Outcome: Ongoing, Progressing  Goal: Rounds/Family Conference  Outcome: Ongoing, Progressing     Problem: Adult Inpatient Plan of Care  Goal: Plan of Care Review  Outcome: Ongoing, Progressing  Goal: Patient-Specific Goal (Individualization)  Outcome: Ongoing, Progressing  Goal: Absence of Hospital-Acquired Illness or Injury  Outcome: Ongoing, Progressing  Goal: Optimal Comfort and Wellbeing  Outcome: Ongoing, Progressing  Goal: Readiness for Transition of Care  Outcome: Ongoing, Progressing  Goal: Rounds/Family Conference  Outcome: Ongoing, Progressing     Problem: Adult Inpatient Plan of Care  Goal: Plan of Care Review  Outcome: Ongoing, Progressing  Goal: Patient-Specific Goal (Individualization)  Outcome: Ongoing, Progressing  Goal: Absence of Hospital-Acquired Illness or Injury  Outcome: Ongoing, Progressing  Goal: Optimal Comfort and Wellbeing  Outcome: Ongoing, Progressing  Goal: Readiness for Transition of Care  Outcome: Ongoing, Progressing  Goal: Rounds/Family Conference  Outcome: Ongoing, Progressing     Problem: Adult Inpatient Plan of Care  Goal: Plan of Care Review  Outcome: Ongoing, Progressing  Goal: Patient-Specific Goal (Individualization)  Outcome: Ongoing, Progressing  Goal: Absence of Hospital-Acquired Illness or Injury  Outcome: Ongoing, Progressing  Goal: Optimal Comfort and  Wellbeing  Outcome: Ongoing, Progressing  Goal: Readiness for Transition of Care  Outcome: Ongoing, Progressing  Goal: Rounds/Family Conference  Outcome: Ongoing, Progressing

## 2020-09-22 NOTE — PT/OT/SLP PROGRESS
Occupational Therapy  Treatment    Fiona Arteaga   MRN: [id number]   Admitting Diagnosis: SDH (subdural hematoma)    OT Date of Treatment: 09/22/20       Billable Minutes:  Self Care/Home Management 34 and Therapeutic Exercise 20    General Precautions: Standard, fall  Orthopedic Precautions: N/A  Braces: N/A    Spiritual, Cultural Beliefs, Holiness Practices, Values that Affect Care: no    Subjective:  Communicated with nursing prior to session.  Pt agreeable to session.    Pain/Comfort  Pain Rating 1: 8/10  Location - Side 1: Left  Location - Orientation 1: generalized  Location 1: hip  Pain Addressed 1: Pre-medicate for activity  Pain Rating Post-Intervention 1: 8/10  Pain Rating 2: 8/10  Location - Side 2: Left  Location - Orientation 2: generalized  Location 2: head  Pain Addressed 2: Pre-medicate for activity  Pain Rating Post-Intervention 2: 8/10    Objective:  Patient found with: oxygen    Occupational Performance:    Bed Mobility:    · Patient completed Rolling/Turning to Left with  supervision  · Patient completed Scooting/Bridging with supervision  · Patient completed Supine to Sit with supervision     Functional Mobility/Transfers:  · Patient completed Sit <> Stand Transfer with stand by assistance  with  rolling walker   · Patient completed Bed <> Chair Transfer using Stand Pivot technique with contact guard assistance with rolling walker  · Functional Mobility: Pt ambulated 2 ft from bed to W/C and 5 ft from W/C to bedside chair with CGA and RW    Activities of Daily Living:  · Grooming: set up assistance seated sinkside from W/C  · Bathing: set up assist seated sinkside .  · Upper Body Dressing: supervision to toby gown seated in W/C  · Lower Body Dressing: stand by assistance to toby socks and pants. Pt stood and pulled pants over hips without aid    AMPAC 6 Click:  AMPAC Total Score: 21    OT Exercises: UE Ergometer 15 min on low resistance. UE exercises performed to increase functional  endurance and strength in order increase independence when performing self care tasks, functional ambulation, W/C propulsion , functional standing activities as well as when performing functional tasks.    Additional Treatment:  Pt edu on role of OT, POC, safety when performing self care tasks , benefit of performing OOB activity, and safety when performing functional transfers and mobility.  - Self care tasks completed-- as noted above       Patient left up in chair with all lines intact, call button in reach and nursing notified    ASSESSMENT:  Fiona Arteaga is a 88 y.o. female with a medical diagnosis of SDH (subdural hematoma).  Pt presents with headache and pain in LLE but is motivated to participate in treatment in order to go home quicker. Pt would benefit from OT intervention to decrease fall risk, to reduce caregiver burden of care, and improve quality of life and functional performance of functional activities.    Rehab identified problem list/impairments: impaired endurance, impaired sensation, impaired self care skills, impaired functional mobilty, pain, visual deficits, decreased lower extremity function    Rehab potential is good    Activity tolerance: Good    Discharge recommendations: home with home health     Barriers to discharge: None     Equipment recommendations: walker, rolling     GOALS:   Multidisciplinary Problems     Occupational Therapy Goals        Problem: Occupational Therapy Goal    Goal Priority Disciplines Outcome Interventions   Occupational Therapy Goal     OT, PT/OT Ongoing, Progressing    Description: Goals to be met by: 9/30/2020    Patient will increase functional independence with ADLs by performing:    UE Dressing with Supervision.  LE Dressing with Supervision.  Grooming while standing at sink with Set-up Assistance.  Toileting from toilet with Supervision for hygiene and clothing management.   Bathing from  sitting at sink with Supervision.  Rolling to Bilateral with  Supervision.   Supine to sit with Supervision.  Stand pivot transfers with Supervision.  Toilet transfer to toilet with Supervision.                       Plan:  Patient to be seen 5 x/week to address the above listed problems via self-care/home management  Plan of Care expires: 10/16/20  Plan of Care reviewed with: patient    Sae Diana, CARLITOST  09/22/2020     I certify that I was present in the room directing the student in service delivery and guiding them using my skilled judgment.  As the co-signing therapist I have reviewed the student's documentation and am responsible for the treatment, assessment, and plan.    Sae Ortiz, MEGHANA  9/22/2020

## 2020-09-22 NOTE — PLAN OF CARE
Recommendations  1) Continue stool softener/laxatives   2) Continue Cardiac diet   3) RD added prune juice to trays   4) Recommend daily MVI      Goals: Pt to meet 75% estimated energy needs by RD follow up  Nutrition Goal Status: goal met    Assessment and Plan  Nutrition Problem  Inadequate oral intake      Related to (etiology):   Decreased appetite, constipation, food preferences     Signs and Symptoms (as evidenced by):   Pt report of LBM x 3 days, abdominal discomfort and decreased appetite due to constipation and disliking food at facility     Interventions  (treatment strategy):  1) Collaboration with other providers  2) Fat and mineral modified diet (Na) Cardiac diet     Nutrition Diagnosis Status:   Ongoing

## 2020-09-23 PROCEDURE — 97116 GAIT TRAINING THERAPY: CPT | Mod: CQ

## 2020-09-23 PROCEDURE — 97530 THERAPEUTIC ACTIVITIES: CPT | Mod: CQ

## 2020-09-23 PROCEDURE — 97110 THERAPEUTIC EXERCISES: CPT

## 2020-09-23 PROCEDURE — 27000221 HC OXYGEN, UP TO 24 HOURS

## 2020-09-23 PROCEDURE — 25000003 PHARM REV CODE 250: Performed by: NURSE PRACTITIONER

## 2020-09-23 PROCEDURE — 11000004 HC SNF PRIVATE

## 2020-09-23 PROCEDURE — 97110 THERAPEUTIC EXERCISES: CPT | Mod: CQ

## 2020-09-23 PROCEDURE — 94761 N-INVAS EAR/PLS OXIMETRY MLT: CPT

## 2020-09-23 PROCEDURE — 97535 SELF CARE MNGMENT TRAINING: CPT

## 2020-09-23 PROCEDURE — 63600175 PHARM REV CODE 636 W HCPCS: Performed by: PHYSICIAN ASSISTANT

## 2020-09-23 PROCEDURE — 25000003 PHARM REV CODE 250: Performed by: PHYSICIAN ASSISTANT

## 2020-09-23 RX ORDER — ACETAMINOPHEN 325 MG/1
650 TABLET ORAL EVERY 4 HOURS PRN
Refills: 0 | COMMUNITY
Start: 2020-09-23

## 2020-09-23 RX ORDER — FUROSEMIDE 40 MG/1
40 TABLET ORAL DAILY
Qty: 30 TABLET | Refills: 3 | Status: SHIPPED | OUTPATIENT
Start: 2020-09-23 | End: 2021-09-23

## 2020-09-23 RX ORDER — TRAMADOL HYDROCHLORIDE 50 MG/1
25 TABLET ORAL EVERY 6 HOURS PRN
Qty: 30 TABLET | Refills: 0 | Status: ON HOLD | OUTPATIENT
Start: 2020-09-23 | End: 2020-10-11 | Stop reason: SDUPTHER

## 2020-09-23 RX ORDER — POLYETHYLENE GLYCOL 3350 17 G/17G
17 POWDER, FOR SOLUTION ORAL DAILY
Refills: 0 | COMMUNITY
Start: 2020-09-24

## 2020-09-23 RX ORDER — AMOXICILLIN 250 MG
1 CAPSULE ORAL 2 TIMES DAILY
COMMUNITY
Start: 2020-09-23

## 2020-09-23 RX ORDER — AMLODIPINE BESYLATE 2.5 MG/1
2.5 TABLET ORAL DAILY
Start: 2020-09-23

## 2020-09-23 RX ORDER — PANTOPRAZOLE SODIUM 40 MG/1
40 TABLET, DELAYED RELEASE ORAL DAILY
Qty: 30 TABLET | Refills: 3 | Status: SHIPPED | OUTPATIENT
Start: 2020-09-23 | End: 2021-09-23

## 2020-09-23 RX ORDER — METOPROLOL SUCCINATE 25 MG/1
25 TABLET, EXTENDED RELEASE ORAL DAILY
Qty: 30 TABLET | Refills: 11 | Status: SHIPPED | OUTPATIENT
Start: 2020-09-24 | End: 2021-09-24

## 2020-09-23 RX ADMIN — TRAMADOL HYDROCHLORIDE 25 MG: 50 TABLET, FILM COATED ORAL at 06:09

## 2020-09-23 RX ADMIN — HEPARIN SODIUM 5000 UNITS: 5000 INJECTION INTRAVENOUS; SUBCUTANEOUS at 06:09

## 2020-09-23 RX ADMIN — DOCUSATE SODIUM 50MG AND SENNOSIDES 8.6MG 1 TABLET: 8.6; 5 TABLET, FILM COATED ORAL at 08:09

## 2020-09-23 RX ADMIN — HEPARIN SODIUM 5000 UNITS: 5000 INJECTION INTRAVENOUS; SUBCUTANEOUS at 08:09

## 2020-09-23 RX ADMIN — TRAMADOL HYDROCHLORIDE 25 MG: 50 TABLET, FILM COATED ORAL at 08:09

## 2020-09-23 RX ADMIN — ACETAMINOPHEN 650 MG: 325 TABLET ORAL at 06:09

## 2020-09-23 RX ADMIN — TRAMADOL HYDROCHLORIDE 25 MG: 50 TABLET, FILM COATED ORAL at 01:09

## 2020-09-23 RX ADMIN — MELATONIN TAB 3 MG 6 MG: 3 TAB at 08:09

## 2020-09-23 RX ADMIN — FUROSEMIDE 40 MG: 40 TABLET ORAL at 08:09

## 2020-09-23 RX ADMIN — METOPROLOL SUCCINATE 25 MG: 25 TABLET, EXTENDED RELEASE ORAL at 08:09

## 2020-09-23 RX ADMIN — POLYETHYLENE GLYCOL 3350 17 G: 17 POWDER, FOR SOLUTION ORAL at 08:09

## 2020-09-23 RX ADMIN — PANTOPRAZOLE SODIUM 40 MG: 40 TABLET, DELAYED RELEASE ORAL at 08:09

## 2020-09-23 RX ADMIN — HEPARIN SODIUM 5000 UNITS: 5000 INJECTION INTRAVENOUS; SUBCUTANEOUS at 01:09

## 2020-09-23 NOTE — PT/OT/SLP PROGRESS
Occupational Therapy  Treatment    Fiona Arteaga   MRN: [id number]   Admitting Diagnosis: SDH (subdural hematoma)    OT Date of Treatment: 09/23/20     Billable Minutes:  Self Care/Home Management 30 and Therapeutic Exercise 17    General Precautions: Standard, fall  Orthopedic Precautions: N/A  Braces: N/A    Spiritual, Cultural Beliefs, Church Practices, Values that Affect Care: no    Subjective:  Communicated with nursing prior to session.  I'm ready but I have a headache.    Pain/Comfort  Pain Rating 1: 8/10  Location - Side 1: Left  Location - Orientation 1: generalized  Location 1: head    Objective:  Patient found with: oxygen    Occupational Performance:    Bed Mobility:    · Patient completed Rolling/Turning to Right with supervision  · Patient completed Supine to Sit with supervision     Functional Mobility/Transfers:  · Patient completed Sit <> Stand Transfer with supervision  with  rolling walker   · Patient completed Bed <> Chair Transfer using Stand Pivot technique with supervision with rolling walker  · Functional Mobility: Pt ambulated 2 ft from bed to W/C and 6 ft from W/C to bedside chair all with supervision and RW    Activities of Daily Living:  · Grooming: set up  Assistance seated sinkside  · Bathing: supervision seated sinkside   · Upper Body Dressing: supervision to toby down seated in W/C  · Lower Body Dressing: supervision to toby socks and pants seated in W/C     OT Exercises: UE Ergometer 15 on low resistance. UE exercises performed to increase functional endurance and strength in order increase independence when performing self care tasks, functional ambulation, W/C propulsion , functional standing activities as well as when performing functional tasks.    Patient left up in chair with all lines intact and call button in reach    [facility] 6 Click:  AMPA Total Score: 23    ASSESSMENT:  Fiona Arteaga is a 88 y.o. female with a medical diagnosis of SDH (subdural hematoma). Pt was  motivated and participated well in session without incident. Pt presents with pain in L hip and Headache on L side but is progressing towards her goals. Pt would continue to benefit from skilled Ot intervention.    Rehab identified problem list/impairments: impaired endurance, impaired sensation, impaired self care skills, impaired functional mobilty, pain, visual deficits, decreased lower extremity function    Rehab potential is good    Activity tolerance: Good    Discharge recommendations: home with home health     Barriers to discharge: None     Equipment recommendations: walker, rolling     GOALS:   Multidisciplinary Problems     Occupational Therapy Goals        Problem: Occupational Therapy Goal    Goal Priority Disciplines Outcome Interventions   Occupational Therapy Goal     OT, PT/OT Ongoing, Progressing    Description: Goals to be met by: 9/30/2020    Patient will increase functional independence with ADLs by performing:    UE Dressing with Supervision.  LE Dressing with Supervision.  Grooming while standing at sink with Set-up Assistance.  Toileting from toilet with Supervision for hygiene and clothing management.   Bathing from  sitting at sink with Supervision.  Rolling to Bilateral with Supervision.   Supine to sit with Supervision.  Stand pivot transfers with Supervision.  Toilet transfer to toilet with Supervision.                       Plan:  Patient to be seen 5 x/week to address the above listed problems via self-care/home management  Plan of Care expires: 10/16/20  Plan of Care reviewed with: patient    Sae Diana, MEGHANA  09/23/2020     I certify that I was present in the room directing the student in service delivery and guiding them using my skilled judgment.  As the co-signing therapist I have reviewed the student's documentation and am responsible for the treatment, assessment, and plan.    Sae MEGHANA Ortiz  9/23/2020

## 2020-09-23 NOTE — TREATMENT PLAN
"Rehab Services' DME recommendations    Fiona Arteaga  MRN: [id number]      [x] Walker Adult (5'4"-6"6")      Wheels Yes      [x] Home health PT and OT      Tasha Ewing, PTA 9/23/2020      "

## 2020-09-23 NOTE — PLAN OF CARE
Spoke to Angelic Edge (niece) during IDT. Patient wants to leave Friday 9/25. Angelic will arrive at 11 am. Angelic does not work and is able to provide assistance to the patient once she is discharged. PT/OT update provided. CM will continue to follow up.    IDT Meeting:    PRERNA Chen, MDS Coordinator  Kayla, Unit Manager  Dr. Dieudonne Nichols, Admit Coordinator  Seble,

## 2020-09-23 NOTE — PT/OT/SLP PROGRESS
Physical Therapy  Treatment    Fiona Arteaga   MRN: [id number]   Admitting Diagnosis: SDH (subdural hematoma)    PT Received On: 09/23/20          Billable Minutes:  Gait Training 10, Therapeutic Activity 10 and Therapeutic Exercise 18    Treatment Type: Treatment  PT/PTA: PTA     PTA Visit Number: 2       General Precautions: Standard, aspiration, fall, seizure  Orthopedic Precautions: N/A   Braces: N/A    Spiritual, Cultural Beliefs, Hinduism Practices, Values that Affect Care: no    Subjective:  Communicated with nursing prior to session.  Pt agreed to work with therapy.     Pain/Comfort  Pain Rating 1: 0/10  Pain Rating 2: 0/10    Objective:  Patient found supine in bed. Patient found with: oxygen     AM-PAC 6 CLICK MOBILITY  Total Score:17    Bed Mobility:  Sit>Supine:on bed with SPV  Supine>Sit: on bed with SPV    Transfers:  Sit<>Stand: to/from EOB, to/from bedside chair  w/ RW and SBA/SPV  Stand Pivot Transfer: EOB<> bedside chair w/ RW and SBA/SPV    Gait:  Amb x2 trials of ~110ft each with RW and close SBA. No LOB noted noted.      Advanced Gait:  Curb Step: ascend/descend 4 inch step w/ RW and CGA for safety and cueing for proper sequencing.     Therex:  BLE therex in standing with BUE support:   -heel raises   -hip abd/add   -mini-squats    Additional Treatment:  -Mini-elliptical x15 min    Patient left HOB elevated with call button in reach and nursing notified.    Assessment:  Fiona Arteaga is a 88 y.o. female with a medical diagnosis of SDH (subdural hematoma).  Pt tolerated treatment well, and will continue to benefit from PT services at this time. Continue with PT POC as indicated.    Rehab identified problem list/impairments: weakness, impaired endurance, impaired self care skills, gait instability, impaired functional mobilty, impaired balance, decreased lower extremity function, pain    Rehab potential is good.    Activity tolerance: Good    Discharge recommendations: home with home  health(w/ intermittent assistance recomended for safety)     Barriers to discharge: None    Equipment recommendations: bedside commode, walker, rolling     GOALS:   Multidisciplinary Problems     Physical Therapy Goals        Problem: Physical Therapy Goal    Goal Priority Disciplines Outcome Goal Variances Interventions   Physical Therapy Goal     PT, PT/OT Ongoing, Progressing     Description: Goals to be met by: 20     Patient will increase functional independence with mobility by performin. Supine to sit with Set-up [place]  2. Sit to supine with Set-up [place]  3. Rolling to Left and Right with Set-up Assistance.  4. Sit to stand transfer with Supervision  5. Bed to chair transfer with Supervision using Rolling Walker  6. Gait  x 100 feet with Supervision using Rolling Walker.   7. Ascend/Descend 4 inch curb step with Stand-by Assistance using Rolling Walker.  8. Stand for 3 minutes with Stand-by Assistance using Rolling Walker while performing an activity  9. Pt will complete a car transfer (Actual or Simulated) w/ RW and SBA  10. Pt will use a reacher to  5 objects from the floor in a standing position w/ a RW and SPV                     PLAN:    Patient to be seen 5 x/week  to address the above listed problems via gait training, therapeutic activities, therapeutic exercises  Plan of Care expires: 10/16/20  Plan of Care reviewed with: patient    Tasha Ewing, PTA  2020

## 2020-09-23 NOTE — PLAN OF CARE
[facility] - Skilled Nursing Care    HOME HEALTH ORDERS  FACE TO FACE ENCOUNTER    Patient Name: Fiona Arteaga  YOB: 1932    PCP: Primary Doctor No   PCP Address: None  PCP Phone Number: None  PCP Fax: None    Encounter Date: 09/23/2020    Admit to Home Health    Diagnoses:  Active Hospital Problems    Diagnosis  POA    *SDH (subdural hematoma) [S06.5X9A]  Yes    Anemia of chronic disease [D63.8]  Yes    Impaired mobility and endurance [Z74.09]  Yes    Fall [W19.XXXA]  Yes    Bradycardia [R00.1]  Yes    Longstanding persistent atrial fibrillation [I48.11]  Yes    Idiopathic thrombocytopenic purpura (ITP) [D69.3]  Yes    (HFpEF) heart failure with preserved ejection fraction [I50.30]  Yes    Benign essential hypertension [I10]  Yes      Resolved Hospital Problems   No resolved problems to display.       Future Appointments   Date Time Provider Department Center   9/24/2020  7:45 AM [facility] SPEC LAB [facility] SPECLAB [facility]   9/29/2020 12:15 PM [facility] OIC-CT1 500 LB LIMIT [facility]   9/29/2020  1:30 PM Dileep Arredondo MD [facility] NEUROS[address]     Follow-up Information     Schedule an appointment as soon as possible for a visit with Primary Doctor No.    Why: WITHIN 1 WEEK                 I have seen and examined this patient face to face today. My clinical findings that support the need for the home health skilled services and home bound status are the following:  Weakness/numbness causing balance and gait disturbance due to Subdural hematoma making it taxing to leave home.    Allergies:Review of patient's allergies indicates:  No Known Allergies    Activities: activity as tolerated    Nursing:   SN to complete comprehensive assessment including routine vital signs. Instruct on disease process and s/s of complications to report to MD. Review/verify medication list sent home with the patient at time of discharge  and instruct  patient/caregiver as needed. Frequency may be adjusted depending on start of care date.    Notify MD if SBP > 160 or < 90; DBP > 90 or < 50; HR > 120 or < 50; Temp > 101    CONSULTS:    Physical Therapy to evaluate and treat. Evaluate for home safety and equipment needs; Establish/upgrade home exercise program. Perform / instruct on therapeutic exercises, gait training, transfer training, and Range of Motion.  Occupational Therapy to evaluate and treat. Evaluate home environment for safety and equipment needs. Perform/Instruct on transfers, ADL training, ROM, and therapeutic exercises.  Aide to provide assistance with personal care, ADLs, and vital signs.    MISCELLANEOUS CARE:  N/A    WOUND CARE ORDERS  n/a    Medications: Review discharge medications with patient and family and provide education.      I certify that this patient is confined to her home and needs intermittent skilled nursing care, physical therapy and occupational therapy.

## 2020-09-23 NOTE — NURSING
luz Li, q2h rounding done this shift, safety maintained, tolerated therapy's today with mild pain, medicine given when requested, all meds given whole, no c/o pain at this current time.

## 2020-09-23 NOTE — CARE UPDATE
Spoke with Angelic Edge gabyece at [phone].     Patient/Family goals/wishes/concerns/needs:  To be discharged by Friday.     Updates on current plan of care: N/A for medical concerns. Therapy manager advised that she is doing well with therapy. She recommended that someone standby with her for a few days because of her risk for falls. Ms. Edge agreed to standing by with her.  D/C scheduled for 11 AM Friday.           Attended by:   ARLEY Griffith    - Seble Garsia   Therapy Manager - Tiffany Olsen    Admissions - Magdalena Sosa   MDS - Xochilt Bustamante  Physician - Dr. Bro   - Kayla Guevara

## 2020-09-24 ENCOUNTER — LAB VISIT (OUTPATIENT)
Dept: LAB | Facility: OTHER | Age: 85
End: 2020-09-24
Payer: MEDICARE

## 2020-09-24 DIAGNOSIS — Z03.818 ENCOUNTER FOR OBSERVATION FOR SUSPECTED EXPOSURE TO OTHER BIOLOGICAL AGENTS RULED OUT: ICD-10-CM

## 2020-09-24 LAB
ANION GAP SERPL CALC-SCNC: 9 MMOL/L (ref 8–16)
BASOPHILS # BLD AUTO: 0.02 K/UL (ref 0–0.2)
BASOPHILS NFR BLD: 0.3 % (ref 0–1.9)
BUN SERPL-MCNC: 44 MG/DL (ref 8–23)
CALCIUM SERPL-MCNC: 9.2 MG/DL (ref 8.7–10.5)
CHLORIDE SERPL-SCNC: 95 MMOL/L (ref 95–110)
CO2 SERPL-SCNC: 32 MMOL/L (ref 23–29)
CREAT SERPL-MCNC: 1.5 MG/DL (ref 0.5–1.4)
DIFFERENTIAL METHOD: ABNORMAL
EOSINOPHIL # BLD AUTO: 0.2 K/UL (ref 0–0.5)
EOSINOPHIL NFR BLD: 2.7 % (ref 0–8)
ERYTHROCYTE [DISTWIDTH] IN BLOOD BY AUTOMATED COUNT: 12.6 % (ref 11.5–14.5)
EST. GFR  (AFRICAN AMERICAN): 35.6 ML/MIN/1.73 M^2
EST. GFR  (NON AFRICAN AMERICAN): 30.9 ML/MIN/1.73 M^2
GLUCOSE SERPL-MCNC: 87 MG/DL (ref 70–110)
HCT VFR BLD AUTO: 35 % (ref 37–48.5)
HGB BLD-MCNC: 10.5 G/DL (ref 12–16)
IMM GRANULOCYTES # BLD AUTO: 0.13 K/UL (ref 0–0.04)
IMM GRANULOCYTES NFR BLD AUTO: 1.7 % (ref 0–0.5)
LYMPHOCYTES # BLD AUTO: 1.5 K/UL (ref 1–4.8)
LYMPHOCYTES NFR BLD: 18.9 % (ref 18–48)
MAGNESIUM SERPL-MCNC: 2.2 MG/DL (ref 1.6–2.6)
MCH RBC QN AUTO: 31.1 PG (ref 27–31)
MCHC RBC AUTO-ENTMCNC: 30 G/DL (ref 32–36)
MCV RBC AUTO: 104 FL (ref 82–98)
MONOCYTES # BLD AUTO: 1.1 K/UL (ref 0.3–1)
MONOCYTES NFR BLD: 14 % (ref 4–15)
NEUTROPHILS # BLD AUTO: 4.8 K/UL (ref 1.8–7.7)
NEUTROPHILS NFR BLD: 62.4 % (ref 38–73)
NRBC BLD-RTO: 0 /100 WBC
PHOSPHATE SERPL-MCNC: 4.5 MG/DL (ref 2.7–4.5)
PLATELET # BLD AUTO: 387 K/UL (ref 150–350)
PMV BLD AUTO: 9.6 FL (ref 9.2–12.9)
POTASSIUM SERPL-SCNC: 5.5 MMOL/L (ref 3.5–5.1)
RBC # BLD AUTO: 3.38 M/UL (ref 4–5.4)
SARS-COV-2 RNA RESP QL NAA+PROBE: NOT DETECTED
SODIUM SERPL-SCNC: 136 MMOL/L (ref 136–145)
WBC # BLD AUTO: 7.76 K/UL (ref 3.9–12.7)

## 2020-09-24 PROCEDURE — 83735 ASSAY OF MAGNESIUM: CPT

## 2020-09-24 PROCEDURE — U0003 INFECTIOUS AGENT DETECTION BY NUCLEIC ACID (DNA OR RNA); SEVERE ACUTE RESPIRATORY SYNDROME CORONAVIRUS 2 (SARS-COV-2) (CORONAVIRUS DISEASE [COVID-19]), AMPLIFIED PROBE TECHNIQUE, MAKING USE OF HIGH THROUGHPUT TECHNOLOGIES AS DESCRIBED BY CMS-2020-01-R: HCPCS

## 2020-09-24 PROCEDURE — 94761 N-INVAS EAR/PLS OXIMETRY MLT: CPT

## 2020-09-24 PROCEDURE — 85025 COMPLETE CBC W/AUTO DIFF WBC: CPT

## 2020-09-24 PROCEDURE — 97110 THERAPEUTIC EXERCISES: CPT | Mod: CO

## 2020-09-24 PROCEDURE — 63600175 PHARM REV CODE 636 W HCPCS: Performed by: PHYSICIAN ASSISTANT

## 2020-09-24 PROCEDURE — 25000003 PHARM REV CODE 250: Performed by: HOSPITALIST

## 2020-09-24 PROCEDURE — 80048 BASIC METABOLIC PNL TOTAL CA: CPT

## 2020-09-24 PROCEDURE — 97116 GAIT TRAINING THERAPY: CPT | Mod: CQ

## 2020-09-24 PROCEDURE — 11000004 HC SNF PRIVATE

## 2020-09-24 PROCEDURE — 27000221 HC OXYGEN, UP TO 24 HOURS

## 2020-09-24 PROCEDURE — 97535 SELF CARE MNGMENT TRAINING: CPT | Mod: CO

## 2020-09-24 PROCEDURE — 25000003 PHARM REV CODE 250: Performed by: PHYSICIAN ASSISTANT

## 2020-09-24 PROCEDURE — 84100 ASSAY OF PHOSPHORUS: CPT

## 2020-09-24 PROCEDURE — 97110 THERAPEUTIC EXERCISES: CPT | Mod: CQ

## 2020-09-24 PROCEDURE — 25000003 PHARM REV CODE 250: Performed by: NURSE PRACTITIONER

## 2020-09-24 PROCEDURE — 99900035 HC TECH TIME PER 15 MIN (STAT)

## 2020-09-24 PROCEDURE — 36415 COLL VENOUS BLD VENIPUNCTURE: CPT

## 2020-09-24 PROCEDURE — 97530 THERAPEUTIC ACTIVITIES: CPT | Mod: CQ

## 2020-09-24 RX ORDER — SODIUM CHLORIDE 9 MG/ML
INJECTION, SOLUTION INTRAVENOUS CONTINUOUS
Status: ACTIVE | OUTPATIENT
Start: 2020-09-24 | End: 2020-09-24

## 2020-09-24 RX ADMIN — TRAMADOL HYDROCHLORIDE 25 MG: 50 TABLET, FILM COATED ORAL at 02:09

## 2020-09-24 RX ADMIN — SODIUM CHLORIDE: 0.9 INJECTION, SOLUTION INTRAVENOUS at 07:09

## 2020-09-24 RX ADMIN — HEPARIN SODIUM 5000 UNITS: 5000 INJECTION INTRAVENOUS; SUBCUTANEOUS at 02:09

## 2020-09-24 RX ADMIN — METOPROLOL SUCCINATE 25 MG: 25 TABLET, EXTENDED RELEASE ORAL at 08:09

## 2020-09-24 RX ADMIN — DOCUSATE SODIUM 50MG AND SENNOSIDES 8.6MG 1 TABLET: 8.6; 5 TABLET, FILM COATED ORAL at 08:09

## 2020-09-24 RX ADMIN — PANTOPRAZOLE SODIUM 40 MG: 40 TABLET, DELAYED RELEASE ORAL at 08:09

## 2020-09-24 RX ADMIN — HEPARIN SODIUM 5000 UNITS: 5000 INJECTION INTRAVENOUS; SUBCUTANEOUS at 09:09

## 2020-09-24 RX ADMIN — POLYETHYLENE GLYCOL 3350 17 G: 17 POWDER, FOR SOLUTION ORAL at 08:09

## 2020-09-24 RX ADMIN — MELATONIN TAB 3 MG 6 MG: 3 TAB at 08:09

## 2020-09-24 RX ADMIN — FUROSEMIDE 40 MG: 40 TABLET ORAL at 08:09

## 2020-09-24 RX ADMIN — ACETAMINOPHEN 650 MG: 325 TABLET ORAL at 08:09

## 2020-09-24 RX ADMIN — ONDANSETRON 4 MG: 4 TABLET, ORALLY DISINTEGRATING ORAL at 09:09

## 2020-09-24 RX ADMIN — ACETAMINOPHEN 650 MG: 325 TABLET ORAL at 09:09

## 2020-09-24 RX ADMIN — HEPARIN SODIUM 5000 UNITS: 5000 INJECTION INTRAVENOUS; SUBCUTANEOUS at 05:09

## 2020-09-24 RX ADMIN — ACETAMINOPHEN 650 MG: 325 TABLET ORAL at 05:09

## 2020-09-24 RX ADMIN — LISINOPRIL 40 MG: 20 TABLET ORAL at 08:09

## 2020-09-24 NOTE — PLAN OF CARE
Problem: Adult Inpatient Plan of Care  Goal: Plan of Care Review  Outcome: Ongoing, Progressing     Problem: Adult Inpatient Plan of Care  Goal: Patient-Specific Goal (Individualization)  Outcome: Ongoing, Progressing     Problem: Wound  Goal: Optimal Wound Healing  Outcome: Ongoing, Progressing     Problem: Pain (Stroke, Hemorrhagic)  Goal: Acceptable Pain Control  Outcome: Ongoing, Progressing     Problem: Fall Injury Risk  Goal: Absence of Fall and Fall-Related Injury  Outcome: Ongoing, Progressing     Problem: Skin Injury Risk Increased  Goal: Skin Health and Integrity  Outcome: Ongoing, Progressing

## 2020-09-24 NOTE — PT/OT/SLP PROGRESS
Occupational Therapy  Treatment    Fiona Arteaga   MRN: [id number]   Admitting Diagnosis: SDH (subdural hematoma)    OT Date of Treatment: 09/24/20       Billable Minutes:  Self Care/Home Management 13 and Therapeutic Exercise 25    General Precautions: Standard, fall  Orthopedic Precautions: N/A  Braces: N/A    Spiritual, Cultural Beliefs, Holiness Practices, Values that Affect Care: no    Subjective:  Communicated with nsgprior to session.  I am doing well today I just gets a little dizzy    Pain/Comfort  Pain Rating 1: 0/10  Pain Rating 2: 0/10    Objective:   Pt. Supine on arrival    Occupational Performance:    Bed Mobility:    · Patient completed Supine to Sit with stand by assistance  · Patient completed Sit to Supine with stand by assistance     Functional Mobility/Transfers:  · Patient completed Sit <> Stand Transfer with stand by assistance  with  no assistive device   · Patient completed Bed <> Chair Transfer using Stand Pivot technique with stand by assistance with no assistive device  · Patient completed Toilet Transfer Stand Pivot technique with stand by assistance with  rolling walker      Activities of Daily Living:  · Grooming: stand by assistance at sink level  · Toileting: stand by assistance with cleaning  mangement from 3n1 level    [facility] 6 Click:  [facility] Total Score: 23    OT Exercises: UE Ergometer 10 min    Additional Treatment:   Pt. With 2# dowel activity with 2x20 reps with  shd flex, bicep curls horz adb/add and forward flex motion to increase BUE ROM and strength,.   Pt. With standing and therex performed to increase ROM, endurance selfcare task and fxl mobility for independence   Patient left supine with all lines intact and call button in reach    ASSESSMENT:  Fiona Arteaga is a 88 y.o. female with a medical diagnosis of SDH (subdural hematoma) Pt. participated well with session on this day.Pt demos physical deficits with balance  functional mobility, UB strength, endurance   level of functional indep with daily tasks and activities and selfcare skills .Pt. Will continue to benefit from continued OT to progress towards goals      Rehab identified problem list/impairments: impaired endurance, impaired sensation, impaired self care skills, impaired functional mobilty, pain, visual deficits, decreased lower extremity function    Rehab potential is fair    Activity tolerance: Fair    Discharge recommendations: home with home health     Barriers to discharge: None     Equipment recommendations: walker, rolling     GOALS:   Multidisciplinary Problems     Occupational Therapy Goals        Problem: Occupational Therapy Goal    Goal Priority Disciplines Outcome Interventions   Occupational Therapy Goal     OT, PT/OT Ongoing, Progressing    Description: Goals to be met by: 9/30/2020    Patient will increase functional independence with ADLs by performing:    UE Dressing with Supervision.  LE Dressing with Supervision.  Grooming while standing at sink with Set-up Assistance.  Toileting from toilet with Supervision for hygiene and clothing management.   Bathing from  sitting at sink with Supervision.  Rolling to Bilateral with Supervision.   Supine to sit with Supervision.  Stand pivot transfers with Supervision.  Toilet transfer to toilet with Supervision.                     Plan:  Patient to be seen 5 x/week to address the above listed problems via self-care/home management  Plan of Care expires: 10/16/20  Plan of Care reviewed with: patient  The ANITRA and LUIS E have collaborated and discussed the patient's status, treatment plan and progress toward established goals.   NISHA Call 9/24/2020

## 2020-09-24 NOTE — PT/OT/SLP PROGRESS
Physical Therapy  Treatment    Fiona Arteaga   MRN: [id number]   Admitting Diagnosis: SDH (subdural hematoma)    PT Received On: 09/24/20          Billable Minutes:  Gait Training 10, Therapeutic Activity 26 and Therapeutic Exercise 17    Treatment Type: Treatment  PT/PTA: PTA     PTA Visit Number: 3       General Precautions: Standard, aspiration, fall, seizure  Orthopedic Precautions: N/A   Braces: N/A    Spiritual, Cultural Beliefs, Sabianist Practices, Values that Affect Care: no    Subjective:  Communicated with nursing prior to session.  Pt agreed to work with therapy.     Pain/Comfort  Pain Rating 1: 0/10  Pain Rating 2: 0/10    Objective:  Patient found supine in bed.  Patient found with: oxygen     AM-PAC 6 CLICK MOBILITY  Total Score:17    Bed Mobility:  Sit>Supine: on bed with SBA/SPV  Supine>Sit: on bed with SBA/SPV    Transfers:  Sit<>Stand: to/from EOB, to/from bedside chair x2 trials w/ RW and CG-SBA for safety  Stand Pivot Transfer: EOB<>bedside chair w/ RW and CG-SBA for safety    Gait:  Amb ~100ft w/ RW and CG-SBA for safety. Multiple turns made. No LOB noted.      Advanced Gait:  Curb Step: ascend/descend 4inch curb step w/ RW and CGA for safety. Cueing for proper sequencing.     Therex:  BLE therex 2x10 reps:   -AP   -LAQ   -Hip flexion    -GS   -Hip abd/add    Balance:  -Pt stood ~2.5 min with RW for unilateral support and SBA for safety while pt moved 3# hand weight left<>right on tray alternating BUEs.    Additional Treatment:  -Pt performed (simulated) car transfer in room with RW and CGA for safety. Cueing for safety.   -Pt picked up (5) objects using reacher and RW for unilateral support. Pt required CGA for safety.  -Mini-Elliptical x12 min    Patient left HOB elevated with all lines intact, call button in reach and nursing notified.    Assessment:  Fiona Arteaga is a 88 y.o. female with a medical diagnosis of SDH (subdural hematoma).  Pt tolerated treatment well, and will  continue to benefit from PT services at this time to improve all deficits noted below. Continue with PT POC as indicated.    Rehab identified problem list/impairments: weakness, impaired endurance, impaired self care skills, gait instability, impaired functional mobilty, impaired balance, decreased lower extremity function, pain    Rehab potential is good.    Activity tolerance: Good    Discharge recommendations: home with home health(w/ intermittent assistance recomended for safety)     Barriers to discharge: None    Equipment recommendations: bedside commode, walker, rolling     GOALS:   Multidisciplinary Problems     Physical Therapy Goals        Problem: Physical Therapy Goal    Goal Priority Disciplines Outcome Goal Variances Interventions   Physical Therapy Goal     PT, PT/OT Ongoing, Progressing     Description: Goals to be met by: 20     Patient will increase functional independence with mobility by performin. Supine to sit with Set-up [place]  2. Sit to supine with Set-up [place]  3. Rolling to Left and Right with Set-up Assistance.  4. Sit to stand transfer with Supervision  5. Bed to chair transfer with Supervision using Rolling Walker  6. Gait  x 100 feet with Supervision using Rolling Walker.   7. Ascend/Descend 4 inch curb step with Stand-by Assistance using Rolling Walker.  8. Stand for 3 minutes with Stand-by Assistance using Rolling Walker while performing an activity  9. Pt will complete a car transfer (Actual or Simulated) w/ RW and SBA  10. Pt will use a reacher to  5 objects from the floor in a standing position w/ a RW and SPV                     PLAN:    Patient to be seen 5 x/week  to address the above listed problems via gait training, therapeutic activities, therapeutic exercises  Plan of Care expires: 10/16/20  Plan of Care reviewed with: patient    Tasha Ewing, PTA  2020

## 2020-09-24 NOTE — PLAN OF CARE
09/24/20 1612   Post-Acute Status   Post-Acute Authorization Home Health   Post-Acute Placement Status Set-up Complete     [facility] will follow up. Niece will arrive at 11 am.

## 2020-09-24 NOTE — PLAN OF CARE
09/24/20 1047   Post-Acute Status   Post-Acute Authorization Home Health   Post-Acute Placement Status Referrals Sent     Multiple Home Health Referrals sent via . Awaiting approval.

## 2020-09-24 NOTE — PROGRESS NOTES
[facility]                                  Skilled Nursing Facility                   Progress Note     Admit Date: 9/15/2020  KYLE 9/25/2020  Principal Problem:  SDH (subdural hematoma)   HPI obtained from patient interview and chart review     Chief Complaint:Re-evaluation of medical treatment and therapy status: Lab review, re-evaluation of acute hypoxic respiratory failure, mild CESARIO    HPI:   Ms. Arteaga is an 88 year old female PMHx of HTN and chronic atrial fibrillation on apixaban who presents to SNF following hospitalization for subdural hematoma.  Admission to SNF for secondary weakness and debility.    Interval history: All of today's labs reviewed and are listed below.  BUN/creatinine increased to 44/1.5 from 32/1.0.  24 hr vital sign ranges listed below.  Patient has adequate SpO2 on room air today.  Patient continues to endorse intermittent headaches, this is to be expected with a history of her subdural hematoma.   Patient also endorses intermittent nausea, patient utilizing PRN Zofran.  Patient reports an inadequate appetite.  Patient denies dysuria.  Patient reports having regular bowel movements.  Patient progessing with PT/OT-Amb ~100ft w/ RW and CG-SBA for safety. Multiple turns made. No LOB noted. Multiple turns made. No LOB noted. Continuing to follow and treat all acute and chronic conditions.    Past Medical History: Patient has a past medical history of A-fib, Anticoagulant long-term use, Cancer, and Hypertension.    Past Surgical History: Patient has no past surgical history on file.    Social History: Patient reports that she has never smoked. She does not have any smokeless tobacco history on file. She reports that she does not drink alcohol or use drugs.    Family History:  No significant history to report    Allergies: Patient has No Known Allergies.    ROS  Constitutional: Negative for fever   Eyes: Negative for  blurred vision, double vision   Respiratory: Negative for cough, shortness of breath   Cardiovascular: Negative for chest pain, palpitations, and leg swelling.   Gastrointestinal: Negative for abdominal pain,, diarrhea, vomiting, constipation.  +nausea  Genitourinary: Negative for dysuria, frequency   Musculoskeletal:  + generalized weakness. Negative for back pain and myalgias.   Skin: Negative for itching and rash.   Neurological:  + for dizziness, headaches.   Psychiatric/Behavioral: Negative for depression. The patient is not nervous/anxious.      24 hour Vital Sign Range   Temp:  [97.1 °F (36.2 °C)-97.9 °F (36.6 °C)]   Pulse:  [86-88]   Resp:  [18]   BP: (116-135)/(53-55)   SpO2:  [93 %-96 %]     PEx  Constitutional: Patient appears debilitated.  No distress noted  HENT:   Head: Normocephalic and atraumatic.   Eyes: Pupils are equal, round  Neck: Normal range of motion. Neck supple.   Cardiovascular: Normal rate, irregular rhythm +murmur    Pulmonary/Chest: Effort normal and breath sounds are clear  Abdominal: Soft. Bowel sounds are normal.   Musculoskeletal: Normal range of motion.   Neurological: Alert and oriented to person, place, and time.   Psychiatric: Normal mood and affect. Behavior is normal.   Skin: Skin is warm and dry. Ecchymosis to left eye.  No skin breakdown noted    No results for input(s): GLUCOSE, NA, K, CL, CO2, BUN, CREATININE, MG in the last 24 hours.    Invalid input(s):  CALCIUM    Recent Labs   Lab 09/24/20  0447   WBC 7.76   RBC 3.38*   HGB 10.5*   HCT 35.0*   *   *   MCH 31.1*   MCHC 30.0*         Assessment and Plan:     Problems addressed today      CESARIO-new  - initiated 250 mL of normal saline at 100 mL/hr, will recheck BMP tomorrow    Long standing persistent atrial fibrillation  Bradycardia  - Had bradycardia during hospital stay so metoprolol succinate 50 mg BID on hold.   - Holding apixaban for now until follow up with NSGY.   - 9/24 heart rates are stable remain  in the 80s, continue metoprolol succinate at 25 mg daily, BP stable, patient to follow-up with PCP/cardiologist to increase metoprolol to previous home dosing    Essential hypertension  Heart failure with preserved ejection fraction  - Continue amlodipine 2.5 mg daily, lisinopril 40 mg daily, and furosemide 40 mg daily.   - previously held home metoprolol succinate.   - 9/21 discontinue amlodipine, holding parameters placed on lisinopril, re-initiated metoprolol XL 25 mg daily to start with.  Monitor BP closely as patient is low to normotensive.  - 9/24 BP stable, patient to see PCP/cardiologist increase metoprolol to previous home dosing    Anemia of chronic disease  - Hgb is stable, continue to monitor twice weekly CBCs       Ongoing but stable problems      Traumatic subdural hematoma  Fall  - Follow up with NSGY after discharge with repeat CT head.   - Holding apixaban until follow up.   - Fall precautions.   - Continue Keppra 500 mg BID until 9/19/20.      ITP  -  Platelet count is WNL.   Continue to monitor.      GERD  - Continue daily PPI.      Constipation  - stable, continue MiraLax daily, Continue pericolace BID     Debility   - Continue with PT/OT for gait training and strengthening and restoration of ADL's   - Encourage mobility, OOB in chair, and early ambulation as appropriate  - Fall precautions   - Monitor for bowel and bladder dysfunction  - Monitor for and prevent skin breakdown and pressure ulcers  - Continue DVT prophylaxis with  SCDs and frequent ambulation     Goals of Care:   Restorative  Optimize nutrition  Wound healing  Muscle strengthening  Restoration of ADL's  Improve mobility        Anticipated Disposition:  Home with home health; patient lives alone       Future Appointments   Date Time Provider Department Center   9/29/2020 12:15 PM [facility] OIC-CT1 500 LB LIMIT [facility]   9/29/2020  1:30 PM Dileep Arredondo MD [facility] NEUROS7 Dalton Gann,  NP  Department of Hospital Medicine   [facility]     DOS: 9/24/2020       Patient note was created using MModal Dictation.  Any errors in syntax or even information may not have been identified and edited on initial review prior to signing this note.

## 2020-09-25 VITALS
WEIGHT: 132.69 LBS | BODY MASS INDEX: 26.05 KG/M2 | SYSTOLIC BLOOD PRESSURE: 150 MMHG | HEIGHT: 60 IN | TEMPERATURE: 97 F | RESPIRATION RATE: 16 BRPM | DIASTOLIC BLOOD PRESSURE: 62 MMHG | OXYGEN SATURATION: 93 % | HEART RATE: 79 BPM

## 2020-09-25 LAB
ANION GAP SERPL CALC-SCNC: 7 MMOL/L (ref 8–16)
BUN SERPL-MCNC: 38 MG/DL (ref 8–23)
CALCIUM SERPL-MCNC: 9.3 MG/DL (ref 8.7–10.5)
CHLORIDE SERPL-SCNC: 98 MMOL/L (ref 95–110)
CO2 SERPL-SCNC: 32 MMOL/L (ref 23–29)
CREAT SERPL-MCNC: 1.2 MG/DL (ref 0.5–1.4)
EST. GFR  (AFRICAN AMERICAN): 46.6 ML/MIN/1.73 M^2
EST. GFR  (NON AFRICAN AMERICAN): 40.4 ML/MIN/1.73 M^2
GLUCOSE SERPL-MCNC: 95 MG/DL (ref 70–110)
POTASSIUM SERPL-SCNC: 5 MMOL/L (ref 3.5–5.1)
SODIUM SERPL-SCNC: 137 MMOL/L (ref 136–145)

## 2020-09-25 PROCEDURE — 80048 BASIC METABOLIC PNL TOTAL CA: CPT

## 2020-09-25 PROCEDURE — 63600175 PHARM REV CODE 636 W HCPCS: Performed by: PHYSICIAN ASSISTANT

## 2020-09-25 PROCEDURE — 25000003 PHARM REV CODE 250: Performed by: NURSE PRACTITIONER

## 2020-09-25 PROCEDURE — 25000003 PHARM REV CODE 250: Performed by: HOSPITALIST

## 2020-09-25 PROCEDURE — 36415 COLL VENOUS BLD VENIPUNCTURE: CPT

## 2020-09-25 PROCEDURE — 25000003 PHARM REV CODE 250: Performed by: PHYSICIAN ASSISTANT

## 2020-09-25 RX ADMIN — PANTOPRAZOLE SODIUM 40 MG: 40 TABLET, DELAYED RELEASE ORAL at 10:09

## 2020-09-25 RX ADMIN — HEPARIN SODIUM 5000 UNITS: 5000 INJECTION INTRAVENOUS; SUBCUTANEOUS at 05:09

## 2020-09-25 RX ADMIN — ACETAMINOPHEN 650 MG: 325 TABLET ORAL at 10:09

## 2020-09-25 RX ADMIN — ONDANSETRON 4 MG: 4 TABLET, ORALLY DISINTEGRATING ORAL at 07:09

## 2020-09-25 RX ADMIN — METOPROLOL SUCCINATE 25 MG: 25 TABLET, EXTENDED RELEASE ORAL at 10:09

## 2020-09-25 RX ADMIN — TRAMADOL HYDROCHLORIDE 25 MG: 50 TABLET, FILM COATED ORAL at 10:09

## 2020-09-25 NOTE — NURSING
Nurse reports pt accidentally pulled IV out. Nurse attempted to restart IV and pt refused. Dr Pack notified. No new orders.

## 2020-09-25 NOTE — PROGRESS NOTES
89 y/o female admitted on 9/15/20 for primary diagnois of SDH( subdural hematoma) due to traumatic fall at home. Pt has comorbidities of hypertension, heart failure, atrial fibrillation, bradycardia, anemia of chronic disease and impaired mobility and endurance. Pt requires the use of a walker and stand by assist with ambulation, transfers and adl's. Alert and oriented x 4, incontinent of bladder, requires pull- ups or diapers. Discharge instructions reivewed with patient, verbalized understanding, family member waiting downstairs to  pt. Pct assisted pt downstairs via wc and personal belongings at side

## 2020-09-25 NOTE — PLAN OF CARE
09/25/20 0908   Final Note   Assessment Type Final Discharge Note   Anticipated Discharge Disposition Home-Health     Patient discharging today. [facility] will follow up. Walker delivered to the bedside. Niece will arrive at 11 am.

## 2020-09-25 NOTE — PLAN OF CARE
Problem: Adult Inpatient Plan of Care  Goal: Plan of Care Review  Outcome: Ongoing, Progressing  Goal: Patient-Specific Goal (Individualization)  Outcome: Ongoing, Progressing  Goal: Absence of Hospital-Acquired Illness or Injury  Outcome: Ongoing, Progressing  Goal: Optimal Comfort and Wellbeing  Outcome: Ongoing, Progressing  Goal: Readiness for Transition of Care  Outcome: Ongoing, Progressing  Goal: Rounds/Family Conference  Outcome: Ongoing, Progressing     Problem: Oral Intake Inadequate (Acute Kidney Injury/Impairment)  Goal: Optimal Nutrition Intake  Outcome: Ongoing, Progressing     Problem: Wound  Goal: Optimal Wound Healing  Outcome: Ongoing, Progressing     Problem: Fall Injury Risk  Goal: Absence of Fall and Fall-Related Injury  Outcome: Ongoing, Progressing     Problem: Skin Injury Risk Increased  Goal: Skin Health and Integrity  Outcome: Ongoing, Progressing

## 2020-09-25 NOTE — NURSING
PT reported catheter slipped out while repositioning in bed during IV therapy. One unsuccessful attempt made by nurse, afterwards pt refused further attempts. Charge nurse Kaial notified Dr Pack. [facility].

## 2020-09-28 NOTE — DISCHARGE SUMMARY
[facility] - Skilled Nursing [facility] Medicine  Discharge Summary      Patient Name: Fiona Arteaga  MRN: [id number]  Admission Date: 9/15/2020  Hospital Length of Stay: 10 days  Discharge Date and Time:  09/25/2020   Attending Physician: No att. providers found   Discharging Provider: Shoshana Gann NP  Primary Care Provider: Primary Doctor Opal      HPI:   Ms. Arteaga is an 88 year old female PMHx of HTN and chronic atrial fibrillation on apixaban who presents to SNF following hospitalization for subdural hematoma.  Admission to SNF for secondary weakness and debility.    Patient originally  presented to [facility] ED on 09/12 for evaluation after a fall.  CT of the head revealed subdural hematoma. She was evaluated by NSGY and did not require evacuation. She received KCentra and vitamin K and was monitored in the hospital. She did not have any significant increase in the SDH on serial CT head. She was started on a 7 day course of Keppra as seizure prophylaxis. She was evaluated by therapy and recommended for SNF placement prior to returning home.      Patient will be treated at [facility] with PT and OT to improve functional status and ability to perform ADLs.       * No surgery found *      Hospital Course:   Patient progressed well with PT and OT. Patient had no significant events during their stay at SNF.  CESARIO has resolved on BMP by giving gentle hydration.  Patient educated to insure to maintain p.o. hydration upon DC.  Patient is rectus follow-up with PCP within 1 week.  Home health was set up. DME was ordered if needed. Follow up appointment to be made by patient within one week. All prescriptions and discharge instructions were ordered to be given to the patient prior to discharge.     PEx  Constitutional: Patient appears debilitated.  No distress noted  HENT:   Head: Normocephalic and atraumatic.   Eyes: Pupils are equal, round  Neck: Normal range of motion. Neck supple.   Cardiovascular: Normal rate,  "irregular rhythm +murmur    Pulmonary/Chest: Effort normal and breath sounds are clear  Abdominal: Soft. Bowel sounds are normal.   Musculoskeletal: Normal range of motion.   Neurological: Alert and oriented to person, place, and time.   Psychiatric: Normal mood and affect. Behavior is normal.   Skin: Skin is warm and dry. Ecchymosis to left eye.  No skin breakdown noted        Consults:   Consults (From admission, onward)        Status Ordering Provider     Inpatient consult to Registered Dietitian/Nutritionist  Once     Provider:  (Not yet assigned)    SPARKLE Hernandez          No new Assessment & Plan notes have been filed under this hospital service since the last note was generated.  Service: Hospital Medicine    Final Active Diagnoses:    Diagnosis Date Noted POA    PRINCIPAL PROBLEM:  SDH (subdural hematoma) [S06.5X9A] 09/12/2020 Yes    Anemia of chronic disease [D63.8] 09/16/2020 Yes    Impaired mobility and endurance [Z74.09] 09/16/2020 Yes    Fall [W19.XXXA] 09/16/2020 Yes    Bradycardia [R00.1]  Yes    Longstanding persistent atrial fibrillation [I48.11] 09/12/2020 Yes    Idiopathic thrombocytopenic purpura (ITP) [D69.3] 02/26/2020 Yes    (HFpEF) heart failure with preserved ejection fraction [I50.30] 02/24/2020 Yes    Benign essential hypertension [I10] 02/22/2020 Yes      Problems Resolved During this Admission:       Discharged Condition: good    Disposition: Home-Health Care [facility]    Follow Up:  Follow-up Information     Schedule an appointment as soon as possible for a visit with Primary Doctor No.    Why: WITHIN 1 WEEK               Patient Instructions:      WALKER FOR HOME USE     Order Specific Question Answer Comments   Type of Walker: Adult (5'4"-6'6")    With wheels? Yes    Height: 5' (1.524 m)    Weight: 60.2 kg (132 lb 11.5 oz)    Length of need (1-99 months): 99    Does patient have medical equipment at home? none    Please check all that apply: Patient's condition impairs " ambulation.    Please check all that apply: Walker will be used for gait training.    Please check all that apply: Patient is unable to safely ambulate without equipment.    Vendor: Other (use comments) Pt received 03/20   Expected Date of Delivery: 9/25/2020      No driving until:   Order Comments: Cleared by PCP     Notify your health care provider if you experience any of the following:  temperature >100.4     Notify your health care provider if you experience any of the following:  persistent nausea and vomiting or diarrhea     Notify your health care provider if you experience any of the following:  severe uncontrolled pain     Notify your health care provider if you experience any of the following:  redness, tenderness, or signs of infection (pain, swelling, redness, odor or green/yellow discharge around incision site)     Notify your health care provider if you experience any of the following:  difficulty breathing or increased cough     Notify your health care provider if you experience any of the following:  persistent dizziness, light-headedness, or visual disturbances     Notify your health care provider if you experience any of the following:  increased confusion or weakness     Activity as tolerated       Significant Diagnostic Studies: Labs: BMP: No results for input(s): GLU, NA, K, CL, CO2, BUN, CREATININE, CALCIUM, MG in the last 48 hours. and CBC No results for input(s): WBC, HGB, HCT, PLT in the last 48 hours.    Pending Diagnostic Studies:     None         Medications:  Reconciled Home Medications:      Medication List      START taking these medications    acetaminophen 325 MG tablet  Commonly known as: TYLENOL  Take 2 tablets (650 mg total) by mouth every 4 (four) hours as needed.     polyethylene glycol 17 gram Pwpk  Commonly known as: GLYCOLAX  Take 17 g by mouth once daily.     senna-docusate 8.6-50 mg 8.6-50 mg per tablet  Commonly known as: PERICOLACE  Take 1 tablet by mouth 2 (two) times  daily.     traMADoL 50 mg tablet  Commonly known as: ULTRAM  Take 0.5 tablets (25 mg total) by mouth every 6 (six) hours as needed for Pain.        CHANGE how you take these medications    amLODIPine 2.5 MG tablet  Commonly known as: NORVASC  Take 1 tablet (2.5 mg total) by mouth once daily. Hold until otherwise directed by primary care physician  What changed: additional instructions     metoprolol succinate 25 MG 24 hr tablet  Commonly known as: TOPROL-XL  Take 1 tablet (25 mg total) by mouth once daily.  What changed:   · medication strength  · how much to take  · when to take this        CONTINUE taking these medications    furosemide 40 MG tablet  Commonly known as: LASIX  Take 1 tablet (40 mg total) by mouth once daily.     lisinopriL 40 MG tablet  Commonly known as: PRINIVIL,ZESTRIL  Take 40 mg by mouth once daily.     pantoprazole 40 MG tablet  Commonly known as: PROTONIX  Take 1 tablet (40 mg total) by mouth once daily.        STOP taking these medications    levETIRAcetam 500 MG Tab  Commonly known as: KEPPRA            Indwelling Lines/Drains at time of discharge:   Lines/Drains/Airways     None                 Time spent on the discharge of patient: 38 minutes  Patient was seen and examined on the date of discharge and determined to be suitable for discharge.         Shoshana Gann NP  Department of Hospital Medicine  [facility] - Skilled Nursing Care

## 2020-09-29 ENCOUNTER — TELEPHONE (OUTPATIENT)
Dept: NEUROSURGERY | Facility: CLINIC | Age: 85
End: 2020-09-29

## 2020-09-29 NOTE — TELEPHONE ENCOUNTER
Tried reaching patient at number in chart. Unable to reach patient or leave voice mail message. Reached out to patient gabyjames Angelic regarding patient appointment scheduled for today @ 1:30. She stated she was unaware of appointment. Verified address which was incorrect in patient chart. I corrected address and rescheduled patient for 10/5/20 with imaging before appointment. Address with appointments date and times given to Angelic and appointment letter mailed to [place] address. Angelic verbalized understanding

## 2020-10-09 ENCOUNTER — HOSPITAL ENCOUNTER (OUTPATIENT)
Facility: HOSPITAL | Age: 85
Discharge: HOME HEALTH | End: 2020-10-11
Attending: EMERGENCY MEDICINE | Admitting: EMERGENCY MEDICINE
Payer: MEDICARE

## 2020-10-09 DIAGNOSIS — M54.30 SCIATICA: ICD-10-CM

## 2020-10-09 DIAGNOSIS — R52 INTRACTABLE PAIN: ICD-10-CM

## 2020-10-09 DIAGNOSIS — R26.2 DIFFICULTY WALKING: Primary | ICD-10-CM

## 2020-10-09 LAB
ALBUMIN SERPL BCP-MCNC: 3.9 G/DL (ref 3.5–5.2)
ALP SERPL-CCNC: 99 U/L (ref 55–135)
ALT SERPL W/O P-5'-P-CCNC: 10 U/L (ref 10–44)
ANION GAP SERPL CALC-SCNC: 12 MMOL/L (ref 8–16)
AST SERPL-CCNC: 15 U/L (ref 10–40)
BASOPHILS # BLD AUTO: 0.03 K/UL (ref 0–0.2)
BASOPHILS NFR BLD: 0.3 % (ref 0–1.9)
BILIRUB SERPL-MCNC: 0.7 MG/DL (ref 0.1–1)
BUN SERPL-MCNC: 52 MG/DL (ref 8–23)
CALCIUM SERPL-MCNC: 9.4 MG/DL (ref 8.7–10.5)
CHLORIDE SERPL-SCNC: 101 MMOL/L (ref 95–110)
CO2 SERPL-SCNC: 23 MMOL/L (ref 23–29)
CREAT SERPL-MCNC: 1.4 MG/DL (ref 0.5–1.4)
DIFFERENTIAL METHOD: ABNORMAL
EOSINOPHIL # BLD AUTO: 0.2 K/UL (ref 0–0.5)
EOSINOPHIL NFR BLD: 2.3 % (ref 0–8)
ERYTHROCYTE [DISTWIDTH] IN BLOOD BY AUTOMATED COUNT: 13.5 % (ref 11.5–14.5)
EST. GFR  (AFRICAN AMERICAN): 38.7 ML/MIN/1.73 M^2
EST. GFR  (NON AFRICAN AMERICAN): 33.6 ML/MIN/1.73 M^2
GLUCOSE SERPL-MCNC: 104 MG/DL (ref 70–110)
HCT VFR BLD AUTO: 38.8 % (ref 37–48.5)
HGB BLD-MCNC: 12.2 G/DL (ref 12–16)
IMM GRANULOCYTES # BLD AUTO: 0.12 K/UL (ref 0–0.04)
IMM GRANULOCYTES NFR BLD AUTO: 1.3 % (ref 0–0.5)
LYMPHOCYTES # BLD AUTO: 1.8 K/UL (ref 1–4.8)
LYMPHOCYTES NFR BLD: 19.6 % (ref 18–48)
MCH RBC QN AUTO: 30.9 PG (ref 27–31)
MCHC RBC AUTO-ENTMCNC: 31.4 G/DL (ref 32–36)
MCV RBC AUTO: 98 FL (ref 82–98)
MONOCYTES # BLD AUTO: 1 K/UL (ref 0.3–1)
MONOCYTES NFR BLD: 10.4 % (ref 4–15)
NEUTROPHILS # BLD AUTO: 6.1 K/UL (ref 1.8–7.7)
NEUTROPHILS NFR BLD: 66.1 % (ref 38–73)
NRBC BLD-RTO: 0 /100 WBC
PLATELET # BLD AUTO: 366 K/UL (ref 150–350)
PMV BLD AUTO: 9.1 FL (ref 9.2–12.9)
POTASSIUM SERPL-SCNC: 5.1 MMOL/L (ref 3.5–5.1)
PROT SERPL-MCNC: 7.6 G/DL (ref 6–8.4)
RBC # BLD AUTO: 3.95 M/UL (ref 4–5.4)
SARS-COV-2 RDRP RESP QL NAA+PROBE: NEGATIVE
SODIUM SERPL-SCNC: 136 MMOL/L (ref 136–145)
WBC # BLD AUTO: 9.27 K/UL (ref 3.9–12.7)

## 2020-10-09 PROCEDURE — 96375 TX/PRO/DX INJ NEW DRUG ADDON: CPT

## 2020-10-09 PROCEDURE — 73502 X-RAY EXAM HIP UNI 2-3 VIEWS: CPT | Mod: 26,RT,, | Performed by: RADIOLOGY

## 2020-10-09 PROCEDURE — 63600175 PHARM REV CODE 636 W HCPCS: Performed by: FAMILY MEDICINE

## 2020-10-09 PROCEDURE — U0002 COVID-19 LAB TEST NON-CDC: HCPCS

## 2020-10-09 PROCEDURE — 80053 COMPREHEN METABOLIC PANEL: CPT

## 2020-10-09 PROCEDURE — G0378 HOSPITAL OBSERVATION PER HR: HCPCS

## 2020-10-09 PROCEDURE — 96374 THER/PROPH/DIAG INJ IV PUSH: CPT

## 2020-10-09 PROCEDURE — 73502 XR PELVIS 3 VIEW INC HIP 2 VIEW RIGHT: ICD-10-PCS | Mod: 26,RT,, | Performed by: RADIOLOGY

## 2020-10-09 PROCEDURE — 99219 PR INITIAL OBSERVATION CARE,LEVL II: ICD-10-PCS | Mod: ,,, | Performed by: FAMILY MEDICINE

## 2020-10-09 PROCEDURE — 25000003 PHARM REV CODE 250: Performed by: PHYSICIAN ASSISTANT

## 2020-10-09 PROCEDURE — 85025 COMPLETE CBC W/AUTO DIFF WBC: CPT

## 2020-10-09 PROCEDURE — 71045 XR CHEST AP PORTABLE: ICD-10-PCS | Mod: 26,,, | Performed by: RADIOLOGY

## 2020-10-09 PROCEDURE — 99285 EMERGENCY DEPT VISIT HI MDM: CPT | Mod: 25

## 2020-10-09 PROCEDURE — 71045 X-RAY EXAM CHEST 1 VIEW: CPT | Mod: TC,FY

## 2020-10-09 PROCEDURE — 73502 X-RAY EXAM HIP UNI 2-3 VIEWS: CPT | Mod: TC,FY,RT

## 2020-10-09 PROCEDURE — 25000003 PHARM REV CODE 250: Performed by: FAMILY MEDICINE

## 2020-10-09 PROCEDURE — 99219 PR INITIAL OBSERVATION CARE,LEVL II: CPT | Mod: ,,, | Performed by: FAMILY MEDICINE

## 2020-10-09 PROCEDURE — 96372 THER/PROPH/DIAG INJ SC/IM: CPT | Mod: 59

## 2020-10-09 PROCEDURE — 71045 X-RAY EXAM CHEST 1 VIEW: CPT | Mod: 26,,, | Performed by: RADIOLOGY

## 2020-10-09 PROCEDURE — 63600175 PHARM REV CODE 636 W HCPCS: Performed by: PHYSICIAN ASSISTANT

## 2020-10-09 RX ORDER — OXYCODONE AND ACETAMINOPHEN 5; 325 MG/1; MG/1
1 TABLET ORAL
Status: COMPLETED | OUTPATIENT
Start: 2020-10-09 | End: 2020-10-09

## 2020-10-09 RX ORDER — HYDROCODONE BITARTRATE AND ACETAMINOPHEN 5; 325 MG/1; MG/1
1 TABLET ORAL EVERY 6 HOURS PRN
Status: DISCONTINUED | OUTPATIENT
Start: 2020-10-09 | End: 2020-10-11 | Stop reason: HOSPADM

## 2020-10-09 RX ORDER — FUROSEMIDE 20 MG/1
40 TABLET ORAL DAILY
Status: DISCONTINUED | OUTPATIENT
Start: 2020-10-10 | End: 2020-10-11 | Stop reason: HOSPADM

## 2020-10-09 RX ORDER — ONDANSETRON 2 MG/ML
4 INJECTION INTRAMUSCULAR; INTRAVENOUS EVERY 8 HOURS PRN
Status: DISCONTINUED | OUTPATIENT
Start: 2020-10-09 | End: 2020-10-11 | Stop reason: HOSPADM

## 2020-10-09 RX ORDER — PANTOPRAZOLE SODIUM 40 MG/1
40 TABLET, DELAYED RELEASE ORAL DAILY
Status: DISCONTINUED | OUTPATIENT
Start: 2020-10-10 | End: 2020-10-11 | Stop reason: HOSPADM

## 2020-10-09 RX ORDER — MORPHINE SULFATE 4 MG/ML
2 INJECTION, SOLUTION INTRAMUSCULAR; INTRAVENOUS EVERY 4 HOURS PRN
Status: DISCONTINUED | OUTPATIENT
Start: 2020-10-09 | End: 2020-10-11 | Stop reason: HOSPADM

## 2020-10-09 RX ORDER — ONDANSETRON 4 MG/1
4 TABLET, ORALLY DISINTEGRATING ORAL
Status: COMPLETED | OUTPATIENT
Start: 2020-10-09 | End: 2020-10-09

## 2020-10-09 RX ORDER — ACETAMINOPHEN 325 MG/1
650 TABLET ORAL EVERY 6 HOURS PRN
Status: DISCONTINUED | OUTPATIENT
Start: 2020-10-09 | End: 2020-10-11 | Stop reason: HOSPADM

## 2020-10-09 RX ORDER — POLYETHYLENE GLYCOL 3350 17 G/17G
17 POWDER, FOR SOLUTION ORAL DAILY
Status: DISCONTINUED | OUTPATIENT
Start: 2020-10-10 | End: 2020-10-11 | Stop reason: HOSPADM

## 2020-10-09 RX ORDER — AMLODIPINE BESYLATE 2.5 MG/1
2.5 TABLET ORAL DAILY
Status: DISCONTINUED | OUTPATIENT
Start: 2020-10-10 | End: 2020-10-11 | Stop reason: HOSPADM

## 2020-10-09 RX ORDER — SODIUM CHLORIDE 0.9 % (FLUSH) 0.9 %
10 SYRINGE (ML) INJECTION
Status: DISCONTINUED | OUTPATIENT
Start: 2020-10-09 | End: 2020-10-11 | Stop reason: HOSPADM

## 2020-10-09 RX ORDER — OXYCODONE AND ACETAMINOPHEN 5; 325 MG/1; MG/1
2 TABLET ORAL
Status: DISCONTINUED | OUTPATIENT
Start: 2020-10-09 | End: 2020-10-09

## 2020-10-09 RX ORDER — METOPROLOL SUCCINATE 25 MG/1
25 TABLET, EXTENDED RELEASE ORAL DAILY
Status: DISCONTINUED | OUTPATIENT
Start: 2020-10-10 | End: 2020-10-11 | Stop reason: HOSPADM

## 2020-10-09 RX ORDER — AMOXICILLIN 250 MG
1 CAPSULE ORAL 2 TIMES DAILY
Status: DISCONTINUED | OUTPATIENT
Start: 2020-10-09 | End: 2020-10-11 | Stop reason: HOSPADM

## 2020-10-09 RX ORDER — ORPHENADRINE CITRATE 30 MG/ML
60 INJECTION INTRAMUSCULAR; INTRAVENOUS
Status: COMPLETED | OUTPATIENT
Start: 2020-10-09 | End: 2020-10-09

## 2020-10-09 RX ORDER — LISINOPRIL 10 MG/1
40 TABLET ORAL DAILY
Status: DISCONTINUED | OUTPATIENT
Start: 2020-10-10 | End: 2020-10-11 | Stop reason: HOSPADM

## 2020-10-09 RX ADMIN — MORPHINE SULFATE 2 MG: 4 INJECTION, SOLUTION INTRAMUSCULAR; INTRAVENOUS at 09:10

## 2020-10-09 RX ADMIN — ONDANSETRON 4 MG: 2 INJECTION INTRAMUSCULAR; INTRAVENOUS at 09:10

## 2020-10-09 RX ADMIN — ONDANSETRON 4 MG: 4 TABLET, ORALLY DISINTEGRATING ORAL at 04:10

## 2020-10-09 RX ADMIN — ORPHENADRINE CITRATE 60 MG: 30 INJECTION INTRAMUSCULAR; INTRAVENOUS at 05:10

## 2020-10-09 RX ADMIN — HYDROCODONE BITARTRATE AND ACETAMINOPHEN 1 TABLET: 5; 325 TABLET ORAL at 11:10

## 2020-10-09 RX ADMIN — OXYCODONE HYDROCHLORIDE AND ACETAMINOPHEN 1 TABLET: 5; 325 TABLET ORAL at 03:10

## 2020-10-09 NOTE — ED PROVIDER NOTES
Please note that my documentation in this Electronic Healthcare Record was produced using speech recognition software and therefore may contain errors related to that software.These could include grammar, punctuation and spelling errors or the inclusion/ exclusion of phrases that were not intended. Please contact myself for any clarification, questions or concerns.    HPI: Patient is a 88 y.o. female who presents with the chief complaint of right hip pain X 2 days.  Denies any recent trauma or injury.  She did have a fall a few weeks ago onto the left hip and was recently discharged after 2 week stay at a skilled nursing facility.  She has did doing physical and occupational therapy and states she was feeling fine.  Developed pain in her right hip but denies any back pain, abdominal pain, urinary complaints, extremity weakness or paresthesias.  She has been taking Tylenol without much improvement.  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  Uses a walker at home.    REVIEW OF SYSTEMS - 10 systems were independently reviewed and are otherwise negative with the exception of those items previously documented in the HPI and nursing notes.    Allergy: Patient has no known allergies.    Past medical history:   Past Medical History:   Diagnosis Date    A-fib     Anticoagulant long-term use     Cancer     skin cancer    Hypertension        Surgical History: History reviewed. No pertinent surgical history.    Social history:   Social History     Socioeconomic History    Marital status:      Spouse name: Not on file    Number of children: Not on file    Years of education: Not on file    Highest education level: Not on file   Occupational History    Not on file   Social Needs    Financial resource strain: Not on file    Food insecurity     Worry: Not on file     Inability: Not on file    Transportation needs     Medical: Not on file     Non-medical: Not on file   Tobacco Use    Smoking  "status: Never Smoker   Substance and Sexual Activity    Alcohol use: Never     Frequency: Never    Drug use: Never    Sexual activity: Not Currently   Lifestyle    Physical activity     Days per week: Not on file     Minutes per session: Not on file    Stress: Not on file   Relationships    Social connections     Talks on phone: Not on file     Gets together: Not on file     Attends Rastafari service: Not on file     Active member of club or organization: Not on file     Attends meetings of clubs or organizations: Not on file     Relationship status: Not on file   Other Topics Concern    Not on file   Social History Narrative    Not on file       Family history: non-contributory    EHR: reviewed    Vitals: BP (!) 109/59   Pulse 97   Temp 98.2 °F (36.8 °C)   Resp 18   Ht 5' 5" (1.651 m)   Wt 54.4 kg (120 lb)   SpO2 (!) 92%   Breastfeeding No   BMI 19.97 kg/m²     PHYSICAL EXAM:    General-88-year-old female awake and alert, oriented, GCS 15, in no acute distress,  HEENT- normocephalic, atraumatic, sclera anicteric, moist mucous membranes, PERRL, EOMI  CARDIOVASCULAR- regular rate and rhythm  PULMONARY- nonlabored, no respiratory distress  NEUROLOGIC- mental status normal, speech fluid, cognition normal, CN II-XII grossly intact, sensations equal normal bilateral upper and lower extremities, peripheral pulse 2 +/4, ambulatory with proper gait.  MUSCULOSKELETAL- well-nourished, well-developed, decreased flexion at the right hip joint but right hip is tender to palpation.  Tender palpation over the right buttock.  Able to lift the left leg.  DERMATOLOGIC- warm and dry, no visible rashes, no extremity edema.  PSYCHIATRIC- normal affect, normal concentration           Labs Reviewed   CBC W/ AUTO DIFFERENTIAL - Abnormal; Notable for the following components:       Result Value    RBC 3.95 (*)     Mean Corpuscular Hemoglobin Conc 31.4 (*)     Platelets 366 (*)     MPV 9.1 (*)     Immature Granulocytes 1.3 " (*)     Immature Grans (Abs) 0.12 (*)     All other components within normal limits   COMPREHENSIVE METABOLIC PANEL - Abnormal; Notable for the following components:    BUN, Bld 52 (*)     eGFR if  38.7 (*)     eGFR if non  33.6 (*)     All other components within normal limits   SARS-COV-2 RNA AMPLIFICATION, QUAL   URINALYSIS, REFLEX TO URINE CULTURE       X-Ray Pelvis 3 view inc Hip 2 view Right   Final Result      Mild degenerative change of the hips.         Electronically signed by: David Owusu MD   Date:    10/09/2020   Time:    15:55          MEDICAL DECISION MAKING: Patient is a 88 y.o. female who presented with chief complaint of right-sided hip pain X 2 days.  Patient denies any recent falls.  No extremity weakness or paresthesias, urinary complaints, abdominal pain.  Has tried some Tylenol without any improvement.  Patient usually walks with a cane but has been unable to ambulate.  On examination, she was unable to flex at the right hip joint but did have some tenderness.  Does have tenderness to the right buttock region.  No point midline tenderness of the midline back.  Patient was given a dose of Percocet and had some improvement in her pain.  She is still unable to walk and bear weight on her right extremity.  She then given a dose of Norflex and still having persistent pain.  Patient lives at home alone and concerned she still having pain and inability to walk normally.  States she was able to walk normally a couple days ago prior to the right hip pain.  Her x-ray of the hip was read as mild degenerative changes but otherwise no acute fracture dislocation.  Patient will be admitted to medicine for further pain control and observe improvement in her ambulation.  I did consider DVT, fracture, AAA, UTI, cauda equina, epidural abscess but given her history and physical exam, these seem less likely.    CLINICAL IMPRESSION:  1. Difficulty walking    2. Sciatica          NELLY Moreau  10/09/20 1754       NELLY Moreau  10/09/20 1750

## 2020-10-10 PROBLEM — N39.0 UTI (URINARY TRACT INFECTION): Status: ACTIVE | Noted: 2020-10-10

## 2020-10-10 LAB
ANION GAP SERPL CALC-SCNC: 9 MMOL/L (ref 8–16)
BACTERIA #/AREA URNS HPF: ABNORMAL /HPF
BASOPHILS # BLD AUTO: 0.02 K/UL (ref 0–0.2)
BASOPHILS NFR BLD: 0.3 % (ref 0–1.9)
BILIRUB UR QL STRIP: NEGATIVE
BUN SERPL-MCNC: 49 MG/DL (ref 8–23)
CALCIUM SERPL-MCNC: 9 MG/DL (ref 8.7–10.5)
CHLORIDE SERPL-SCNC: 104 MMOL/L (ref 95–110)
CLARITY UR: ABNORMAL
CO2 SERPL-SCNC: 23 MMOL/L (ref 23–29)
COLOR UR: YELLOW
CREAT SERPL-MCNC: 1.2 MG/DL (ref 0.5–1.4)
DIFFERENTIAL METHOD: ABNORMAL
EOSINOPHIL # BLD AUTO: 0.2 K/UL (ref 0–0.5)
EOSINOPHIL NFR BLD: 2.5 % (ref 0–8)
ERYTHROCYTE [DISTWIDTH] IN BLOOD BY AUTOMATED COUNT: 13.6 % (ref 11.5–14.5)
EST. GFR  (AFRICAN AMERICAN): 46.6 ML/MIN/1.73 M^2
EST. GFR  (NON AFRICAN AMERICAN): 40.4 ML/MIN/1.73 M^2
GLUCOSE SERPL-MCNC: 79 MG/DL (ref 70–110)
GLUCOSE UR QL STRIP: NEGATIVE
HCT VFR BLD AUTO: 35.5 % (ref 37–48.5)
HGB BLD-MCNC: 10.9 G/DL (ref 12–16)
HGB UR QL STRIP: NEGATIVE
IMM GRANULOCYTES # BLD AUTO: 0.04 K/UL (ref 0–0.04)
IMM GRANULOCYTES NFR BLD AUTO: 0.5 % (ref 0–0.5)
KETONES UR QL STRIP: NEGATIVE
LEUKOCYTE ESTERASE UR QL STRIP: ABNORMAL
LYMPHOCYTES # BLD AUTO: 1.4 K/UL (ref 1–4.8)
LYMPHOCYTES NFR BLD: 17.5 % (ref 18–48)
MCH RBC QN AUTO: 30.9 PG (ref 27–31)
MCHC RBC AUTO-ENTMCNC: 30.7 G/DL (ref 32–36)
MCV RBC AUTO: 101 FL (ref 82–98)
MICROSCOPIC COMMENT: ABNORMAL
MONOCYTES # BLD AUTO: 0.9 K/UL (ref 0.3–1)
MONOCYTES NFR BLD: 11.2 % (ref 4–15)
NEUTROPHILS # BLD AUTO: 5.3 K/UL (ref 1.8–7.7)
NEUTROPHILS NFR BLD: 68 % (ref 38–73)
NITRITE UR QL STRIP: POSITIVE
NON-SQ EPI CELLS #/AREA URNS HPF: ABNORMAL /HPF
NRBC BLD-RTO: 0 /100 WBC
PH UR STRIP: 5 [PH] (ref 5–8)
PLATELET # BLD AUTO: 353 K/UL (ref 150–350)
PMV BLD AUTO: 9.2 FL (ref 9.2–12.9)
POTASSIUM SERPL-SCNC: 5.2 MMOL/L (ref 3.5–5.1)
PROT UR QL STRIP: NEGATIVE
RBC # BLD AUTO: 3.53 M/UL (ref 4–5.4)
RBC #/AREA URNS HPF: 5 /HPF (ref 0–4)
SODIUM SERPL-SCNC: 136 MMOL/L (ref 136–145)
SP GR UR STRIP: 1.01 (ref 1–1.03)
SQUAMOUS #/AREA URNS HPF: ABNORMAL /HPF
URN SPEC COLLECT METH UR: ABNORMAL
UROBILINOGEN UR STRIP-ACNC: NEGATIVE EU/DL
WBC # BLD AUTO: 7.71 K/UL (ref 3.9–12.7)
WBC #/AREA URNS HPF: >100 /HPF (ref 0–5)

## 2020-10-10 PROCEDURE — 96375 TX/PRO/DX INJ NEW DRUG ADDON: CPT

## 2020-10-10 PROCEDURE — 25000003 PHARM REV CODE 250: Performed by: FAMILY MEDICINE

## 2020-10-10 PROCEDURE — 63600175 PHARM REV CODE 636 W HCPCS: Performed by: FAMILY MEDICINE

## 2020-10-10 PROCEDURE — 87086 URINE CULTURE/COLONY COUNT: CPT

## 2020-10-10 PROCEDURE — 87088 URINE BACTERIA CULTURE: CPT

## 2020-10-10 PROCEDURE — 63600175 PHARM REV CODE 636 W HCPCS: Performed by: INTERNAL MEDICINE

## 2020-10-10 PROCEDURE — 96376 TX/PRO/DX INJ SAME DRUG ADON: CPT

## 2020-10-10 PROCEDURE — 81000 URINALYSIS NONAUTO W/SCOPE: CPT

## 2020-10-10 PROCEDURE — 87077 CULTURE AEROBIC IDENTIFY: CPT

## 2020-10-10 PROCEDURE — 87186 SC STD MICRODIL/AGAR DIL: CPT

## 2020-10-10 PROCEDURE — 36415 COLL VENOUS BLD VENIPUNCTURE: CPT

## 2020-10-10 PROCEDURE — 80048 BASIC METABOLIC PNL TOTAL CA: CPT

## 2020-10-10 PROCEDURE — G0378 HOSPITAL OBSERVATION PER HR: HCPCS

## 2020-10-10 PROCEDURE — 85025 COMPLETE CBC W/AUTO DIFF WBC: CPT

## 2020-10-10 RX ADMIN — PANTOPRAZOLE SODIUM 40 MG: 40 TABLET, DELAYED RELEASE ORAL at 08:10

## 2020-10-10 RX ADMIN — MORPHINE SULFATE 2 MG: 4 INJECTION, SOLUTION INTRAMUSCULAR; INTRAVENOUS at 10:10

## 2020-10-10 RX ADMIN — FUROSEMIDE 40 MG: 20 TABLET ORAL at 08:10

## 2020-10-10 RX ADMIN — ONDANSETRON 4 MG: 2 INJECTION INTRAMUSCULAR; INTRAVENOUS at 05:10

## 2020-10-10 RX ADMIN — PROMETHAZINE HYDROCHLORIDE 6.25 MG: 25 INJECTION INTRAMUSCULAR; INTRAVENOUS at 10:10

## 2020-10-10 RX ADMIN — HYDROCODONE BITARTRATE AND ACETAMINOPHEN 1 TABLET: 5; 325 TABLET ORAL at 12:10

## 2020-10-10 RX ADMIN — POLYETHYLENE GLYCOL (3350) 17 G: 17 POWDER, FOR SOLUTION ORAL at 08:10

## 2020-10-10 RX ADMIN — CEFTRIAXONE 1 G: 1 INJECTION, SOLUTION INTRAVENOUS at 12:10

## 2020-10-10 RX ADMIN — MORPHINE SULFATE 2 MG: 4 INJECTION, SOLUTION INTRAMUSCULAR; INTRAVENOUS at 05:10

## 2020-10-10 RX ADMIN — LISINOPRIL 40 MG: 10 TABLET ORAL at 08:10

## 2020-10-10 RX ADMIN — AMLODIPINE BESYLATE 2.5 MG: 2.5 TABLET ORAL at 08:10

## 2020-10-10 RX ADMIN — MORPHINE SULFATE 2 MG: 4 INJECTION, SOLUTION INTRAMUSCULAR; INTRAVENOUS at 08:10

## 2020-10-10 RX ADMIN — ONDANSETRON 4 MG: 2 INJECTION INTRAMUSCULAR; INTRAVENOUS at 08:10

## 2020-10-10 RX ADMIN — SENNOSIDES AND DOCUSATE SODIUM 1 TABLET: 8.6; 5 TABLET ORAL at 08:10

## 2020-10-10 RX ADMIN — HYDROCODONE BITARTRATE AND ACETAMINOPHEN 1 TABLET: 5; 325 TABLET ORAL at 05:10

## 2020-10-10 NOTE — HPI
Patient is an 88-year-old female with past medical history of AFib, long-term anticoagulant use, hypertension who presents to the ER with a 3 day history of severe right-sided hip and posterior leg pain.  Patient has had pain in this area before and has been told that she has sciatica.  Had a fall about 3 weeks ago, resulted in a subdural hematoma, she was sent to an outside hospital for treatment and then to rehab where she was discharged home recently.  She denies any further falls since then.  Reports that the pain came on kind of suddenly and has been getting much worse.  Feels like sciatic nerve pain.  Has been trying pain medication at home with no relief.  Came to the ER today because the pain was so bad that she was not able to walk.  X-rays of the pelvis showed no acute fracture.  Arthritis present.  Given 2 doses of analgesics in the ER with minimal to no relief. Hospital team called for admission for intractable pain.

## 2020-10-10 NOTE — ED NOTES
SPOKE TO YUNG MORENO, STATED UNABLE TO GIVE REPORT AT PRESENT TIME, AWAITING RN TO CALL BACK. HECTOR KWOK RN

## 2020-10-10 NOTE — ASSESSMENT & PLAN NOTE
Acute on chronic issue  Admit and treat with IV pain medications prn  Ambulate when able  If no relief/unable to ambulate may need MRI

## 2020-10-10 NOTE — ED NOTES
PATIENT UNABLE TO USE BEDPAN, PUREWICK APPLIED. DR JUSTIN AT BEDSIDE FOR ASSESSMENT. HECTOR KWOK RN

## 2020-10-10 NOTE — SUBJECTIVE & OBJECTIVE
Past Medical History:   Diagnosis Date    A-fib     Anticoagulant long-term use     Cancer     skin cancer    Hypertension        History reviewed. No pertinent surgical history.    Review of patient's allergies indicates:  No Known Allergies    No current facility-administered medications on file prior to encounter.      Current Outpatient Medications on File Prior to Encounter   Medication Sig    amLODIPine (NORVASC) 2.5 MG tablet Take 1 tablet (2.5 mg total) by mouth once daily. Hold until otherwise directed by primary care physician    furosemide (LASIX) 40 MG tablet Take 1 tablet (40 mg total) by mouth once daily.    lisinopril (PRINIVIL,ZESTRIL) 40 MG tablet Take 40 mg by mouth once daily.    metoprolol succinate (TOPROL-XL) 25 MG 24 hr tablet Take 1 tablet (25 mg total) by mouth once daily.    pantoprazole (PROTONIX) 40 MG tablet Take 1 tablet (40 mg total) by mouth once daily.    polyethylene glycol (GLYCOLAX) 17 gram PwPk Take 17 g by mouth once daily.    acetaminophen (TYLENOL) 325 MG tablet Take 2 tablets (650 mg total) by mouth every 4 (four) hours as needed.    senna-docusate 8.6-50 mg (PERICOLACE) 8.6-50 mg per tablet Take 1 tablet by mouth 2 (two) times daily.    traMADoL (ULTRAM) 50 mg tablet Take 0.5 tablets (25 mg total) by mouth every 6 (six) hours as needed for Pain.     Family History     Problem Relation (Age of Onset)    No Known Problems Mother, Father        Tobacco Use    Smoking status: Never Smoker   Substance and Sexual Activity    Alcohol use: Never     Frequency: Never    Drug use: Never    Sexual activity: Not Currently     Review of Systems   Constitutional: Negative for activity change, appetite change, fatigue, fever and unexpected weight change.   HENT: Negative.  Negative for congestion.    Eyes: Negative for photophobia and visual disturbance.   Respiratory: Negative for cough, shortness of breath and wheezing.    Cardiovascular: Negative for chest pain,  palpitations and leg swelling.   Gastrointestinal: Negative for abdominal pain.   Endocrine: Negative.    Genitourinary: Negative.    Musculoskeletal: Positive for back pain.        Severe right hip pain   Skin: Negative.    Allergic/Immunologic: Negative.    Neurological: Negative.    Hematological: Negative.    Psychiatric/Behavioral: Negative.      Objective:     Vital Signs (Most Recent):  Temp: 98.2 °F (36.8 °C) (10/09/20 1451)  Pulse: 96 (10/09/20 1739)  Resp: 18 (10/09/20 1739)  BP: 132/65 (10/09/20 1739)  SpO2: (!) 92 % (10/09/20 1739) Vital Signs (24h Range):  Temp:  [98.2 °F (36.8 °C)] 98.2 °F (36.8 °C)  Pulse:  [95-97] 96  Resp:  [18-20] 18  SpO2:  [92 %-93 %] 92 %  BP: (109-138)/(59-65) 132/65     Weight: 54.4 kg (120 lb)  Body mass index is 19.97 kg/m².    Physical Exam  Vitals signs reviewed.   Constitutional:       General: She is not in acute distress.     Comments: Elderly female, lying in bed, appears in pain   HENT:      Head: Normocephalic and atraumatic.      Right Ear: External ear normal.      Left Ear: External ear normal.      Nose: Nose normal.      Mouth/Throat:      Comments: Dentures in place, dry mouth with excessive saliva  Eyes:      Conjunctiva/sclera: Conjunctivae normal.   Cardiovascular:      Rate and Rhythm: Regular rhythm. Tachycardia present.      Pulses: Normal pulses.      Heart sounds: No murmur. No gallop.    Pulmonary:      Effort: Pulmonary effort is normal. No respiratory distress.      Breath sounds: No wheezing or rales.      Comments: O2 sats 88% on room air though no increased work of breathing, no wheezes, no rales, no respiratory distress distress  Abdominal:      General: Abdomen is flat. Bowel sounds are normal. There is no distension.      Tenderness: There is no abdominal tenderness.   Musculoskeletal:      Right lower leg: No edema.      Left lower leg: No edema.      Comments: No pain with internal and external rotation of the leg in full extension however  with flexion and external rotation there was severe pain, almost unbearable, had to defer rest of exam. Left leg without issue. No tenderness to palpation along the pelvis   Skin:     General: Skin is warm and dry.   Neurological:      Mental Status: She is oriented to person, place, and time. Mental status is at baseline.   Psychiatric:         Mood and Affect: Mood normal.         Behavior: Behavior normal.             Significant Labs:   Recent Lab Results       10/09/20  1649   10/09/20  1639        Albumin   3.9     Alkaline Phosphatase   99     ALT   10     Anion Gap   12     AST   15     Baso #   0.03     Basophil%   0.3     BILIRUBIN TOTAL   0.7  Comment:  For infants and newborns, interpretation of results should be based  on gestational age, weight and in agreement with clinical  observations.  Premature Infant recommended reference ranges:  Up to 24 hours.............<8.0 mg/dL  Up to 48 hours............<12.0 mg/dL  3-5 days..................<15.0 mg/dL  6-29 days.................<15.0 mg/dL       BUN, Bld   52     Calcium   9.4     Chloride   101     CO2   23     Creatinine   1.4     Differential Method   Automated     eGFR if African American   38.7     eGFR if non    33.6  Comment:  Calculation used to obtain the estimated glomerular filtration  rate (eGFR) is the CKD-EPI equation.        Eos #   0.2     Eosinophil%   2.3     Glucose   104     Gran # (ANC)   6.1     Gran%   66.1     Hematocrit   38.8     Hemoglobin   12.2     Immature Grans (Abs)   0.12  Comment:  Mild elevation in immature granulocytes is non specific and   can be seen in a variety of conditions including stress response,   acute inflammation, trauma and pregnancy. Correlation with other   laboratory and clinical findings is essential.       Immature Granulocytes   1.3     Lymph #   1.8     Lymph%   19.6     MCH   30.9     MCHC   31.4     MCV   98     Mono #   1.0     Mono%   10.4     MPV   9.1     nRBC   0      Platelets   366     Potassium   5.1     PROTEIN TOTAL   7.6     RBC   3.95     RDW   13.5     SARS-CoV-2 RNA, Amplification, Qual Negative  Comment:  This test utilizes isothermal nucleic acid amplification   technology to detect the SARS-CoV-2 RdRp nucleic acid segment.   The analytical sensitivity (limit of detection) is 125 genome   equivalents/mL.   A POSITIVE result implies infection with the SARS-CoV-2 virus;  the patient is presumed to be contagious.    A NEGATIVE result means that SARS-CoV-2 nucleic acids are not  present above the limit of detection. A NEGATIVE result should be   treated as presumptive. It does not rule out the possibility of   COVID-19 and should not be the sole basis for treatment decisions.   If COVID-19 is strongly suspected based on clinical and exposure   history, re-testing using an alternate molecular assay should be   considered.   This test is only for use under the Food and Drug   Administration s Emergency Use Authorization (EUA).   Commercial kits are provided by [employer].   Performance characteristics of the EUA have been independently  verified by [facility] Department of  Pathology and Laboratory Medicine.   _________________________________________________________________  The ID NOW COVID-19 Letter of Authorization, along with the   authorized Fact Sheet for Healthcare Providers, the authorized Fact  Sheet for Patients, and authorized labeling are available on the FDA   website:  www.fda.gov/MedicalDevices/Safety/EmergencySituations/nix009276.htm         Sodium   136     WBC   9.27         All pertinent labs within the past 24 hours have been reviewed.    Significant Imaging: I have reviewed all pertinent imaging results/findings within the past 24 hours.

## 2020-10-10 NOTE — PROGRESS NOTES
[facility] Medicine  Progress Note    Patient Name: Fiona Arteaga  MRN: [id number]  Patient Class: OP- Observation   Admission Date: 10/9/2020  Length of Stay: 0 days  Attending Physician: Nona Vieyra MD  Primary Care Provider: Nasreen Tobin MD        Subjective:     Principal Problem:Intractable pain        HPI:  Patient is an 88-year-old female with past medical history of AFib, long-term anticoagulant use, hypertension who presents to the ER with a 3 day history of severe right-sided hip and posterior leg pain.  Patient has had pain in this area before and has been told that she has sciatica.  Had a fall about 3 weeks ago, resulted in a subdural hematoma, she was sent to an outside hospital for treatment and then to rehab where she was discharged home recently.  She denies any further falls since then.  Reports that the pain came on kind of suddenly and has been getting much worse.  Feels like sciatic nerve pain.  Has been trying pain medication at home with no relief.  Came to the ER today because the pain was so bad that she was not able to walk.  X-rays of the pelvis showed no acute fracture.  Arthritis present.  Given 2 doses of analgesics in the ER with minimal to no relief. Hospital team called for admission for intractable pain.    Overview/Hospital Course:  No notes on file    Interval History:  Patient is continuing to experience right hip pain which is somewhat better than yesterday.  Patient does not feel comfortable going home today.  Patient is not interested in skilled nursing facility placement either.  Patient denies any head injury or loss of consciousness.  No chest pain, shortness of breath or abdominal pain reported.  Patient does report increased frequency and possible dysuria.  Will obtain urine analysis.    Review of Systems   Constitutional: Negative for activity change, appetite change, fatigue, fever and unexpected weight change.    HENT: Negative.  Negative for congestion.    Eyes: Negative for photophobia and visual disturbance.   Respiratory: Negative for cough, shortness of breath and wheezing.    Cardiovascular: Negative for chest pain, palpitations and leg swelling.   Gastrointestinal: Negative for abdominal pain.   Endocrine: Negative.    Genitourinary: Negative.    Musculoskeletal: Positive for back pain.        Severe right hip pain   Skin: Negative.    Allergic/Immunologic: Negative.    Neurological: Negative.    Hematological: Negative.    Psychiatric/Behavioral: Negative.      Objective:     Vital Signs (Most Recent):  Temp: 98.4 °F (36.9 °C) (10/10/20 0727)  Pulse: (!) 57 (10/10/20 0814)  Resp: 18 (10/10/20 0822)  BP: 138/64 (10/10/20 0814)  SpO2: (!) 90 % (10/10/20 0727) Vital Signs (24h Range):  Temp:  [97.3 °F (36.3 °C)-98.4 °F (36.9 °C)] 98.4 °F (36.9 °C)  Pulse:  [] 57  Resp:  [16-20] 18  SpO2:  [90 %-99 %] 90 %  BP: (109-170)/() 138/64     Weight: 54.4 kg (120 lb)  Body mass index is 19.97 kg/m².    Intake/Output Summary (Last 24 hours) at 10/10/2020 1103  Last data filed at 10/10/2020 1008  Gross per 24 hour   Intake 460 ml   Output 400 ml   Net 60 ml      Physical Exam  Vitals signs reviewed.   Constitutional:       General: She is not in acute distress.     Comments: Elderly female, lying in bed, appears in pain   HENT:      Head: Normocephalic and atraumatic.      Right Ear: External ear normal.      Left Ear: External ear normal.      Nose: Nose normal.      Mouth/Throat:      Comments: Dentures in place, dry mouth with excessive saliva  Eyes:      Conjunctiva/sclera: Conjunctivae normal.   Cardiovascular:      Rate and Rhythm: Regular rhythm. Tachycardia present.      Pulses: Normal pulses.      Heart sounds: No murmur. No gallop.    Pulmonary:      Effort: Pulmonary effort is normal. No respiratory distress.      Breath sounds: No wheezing or rales.   Abdominal:      General: Abdomen is flat. Bowel sounds  are normal. There is no distension.      Tenderness: There is no abdominal tenderness.   Musculoskeletal:      Right lower leg: No edema.      Left lower leg: No edema.      Comments: No pain with internal and external rotation of the leg in full extension however with flexion and external rotation there was severe pain, which is better today than yesterday. No tenderness to palpation along the pelvis   Skin:     General: Skin is warm and dry.   Neurological:      Mental Status: She is oriented to person, place, and time. Mental status is at baseline.   Psychiatric:         Mood and Affect: Mood normal.         Behavior: Behavior normal.         Significant Labs:   CBC:   Recent Labs   Lab 10/09/20  1639 10/10/20  0654   WBC 9.27 7.71   HGB 12.2 10.9*   HCT 38.8 35.5*   * 353*     CMP:   Recent Labs   Lab 10/09/20  1639 10/10/20  0654    136   K 5.1 5.2*    104   CO2 23 23    79   BUN 52* 49*   CREATININE 1.4 1.2   CALCIUM 9.4 9.0   PROT 7.6  --    ALBUMIN 3.9  --    BILITOT 0.7  --    ALKPHOS 99  --    AST 15  --    ALT 10  --    ANIONGAP 12 9   EGFRNONAA 33.6* 40.4*     Urine Culture: No results for input(s): LABURIN in the last 48 hours.  Urine Studies:   Recent Labs   Lab 10/10/20  0623   COLORU Yellow   APPEARANCEUA Cloudy*   PHUR 5.0   SPECGRAV 1.015   PROTEINUA Negative   GLUCUA Negative   KETONESU Negative   BILIRUBINUA Negative   OCCULTUA Negative   NITRITE Positive*   UROBILINOGEN Negative   LEUKOCYTESUR 1+*   RBCUA 5*   WBCUA >100*   BACTERIA Many*   SQUAMEPITHEL Few       Significant Imaging:   CXR: No acute abnormality.    Pelvic x-ray: Mild degenerative change of the hips.      Assessment/Plan:      * Intractable pain  Patient with severe intractable pain  Given Percocet and orphenadrine in the ER with minimal relief  Continue IV pain medications  Patient would like to return home where she is fully independent on discharge  Consult PT/OT though not sure if they are available  for weekend coverage, do not delay discharge if patient is ambulating  Nursing to ambulate when tolerated  Barriers to discharge are pain control      UTI (urinary tract infection)  Follow urine culture and sensitivity.  Start Rocephin 1 g IV daily      Sciatica  Acute on chronic issue  Admit and treat with IV pain medications prn  Ambulate when able  If no relief/unable to ambulate may need MRI        VTE Risk Mitigation (From admission, onward)         Ordered     Place MARIANA hose  Until discontinued .  Avoiding anticoagulation due to recent history of subdural hemorrhage     10/10/20 1059     IP VTE HIGH RISK PATIENT  Once      10/09/20 2123     Place sequential compression device  Until discontinued      10/09/20 2123     Place sequential compression device  Until discontinued      10/09/20 2118                Discharge Planning   KYLE:      Code Status: Full Code   Is the patient medically ready for discharge?:     Reason for patient still in hospital (select all that apply): Patient trending condition                     Srikanth Hackett MD  Department of Hospital Medicine   [facility]

## 2020-10-10 NOTE — SUBJECTIVE & OBJECTIVE
Interval History:  Patient is continuing to experience right hip pain which is somewhat better than yesterday.  Patient does not feel comfortable going home today.  Patient is not interested in skilled nursing facility placement either.  Patient denies any head injury or loss of consciousness.  No chest pain, shortness of breath or abdominal pain reported.  Patient does report increased frequency and possible dysuria.  Will obtain urine analysis.    Review of Systems   Constitutional: Negative for activity change, appetite change, fatigue, fever and unexpected weight change.   HENT: Negative.  Negative for congestion.    Eyes: Negative for photophobia and visual disturbance.   Respiratory: Negative for cough, shortness of breath and wheezing.    Cardiovascular: Negative for chest pain, palpitations and leg swelling.   Gastrointestinal: Negative for abdominal pain.   Endocrine: Negative.    Genitourinary: Negative.    Musculoskeletal: Positive for back pain.        Severe right hip pain   Skin: Negative.    Allergic/Immunologic: Negative.    Neurological: Negative.    Hematological: Negative.    Psychiatric/Behavioral: Negative.      Objective:     Vital Signs (Most Recent):  Temp: 98.4 °F (36.9 °C) (10/10/20 0727)  Pulse: (!) 57 (10/10/20 0814)  Resp: 18 (10/10/20 0822)  BP: 138/64 (10/10/20 0814)  SpO2: (!) 90 % (10/10/20 0727) Vital Signs (24h Range):  Temp:  [97.3 °F (36.3 °C)-98.4 °F (36.9 °C)] 98.4 °F (36.9 °C)  Pulse:  [] 57  Resp:  [16-20] 18  SpO2:  [90 %-99 %] 90 %  BP: (109-170)/() 138/64     Weight: 54.4 kg (120 lb)  Body mass index is 19.97 kg/m².    Intake/Output Summary (Last 24 hours) at 10/10/2020 1103  Last data filed at 10/10/2020 1008  Gross per 24 hour   Intake 460 ml   Output 400 ml   Net 60 ml      Physical Exam  Vitals signs reviewed.   Constitutional:       General: She is not in acute distress.     Comments: Elderly female, lying in bed, appears in pain   HENT:      Head:  Normocephalic and atraumatic.      Right Ear: External ear normal.      Left Ear: External ear normal.      Nose: Nose normal.      Mouth/Throat:      Comments: Dentures in place, dry mouth with excessive saliva  Eyes:      Conjunctiva/sclera: Conjunctivae normal.   Cardiovascular:      Rate and Rhythm: Regular rhythm. Tachycardia present.      Pulses: Normal pulses.      Heart sounds: No murmur. No gallop.    Pulmonary:      Effort: Pulmonary effort is normal. No respiratory distress.      Breath sounds: No wheezing or rales.   Abdominal:      General: Abdomen is flat. Bowel sounds are normal. There is no distension.      Tenderness: There is no abdominal tenderness.   Musculoskeletal:      Right lower leg: No edema.      Left lower leg: No edema.      Comments: No pain with internal and external rotation of the leg in full extension however with flexion and external rotation there was severe pain, which is better today than yesterday. No tenderness to palpation along the pelvis   Skin:     General: Skin is warm and dry.   Neurological:      Mental Status: She is oriented to person, place, and time. Mental status is at baseline.   Psychiatric:         Mood and Affect: Mood normal.         Behavior: Behavior normal.         Significant Labs:   CBC:   Recent Labs   Lab 10/09/20  1639 10/10/20  0654   WBC 9.27 7.71   HGB 12.2 10.9*   HCT 38.8 35.5*   * 353*     CMP:   Recent Labs   Lab 10/09/20  1639 10/10/20  0654    136   K 5.1 5.2*    104   CO2 23 23    79   BUN 52* 49*   CREATININE 1.4 1.2   CALCIUM 9.4 9.0   PROT 7.6  --    ALBUMIN 3.9  --    BILITOT 0.7  --    ALKPHOS 99  --    AST 15  --    ALT 10  --    ANIONGAP 12 9   EGFRNONAA 33.6* 40.4*     Urine Culture: No results for input(s): LABURIN in the last 48 hours.  Urine Studies:   Recent Labs   Lab 10/10/20  0623   COLORU Yellow   APPEARANCEUA Cloudy*   PHUR 5.0   SPECGRAV 1.015   PROTEINUA Negative   GLUCUA Negative   KETONESU  Negative   BILIRUBINUA Negative   OCCULTUA Negative   NITRITE Positive*   UROBILINOGEN Negative   LEUKOCYTESUR 1+*   RBCUA 5*   WBCUA >100*   BACTERIA Many*   SQUAMEPITHEL Few       Significant Imaging:   CXR: No acute abnormality.    Pelvic x-ray: Mild degenerative change of the hips.

## 2020-10-10 NOTE — H&P
[facility] - ED Hospital Medicine  History & Physical    Patient Name: Fiona Arteaga  MRN: [id number]  Admission Date: 10/9/2020  Attending Physician: No att. providers found   Primary Care Provider: Nasreen Tobin MD         Patient information was obtained from patient and ER records.     Subjective:     Principal Problem:Intractable pain    Chief Complaint:   Chief Complaint   Patient presents with    Hip Pain     right hip pain had fall 3 weeks ago and was in hospital for 2 weeks now has right hip pain hx of arthritis in that hip        HPI: Patient is an 88-year-old female with past medical history of AFib, long-term anticoagulant use, hypertension who presents to the ER with a 3 day history of severe right-sided hip and posterior leg pain.  Patient has had pain in this area before and has been told that she has sciatica.  Had a fall about 3 weeks ago, resulted in a subdural hematoma, she was sent to an outside hospital for treatment and then to rehab where she was discharged home recently.  She denies any further falls since then.  Reports that the pain came on kind of suddenly and has been getting much worse.  Feels like sciatic nerve pain.  Has been trying pain medication at home with no relief.  Came to the ER today because the pain was so bad that she was not able to walk.  X-rays of the pelvis showed no acute fracture.  Arthritis present.  Given 2 doses of analgesics in the ER with minimal to no relief. Hospital team called for admission for intractable pain.    Past Medical History:   Diagnosis Date    A-fib     Anticoagulant long-term use     Cancer     skin cancer    Hypertension        History reviewed. No pertinent surgical history.    Review of patient's allergies indicates:  No Known Allergies    No current facility-administered medications on file prior to encounter.      Current Outpatient Medications on File Prior to Encounter   Medication Sig    amLODIPine  (NORVASC) 2.5 MG tablet Take 1 tablet (2.5 mg total) by mouth once daily. Hold until otherwise directed by primary care physician    furosemide (LASIX) 40 MG tablet Take 1 tablet (40 mg total) by mouth once daily.    lisinopril (PRINIVIL,ZESTRIL) 40 MG tablet Take 40 mg by mouth once daily.    metoprolol succinate (TOPROL-XL) 25 MG 24 hr tablet Take 1 tablet (25 mg total) by mouth once daily.    pantoprazole (PROTONIX) 40 MG tablet Take 1 tablet (40 mg total) by mouth once daily.    polyethylene glycol (GLYCOLAX) 17 gram PwPk Take 17 g by mouth once daily.    acetaminophen (TYLENOL) 325 MG tablet Take 2 tablets (650 mg total) by mouth every 4 (four) hours as needed.    senna-docusate 8.6-50 mg (PERICOLACE) 8.6-50 mg per tablet Take 1 tablet by mouth 2 (two) times daily.    traMADoL (ULTRAM) 50 mg tablet Take 0.5 tablets (25 mg total) by mouth every 6 (six) hours as needed for Pain.     Family History     Problem Relation (Age of Onset)    No Known Problems Mother, Father        Tobacco Use    Smoking status: Never Smoker   Substance and Sexual Activity    Alcohol use: Never     Frequency: Never    Drug use: Never    Sexual activity: Not Currently     Review of Systems   Constitutional: Negative for activity change, appetite change, fatigue, fever and unexpected weight change.   HENT: Negative.  Negative for congestion.    Eyes: Negative for photophobia and visual disturbance.   Respiratory: Negative for cough, shortness of breath and wheezing.    Cardiovascular: Negative for chest pain, palpitations and leg swelling.   Gastrointestinal: Negative for abdominal pain.   Endocrine: Negative.    Genitourinary: Negative.    Musculoskeletal: Positive for back pain.        Severe right hip pain   Skin: Negative.    Allergic/Immunologic: Negative.    Neurological: Negative.    Hematological: Negative.    Psychiatric/Behavioral: Negative.      Objective:     Vital Signs (Most Recent):  Temp: 98.2 °F (36.8 °C)  (10/09/20 1451)  Pulse: 96 (10/09/20 1739)  Resp: 18 (10/09/20 1739)  BP: 132/65 (10/09/20 1739)  SpO2: (!) 92 % (10/09/20 1739) Vital Signs (24h Range):  Temp:  [98.2 °F (36.8 °C)] 98.2 °F (36.8 °C)  Pulse:  [95-97] 96  Resp:  [18-20] 18  SpO2:  [92 %-93 %] 92 %  BP: (109-138)/(59-65) 132/65     Weight: 54.4 kg (120 lb)  Body mass index is 19.97 kg/m².    Physical Exam  Vitals signs reviewed.   Constitutional:       General: She is not in acute distress.     Comments: Elderly female, lying in bed, appears in pain   HENT:      Head: Normocephalic and atraumatic.      Right Ear: External ear normal.      Left Ear: External ear normal.      Nose: Nose normal.      Mouth/Throat:      Comments: Dentures in place, dry mouth with excessive saliva  Eyes:      Conjunctiva/sclera: Conjunctivae normal.   Cardiovascular:      Rate and Rhythm: Regular rhythm. Tachycardia present.      Pulses: Normal pulses.      Heart sounds: No murmur. No gallop.    Pulmonary:      Effort: Pulmonary effort is normal. No respiratory distress.      Breath sounds: No wheezing or rales.      Comments: O2 sats 88% on room air though no increased work of breathing, no wheezes, no rales, no respiratory distress distress  Abdominal:      General: Abdomen is flat. Bowel sounds are normal. There is no distension.      Tenderness: There is no abdominal tenderness.   Musculoskeletal:      Right lower leg: No edema.      Left lower leg: No edema.      Comments: No pain with internal and external rotation of the leg in full extension however with flexion and external rotation there was severe pain, almost unbearable, had to defer rest of exam. Left leg without issue. No tenderness to palpation along the pelvis   Skin:     General: Skin is warm and dry.   Neurological:      Mental Status: She is oriented to person, place, and time. Mental status is at baseline.   Psychiatric:         Mood and Affect: Mood normal.         Behavior: Behavior normal.              Significant Labs:   Recent Lab Results       10/09/20  1649   10/09/20  1639        Albumin   3.9     Alkaline Phosphatase   99     ALT   10     Anion Gap   12     AST   15     Baso #   0.03     Basophil%   0.3     BILIRUBIN TOTAL   0.7  Comment:  For infants and newborns, interpretation of results should be based  on gestational age, weight and in agreement with clinical  observations.  Premature Infant recommended reference ranges:  Up to 24 hours.............<8.0 mg/dL  Up to 48 hours............<12.0 mg/dL  3-5 days..................<15.0 mg/dL  6-29 days.................<15.0 mg/dL       BUN, Bld   52     Calcium   9.4     Chloride   101     CO2   23     Creatinine   1.4     Differential Method   Automated     eGFR if African American   38.7     eGFR if non    33.6  Comment:  Calculation used to obtain the estimated glomerular filtration  rate (eGFR) is the CKD-EPI equation.        Eos #   0.2     Eosinophil%   2.3     Glucose   104     Gran # (ANC)   6.1     Gran%   66.1     Hematocrit   38.8     Hemoglobin   12.2     Immature Grans (Abs)   0.12  Comment:  Mild elevation in immature granulocytes is non specific and   can be seen in a variety of conditions including stress response,   acute inflammation, trauma and pregnancy. Correlation with other   laboratory and clinical findings is essential.       Immature Granulocytes   1.3     Lymph #   1.8     Lymph%   19.6     MCH   30.9     MCHC   31.4     MCV   98     Mono #   1.0     Mono%   10.4     MPV   9.1     nRBC   0     Platelets   366     Potassium   5.1     PROTEIN TOTAL   7.6     RBC   3.95     RDW   13.5     SARS-CoV-2 RNA, Amplification, Qual Negative  Comment:  This test utilizes isothermal nucleic acid amplification   technology to detect the SARS-CoV-2 RdRp nucleic acid segment.   The analytical sensitivity (limit of detection) is 125 genome   equivalents/mL.   A POSITIVE result implies infection with the SARS-CoV-2 virus;  the  patient is presumed to be contagious.    A NEGATIVE result means that SARS-CoV-2 nucleic acids are not  present above the limit of detection. A NEGATIVE result should be   treated as presumptive. It does not rule out the possibility of   COVID-19 and should not be the sole basis for treatment decisions.   If COVID-19 is strongly suspected based on clinical and exposure   history, re-testing using an alternate molecular assay should be   considered.   This test is only for use under the Food and Drug   Administration s Emergency Use Authorization (EUA).   Commercial kits are provided by [employer].   Performance characteristics of the EUA have been independently  verified by [facility] Department of  Pathology and Laboratory Medicine.   _________________________________________________________________  The ID NOW COVID-19 Letter of Authorization, along with the   authorized Fact Sheet for Healthcare Providers, the authorized Fact  Sheet for Patients, and authorized labeling are available on the FDA   website:  www.fda.gov/MedicalDevices/Safety/EmergencySituations/mge731853.htm         Sodium   136     WBC   9.27         All pertinent labs within the past 24 hours have been reviewed.    Significant Imaging: I have reviewed all pertinent imaging results/findings within the past 24 hours.    Assessment/Plan:     * Intractable pain  Patient with severe intractable pain  Given Percocet and orphenadrine in the ER with minimal relief  Will admit and start on IV pain medications  Patient would like to return home where she is fully independent on discharge  Consult PT/OT though not sure if they are available for weekend coverage, do not delay discharge if patient is ambulating  Nursing to ambulate when tolerated  Barriers to discharge are pain control      Sciatica  Acute on chronic issue  Admit and treat with IV pain medications prn  Ambulate when able  If no relief/unable to ambulate may need  MRI      VTE Risk Mitigation (From admission, onward)    None             Nona Vieyra MD  Department of Hospital Medicine   [facility] - ED

## 2020-10-10 NOTE — ASSESSMENT & PLAN NOTE
Patient with severe intractable pain  Given Percocet and orphenadrine in the ER with minimal relief  Will admit and start on IV pain medications  Patient would like to return home where she is fully independent on discharge  Consult PT/OT though not sure if they are available for weekend coverage, do not delay discharge if patient is ambulating  Nursing to ambulate when tolerated  Barriers to discharge are pain control

## 2020-10-10 NOTE — PLAN OF CARE
Problem: Fall Injury Risk  Goal: Absence of Fall and Fall-Related Injury  10/10/2020 1303 by Martina Davies RN  Outcome: Ongoing, Progressing  10/10/2020 1302 by Martina Davies RN  Outcome: Ongoing, Progressing     Problem: Adult Inpatient Plan of Care  Goal: Plan of Care Review  10/10/2020 1303 by Martina Davies RN  Outcome: Ongoing, Progressing  10/10/2020 1302 by Martina Davies RN  Outcome: Ongoing, Progressing  Goal: Patient-Specific Goal (Individualization)  10/10/2020 1303 by Martina Davies RN  Outcome: Ongoing, Progressing  10/10/2020 1302 by Martina Davies RN  Outcome: Ongoing, Progressing  Goal: Absence of Hospital-Acquired Illness or Injury  10/10/2020 1303 by Martina Davies RN  Outcome: Ongoing, Progressing  10/10/2020 1302 by Martina Davies RN  Outcome: Ongoing, Progressing  Goal: Optimal Comfort and Wellbeing  10/10/2020 1303 by Martina Davies RN  Outcome: Ongoing, Progressing  10/10/2020 1302 by Martina Davies RN  Outcome: Ongoing, Progressing  Goal: Readiness for Transition of Care  10/10/2020 1303 by Martina Davies RN  Outcome: Ongoing, Progressing  10/10/2020 1302 by Martina Davies RN  Outcome: Ongoing, Progressing  Goal: Rounds/Family Conference  10/10/2020 1303 by Martina Davies RN  Outcome: Ongoing, Progressing  10/10/2020 1302 by Martina Davies RN  Outcome: Ongoing, Progressing     Problem: Wound  Goal: Optimal Wound Healing  Outcome: Ongoing, Progressing     Problem: Skin Injury Risk Increased  Goal: Skin Health and Integrity  10/10/2020 1303 by Martina Davies RN  Outcome: Ongoing, Progressing  10/10/2020 1302 by Martina Davies RN  Outcome: Ongoing, Progressing     Problem: Pain Acute  Goal: Optimal Pain Control  Outcome: Ongoing, Progressing

## 2020-10-10 NOTE — ASSESSMENT & PLAN NOTE
Patient with severe intractable pain  Given Percocet and orphenadrine in the ER with minimal relief  Continue IV pain medications  Patient would like to return home where she is fully independent on discharge  Consult PT/OT though not sure if they are available for weekend coverage, do not delay discharge if patient is ambulating  Nursing to ambulate when tolerated  Barriers to discharge are pain control

## 2020-10-11 VITALS
RESPIRATION RATE: 18 BRPM | HEIGHT: 65 IN | DIASTOLIC BLOOD PRESSURE: 55 MMHG | OXYGEN SATURATION: 90 % | TEMPERATURE: 97 F | SYSTOLIC BLOOD PRESSURE: 108 MMHG | WEIGHT: 120 LBS | HEART RATE: 93 BPM | BODY MASS INDEX: 19.99 KG/M2

## 2020-10-11 LAB
ANION GAP SERPL CALC-SCNC: 8 MMOL/L (ref 8–16)
BASOPHILS # BLD AUTO: 0.02 K/UL (ref 0–0.2)
BASOPHILS NFR BLD: 0.3 % (ref 0–1.9)
BUN SERPL-MCNC: 58 MG/DL (ref 8–23)
CALCIUM SERPL-MCNC: 8.7 MG/DL (ref 8.7–10.5)
CHLORIDE SERPL-SCNC: 103 MMOL/L (ref 95–110)
CO2 SERPL-SCNC: 24 MMOL/L (ref 23–29)
CREAT SERPL-MCNC: 2.2 MG/DL (ref 0.5–1.4)
DIFFERENTIAL METHOD: ABNORMAL
EOSINOPHIL # BLD AUTO: 0.2 K/UL (ref 0–0.5)
EOSINOPHIL NFR BLD: 3.2 % (ref 0–8)
ERYTHROCYTE [DISTWIDTH] IN BLOOD BY AUTOMATED COUNT: 13.5 % (ref 11.5–14.5)
EST. GFR  (AFRICAN AMERICAN): 22.4 ML/MIN/1.73 M^2
EST. GFR  (NON AFRICAN AMERICAN): 19.4 ML/MIN/1.73 M^2
GLUCOSE SERPL-MCNC: 107 MG/DL (ref 70–110)
HCT VFR BLD AUTO: 33.9 % (ref 37–48.5)
HGB BLD-MCNC: 10.4 G/DL (ref 12–16)
IMM GRANULOCYTES # BLD AUTO: 0.04 K/UL (ref 0–0.04)
IMM GRANULOCYTES NFR BLD AUTO: 0.7 % (ref 0–0.5)
LYMPHOCYTES # BLD AUTO: 1.7 K/UL (ref 1–4.8)
LYMPHOCYTES NFR BLD: 28.1 % (ref 18–48)
MCH RBC QN AUTO: 30.8 PG (ref 27–31)
MCHC RBC AUTO-ENTMCNC: 30.7 G/DL (ref 32–36)
MCV RBC AUTO: 100 FL (ref 82–98)
MONOCYTES # BLD AUTO: 0.9 K/UL (ref 0.3–1)
MONOCYTES NFR BLD: 14.1 % (ref 4–15)
NEUTROPHILS # BLD AUTO: 3.2 K/UL (ref 1.8–7.7)
NEUTROPHILS NFR BLD: 53.6 % (ref 38–73)
NRBC BLD-RTO: 0 /100 WBC
PLATELET # BLD AUTO: 308 K/UL (ref 150–350)
PMV BLD AUTO: 8.9 FL (ref 9.2–12.9)
POTASSIUM SERPL-SCNC: 5.7 MMOL/L (ref 3.5–5.1)
RBC # BLD AUTO: 3.38 M/UL (ref 4–5.4)
SODIUM SERPL-SCNC: 135 MMOL/L (ref 136–145)
WBC # BLD AUTO: 6.01 K/UL (ref 3.9–12.7)

## 2020-10-11 PROCEDURE — 96376 TX/PRO/DX INJ SAME DRUG ADON: CPT

## 2020-10-11 PROCEDURE — 25000003 PHARM REV CODE 250: Performed by: FAMILY MEDICINE

## 2020-10-11 PROCEDURE — G0378 HOSPITAL OBSERVATION PER HR: HCPCS

## 2020-10-11 PROCEDURE — 80048 BASIC METABOLIC PNL TOTAL CA: CPT

## 2020-10-11 PROCEDURE — 63600175 PHARM REV CODE 636 W HCPCS: Performed by: FAMILY MEDICINE

## 2020-10-11 PROCEDURE — 85025 COMPLETE CBC W/AUTO DIFF WBC: CPT

## 2020-10-11 PROCEDURE — 36415 COLL VENOUS BLD VENIPUNCTURE: CPT

## 2020-10-11 RX ORDER — CIPROFLOXACIN 250 MG/1
250 TABLET, FILM COATED ORAL 2 TIMES DAILY
Qty: 10 TABLET | Refills: 0 | Status: SHIPPED | OUTPATIENT
Start: 2020-10-11 | End: 2020-10-16

## 2020-10-11 RX ORDER — ONDANSETRON 4 MG/1
4 TABLET, FILM COATED ORAL EVERY 6 HOURS PRN
Qty: 15 TABLET | Refills: 0 | Status: SHIPPED | OUTPATIENT
Start: 2020-10-11

## 2020-10-11 RX ORDER — TRAMADOL HYDROCHLORIDE 50 MG/1
25 TABLET ORAL EVERY 8 HOURS PRN
Qty: 20 TABLET | Refills: 0 | Status: SHIPPED | OUTPATIENT
Start: 2020-10-11

## 2020-10-11 RX ADMIN — MORPHINE SULFATE 2 MG: 4 INJECTION, SOLUTION INTRAMUSCULAR; INTRAVENOUS at 03:10

## 2020-10-11 RX ADMIN — FUROSEMIDE 40 MG: 20 TABLET ORAL at 09:10

## 2020-10-11 RX ADMIN — POLYETHYLENE GLYCOL (3350) 17 G: 17 POWDER, FOR SOLUTION ORAL at 09:10

## 2020-10-11 RX ADMIN — METOPROLOL SUCCINATE 25 MG: 25 TABLET, EXTENDED RELEASE ORAL at 09:10

## 2020-10-11 RX ADMIN — PANTOPRAZOLE SODIUM 40 MG: 40 TABLET, DELAYED RELEASE ORAL at 09:10

## 2020-10-11 RX ADMIN — SENNOSIDES AND DOCUSATE SODIUM 1 TABLET: 8.6; 5 TABLET ORAL at 09:10

## 2020-10-11 RX ADMIN — AMLODIPINE BESYLATE 2.5 MG: 2.5 TABLET ORAL at 09:10

## 2020-10-11 RX ADMIN — LISINOPRIL 40 MG: 10 TABLET ORAL at 09:10

## 2020-10-11 RX ADMIN — HYDROCODONE BITARTRATE AND ACETAMINOPHEN 1 TABLET: 5; 325 TABLET ORAL at 09:10

## 2020-10-11 NOTE — SUBJECTIVE & OBJECTIVE
Interval History:  Patient is continuing to experience right hip pain which is somewhat better than yesterday.  Patient does not feel comfortable going home today.  Patient is not interested in skilled nursing facility placement either.  Patient denies any head injury or loss of consciousness.  No chest pain, shortness of breath or abdominal pain reported.  Patient does report increased frequency and possible dysuria.  Will obtain urine analysis.    Review of Systems   Constitutional: Negative for activity change, appetite change, fatigue, fever and unexpected weight change.   HENT: Negative.  Negative for congestion.    Eyes: Negative for photophobia and visual disturbance.   Respiratory: Negative for cough, shortness of breath and wheezing.    Cardiovascular: Negative for chest pain, palpitations and leg swelling.   Gastrointestinal: Negative for abdominal pain.   Endocrine: Negative.    Genitourinary: Negative.    Musculoskeletal: Positive for back pain.        Severe right hip pain   Skin: Negative.    Allergic/Immunologic: Negative.    Neurological: Negative.    Hematological: Negative.    Psychiatric/Behavioral: Negative.      Objective:     Vital Signs (Most Recent):  Temp: 96.5 °F (35.8 °C) (10/11/20 0719)  Pulse: 93 (10/11/20 0719)  Resp: 20 (10/11/20 0719)  BP: (!) 108/55 (10/11/20 0719)  SpO2: (!) 90 % (10/11/20 0719) Vital Signs (24h Range):  Temp:  [96.5 °F (35.8 °C)-98.6 °F (37 °C)] 96.5 °F (35.8 °C)  Pulse:  [46-99] 93  Resp:  [16-20] 20  SpO2:  [90 %-96 %] 90 %  BP: (102-150)/(46-78) 108/55     Weight: 54.4 kg (120 lb)  Body mass index is 19.97 kg/m².    Intake/Output Summary (Last 24 hours) at 10/11/2020 0817  Last data filed at 10/11/2020 0647  Gross per 24 hour   Intake 800 ml   Output 600 ml   Net 200 ml      Physical Exam  Vitals signs reviewed.   Constitutional:       General: She is not in acute distress.     Comments: Elderly female, lying in bed, appears in pain   HENT:      Head:  Normocephalic and atraumatic.      Right Ear: External ear normal.      Left Ear: External ear normal.      Nose: Nose normal.      Mouth/Throat:      Comments: Dentures in place, dry mouth with excessive saliva  Eyes:      Conjunctiva/sclera: Conjunctivae normal.   Cardiovascular:      Rate and Rhythm: Regular rhythm. Tachycardia present.      Pulses: Normal pulses.      Heart sounds: No murmur. No gallop.    Pulmonary:      Effort: Pulmonary effort is normal. No respiratory distress.      Breath sounds: No wheezing or rales.   Abdominal:      General: Abdomen is flat. Bowel sounds are normal. There is no distension.      Tenderness: There is no abdominal tenderness.   Musculoskeletal:      Right lower leg: No edema.      Left lower leg: No edema.      Comments: No pain with internal and external rotation of the leg in full extension however with flexion and external rotation there was severe pain, which is better today than yesterday. No tenderness to palpation along the pelvis   Skin:     General: Skin is warm and dry.   Neurological:      Mental Status: She is oriented to person, place, and time. Mental status is at baseline.   Psychiatric:         Mood and Affect: Mood normal.         Behavior: Behavior normal.         Significant Labs:   CBC:   Recent Labs   Lab 10/09/20  1639 10/10/20  0654 10/11/20  0720   WBC 9.27 7.71 6.01   HGB 12.2 10.9* 10.4*   HCT 38.8 35.5* 33.9*   * 353* 308     CMP:   Recent Labs   Lab 10/09/20  1639 10/10/20  0654    136   K 5.1 5.2*    104   CO2 23 23    79   BUN 52* 49*   CREATININE 1.4 1.2   CALCIUM 9.4 9.0   PROT 7.6  --    ALBUMIN 3.9  --    BILITOT 0.7  --    ALKPHOS 99  --    AST 15  --    ALT 10  --    ANIONGAP 12 9   EGFRNONAA 33.6* 40.4*     Urine Culture: No results for input(s): LABURIN in the last 48 hours.  Urine Studies:   Recent Labs   Lab 10/10/20  0623   COLORU Yellow   APPEARANCEUA Cloudy*   PHUR 5.0   SPECGRAV 1.015   PROTEINUA Negative    GLUCUA Negative   KETONESU Negative   BILIRUBINUA Negative   OCCULTUA Negative   NITRITE Positive*   UROBILINOGEN Negative   LEUKOCYTESUR 1+*   RBCUA 5*   WBCUA >100*   BACTERIA Many*   SQUAMEPITHEL Few       Significant Imaging:   CXR: No acute abnormality.    Pelvic x-ray: Mild degenerative change of the hips.

## 2020-10-11 NOTE — DISCHARGE SUMMARY
[facility] Medicine  Discharge Summary      Patient Name: Fiona Arteaga  MRN: [id number]  Admission Date: 10/9/2020  Hospital Length of Stay: 0 days  Discharge Date and Time:  10/11/2020 10:35 AM  Attending Physician: Nona Vieyra MD   Discharging Provider: Srikanth Hackett MD  Primary Care Provider: Nasreen Tobin MD      HPI:   Patient is an 88-year-old female with past medical history of AFib, long-term anticoagulant use, hypertension who presents to the ER with a 3 day history of severe right-sided hip and posterior leg pain.  Patient has had pain in this area before and has been told that she has sciatica.  Had a fall about 3 weeks ago, resulted in a subdural hematoma, she was sent to an outside hospital for treatment and then to rehab where she was discharged home recently.  She denies any further falls since then.  Reports that the pain came on kind of suddenly and has been getting much worse.  Feels like sciatic nerve pain.  Has been trying pain medication at home with no relief.  Came to the ER today because the pain was so bad that she was not able to walk.  X-rays of the pelvis showed no acute fracture.  Arthritis present.  Given 2 doses of analgesics in the ER with minimal to no relief. Hospital team called for admission for intractable pain.    Hospital Course:   Patient was admitted to medicine telemetry service and was provided supportive care.  Patient also noted to have urinary tract infection for which patient received antibiotic therapy.  Urine culture results are negative to date.  Patient required narcotic medication for pain control.  Hip pain has improved.  Patient is able to ambulate in the room and in the hallway.  Patient has been counseled regarding regular use of walker and fall precautions discussed with the patient.  Patient to complete antibiotic course.  Patient also instructed to improve protein intake.  Patient was offered a skilled  nursing facility placement however patient has opted for home health services and home physical therapy which is being arranged.  Patient was instructed in case her symptoms continued to get worse patient will require MRI of lower back and possible consideration for orthopedic evaluation for persisting right hip pain.  Patient voiced understanding with discharge plan of care.  Side effects related to tramadol use increase sedation and possibility of fall discussed with the patient.    Consults: None    CXR: No acute abnormality.     Pelvic x-ray: Mild degenerative change of the hips.    Final Active Diagnoses:    Diagnosis Date Noted POA    PRINCIPAL PROBLEM:  Intractable pain [R52] 10/09/2020 Yes    UTI (urinary tract infection) [N39.0] 10/10/2020 No    Sciatica [M54.30] 10/09/2020 Yes      Problems Resolved During this Admission:       Discharged Condition: good    Disposition: Home or Self Care    Follow Up:  Follow-up Information     Nasreen Tobin MD In 1 week.    Specialty: Hematology and Oncology  Contact information:  [address]   [facility] [address]  [phone]                 Patient Instructions:      Referral to Home health   Referral Priority: Routine Referral Type: Home Health   Referral Reason: Specialty Services Required   Requested Specialty: Home Health Services   Number of Visits Requested: 1     Diet Cardiac   Order Comments: Boost Plus can with meals     Other restrictions (specify):   Order Comments: PLEASE OBSERVE FALL PRECAUTIONS, use walker for ambulation     Call MD for:   Order Comments: For worsening symptoms, chest pain, shortness of breath, increased abdominal pain, high grade fever, stroke or stroke like symptoms, immediately go to the nearest Emergency Room or call 911 as soon as possible.       Significant Diagnostic Studies: Labs:   CMP   Recent Labs   Lab 10/09/20  1639 10/10/20  0654 10/11/20  0720    136 135*   K 5.1 5.2* 5.7*    104 103   CO2 23 23 24     79 107   BUN 52* 49* 58*   CREATININE 1.4 1.2 2.2*   CALCIUM 9.4 9.0 8.7   PROT 7.6  --   --    ALBUMIN 3.9  --   --    BILITOT 0.7  --   --    ALKPHOS 99  --   --    AST 15  --   --    ALT 10  --   --    ANIONGAP 12 9 8   ESTGFRAFRICA 38.7* 46.6* 22.4*   EGFRNONAA 33.6* 40.4* 19.4*    and CBC   Recent Labs   Lab 10/09/20  1639 10/10/20  0654 10/11/20  0720   WBC 9.27 7.71 6.01   HGB 12.2 10.9* 10.4*   HCT 38.8 35.5* 33.9*   * 353* 308     Microbiology Results (last 7 days)     Procedure Component Value Units Date/Time    Urine culture [[id number]] Collected: 10/10/20 0623    Order Status: No result Specimen: Urine Updated: 10/10/20 0728        Pending Diagnostic Studies:     None         Medications:  Reconciled Home Medications:      Medication List      START taking these medications    ciprofloxacin HCl 250 MG tablet  Commonly known as: CIPRO  Take 1 tablet (250 mg total) by mouth 2 (two) times daily. for 5 days        CHANGE how you take these medications    traMADoL 50 mg tablet  Commonly known as: ULTRAM  Take 0.5 tablets (25 mg total) by mouth every 8 (eight) hours as needed for Pain.  What changed: when to take this        CONTINUE taking these medications    acetaminophen 325 MG tablet  Commonly known as: TYLENOL  Take 2 tablets (650 mg total) by mouth every 4 (four) hours as needed.     amLODIPine 2.5 MG tablet  Commonly known as: NORVASC  Take 1 tablet (2.5 mg total) by mouth once daily. Hold until otherwise directed by primary care physician     furosemide 40 MG tablet  Commonly known as: LASIX  Take 1 tablet (40 mg total) by mouth once daily.     lisinopriL 40 MG tablet  Commonly known as: PRINIVIL,ZESTRIL  Take 40 mg by mouth once daily.     metoprolol succinate 25 MG 24 hr tablet  Commonly known as: TOPROL-XL  Take 1 tablet (25 mg total) by mouth once daily.     pantoprazole 40 MG tablet  Commonly known as: PROTONIX  Take 1 tablet (40 mg total) by mouth once daily.     polyethylene  glycol 17 gram Pwpk  Commonly known as: GLYCOLAX  Take 17 g by mouth once daily.     senna-docusate 8.6-50 mg 8.6-50 mg per tablet  Commonly known as: PERICOLACE  Take 1 tablet by mouth 2 (two) times daily.            Indwelling Lines/Drains at time of discharge:   Lines/Drains/Airways     None                 Time spent on the discharge of patient: 28 minutes  Patient was seen and examined on the date of discharge and determined to be suitable for discharge.         Srikanth Hackett MD  Department of Hospital Medicine  [facility]

## 2020-10-11 NOTE — NURSING
D/C HOME VIA POV WITH [facility]. CONDITION GOOD. D/C INSTRUCTIONS GIVEN TO MEJIA (KAILYN). LEFT MESSAGE WITH SOCIAL SERVICE FOR SET UP VISITING NURSE AND PT. VERBALIZED UNDERSTANDING. NO DISTRESS NOTED OR VERBALIZED AT TIME OF D/C.

## 2020-10-11 NOTE — PLAN OF CARE
10/11/20 5310   Post-Acute Status   Post-Acute Authorization Home Health   Home Health Status Referrals Sent   Discharge Plan   Discharge Plan A Home Health   Per nurse patient needs home health. Had home health in the past but ran them off. Not sure of the company. Faxed to [facility].

## 2020-10-12 LAB — BACTERIA UR CULT: ABNORMAL

## 2020-10-12 NOTE — PLAN OF CARE
10/12/20 0907   Post-Acute Status   Post-Acute Authorization Home Health   Home Health Status Referrals Sent   Discharge Plan   Discharge Plan A Home Health   Referral was faxed to [facility]; [facility] will be the one seeing this patient. Referral faxed to them at [phone]. Someone will let me know if there will be a problem with them seeing her.

## 2020-10-12 NOTE — PLAN OF CARE
10/12/20 0073   Final Note   Assessment Type Final Discharge Note   Called patient to give follow up information and she stated that she is hard of hearing and would like me to speak with her niece.  Informed patient's niece that Ahmet's office will call them to schedule the one week follow-up appointment and that [facility]  would also be calling them to arrange a time to come see the patient.  Understanding verbalized. No other needs voiced at this time.

## 2020-11-20 ENCOUNTER — EXTERNAL HOME HEALTH (OUTPATIENT)
Dept: HOME HEALTH SERVICES | Facility: HOSPITAL | Age: 85
End: 2020-11-20
Payer: MEDICARE

## 2020-11-24 ENCOUNTER — DOCUMENT SCAN (OUTPATIENT)
Dept: HOME HEALTH SERVICES | Facility: HOSPITAL | Age: 85
End: 2020-11-24
Payer: MEDICARE

## 2023-09-28 NOTE — NURSING
"Eighteen second pause noted on telemetry at 0222. Pt. Was asymptomatic: sitting up in bed, verbalizing that she "felt fine". Other vital signs stable. External pacer pads in place on pt. Pacer pads connected to Zoll while Zoll is kept on in monitor mode.   "
[facility] called updated on patients status . Awaiting  accepting doctor  
Dr. Bonds updated on pt. Status.Informed pt much improved since BiPap placement, cardizem admin, SL nitro and morphine IV administration. Informed pt resting quietly with eyes closed. No complaints. Skin warm and dry. Verbalized acknowledgement.   
Dr. Feliciano notified of episode of acute SOB, afib RVR, and elevated SBP. New orders received for morphine and SL nitro.   
Dr. Feliciano on unit to see pt. Pt resting quietly and comfortably at this time on BiPap. Dr. Feliciano spoke to pt regarding the possibility of angiogram in AM. Pt verbalized understanding. New orders for repeat labs ordered.   
Dr. Thurston at bedside to see pt. For new consult. No new orders received.   
Notified Dr. Garces of platelet count of 15. Says he does not want to order to transfuse platelets at this time.   
Pt OOB up to bedside commode chair. Upon return to bed, pt in afib RVR, dqxn=798x, FEH=521-237d, pt became acutely SOB. 02 sats~80 on 15L HiFlow NC. RT called to bedside to place pt on BiPap. Dr. Bonds notified. New orders received for cardizem IV push.   
Pt. Had a 7 sec pause on the monitor. Code cart in room and external pacer pads were connected to pt. Pt. Asymptomatic. VSS other than HR. Notified Dr. ARELIS Feliciano. New orders received to put metoprolol on hold for the future and keep pt connected to pacer pads. Notify him again only if pt becomes symptomatic or hemodynamically unstable.   
Report called to Alyse Narayanan RN at [facility] preparing to transfer now awaiting ambulance  
Spoke to Dr. ELOY Feliciano. Notified of pt's episode of bradycardia and pauses on the monitor. And that pt was asymptomatic. No new orders received.   
Spoke to Dr. Feliciano. Informed pt much improved since BiPap placement, cardizem admin, SL nitro and morphine IV administration. Informed pt resting quietly with eyes closed. No complaints. Skin warm and dry. New orders received.   
Spoke to pt richi Edge per pt request. Updated on pt status and plan of care at this time. Verbalized understanding.   
Tridil restarted for bp control. Pt resting quietly in bed. Dr Feliciano on unit aware pt overall status and labs . Orders received to transfer to [facility] . Placed order in [facility] for hospital transfer. Awaiting bed assignment  
20.6